# Patient Record
Sex: FEMALE | Race: WHITE | NOT HISPANIC OR LATINO | URBAN - METROPOLITAN AREA
[De-identification: names, ages, dates, MRNs, and addresses within clinical notes are randomized per-mention and may not be internally consistent; named-entity substitution may affect disease eponyms.]

---

## 2020-07-10 ENCOUNTER — INPATIENT (INPATIENT)
Facility: HOSPITAL | Age: 75
LOS: 13 days | Discharge: HOME HEALTH | DRG: 456 | End: 2020-07-24
Attending: INTERNAL MEDICINE | Admitting: INTERNAL MEDICINE
Payer: COMMERCIAL

## 2020-07-10 ENCOUNTER — RESULT REVIEW (OUTPATIENT)
Age: 75
End: 2020-07-10

## 2020-07-10 VITALS — WEIGHT: 139.99 LBS | HEIGHT: 64 IN

## 2020-07-10 DIAGNOSIS — J90 PLEURAL EFFUSION, NOT ELSEWHERE CLASSIFIED: ICD-10-CM

## 2020-07-10 LAB
ALBUMIN FLD-MCNC: 2.7 G/DL — SIGNIFICANT CHANGE UP
ALBUMIN SERPL ELPH-MCNC: 3.1 G/DL — LOW (ref 3.3–5)
ALP SERPL-CCNC: 126 U/L — HIGH (ref 40–120)
ALT FLD-CCNC: 33 U/L — SIGNIFICANT CHANGE UP (ref 12–78)
ANION GAP SERPL CALC-SCNC: 7 MMOL/L — SIGNIFICANT CHANGE UP (ref 5–17)
APPEARANCE UR: CLEAR — SIGNIFICANT CHANGE UP
APTT BLD: 29.4 SEC — SIGNIFICANT CHANGE UP (ref 27.5–35.5)
AST SERPL-CCNC: 28 U/L — SIGNIFICANT CHANGE UP (ref 15–37)
BASOPHILS # BLD AUTO: 0.07 K/UL — SIGNIFICANT CHANGE UP (ref 0–0.2)
BASOPHILS NFR BLD AUTO: 0.6 % — SIGNIFICANT CHANGE UP (ref 0–2)
BILIRUB SERPL-MCNC: 0.4 MG/DL — SIGNIFICANT CHANGE UP (ref 0.2–1.2)
BILIRUB UR-MCNC: NEGATIVE — SIGNIFICANT CHANGE UP
BUN SERPL-MCNC: 11 MG/DL — SIGNIFICANT CHANGE UP (ref 7–23)
CALCIUM SERPL-MCNC: 8.7 MG/DL — SIGNIFICANT CHANGE UP (ref 8.5–10.1)
CHLORIDE SERPL-SCNC: 104 MMOL/L — SIGNIFICANT CHANGE UP (ref 96–108)
CO2 SERPL-SCNC: 25 MMOL/L — SIGNIFICANT CHANGE UP (ref 22–31)
COLOR SPEC: YELLOW — SIGNIFICANT CHANGE UP
CREAT SERPL-MCNC: 0.73 MG/DL — SIGNIFICANT CHANGE UP (ref 0.5–1.3)
DIFF PNL FLD: NEGATIVE — SIGNIFICANT CHANGE UP
EOSINOPHIL # BLD AUTO: 0.19 K/UL — SIGNIFICANT CHANGE UP (ref 0–0.5)
EOSINOPHIL NFR BLD AUTO: 1.5 % — SIGNIFICANT CHANGE UP (ref 0–6)
GLUCOSE FLD-MCNC: 101 MG/DL — SIGNIFICANT CHANGE UP
GLUCOSE SERPL-MCNC: 114 MG/DL — HIGH (ref 70–99)
GLUCOSE UR QL: NEGATIVE MG/DL — SIGNIFICANT CHANGE UP
GRAM STN FLD: SIGNIFICANT CHANGE UP
HCT VFR BLD CALC: 36.6 % — SIGNIFICANT CHANGE UP (ref 34.5–45)
HGB BLD-MCNC: 12 G/DL — SIGNIFICANT CHANGE UP (ref 11.5–15.5)
IMM GRANULOCYTES NFR BLD AUTO: 1.3 % — SIGNIFICANT CHANGE UP (ref 0–1.5)
INR BLD: 1.04 RATIO — SIGNIFICANT CHANGE UP (ref 0.88–1.16)
KETONES UR-MCNC: ABNORMAL
LACTATE SERPL-SCNC: 1.3 MMOL/L — SIGNIFICANT CHANGE UP (ref 0.7–2)
LDH SERPL L TO P-CCNC: 410 U/L — SIGNIFICANT CHANGE UP
LEUKOCYTE ESTERASE UR-ACNC: ABNORMAL
LYMPHOCYTES # BLD AUTO: 18.6 % — SIGNIFICANT CHANGE UP (ref 13–44)
LYMPHOCYTES # BLD AUTO: 2.36 K/UL — SIGNIFICANT CHANGE UP (ref 1–3.3)
MAGNESIUM SERPL-MCNC: 2.2 MG/DL — SIGNIFICANT CHANGE UP (ref 1.6–2.6)
MCHC RBC-ENTMCNC: 30.1 PG — SIGNIFICANT CHANGE UP (ref 27–34)
MCHC RBC-ENTMCNC: 32.8 GM/DL — SIGNIFICANT CHANGE UP (ref 32–36)
MCV RBC AUTO: 91.7 FL — SIGNIFICANT CHANGE UP (ref 80–100)
MONOCYTES # BLD AUTO: 1.19 K/UL — HIGH (ref 0–0.9)
MONOCYTES NFR BLD AUTO: 9.4 % — SIGNIFICANT CHANGE UP (ref 2–14)
NEUTROPHILS # BLD AUTO: 8.71 K/UL — HIGH (ref 1.8–7.4)
NEUTROPHILS NFR BLD AUTO: 68.6 % — SIGNIFICANT CHANGE UP (ref 43–77)
NITRITE UR-MCNC: NEGATIVE — SIGNIFICANT CHANGE UP
NT-PROBNP SERPL-SCNC: 209 PG/ML — HIGH (ref 0–125)
PH FLD: 7.38 — SIGNIFICANT CHANGE UP
PH UR: 7 — SIGNIFICANT CHANGE UP (ref 5–8)
PLATELET # BLD AUTO: 470 K/UL — HIGH (ref 150–400)
POTASSIUM SERPL-MCNC: 4.2 MMOL/L — SIGNIFICANT CHANGE UP (ref 3.5–5.3)
POTASSIUM SERPL-SCNC: 4.2 MMOL/L — SIGNIFICANT CHANGE UP (ref 3.5–5.3)
PROT FLD-MCNC: 4 G/DL — SIGNIFICANT CHANGE UP
PROT SERPL-MCNC: 7 GM/DL — SIGNIFICANT CHANGE UP (ref 6–8.3)
PROT UR-MCNC: NEGATIVE MG/DL — SIGNIFICANT CHANGE UP
PROTHROM AB SERPL-ACNC: 12.2 SEC — SIGNIFICANT CHANGE UP (ref 10.6–13.6)
RBC # BLD: 3.99 M/UL — SIGNIFICANT CHANGE UP (ref 3.8–5.2)
RBC # FLD: 13.6 % — SIGNIFICANT CHANGE UP (ref 10.3–14.5)
SARS-COV-2 RNA SPEC QL NAA+PROBE: SIGNIFICANT CHANGE UP
SODIUM SERPL-SCNC: 136 MMOL/L — SIGNIFICANT CHANGE UP (ref 135–145)
SP GR SPEC: 1.01 — SIGNIFICANT CHANGE UP (ref 1.01–1.02)
SPECIMEN SOURCE FLD: SIGNIFICANT CHANGE UP
SPECIMEN SOURCE: SIGNIFICANT CHANGE UP
TROPONIN I SERPL-MCNC: <0.015 NG/ML — SIGNIFICANT CHANGE UP (ref 0.01–0.04)
TSH SERPL-MCNC: 0.66 UU/ML — SIGNIFICANT CHANGE UP (ref 0.34–4.82)
UROBILINOGEN FLD QL: NEGATIVE MG/DL — SIGNIFICANT CHANGE UP
WBC # BLD: 12.68 K/UL — HIGH (ref 3.8–10.5)
WBC # FLD AUTO: 12.68 K/UL — HIGH (ref 3.8–10.5)

## 2020-07-10 PROCEDURE — 72156 MRI NECK SPINE W/O & W/DYE: CPT

## 2020-07-10 PROCEDURE — 84157 ASSAY OF PROTEIN OTHER: CPT

## 2020-07-10 PROCEDURE — 88307 TISSUE EXAM BY PATHOLOGIST: CPT

## 2020-07-10 PROCEDURE — 86900 BLOOD TYPING SEROLOGIC ABO: CPT

## 2020-07-10 PROCEDURE — 80061 LIPID PANEL: CPT

## 2020-07-10 PROCEDURE — 82945 GLUCOSE OTHER FLUID: CPT

## 2020-07-10 PROCEDURE — 78226 HEPATOBILIARY SYSTEM IMAGING: CPT

## 2020-07-10 PROCEDURE — 88341 IMHCHEM/IMCYTCHM EA ADD ANTB: CPT

## 2020-07-10 PROCEDURE — 82306 VITAMIN D 25 HYDROXY: CPT

## 2020-07-10 PROCEDURE — 97530 THERAPEUTIC ACTIVITIES: CPT | Mod: GP

## 2020-07-10 PROCEDURE — 80053 COMPREHEN METABOLIC PANEL: CPT

## 2020-07-10 PROCEDURE — 88342 IMHCHEM/IMCYTCHM 1ST ANTB: CPT

## 2020-07-10 PROCEDURE — C1713: CPT

## 2020-07-10 PROCEDURE — 72158 MRI LUMBAR SPINE W/O & W/DYE: CPT

## 2020-07-10 PROCEDURE — 99497 ADVNCD CARE PLAN 30 MIN: CPT

## 2020-07-10 PROCEDURE — 99223 1ST HOSP IP/OBS HIGH 75: CPT

## 2020-07-10 PROCEDURE — 76000 FLUOROSCOPY <1 HR PHYS/QHP: CPT

## 2020-07-10 PROCEDURE — 83036 HEMOGLOBIN GLYCOSYLATED A1C: CPT

## 2020-07-10 PROCEDURE — 84443 ASSAY THYROID STIM HORMONE: CPT

## 2020-07-10 PROCEDURE — A9579: CPT

## 2020-07-10 PROCEDURE — 76700 US EXAM ABDOM COMPLETE: CPT

## 2020-07-10 PROCEDURE — 82248 BILIRUBIN DIRECT: CPT

## 2020-07-10 PROCEDURE — 70553 MRI BRAIN STEM W/O & W/DYE: CPT

## 2020-07-10 PROCEDURE — 88311 DECALCIFY TISSUE: CPT

## 2020-07-10 PROCEDURE — 80076 HEPATIC FUNCTION PANEL: CPT

## 2020-07-10 PROCEDURE — 32557 INSERT CATH PLEURA W/ IMAGE: CPT

## 2020-07-10 PROCEDURE — 83735 ASSAY OF MAGNESIUM: CPT

## 2020-07-10 PROCEDURE — 97116 GAIT TRAINING THERAPY: CPT | Mod: GP

## 2020-07-10 PROCEDURE — C1769: CPT

## 2020-07-10 PROCEDURE — 72157 MRI CHEST SPINE W/O & W/DYE: CPT

## 2020-07-10 PROCEDURE — 83986 ASSAY PH BODY FLUID NOS: CPT

## 2020-07-10 PROCEDURE — 88305 TISSUE EXAM BY PATHOLOGIST: CPT | Mod: 26

## 2020-07-10 PROCEDURE — 81001 URINALYSIS AUTO W/SCOPE: CPT

## 2020-07-10 PROCEDURE — 84100 ASSAY OF PHOSPHORUS: CPT

## 2020-07-10 PROCEDURE — 88108 CYTOPATH CONCENTRATE TECH: CPT | Mod: 26

## 2020-07-10 PROCEDURE — 85610 PROTHROMBIN TIME: CPT

## 2020-07-10 PROCEDURE — 47490 INCISION OF GALLBLADDER: CPT

## 2020-07-10 PROCEDURE — 87040 BLOOD CULTURE FOR BACTERIA: CPT

## 2020-07-10 PROCEDURE — 86923 COMPATIBILITY TEST ELECTRIC: CPT

## 2020-07-10 PROCEDURE — 84439 ASSAY OF FREE THYROXINE: CPT

## 2020-07-10 PROCEDURE — 71045 X-RAY EXAM CHEST 1 VIEW: CPT

## 2020-07-10 PROCEDURE — 82042 OTHER SOURCE ALBUMIN QUAN EA: CPT

## 2020-07-10 PROCEDURE — 87102 FUNGUS ISOLATION CULTURE: CPT

## 2020-07-10 PROCEDURE — 86803 HEPATITIS C AB TEST: CPT

## 2020-07-10 PROCEDURE — 99253 IP/OBS CNSLTJ NEW/EST LOW 45: CPT | Mod: 25

## 2020-07-10 PROCEDURE — C1729: CPT

## 2020-07-10 PROCEDURE — 86850 RBC ANTIBODY SCREEN: CPT

## 2020-07-10 PROCEDURE — 83615 LACTATE (LD) (LDH) ENZYME: CPT

## 2020-07-10 PROCEDURE — 88360 TUMOR IMMUNOHISTOCHEM/MANUAL: CPT

## 2020-07-10 PROCEDURE — 85025 COMPLETE CBC W/AUTO DIFF WBC: CPT

## 2020-07-10 PROCEDURE — C1889: CPT

## 2020-07-10 PROCEDURE — 87070 CULTURE OTHR SPECIMN AEROBIC: CPT

## 2020-07-10 PROCEDURE — 71045 X-RAY EXAM CHEST 1 VIEW: CPT | Mod: 26

## 2020-07-10 PROCEDURE — 87075 CULTR BACTERIA EXCEPT BLOOD: CPT

## 2020-07-10 PROCEDURE — 76705 ECHO EXAM OF ABDOMEN: CPT

## 2020-07-10 PROCEDURE — 83605 ASSAY OF LACTIC ACID: CPT

## 2020-07-10 PROCEDURE — 89051 BODY FLUID CELL COUNT: CPT

## 2020-07-10 PROCEDURE — 80048 BASIC METABOLIC PNL TOTAL CA: CPT

## 2020-07-10 PROCEDURE — 87635 SARS-COV-2 COVID-19 AMP PRB: CPT

## 2020-07-10 PROCEDURE — 85027 COMPLETE CBC AUTOMATED: CPT

## 2020-07-10 PROCEDURE — 97162 PT EVAL MOD COMPLEX 30 MIN: CPT | Mod: GP

## 2020-07-10 PROCEDURE — A9537: CPT

## 2020-07-10 PROCEDURE — 85730 THROMBOPLASTIN TIME PARTIAL: CPT

## 2020-07-10 PROCEDURE — 93306 TTE W/DOPPLER COMPLETE: CPT

## 2020-07-10 PROCEDURE — 86901 BLOOD TYPING SEROLOGIC RH(D): CPT

## 2020-07-10 PROCEDURE — 83690 ASSAY OF LIPASE: CPT

## 2020-07-10 PROCEDURE — 36415 COLL VENOUS BLD VENIPUNCTURE: CPT

## 2020-07-10 PROCEDURE — 72100 X-RAY EXAM L-S SPINE 2/3 VWS: CPT

## 2020-07-10 PROCEDURE — 88333 PATH CONSLTJ SURG CYTO XM 1: CPT

## 2020-07-10 PROCEDURE — 87086 URINE CULTURE/COLONY COUNT: CPT

## 2020-07-10 PROCEDURE — 84481 FREE ASSAY (FT-3): CPT

## 2020-07-10 PROCEDURE — 71260 CT THORAX DX C+: CPT

## 2020-07-10 RX ORDER — GABAPENTIN 400 MG/1
300 CAPSULE ORAL AT BEDTIME
Refills: 0 | Status: DISCONTINUED | OUTPATIENT
Start: 2020-07-10 | End: 2020-07-16

## 2020-07-10 RX ORDER — HYDROMORPHONE HYDROCHLORIDE 2 MG/ML
0.5 INJECTION INTRAMUSCULAR; INTRAVENOUS; SUBCUTANEOUS EVERY 4 HOURS
Refills: 0 | Status: DISCONTINUED | OUTPATIENT
Start: 2020-07-10 | End: 2020-07-16

## 2020-07-10 RX ORDER — MORPHINE SULFATE 50 MG/1
1 CAPSULE, EXTENDED RELEASE ORAL ONCE
Refills: 0 | Status: DISCONTINUED | OUTPATIENT
Start: 2020-07-10 | End: 2020-07-11

## 2020-07-10 RX ORDER — ASCORBIC ACID 60 MG
500 TABLET,CHEWABLE ORAL DAILY
Refills: 0 | Status: DISCONTINUED | OUTPATIENT
Start: 2020-07-10 | End: 2020-07-16

## 2020-07-10 RX ORDER — SENNA PLUS 8.6 MG/1
2 TABLET ORAL AT BEDTIME
Refills: 0 | Status: DISCONTINUED | OUTPATIENT
Start: 2020-07-10 | End: 2020-07-16

## 2020-07-10 RX ORDER — LIOTHYRONINE SODIUM 25 UG/1
5 TABLET ORAL DAILY
Refills: 0 | Status: DISCONTINUED | OUTPATIENT
Start: 2020-07-10 | End: 2020-07-16

## 2020-07-10 RX ORDER — AZITHROMYCIN 500 MG/1
500 TABLET, FILM COATED ORAL DAILY
Refills: 0 | Status: DISCONTINUED | OUTPATIENT
Start: 2020-07-10 | End: 2020-07-13

## 2020-07-10 RX ORDER — ONDANSETRON 8 MG/1
4 TABLET, FILM COATED ORAL EVERY 6 HOURS
Refills: 0 | Status: DISCONTINUED | OUTPATIENT
Start: 2020-07-10 | End: 2020-07-16

## 2020-07-10 RX ORDER — CEFTRIAXONE 500 MG/1
1000 INJECTION, POWDER, FOR SOLUTION INTRAMUSCULAR; INTRAVENOUS EVERY 24 HOURS
Refills: 0 | Status: DISCONTINUED | OUTPATIENT
Start: 2020-07-10 | End: 2020-07-12

## 2020-07-10 RX ORDER — LACTOBACILLUS ACIDOPHILUS 100MM CELL
1 CAPSULE ORAL
Refills: 0 | Status: DISCONTINUED | OUTPATIENT
Start: 2020-07-10 | End: 2020-07-16

## 2020-07-10 RX ORDER — OXYCODONE AND ACETAMINOPHEN 5; 325 MG/1; MG/1
1 TABLET ORAL EVERY 4 HOURS
Refills: 0 | Status: DISCONTINUED | OUTPATIENT
Start: 2020-07-10 | End: 2020-07-16

## 2020-07-10 RX ORDER — DEXAMETHASONE 0.5 MG/5ML
6 ELIXIR ORAL THREE TIMES A DAY
Refills: 0 | Status: DISCONTINUED | OUTPATIENT
Start: 2020-07-10 | End: 2020-07-12

## 2020-07-10 RX ORDER — ACETAMINOPHEN 500 MG
650 TABLET ORAL EVERY 4 HOURS
Refills: 0 | Status: DISCONTINUED | OUTPATIENT
Start: 2020-07-10 | End: 2020-07-16

## 2020-07-10 RX ORDER — LEVOTHYROXINE SODIUM 125 MCG
100 TABLET ORAL DAILY
Refills: 0 | Status: DISCONTINUED | OUTPATIENT
Start: 2020-07-10 | End: 2020-07-16

## 2020-07-10 RX ADMIN — Medication 500 MILLIGRAM(S): at 17:32

## 2020-07-10 RX ADMIN — GABAPENTIN 300 MILLIGRAM(S): 400 CAPSULE ORAL at 21:16

## 2020-07-10 RX ADMIN — AZITHROMYCIN 500 MILLIGRAM(S): 500 TABLET, FILM COATED ORAL at 17:32

## 2020-07-10 RX ADMIN — Medication 1 TABLET(S): at 21:17

## 2020-07-10 RX ADMIN — OXYCODONE AND ACETAMINOPHEN 1 TABLET(S): 5; 325 TABLET ORAL at 20:12

## 2020-07-10 RX ADMIN — Medication 1 TABLET(S): at 17:32

## 2020-07-10 RX ADMIN — CEFTRIAXONE 1000 MILLIGRAM(S): 500 INJECTION, POWDER, FOR SOLUTION INTRAMUSCULAR; INTRAVENOUS at 17:32

## 2020-07-10 NOTE — CONSULT NOTE ADULT - ASSESSMENT
75F with h/o neuropathy, hypothyroidism recently developed back pain and SOB.  Found to have pathologic fracture of L2 with large left pleural effusion.    Plan:    Admit to medicine.  Tx to 1N.  Heme/onc consult.  Pulm consult.  Left CT to water seal.  Repeat CT chest after effusion has been drained.    Will follow.    Case discussed with Dr. Stein.

## 2020-07-10 NOTE — H&P ADULT - NSHPREVIEWOFSYSTEMS_GEN_ALL_CORE
REVIEW OF SYSTEMS:    CONSTITUTIONAL: No weakness, fevers or chills  EYES/ENT: No visual changes;  No vertigo or throat pain   NECK: No pain or stiffness  RESPIRATORY:+  cough, no wheezing, hemoptysis; + shortness of breath  CARDIOVASCULAR: No chest pain or palpitations  GASTROINTESTINAL: No abdominal or epigastric pain. No nausea, vomiting, or hematemesis; No diarrhea or constipation. No melena or hematochezia.  GENITOURINARY: No dysuria, frequency or hematuria  NEUROLOGICAL: No numbness or weakness  SKIN: No itching, burning, rashes, or lesions   All other review of systems is negative unless indicated above.

## 2020-07-10 NOTE — ED ADULT NURSE NOTE - NSIMPLEMENTINTERV_GEN_ALL_ED
Implemented All Universal Safety Interventions:  [place] to call system. Call bell, personal items and telephone within reach. Instruct patient to call for assistance. Room bathroom lighting operational. Non-slip footwear when patient is off stretcher. Physically safe environment: no spills, clutter or unnecessary equipment. Stretcher in lowest position, wheels locked, appropriate side rails in place.

## 2020-07-10 NOTE — ED STATDOCS - OBJECTIVE STATEMENT
74 y/o F with presents to ED c/o SOB. Pt reports she has fluid in left lung and a possible mass underneath. Pt reports onset of pain was in March with some back pain, received cortisone shots and got an MRI of L2 and blood test x2 weeks ago. Pt went to Dr. Westfall (oncologist) and was sent for imaging with  Dr. Sanchez (pulmonologist) who discussed results of CT abdomen and chest this AM with pt and told pt to come to ED for evaluation for SOB. 76 y/o F presents to ED c/o SOB. Pt reports she has fluid in left lung and a possible mass underneath. Pt reports onset of back pain was in March, received cortisone shots and got an MRI of L2 and blood test x2 weeks ago. Pt went to Dr. Westfall (oncologist) and was sent for imaging with Dr. Sanchez (pulmonologist) who discussed results of CT abdomen and chest this AM with pt and told pt to come to ED for evaluation for SOB. On synthroid. 74 y/o F presents to ED c/o SOB. Pt reports she has fluid in left lung with concern for lung mass; Pt reports onset of back pain was in March 2020, received cortisone shots and got an MRI of L2 and blood test x2 weeks ago. Pt went to Dr. Westfall (oncologist) and was sent for imaging with Dr. Sanchez (pulmonologist) who discussed results of CT abdomen and chest this AM with pt and told pt to come to ED for evaluation for SOB. On synthroid.

## 2020-07-10 NOTE — H&P ADULT - HISTORY OF PRESENT ILLNESS
74 y/o female with PMH of Hypothyroidism and L2 pathologic compression fracture presents to  with 1 month history of dyspnea and 1 week history of the dry cough. Patient was seen by ortho spine surgeon, pulmonologist and oncologist and send for evaluation of left sided pleural effusion with concern for lung mass. Pt reports onset of back pain was in March 2020, received cortisone shots and got an MRI of L2 and blood test x2 weeks ago. Pt went to Dr. Westfall (oncologist) and was sent for imaging with Dr. Sanchez (pulmonologist) who discussed results of CT abdomen and chest this AM with pt and told pt to come to ED for evaluation for SOB.  In Ed -  T(F): 98.9Max: 98.9  HR: 80  (80 - 94) BP: 149/63 (149/63 - 150/77) RR: 24(17 - 24) SpO2: 98% (98% - 99%) EKG - sinus , low voltage QRS, troponin x 1neg, CXR - large pleural effusion, WBC 12, Cr 0.73, , covid PCR neg , s/p left chest tube placement in ED , fluid analysis pending

## 2020-07-10 NOTE — ED STATDOCS - ATTENDING CONTRIBUTION TO CARE
I, Sumi Grant DO,  performed the initial face to face bedside interview with this patient regarding history of present illness, review of symptoms and relevant past medical, social and family history.  I completed an independent physical examination.  I was the initial provider who evaluated this patient. I have signed out the follow up of any pending tests (i.e. labs, radiological studies) to the ACP.  I have communicated the patient’s plan of care and disposition with the ACP.  The history, relevant review of systems, past medical and surgical history, medical decision making, and physical examination was documented by the scribe in my presence and I attest to the accuracy of the documentation.

## 2020-07-10 NOTE — CONSULT NOTE ADULT - SUBJECTIVE AND OBJECTIVE BOX
History of Present Illness:  75y Female h/o hypothyroid s/p hemithyroidectomy, developed back pain a few months ago.  Evaluated for back pain and treated with steroid injections.  Pain returned, and when re-evaluated found to have pathological L2 fracture.  Sent to Heme/Onc for suspected cancer diagnosis. Dr. Westfall noticed absent breath sounds on the left - CT scan showed large left pleural effusion with possible underlying lung malignancy.  Referred to Dr. Sanchez who sent her to the ED for chest tube.   Patient is currently resting comfortably.  No shortness of breath at rest.  Denies fever, chills, weight loss, fatigue, or trauma.    PMH/PSH:  Hypothyroid    s/p hemithyroidectomy    Neuropathy      Relevant Family History  FAMILY HISTORY: none pertinent      SOCIAL HISTORY:  Smoker: former (quit in 1970s)  ETOH use: denies  Ilicit Drug use:  denies  Occupation:  retired   Live with:   Assist device use:  none    MEDICATIONS  (STANDING):  gabapentin 300 milliGRAM(s) Oral at bedtime  levothyroxine 100 MICROGram(s) Oral daily    MEDICATIONS  (PRN):  acetaminophen   Tablet .. 650 milliGRAM(s) Oral every 4 hours PRN Temp greater or equal to 38C (100.4F), Mild Pain (1 - 3)  aluminum hydroxide/magnesium hydroxide/simethicone Suspension 30 milliLiter(s) Oral every 4 hours PRN Dyspepsia  HYDROmorphone  Injectable 0.5 milliGRAM(s) IV Push every 4 hours PRN Severe Pain (7 - 10)  morphine  - Injectable 1 milliGRAM(s) IV Push once PRN Moderate Pain (4 - 6)  ondansetron Injectable 4 milliGRAM(s) IV Push every 6 hours PRN Nausea  oxycodone    5 mG/acetaminophen 325 mG 1 Tablet(s) Oral every 4 hours PRN Moderate Pain (4 - 6)  senna 2 Tablet(s) Oral at bedtime PRN Constipation      Allergies: No Known Allergies                                                            LABS:                        12.0   12.68 )-----------( 470      ( 10 Jul 2020 10:49 )             36.6     07-10    136  |  104  |  11  ----------------------------<  114<H>  4.2   |  25  |  0.73    Ca    8.7      10 Jul 2020 10:49  Mg     2.2     07-10    TPro  7.0  /  Alb  3.1<L>  /  TBili  0.4  /  DBili  x   /  AST  28  /  ALT  33  /  AlkPhos  126<H>  07-10    PT/INR - ( 10 Jul 2020 10:57 )   PT: 12.2 sec;   INR: 1.04 ratio         PTT - ( 10 Jul 2020 10:57 )  PTT:29.4 sec          Review of Systems       Constitutional: denies fever, chills, general malaise, weight loss, weight gain, diaphoresis   HEENT: denies dry mouth, sore throat, runny nose, photophobia, blurry vision, double vision, glasses, discharge, eye pain, difficulty hearing, vertigo, dysphagia, epistaxis, recent dental work    Respiratory: denies wheezing, hemoptysis  Cardiovascular: denies CP, palpitations, edema, diaphoresis   Gastrointestinal: denies nausea, vomiting, diarrhea, constipation, abdominal pain, melena   Genitourinary: denies dysuria, frequency, urgency, incontinence, hematuria   Skin/Breast: denies rash, hives, itching, masses, hair loss   Musculoskeletal: denies myalgias, arthritis, joint swelling, muscle weakness  Neurologic: denies syncope, LOC, headache, weakness, dizziness, parasthesias, numbness, seizures, confusion, dementia   Psychiatric: denies feeling anxious, depressed, suicidal, homicidal  Endocrine: denies increased fingerstick glucoses, cold or heat intolerance, polydipsia, polyuria, polyphagia   Hematology/Oncology: denies bruising, tender or enlarged lymph nodes   ROS negative x 10 systems except as noted above    T(C): 37.2 (07-10-20 @ 10:20), Max: 37.2 (07-10-20 @ 10:20)  HR: 80 (07-10-20 @ 13:19) (80 - 94)  BP: 149/63 (07-10-20 @ 13:19) (149/63 - 150/77)  RR: 24 (07-10-20 @ 13:19) (17 - 24)  SpO2: 98% (07-10-20 @ 13:19) on room air      Physical Exam  General: WD, WN, NAD                                                         Neuro: A+O x 3, non-focal, speech clear and intact                     Eyes: PERRL, EOMI   ENT: Normal exam of nasal/oral mucosa with absence of cyanosis.   Neck: supple, no JVD, trachea midline   Chest: absent breath sounds on the left  CV: RRR, +S1S2  GI: soft, NT, ND, +BS, no organomegaly noted  Extremities: MARTINEZ x 4, no C/C/E, distal motor/neuro/circ intact  SKIN: warm, dry, intact

## 2020-07-10 NOTE — ED STATDOCS - PROGRESS NOTE DETAILS
The patient is a 29y woman complaining of vaginal bleeding. 75 yr. old female PMH: presents to ED with difficulty breathing for 3 days. Sent to ED by Dr. Westfall for admission for mass in left lung revealed on CT Chest. Reports she developed back pain in march while in [place] . Seen by both  Dr. Harding and Khoi when she returned. Seen and examined by attending in intake. Plan: IV, Labs, admission. Will F/U with results and re evaluate. Patt NP Juanita AMOR: Endorsed to Dr. Waite for admission; thoracics at bedside.

## 2020-07-10 NOTE — PROCEDURE NOTE - NSICDXPROCEDURE_GEN_ALL_CORE_FT
PROCEDURES:  US guided chest tube insertion and thoracentesis 10-Jul-2020 13:47:38  Brittany Collins

## 2020-07-10 NOTE — ED ADULT NURSE REASSESSMENT NOTE - NS ED NURSE REASSESS COMMENT FT1
PA and hospitalist at bedside. Patient resting comfortable with no complaints. O2 sat on room air 99%

## 2020-07-10 NOTE — H&P ADULT - NSHPPHYSICALEXAM_GEN_ALL_CORE
PHYSICAL EXAM:    Constitutional: NAD, awake and alert, well-developed  HEENT: PERR, EOMI, Normal Hearing, MMM  Neck: Soft and supple, No LAD, No JVD  Respiratory: Breath sounds absent on the left side,  No wheezing, rales or rhonchi  Cardiovascular: S1 and S2, regular rate and rhythm, no Murmurs, gallops or rubs  Gastrointestinal: Bowel Sounds present, soft, nontender, nondistended, no guarding, no rebound  Extremities: No peripheral edema  Vascular: 2+ peripheral pulses  Neurological: A/O x 3, no focal deficits  Musculoskeletal: 5/5 strength b/l upper and lower extremities  Skin: No rashes  rectal : deferred, not indicated

## 2020-07-10 NOTE — H&P ADULT - ASSESSMENT
76 y/o female with PMH of Hypothyroidism and L2 pathologic compression fracture presents to  with 1 month history of dyspnea and 1 week history of the dry cough. Patient was seen by ortho spine surgeon, pulmonologist and oncologist and send for evaluation of left sided pleural effusion with concern for lung mass.   In Ed -  T(F): 98.9Max: 98.9  HR: 80  (80 - 94) BP: 149/63 (149/63 - 150/77) RR: 24(17 - 24) SpO2: 98% (98% - 99%) EKG - sinus , low voltage QRS, troponin x 1neg, CXR - large pleural effusion, WBC 12, Cr 0.73, , covid PCR neg , s/p left chest tube placement in ED , fluid analysis pending     * Dyspnea due to Large left pleural effusion r/o malignancy vs infectious  - med-surg  - CT consult- s/p left chest tube placement   - f/u pleural fluid analysis  - Ct chest /abd recently done as o/p  - oncology and pulmonology evaluation  - 2 d echo, check TSH, free T4, T3    * Hypothyroidism  - c/w LT4 and LT3, check thyroid function test   * Lower back pain due to pathologic L2 fracture  - pain management, c/w gabapentin  Advanced directives  - discussed advanced directive for 17 min  - FULL code    DVT proph -IPC  IMPROVE VTE Individual Risk Assessment  RISK                                                                Points  [  ] Previous VTE                                                  3  [  ] Thrombophilia                                               2  [  ] Lower limb paralysis                                      2        (unable to hold up >15 seconds)    [ x ] Current Cancer                                              2         (within 6 months)  [  ] Immobilization > 24 hrs                                1  [  ] ICU/CCU stay > 24 hours                              1  [ x ] Age > 60                                                      1  IMPROVE VTE Score _____3___    IMPROVE Score 0-1: Low Risk, No VTE prophylaxis required for most patients, encourage ambulation.   IMPROVE Score 2-3: At risk, pharmacologic VTE prophylaxis is indicated for most patients (in the absence of a contraindication)  IMPROVE Score > or = 4: High Risk, pharmacologic VTE prophylaxis is indicated for most patients (in the absence of a contraindication)    Time spend 72 minutes 74 y/o female with PMH of Hypothyroidism and L2 pathologic compression fracture presents to  with 1 month history of dyspnea and 1 week history of the dry cough. Patient was seen by ortho spine surgeon, pulmonologist and oncologist and send for evaluation of left sided pleural effusion with concern for lung mass.   In Ed -  T(F): 98.9Max: 98.9  HR: 80  (80 - 94) BP: 149/63 (149/63 - 150/77) RR: 24(17 - 24) SpO2: 98% (98% - 99%) EKG - sinus , low voltage QRS, troponin x 1neg, CXR - large pleural effusion, WBC 12, Cr 0.73, , covid PCR neg , s/p left chest tube placement in ED , fluid analysis pending     * Dyspnea due to Large left pleural effusion r/o malignancy vs infectious  * Suspected postobstructive PNA  - med-surg  - CT consult- s/p left chest tube placement   - f/u pleural fluid analysis  - Ct chest with contrast in am  - IV ceftriaxone, Azithromycin, cough Supressant   - oncology and pulmonology evaluation  - 2 d echo, check TSH, free T4, T3  , blood cultures, lactate, trend WBC  * Hypothyroidism  - c/w LT4 and LT3, check thyroid function test   * Lower back pain due to pathologic L2 fracture  - pain management, c/w gabapentin  - Dr. Flood consult  Advanced directives  - discussed advanced directive for 17 min  - FULL code    DVT proph -IPC  IMPROVE VTE Individual Risk Assessment  RISK                                                                Points  [  ] Previous VTE                                                  3  [  ] Thrombophilia                                               2  [  ] Lower limb paralysis                                      2        (unable to hold up >15 seconds)    [ x ] Current Cancer                                              2         (within 6 months)  [  ] Immobilization > 24 hrs                                1  [  ] ICU/CCU stay > 24 hours                              1  [ x ] Age > 60                                                      1  IMPROVE VTE Score _____3___    IMPROVE Score 0-1: Low Risk, No VTE prophylaxis required for most patients, encourage ambulation.   IMPROVE Score 2-3: At risk, pharmacologic VTE prophylaxis is indicated for most patients (in the absence of a contraindication)  IMPROVE Score > or = 4: High Risk, pharmacologic VTE prophylaxis is indicated for most patients (in the absence of a contraindication)    Time spend 72 minutes

## 2020-07-10 NOTE — CONSULT NOTE ADULT - SUBJECTIVE AND OBJECTIVE BOX
Patient is a 75y Female who presented to  ER today with Left Massive pleural effusion, sent in by Pulmonologist for eval. Patient seen by Dr. George last week for back pain, MRI demonstrated possible pathological L2 compression fx, pt only experiencing back pain at that time.  Sen to oncologist for workup for possible Lung Ca.  Pt had chest tube placed today by [facility] and >1L removed.  at bedside upon arrival.  Pt c/o LBP, difficulty walking 2/2 pain and weakness in thighs, numbness tingling along b/l anterior thighs, worse with ambulation. Denies bowel/bladder incontinence. Denies fevers/chills. No other complaints at this time.    HEALTH ISSUES - PROBLEM Dx:    Hypothyroidism       MEDICATIONS  (STANDING):  ascorbic acid 500 milliGRAM(s) Oral daily  azithromycin   Tablet 500 milliGRAM(s) Oral daily  cefTRIAXone Injectable. 1000 milliGRAM(s) IV Push every 24 hours  gabapentin 300 milliGRAM(s) Oral at bedtime  lactobacillus acidophilus 1 Tablet(s) Oral two times a day  levothyroxine 100 MICROGram(s) Oral daily  liothyronine 5 MICROGram(s) Oral daily  multivitamin 1 Tablet(s) Oral daily      Allergies    No Known Allergies    Intolerances        PAST MEDICAL & SURGICAL HISTORY:  Osteoarthritis  Fracture: L2 pathologic fracture  Hypothyroidism  No significant past surgical history                            12.0   12.68 )-----------( 470      ( 10 Jul 2020 10:49 )             36.6       10 Jul 2020 10:49    136    |  104    |  11     ----------------------------<  114    4.2     |  25     |  0.73     Ca    8.7        10 Jul 2020 10:49  Mg     2.2       10 Jul 2020 10:49    TPro  7.0    /  Alb  3.1    /  TBili  0.4    /  DBili  x      /  AST  28     /  ALT  33     /  AlkPhos  126    10 Jul 2020 10:49      PT/INR - ( 10 Jul 2020 10:57 )   PT: 12.2 sec;   INR: 1.04 ratio         PTT - ( 10 Jul 2020 10:57 )  PTT:29.4 sec    Urinalysis Basic - ( 10 Jul 2020 17:50 )    Color: Yellow / Appearance: Clear / S.010 / pH: x  Gluc: x / Ketone: Small  / Bili: Negative / Urobili: Negative mg/dL   Blood: x / Protein: Negative mg/dL / Nitrite: Negative   Leuk Esterase: Trace / RBC: 0-2 /HPF / WBC 0-2   Sq Epi: x / Non Sq Epi: Occasional / Bacteria: Few        Vital Signs Last 24 Hrs  T(C): 36.8 (07-10-20 @ 15:05), Max: 37.2 (07-10-20 @ 10:20)  T(F): 98.2 (07-10-20 @ 15:05), Max: 98.9 (07-10-20 @ 10:20)  HR: 87 (07-10-20 @ 15:05) (80 - 94)  BP: 152/74 (07-10-20 @ 15:05) (149/63 - 152/74)  BP(mean): 96 (07-10-20 @ 10:20) (96 - 96)  RR: 18 (07-10-20 @ 15:05) (17 - 24)  SpO2: 92% (07-10-20 @ 15:05) (92% - 99%)    Gen: NAD    Spine PE:  Skin intact  No gross deformity  midline TTP to upper L Spine  No bony step offs  paraspinal muscle ttp/hypertonicity along upper L Spine   Negative clonus  Negative babinski  Negative perez  No saddle anesthesia    Motor:                   C5                C6              C7               C8           T1   R            5/5                5/5            5/5             5/5          5/5  L             5/5               5/5             5/5             5/5          5/5                L2             L3             L4               L5            S1  R         4/5           4/5          4+/5             5/5           5/5  L          4/5          4/5           4+/5             5/5           5/5    Sensory:            C5         C6         C7      C8       T1        (0=absent, 1=impaired, 2=normal, NT=not testable)  R         2            2           2        2         2  L          2            2           2        2         2               L2          L3         L4      L5       S1         (0=absent, 1=impaired, 2=normal, NT=not testable)  R         1            1            2        2        2  L          1            1           2        2         2    Imaging: Out[facility] at [facility] demonstrates possible L2 pathologic compression fracture, Severe central stenosis L2-3 with moderate b/L foraminal stenosis at L2-3 and L3-4.     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  Recommend repeat CT Chest with IV contrast now that fluid is drained off  Will get repeat L spine MRI w/ Contrast patient exam worsened from office visit, concern for worsening compression  Scan MRI of C and T spine to look for contiguous mets  D/w patient and , given exam findings, patient may need surgical decompression to relieve the stenosis and LE symptoms  Answered all questions  Would prefer primary diagnosis so we can better determine outcomes, will F/U with H/O workup  Decadron 6mg Q8 for now, will repeat exam in am  Pain control  WBAT with assistive devices as needed  FU Labs/imaging  Care per primary team   SCDs  Dr. George aware and agrees with plan

## 2020-07-10 NOTE — ED ADULT NURSE NOTE - OBJECTIVE STATEMENT
pt is 76 yo female sent in by md for SOB, "fluid in lung," pt denies any cp, palpitations, fever, chills, or nvd.  no s/sx of distress noted.

## 2020-07-10 NOTE — PROCEDURE NOTE - NSPROCDETAILS_GEN_ALL_CORE
dressing applied/secured in place/ultrasound assessment of fluid (location)/Seldinger technique/ultrasound assessment of fluid (size)

## 2020-07-10 NOTE — ED ADULT NURSE NOTE - CHPI ED NUR SYMPTOMS NEG
no body aches/no cough/no chest pain/no wheezing/no edema/no headache/no diaphoresis/no chills/no hemoptysis/no fever

## 2020-07-11 LAB
A1C WITH ESTIMATED AVERAGE GLUCOSE RESULT: 5.7 % — HIGH (ref 4–5.6)
ANION GAP SERPL CALC-SCNC: 8 MMOL/L — SIGNIFICANT CHANGE UP (ref 5–17)
B PERT IGG+IGM PNL SER: ABNORMAL
BASOPHILS # BLD AUTO: 0.04 K/UL — SIGNIFICANT CHANGE UP (ref 0–0.2)
BASOPHILS NFR BLD AUTO: 0.3 % — SIGNIFICANT CHANGE UP (ref 0–2)
BUN SERPL-MCNC: 10 MG/DL — SIGNIFICANT CHANGE UP (ref 7–23)
CALCIUM SERPL-MCNC: 8.6 MG/DL — SIGNIFICANT CHANGE UP (ref 8.5–10.1)
CHLORIDE SERPL-SCNC: 103 MMOL/L — SIGNIFICANT CHANGE UP (ref 96–108)
CHOLEST SERPL-MCNC: 216 MG/DL — HIGH (ref 10–199)
CO2 SERPL-SCNC: 26 MMOL/L — SIGNIFICANT CHANGE UP (ref 22–31)
COLOR FLD: YELLOW — SIGNIFICANT CHANGE UP
CREAT SERPL-MCNC: 0.68 MG/DL — SIGNIFICANT CHANGE UP (ref 0.5–1.3)
CULTURE RESULTS: SIGNIFICANT CHANGE UP
EOSINOPHIL # BLD AUTO: 0.07 K/UL — SIGNIFICANT CHANGE UP (ref 0–0.5)
EOSINOPHIL # FLD: 0 % — SIGNIFICANT CHANGE UP
EOSINOPHIL NFR BLD AUTO: 0.5 % — SIGNIFICANT CHANGE UP (ref 0–6)
ESTIMATED AVERAGE GLUCOSE: 117 MG/DL — HIGH (ref 68–114)
FLUID INTAKE SUBSTANCE CLASS: SIGNIFICANT CHANGE UP
FLUID SEGMENTED GRANULOCYTES: 13 % — SIGNIFICANT CHANGE UP
FOLATE+VIT B12 SERBLD-IMP: 0 % — SIGNIFICANT CHANGE UP
GLUCOSE SERPL-MCNC: 196 MG/DL — HIGH (ref 70–99)
HCT VFR BLD CALC: 39.1 % — SIGNIFICANT CHANGE UP (ref 34.5–45)
HCV AB S/CO SERPL IA: 0.08 S/CO — SIGNIFICANT CHANGE UP (ref 0–0.99)
HCV AB SERPL-IMP: SIGNIFICANT CHANGE UP
HDLC SERPL-MCNC: 49 MG/DL — LOW
HGB BLD-MCNC: 13.3 G/DL — SIGNIFICANT CHANGE UP (ref 11.5–15.5)
IMM GRANULOCYTES NFR BLD AUTO: 1 % — SIGNIFICANT CHANGE UP (ref 0–1.5)
LIPID PNL WITH DIRECT LDL SERPL: 143 MG/DL — HIGH
LYMPHOCYTES # BLD AUTO: 1.04 K/UL — SIGNIFICANT CHANGE UP (ref 1–3.3)
LYMPHOCYTES # BLD AUTO: 7.7 % — LOW (ref 13–44)
LYMPHOCYTES # FLD: 40 % — SIGNIFICANT CHANGE UP
MAGNESIUM SERPL-MCNC: 2.2 MG/DL — SIGNIFICANT CHANGE UP (ref 1.6–2.6)
MCHC RBC-ENTMCNC: 30.6 PG — SIGNIFICANT CHANGE UP (ref 27–34)
MCHC RBC-ENTMCNC: 34 GM/DL — SIGNIFICANT CHANGE UP (ref 32–36)
MCV RBC AUTO: 89.9 FL — SIGNIFICANT CHANGE UP (ref 80–100)
MESOTHL CELL # FLD: 3 % — SIGNIFICANT CHANGE UP
MONOCYTES # BLD AUTO: 0.14 K/UL — SIGNIFICANT CHANGE UP (ref 0–0.9)
MONOCYTES NFR BLD AUTO: 1 % — LOW (ref 2–14)
MONOS+MACROS # FLD: 41 % — SIGNIFICANT CHANGE UP
NEUTROPHILS # BLD AUTO: 12.05 K/UL — HIGH (ref 1.8–7.4)
NEUTROPHILS NFR BLD AUTO: 89.5 % — HIGH (ref 43–77)
NRBC # FLD: 0 — SIGNIFICANT CHANGE UP
OTHER CELLS FLD MANUAL: 3 % — SIGNIFICANT CHANGE UP
PHOSPHATE SERPL-MCNC: 3.5 MG/DL — SIGNIFICANT CHANGE UP (ref 2.5–4.5)
PLATELET # BLD AUTO: 482 K/UL — HIGH (ref 150–400)
POTASSIUM SERPL-MCNC: 3.8 MMOL/L — SIGNIFICANT CHANGE UP (ref 3.5–5.3)
POTASSIUM SERPL-SCNC: 3.8 MMOL/L — SIGNIFICANT CHANGE UP (ref 3.5–5.3)
RBC # BLD: 4.35 M/UL — SIGNIFICANT CHANGE UP (ref 3.8–5.2)
RBC # FLD: 13.5 % — SIGNIFICANT CHANGE UP (ref 10.3–14.5)
RCV VOL RI: 1000 /UL — HIGH (ref 0–0)
SARS-COV-2 IGG SERPL QL IA: NEGATIVE — SIGNIFICANT CHANGE UP
SARS-COV-2 IGM SERPL IA-ACNC: <0.1 INDEX — SIGNIFICANT CHANGE UP
SODIUM SERPL-SCNC: 137 MMOL/L — SIGNIFICANT CHANGE UP (ref 135–145)
SPECIMEN SOURCE: SIGNIFICANT CHANGE UP
T3FREE SERPL-MCNC: 3.19 PG/ML — SIGNIFICANT CHANGE UP (ref 1.8–4.6)
T4 FREE SERPL-MCNC: 1.56 NG/DL — HIGH (ref 0.76–1.46)
TOTAL CHOLESTEROL/HDL RATIO MEASUREMENT: 4.4 RATIO — SIGNIFICANT CHANGE UP (ref 3.3–7.1)
TOTAL NUCLEATED CELL COUNT, BODY FLUID: 971 /UL — SIGNIFICANT CHANGE UP
TRIGL SERPL-MCNC: 120 MG/DL — SIGNIFICANT CHANGE UP (ref 10–149)
TSH SERPL-MCNC: 0.69 UU/ML — SIGNIFICANT CHANGE UP (ref 0.34–4.82)
TUBE TYPE: SIGNIFICANT CHANGE UP
WBC # BLD: 13.48 K/UL — HIGH (ref 3.8–10.5)
WBC # FLD AUTO: 13.48 K/UL — HIGH (ref 3.8–10.5)

## 2020-07-11 PROCEDURE — 72158 MRI LUMBAR SPINE W/O & W/DYE: CPT | Mod: 26

## 2020-07-11 PROCEDURE — 99232 SBSQ HOSP IP/OBS MODERATE 35: CPT

## 2020-07-11 PROCEDURE — 99233 SBSQ HOSP IP/OBS HIGH 50: CPT

## 2020-07-11 PROCEDURE — 72157 MRI CHEST SPINE W/O & W/DYE: CPT | Mod: 26

## 2020-07-11 PROCEDURE — 93306 TTE W/DOPPLER COMPLETE: CPT | Mod: 26

## 2020-07-11 PROCEDURE — 71045 X-RAY EXAM CHEST 1 VIEW: CPT | Mod: 26

## 2020-07-11 PROCEDURE — 72156 MRI NECK SPINE W/O & W/DYE: CPT | Mod: 26

## 2020-07-11 RX ORDER — DIAZEPAM 5 MG
2 TABLET ORAL ONCE
Refills: 0 | Status: DISCONTINUED | OUTPATIENT
Start: 2020-07-11 | End: 2020-07-11

## 2020-07-11 RX ORDER — DIAZEPAM 5 MG
2 TABLET ORAL
Refills: 0 | Status: DISCONTINUED | OUTPATIENT
Start: 2020-07-11 | End: 2020-07-11

## 2020-07-11 RX ORDER — LIDOCAINE 4 G/100G
1 CREAM TOPICAL DAILY
Refills: 0 | Status: DISCONTINUED | OUTPATIENT
Start: 2020-07-11 | End: 2020-07-16

## 2020-07-11 RX ADMIN — Medication 6 MILLIGRAM(S): at 06:02

## 2020-07-11 RX ADMIN — Medication 1 TABLET(S): at 10:30

## 2020-07-11 RX ADMIN — Medication 6 MILLIGRAM(S): at 14:24

## 2020-07-11 RX ADMIN — LIDOCAINE 1 PATCH: 4 CREAM TOPICAL at 19:42

## 2020-07-11 RX ADMIN — Medication 500 MILLIGRAM(S): at 10:30

## 2020-07-11 RX ADMIN — OXYCODONE AND ACETAMINOPHEN 1 TABLET(S): 5; 325 TABLET ORAL at 14:54

## 2020-07-11 RX ADMIN — Medication 100 MICROGRAM(S): at 06:02

## 2020-07-11 RX ADMIN — Medication 2 MILLIGRAM(S): at 11:16

## 2020-07-11 RX ADMIN — AZITHROMYCIN 500 MILLIGRAM(S): 500 TABLET, FILM COATED ORAL at 11:16

## 2020-07-11 RX ADMIN — MORPHINE SULFATE 1 MILLIGRAM(S): 50 CAPSULE, EXTENDED RELEASE ORAL at 10:26

## 2020-07-11 RX ADMIN — LIDOCAINE 1 PATCH: 4 CREAM TOPICAL at 15:01

## 2020-07-11 RX ADMIN — Medication 1 TABLET(S): at 21:48

## 2020-07-11 RX ADMIN — Medication 6 MILLIGRAM(S): at 21:48

## 2020-07-11 RX ADMIN — GABAPENTIN 300 MILLIGRAM(S): 400 CAPSULE ORAL at 21:48

## 2020-07-11 RX ADMIN — OXYCODONE AND ACETAMINOPHEN 1 TABLET(S): 5; 325 TABLET ORAL at 02:14

## 2020-07-11 RX ADMIN — LIOTHYRONINE SODIUM 5 MICROGRAM(S): 25 TABLET ORAL at 06:02

## 2020-07-11 RX ADMIN — CEFTRIAXONE 1000 MILLIGRAM(S): 500 INJECTION, POWDER, FOR SOLUTION INTRAMUSCULAR; INTRAVENOUS at 17:33

## 2020-07-11 NOTE — PROGRESS NOTE ADULT - SUBJECTIVE AND OBJECTIVE BOX
Pt seen & examined at bedside this AM, standing in room, discomfort from chest tube. No acute events overnight. Received steroids this AM first dose, but was ordered for last night.  Pt denies any new onset weakness/numbness/tingling or loss of bowel/bladder control.    D/w patient about MRI today.    Vital Signs Last 24 Hrs  T(C): 37.7 (11 Jul 2020 05:55), Max: 37.7 (11 Jul 2020 05:55)  T(F): 99.8 (11 Jul 2020 05:55), Max: 99.8 (11 Jul 2020 05:55)  HR: 89 (11 Jul 2020 05:55) (80 - 94)  BP: 132/70 (11 Jul 2020 05:55) (132/70 - 152/74)  BP(mean): 96 (10 Jul 2020 10:20) (96 - 96)  RR: 17 (11 Jul 2020 05:55) (17 - 24)  SpO2: 95% (11 Jul 2020 05:55) (92% - 99%)    Gen: NAD  Spine:  Skin intact, TTP along upper lumbar spine   +Sensation C5-T1 & L2-S1  Moving all extremities  Distal pulses intact  Soft compartments, - calf tenderness to palpation

## 2020-07-11 NOTE — CONSULT NOTE ADULT - ASSESSMENT
76 y/o female with PMH of Hypothyroidism and L2 pathologic compression fracture presents to  with 1 month history of dyspnea and 1 week history of the dry cough. Patient was seen by ortho spine surgeon, pulmonologist and oncologist and send for evaluation of left sided pleural effusion with concern for lung mass. Pt reports onset of back pain was in March 2020, received cortisone shots and got an MRI of L2 and blood test x2 weeks ago. Pt went to Dr. Westfall (oncologist) and was sent for imaging with Dr. Sanchez (pulmonologist) who discussed results of CT abdomen and chest this AM with pt and told pt to come to ED for evaluation for SOB.  In Ed -  T(F): 98.9Max: 98.9  HR: 80  (80 - 94) BP: 149/63 (149/63 - 150/77) RR: 24(17 - 24) SpO2: 98% (98% - 99%) EKG - sinus , low voltage QRS, troponin x 1neg, CXR - large pleural effusion, WBC 12, Cr 0.73, , covid PCR neg , s/p left chest tube placement in ED , fluid analysis pending (10 Jul 2020 13:26)    seen and evaluated today  data discussed with thoracic surgery as well as her RN at bedside  CT attached with some pain being medicated  breathing is better      Assessment / plan:  Massive left sided pleural effusion  s/p CT placement with exudative process  eval for malignancy as most likely etiology  pathologic compression fx  adequate oxygenation  hemodynamically stable    workup in progress  PF cytology  CT management as per thoracic surgery  fu PF cultures / MRI pending  data discussed with pt today

## 2020-07-11 NOTE — CONSULT NOTE ADULT - SUBJECTIVE AND OBJECTIVE BOX
Patient is a 75y old  Female who presents with a chief complaint of dyspnea , cough (2020 06:36)      HPI:  74 y/o female with PMH of Hypothyroidism and L2 pathologic compression fracture presents to  with 1 month history of dyspnea and 1 week history of the dry cough. Patient was seen by ortho spine surgeon, pulmonologist and oncologist and send for evaluation of left sided pleural effusion with concern for lung mass. Pt reports onset of back pain was in 2020, received cortisone shots and got an MRI of L2 and blood test x2 weeks ago. Pt went to Dr. Westfall (oncologist) and was sent for imaging with Dr. Sanchez (pulmonologist) who discussed results of CT abdomen and chest this AM with pt and told pt to come to ED for evaluation for SOB.  In Ed -  T(F): 98.9Max: 98.9  HR: 80  (80 - 94) BP: 149/63 (149/63 - 150/77) RR: 24(17 - 24) SpO2: 98% (98% - 99%) EKG - sinus , low voltage QRS, troponin x 1neg, CXR - large pleural effusion, WBC 12, Cr 0.73, , covid PCR neg , s/p left chest tube placement in ED , fluid analysis pending (10 Jul 2020 13:26)    seen and evaluated today  data discussed with thoracic surgery as well as her RN at bedside  CT attached with some pain being medicated  breathing is better    PAST MEDICAL & SURGICAL HISTORY:  Osteoarthritis  Fracture: L2 pathologic fracture  Hypothyroidism  No significant past surgical history      PREVIOUS DIAGNOSTIC TESTING:      MEDICATIONS  (STANDING):  ascorbic acid 500 milliGRAM(s) Oral daily  azithromycin   Tablet 500 milliGRAM(s) Oral daily  cefTRIAXone Injectable. 1000 milliGRAM(s) IV Push every 24 hours  dexAMETHasone  Injectable 6 milliGRAM(s) IV Push three times a day  diazepam    Tablet 2 milliGRAM(s) Oral once  gabapentin 300 milliGRAM(s) Oral at bedtime  lactobacillus acidophilus 1 Tablet(s) Oral two times a day  levothyroxine 100 MICROGram(s) Oral daily  liothyronine 5 MICROGram(s) Oral daily  multivitamin 1 Tablet(s) Oral daily    MEDICATIONS  (PRN):  acetaminophen   Tablet .. 650 milliGRAM(s) Oral every 4 hours PRN Temp greater or equal to 38C (100.4F), Mild Pain (1 - 3)  aluminum hydroxide/magnesium hydroxide/simethicone Suspension 30 milliLiter(s) Oral every 4 hours PRN Dyspepsia  hydrocodone/homatropine Syrup 5 milliLiter(s) Oral every 8 hours PRN Cough  HYDROmorphone  Injectable 0.5 milliGRAM(s) IV Push every 4 hours PRN Severe Pain (7 - 10)  morphine  - Injectable 1 milliGRAM(s) IV Push once PRN Moderate Pain (4 - 6)  ondansetron Injectable 4 milliGRAM(s) IV Push every 6 hours PRN Nausea  oxycodone    5 mG/acetaminophen 325 mG 1 Tablet(s) Oral every 4 hours PRN Moderate Pain (4 - 6)  senna 2 Tablet(s) Oral at bedtime PRN Constipation      FAMILY HISTORY:  FH: thyroid disease (Mother)      SOCIAL HISTORY:  ***    REVIEW OF SYSTEM:  data as above    Vital Signs Last 24 Hrs  T(C): 37 (2020 09:15), Max: 37.7 (2020 05:55)  T(F): 98.6 (2020 09:15), Max: 99.8 (2020 05:55)  HR: 95 (2020 09:15) (80 - 95)  BP: 136/71 (2020 09:15) (132/70 - 152/74)  BP(mean): 96 (10 Jul 2020 10:20) (96 - 96)  RR: 18 (2020 09:15) (17 - 24)  SpO2: 93% (2020 09:15) (92% - 99%)    I&O's Summary    10 Jul 2020 07:01  -  2020 07:00  --------------------------------------------------------  IN: 0 mL / OUT: 1035 mL / NET: -1035 mL      PHYSICAL EXAM  General Appearance: cooperative, no acute distress,   HEENT: PERRL, conjunctiva clear, EOM's intact, non injected pharynx, no exudate, TM   normal  Neck: Supple, , no adenopathy, thyroid: not enlarged, no carotid bruit or JVD  Back: Symmetric, no  tenderness,no soft tissue tenderness  Lungs: left sided CT attached, decreased BS left mid-lower lung  Heart: Regular rate and rhythm, S1, S2 normal, no murmur, rub or gallop  Abdomen: Soft, non-tender, bowel sounds active , no hepatosplenomegaly  Extremities: no cyanosis or edema, no joint swelling  Skin: Skin color, texture normal, no rashes   Neurologic: Alert and oriented X3 , cranial nerves intact, sensory and motor normal,    ECG:    LABS:                          13.3   13.48 )-----------( 482      ( 2020 09:13 )             39.1     07-10    136  |  104  |  11  ----------------------------<  114<H>  4.2   |  25  |  0.73    Ca    8.7      10 Jul 2020 10:49  Mg     2.2     07-10    TPro  7.0  /  Alb  3.1<L>  /  TBili  0.4  /  DBili  x   /  AST  28  /  ALT  33  /  AlkPhos  126<H>  07-10    CARDIAC MARKERS ( 10 Jul 2020 10:49 )  <0.015 ng/mL / x     / x     / x     / x            Pro BNP  209 07-10 @ 10:49  D Dimer  -- 07-10 @ 10:49    PT/INR - ( 10 Jul 2020 10:57 )   PT: 12.2 sec;   INR: 1.04 ratio         PTT - ( 10 Jul 2020 10:57 )  PTT:29.4 sec  Urinalysis Basic - ( 10 Jul 2020 17:50 )    Color: Yellow / Appearance: Clear / S.010 / pH: x  Gluc: x / Ketone: Small  / Bili: Negative / Urobili: Negative mg/dL   Blood: x / Protein: Negative mg/dL / Nitrite: Negative   Leuk Esterase: Trace / RBC: 0-2 /HPF / WBC 0-2   Sq Epi: x / Non Sq Epi: Occasional / Bacteria: Few            RADIOLOGY & ADDITIONAL STUDIES:    < from: Xray Chest 1 View- PORTABLE-Urgent (07.10.20 @ 14:20) >    PROCEDURE DATE:  07/10/2020          INTERPRETATION:  AP chest on July 10, 2020 at 2:12 PM. Patient had catheter left chest tube inserted for massive effusion.    Heart size cannot be assessed.    A catheter left chest tube has been inserted at the base.    Massive left effusion seen prior in the day is significantly diminished but a very large effusion remains. There is no visible pneumothorax.    Underlying pathology cannot be excluded.    IMPRESSION: Diminished left effusion after chest tube. Significant effusion remains.    < end of copied text >

## 2020-07-11 NOTE — PROGRESS NOTE ADULT - SUBJECTIVE AND OBJECTIVE BOX
Subjective:  CT unclamped.  Patient complaining of pain near CT site.    Vital Signs:  AVSS    Relevant labs, radiology and Medications reviewed                        14.6   20.12 )-----------( 563      ( 12 Jul 2020 07:18 )             43.9     07-12    137  |  103  |  14  ----------------------------<  135<H>  4.1   |  27  |  0.79    Ca    8.4<L>      12 Jul 2020 07:18  Phos  3.5     07-11  Mg     2.2     07-11    TPro  7.7  /  Alb  3.2<L>  /  TBili  0.3  /  DBili  0.1  /  AST  16  /  ALT  29  /  AlkPhos  139<H>  07-12      MEDICATIONS  (STANDING):  ascorbic acid 500 milliGRAM(s) Oral daily  azithromycin   Tablet 500 milliGRAM(s) Oral daily  dexAMETHasone  Injectable 4 milliGRAM(s) IV Push every 8 hours  gabapentin 300 milliGRAM(s) Oral at bedtime  lactobacillus acidophilus 1 Tablet(s) Oral two times a day  levothyroxine 100 MICROGram(s) Oral daily  lidocaine   Patch 1 Patch Transdermal daily  liothyronine 5 MICROGram(s) Oral daily  meropenem  IVPB 1000 milliGRAM(s) IV Intermittent every 8 hours  multivitamin 1 Tablet(s) Oral daily  pantoprazole    Tablet 40 milliGRAM(s) Oral before breakfast    MEDICATIONS  (PRN):  acetaminophen   Tablet .. 650 milliGRAM(s) Oral every 4 hours PRN Temp greater or equal to 38C (100.4F), Mild Pain (1 - 3)  aluminum hydroxide/magnesium hydroxide/simethicone Suspension 30 milliLiter(s) Oral every 4 hours PRN Dyspepsia  hydrocodone/homatropine Syrup 5 milliLiter(s) Oral every 8 hours PRN Cough  HYDROmorphone  Injectable 0.5 milliGRAM(s) IV Push every 4 hours PRN Severe Pain (7 - 10)  ondansetron Injectable 4 milliGRAM(s) IV Push every 6 hours PRN Nausea  oxycodone    5 mG/acetaminophen 325 mG 1 Tablet(s) Oral every 4 hours PRN Moderate Pain (4 - 6)  senna 2 Tablet(s) Oral at bedtime PRN Constipation      Physical exam  Gen: NAD, breathing comfortably  Neuro: AO x 3  Card: S1 S2  Pulm: decreased left basilar breath sounds  Abd: Soft NT ND  Ext: no edema    Tubes: copious amounts of serous drainage.  No AL.    I&O's Summary    12 Jul 2020 07:01  -  13 Jul 2020 07:00  --------------------------------------------------------  IN: 360 mL / OUT: 205 mL / NET: 155 mL        Assessment  75y Female  w/ PAST MEDICAL & SURGICAL HISTORY:  Osteoarthritis  Fracture: L2 pathologic fracture  Hypothyroidism  No significant past surgical history    Patient is a 75y old  Female who presents with a chief complaint of dyspnea , cough (12 Jul 2020 17:45)  .  Pathologic fx of L2.  New left pleural effusion s/p left PTC placement.    PLAN    CT to water seal.  Daily CXR.  Follow up cytology.    Discussed with Cardiothoracic Team at AM rounds.

## 2020-07-11 NOTE — PROGRESS NOTE ADULT - SUBJECTIVE AND OBJECTIVE BOX
Subjective:  Patient is a 75y old  Female who presents with a chief complaint of dyspnea , cough     HPI:  74 y/o female with PMH of Hypothyroidism and L2 pathologic compression fracture presents to   on 7/10/20 with 1 month history of dyspnea and 1 week history of the dry cough. Patient was seen by ortho spine surgeon, pulmonologist and oncologist and send for evaluation of left sided pleural effusion with concern for lung mass. Pt reports onset of back pain was in 2020, received cortisone shots and got an MRI of L2 and blood test x2 weeks ago. Pt went to Dr. Westfall (oncologist) and was sent for imaging with Dr. Sanchez (pulmonologist) who discussed results of CT abdomen and chest this AM with pt and told pt to come to ED for evaluation for SOB.  In Ed -  T(F): 98.9Max: 98.9  HR: 80  (80 - 94) BP: 149/63 (149/63 - 150/77) RR: 24(17 - 24) SpO2: 98% (98% - 99%) EKG - sinus , low voltage QRS, troponin x 1neg, CXR - large pleural effusion, WBC 12, Cr 0.73, , covid PCR neg , s/p left chest tube placement in ED , fluid analysis pending (10 Jul 2020 13:26)     -  Patient seen and examined at bedside earlier today, feels better, left sided chest wall pain around CT, + cough, dyspnea improved, POC discussed in length    Review of system- Rest of the review of system are negative except mentioned in HPI    OBJECTIVE:   T(C): 36.7 (20 @ 16:22), Max: 37.7 (20 @ 05:55)  HR: 92 (20 @ 16:22) (89 - 95)  BP: 116/70 (20 @ 16:22) (116/70 - 136/71)  RR: 18 (20 @ 16:22) (17 - 18)  SpO2: 93% (20 @ 16:22) (93% - 95%)  Wt(kg): --  Daily     Daily   CAPILLARY BLOOD GLUCOSE        PHYSICAL EXAM:  GENERAL: NAD  NERVOUS SYSTEM:  Alert & Oriented X3, non- focal exam,  Motor Strength 5/5 B/L upper and lower extremities; DTRs 2+ intact and symmetric  HEAD:  Atraumatic, Normocephalic  EYES: EOMI, PERRLA, conjunctiva and sclera clear  HEENT: Moist mucous membranes  NECK: Supple, No JVD  CHEST/LUNG: BS decreased over left base, ; No rales, no rhonchi, no wheezing  HEART: Regular rate and rhythm; No murmurs, no rubs or gallops  ABDOMEN: Soft, Nontender, Nondistended; Bowel sounds present  GENITOURINARY- Voiding, no suprapubic tenderness  EXTREMITIES:  2+ Peripheral Pulses, No clubbing, cyanosis,   edema  MUSCULOSKELETAL:- No muscle tenderness, Muscle tone normal, No joint tenderness, no Joint swelling,  Joint ROM -normal  SKIN-no rash, no lesion    LABS: all reviewed                         13.3   13.48 )-----------( 482      ( 2020 09:13 )             39.1     07-11    137  |  103  |  10  ----------------------------<  196<H>  3.8   |  26  |  0.68    Ca    8.6      2020 09:13  Phos  3.5     07-11  Mg     2.2     07-11    TPro  7.0  /  Alb  3.1<L>  /  TBili  0.4  /  DBili  x   /  AST  28  /  ALT  33  /  AlkPhos  126<H>  07-10    PT/INR - ( 10 Jul 2020 10:57 )   PT: 12.2 sec;   INR: 1.04 ratio         PTT - ( 10 Jul 2020 10:57 )  PTT:29.4 sec    CARDIAC MARKERS ( 10 Jul 2020 10:49 )  <0.015 ng/mL / x     / x     / x     / x          Urinalysis Basic - ( 10 Jul 2020 17:50 )    Color: Yellow / Appearance: Clear / S.010 / pH: x  Gluc: x / Ketone: Small  / Bili: Negative / Urobili: Negative mg/dL   Blood: x / Protein: Negative mg/dL / Nitrite: Negative   Leuk Esterase: Trace / RBC: 0-2 /HPF / WBC 0-2   Sq Epi: x / Non Sq Epi: Occasional / Bacteria: Few          RECENT CULTURES:    RADIOLOGY & ADDITIONAL TESTS: all reviewed   EKG reviewed  < from: 12 Lead ECG (07.10.20 @ 10:22) >    Ventricular Rate 87 BPM    Atrial Rate 87 BPM    P-R Interval 122 ms    QRS Duration 72 ms    Q-T Interval 348 ms    QTC Calculation(Bezet) 418 ms    P Axis 17 degrees    R Axis 1 degrees    T Axis 29 degrees    Diagnosis Line Normal sinus rhythm  Low voltage QRS  Cannot rule out Anterior infarct , age undetermined  Abnormal ECG  No previous ECGs available    < end of copied text >  < from: MR Cervical Spine w/wo IV Cont (20 @ 13:57) >  IMPRESSION: Degenerative changes as described above.    Hemangiomas as described above.      < end of copied text >  < from: MR Thoracic Spine w/wo IV Cont (20 @ 13:46) >  IMPRESSION: Abnormal lesions involving the T12 and T10 levels as described above.    Compression fracture is seen involving L2 level with abnormal T1 and T2 prolongation seen. This could be compatible with a pathologic compression fracture. Retropulsed fragment is identified as described above.    < end of copied text >    < from: Xray Chest 1 View- PORTABLE-Routine (20 @ 10:08) >  FINDINGS: Since prior evaluation the position of the indwelling left pleural space pigtail drainage catheter has changed, the pigtail now overlying the medial aspect of the left mid thorax. There is interval decrease in left pleural effusion from prior; a moderately sized left pleural effusion persists. Underlying lung parenchymal infiltrate, consolidation or atelectasis cannot be excluded. There is no evidence for left-sided pneumothorax. No acute right-sided infiltrate is identified. No evidence for right pleural effusion or pneumothorax. No mediastinal shift noted. Degenerative changes of the thoracic spine evident.    IMPRESSION: Interval decrease in left pleural effusion from prior with change in position of indwelling pigtail pleural space drainage catheter. Moderately sized left pleural effusion persists. See above discussion.          < end of copied text >    < from: TTE Echo Complete w/o Contrast w/ Doppler (20 @ 09:02) >   The mitral valve leaflets appear thin and normal.   Trace mitral regurgitation is present.   EA reversal of the mitral inflow consistent with reduced compliance of the   left ventricle.   The aortic valve is not well visualized, appears normal. Valve opening   seems to be normal.   Mild (1+) aortic regurgitation is present.   Normal appearing tricuspid valve structure and function.   Mild (1+) tricuspid valve regurgitation is present.   Pulmonic valve not well seen.   Normal appearing left atrium.   Left ventricle systolic function appears preserved based on difficult   trans thoracic views; segmental wall motion abnormalities can not be ruled   out.   Visual estimation of left ventricle ejection fraction is >50%.   The IVC appears normal.   Massive anechoic cystic structure (15 cm x 5 cm) noted within the liver   possibly enlarged gallbladder. There are calcified gallstones noted within   the structure.   Pleural effusion - massive left pleural effusion.    < end of copied text >    Current medications:  acetaminophen   Tablet .. 650 milliGRAM(s) Oral every 4 hours PRN  aluminum hydroxide/magnesium hydroxide/simethicone Suspension 30 milliLiter(s) Oral every 4 hours PRN  ascorbic acid 500 milliGRAM(s) Oral daily  azithromycin   Tablet 500 milliGRAM(s) Oral daily  cefTRIAXone Injectable. 1000 milliGRAM(s) IV Push every 24 hours  dexAMETHasone  Injectable 6 milliGRAM(s) IV Push three times a day  gabapentin 300 milliGRAM(s) Oral at bedtime  hydrocodone/homatropine Syrup 5 milliLiter(s) Oral every 8 hours PRN  HYDROmorphone  Injectable 0.5 milliGRAM(s) IV Push every 4 hours PRN  lactobacillus acidophilus 1 Tablet(s) Oral two times a day  levothyroxine 100 MICROGram(s) Oral daily  lidocaine   Patch 1 Patch Transdermal daily  liothyronine 5 MICROGram(s) Oral daily  multivitamin 1 Tablet(s) Oral daily  ondansetron Injectable 4 milliGRAM(s) IV Push every 6 hours PRN  oxycodone    5 mG/acetaminophen 325 mG 1 Tablet(s) Oral every 4 hours PRN  senna 2 Tablet(s) Oral at bedtime PRN

## 2020-07-11 NOTE — PROGRESS NOTE ADULT - ASSESSMENT
74 y/o female with PMH of Hypothyroidism and L2 pathologic compression fracture presents to  with 1 month history of dyspnea and 1 week history of the dry cough. Patient was seen by ortho spine surgeon, pulmonologist and oncologist and send for evaluation of left sided pleural effusion with concern for lung mass.   In Ed -  T(F): 98.9Max: 98.9  HR: 80  (80 - 94) BP: 149/63 (149/63 - 150/77) RR: 24(17 - 24) SpO2: 98% (98% - 99%) EKG - sinus , low voltage QRS, troponin x 1neg, CXR - large pleural effusion, WBC 12, Cr 0.73, , covid PCR neg , s/p left chest tube placement in ED , fluid analysis pending     * Dyspnea due to Large left pleural effusion s/p left chest tube placement, exudative effusion suspected malignancy vs infectious  * Suspected postobstructive PNA  - CT consult- s/p left chest tube placement   - f/u pleural fluid analysis - exudate, cx - neg, cytology - pending   - Ct chest with contrast  once lung is expanded  - IV ceftriaxone, Azithromycin, cough Supressant   - oncology and pulmonology evaluation  - 2 d echo- EF wnl, liver cystic lesion 15x5 cm  - f/u cultures  - pulmonology and oncology consult  * Liver lesion cystic on 2 d echo - will get Ct abd from oncology team  * Hypothyroidism  - c/w LT4 and LT3, check thyroid function test   * Lower back pain due to pathologic L2 fracture  - pain management, c/w gabapentin  - Dr. Flood consult     Advanced directives  - discussed advanced directive for 17 min  - FULL code    DVT proph -IPC     Dispo - CT management, pain management, oncology and pulmonology work-up

## 2020-07-11 NOTE — PROGRESS NOTE ADULT - ASSESSMENT
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  Recommend repeat CT Chest with IV contrast now that fluid is drained off  Will get repeat L spine MRI w/ Contrast to eval spine and compression, not worsening based on AM exam  Scan MRI of C and T spine to look for contiguous mets  D/w patient and , given exam findings, patient may need surgical decompression to relieve the stenosis and LE symptoms  Answered all questions  Would prefer primary diagnosis so we can better determine outcomes, will F/U with H/O workup  Decadron 6mg Q8 for now, will repeat exam in am  Pain control  WBAT with assistive devices as needed  FU Labs/imaging  Care per primary team   SCDs  Dr. George aware and agrees with plan

## 2020-07-12 DIAGNOSIS — D72.829 ELEVATED WHITE BLOOD CELL COUNT, UNSPECIFIED: ICD-10-CM

## 2020-07-12 DIAGNOSIS — J90 PLEURAL EFFUSION, NOT ELSEWHERE CLASSIFIED: ICD-10-CM

## 2020-07-12 DIAGNOSIS — T14.8XXA OTHER INJURY OF UNSPECIFIED BODY REGION, INITIAL ENCOUNTER: ICD-10-CM

## 2020-07-12 DIAGNOSIS — D47.3 ESSENTIAL (HEMORRHAGIC) THROMBOCYTHEMIA: ICD-10-CM

## 2020-07-12 LAB
ALBUMIN SERPL ELPH-MCNC: 3.2 G/DL — LOW (ref 3.3–5)
ALP SERPL-CCNC: 139 U/L — HIGH (ref 40–120)
ALT FLD-CCNC: 29 U/L — SIGNIFICANT CHANGE UP (ref 12–78)
ANION GAP SERPL CALC-SCNC: 7 MMOL/L — SIGNIFICANT CHANGE UP (ref 5–17)
AST SERPL-CCNC: 16 U/L — SIGNIFICANT CHANGE UP (ref 15–37)
BASOPHILS # BLD AUTO: 0.03 K/UL — SIGNIFICANT CHANGE UP (ref 0–0.2)
BASOPHILS NFR BLD AUTO: 0.1 % — SIGNIFICANT CHANGE UP (ref 0–2)
BILIRUB DIRECT SERPL-MCNC: 0.1 MG/DL — SIGNIFICANT CHANGE UP (ref 0–0.2)
BILIRUB INDIRECT FLD-MCNC: 0.2 MG/DL — SIGNIFICANT CHANGE UP (ref 0.2–1)
BILIRUB SERPL-MCNC: 0.3 MG/DL — SIGNIFICANT CHANGE UP (ref 0.2–1.2)
BUN SERPL-MCNC: 14 MG/DL — SIGNIFICANT CHANGE UP (ref 7–23)
CALCIUM SERPL-MCNC: 8.4 MG/DL — LOW (ref 8.5–10.1)
CHLORIDE SERPL-SCNC: 103 MMOL/L — SIGNIFICANT CHANGE UP (ref 96–108)
CO2 SERPL-SCNC: 27 MMOL/L — SIGNIFICANT CHANGE UP (ref 22–31)
CREAT SERPL-MCNC: 0.79 MG/DL — SIGNIFICANT CHANGE UP (ref 0.5–1.3)
EOSINOPHIL # BLD AUTO: 0 K/UL — SIGNIFICANT CHANGE UP (ref 0–0.5)
EOSINOPHIL NFR BLD AUTO: 0 % — SIGNIFICANT CHANGE UP (ref 0–6)
GLUCOSE SERPL-MCNC: 135 MG/DL — HIGH (ref 70–99)
HCT VFR BLD CALC: 43.9 % — SIGNIFICANT CHANGE UP (ref 34.5–45)
HGB BLD-MCNC: 14.6 G/DL — SIGNIFICANT CHANGE UP (ref 11.5–15.5)
IMM GRANULOCYTES NFR BLD AUTO: 0.8 % — SIGNIFICANT CHANGE UP (ref 0–1.5)
LIDOCAIN IGE QN: 69 U/L — LOW (ref 73–393)
LYMPHOCYTES # BLD AUTO: 1.53 K/UL — SIGNIFICANT CHANGE UP (ref 1–3.3)
LYMPHOCYTES # BLD AUTO: 7.6 % — LOW (ref 13–44)
MCHC RBC-ENTMCNC: 29.6 PG — SIGNIFICANT CHANGE UP (ref 27–34)
MCHC RBC-ENTMCNC: 33.3 GM/DL — SIGNIFICANT CHANGE UP (ref 32–36)
MCV RBC AUTO: 89 FL — SIGNIFICANT CHANGE UP (ref 80–100)
MONOCYTES # BLD AUTO: 0.77 K/UL — SIGNIFICANT CHANGE UP (ref 0–0.9)
MONOCYTES NFR BLD AUTO: 3.8 % — SIGNIFICANT CHANGE UP (ref 2–14)
NEUTROPHILS # BLD AUTO: 17.62 K/UL — HIGH (ref 1.8–7.4)
NEUTROPHILS NFR BLD AUTO: 87.7 % — HIGH (ref 43–77)
PLATELET # BLD AUTO: 563 K/UL — HIGH (ref 150–400)
POTASSIUM SERPL-MCNC: 4.1 MMOL/L — SIGNIFICANT CHANGE UP (ref 3.5–5.3)
POTASSIUM SERPL-SCNC: 4.1 MMOL/L — SIGNIFICANT CHANGE UP (ref 3.5–5.3)
PROT SERPL-MCNC: 7.7 GM/DL — SIGNIFICANT CHANGE UP (ref 6–8.3)
RBC # BLD: 4.93 M/UL — SIGNIFICANT CHANGE UP (ref 3.8–5.2)
RBC # FLD: 13.2 % — SIGNIFICANT CHANGE UP (ref 10.3–14.5)
SODIUM SERPL-SCNC: 137 MMOL/L — SIGNIFICANT CHANGE UP (ref 135–145)
WBC # BLD: 20.12 K/UL — HIGH (ref 3.8–10.5)
WBC # FLD AUTO: 20.12 K/UL — HIGH (ref 3.8–10.5)

## 2020-07-12 PROCEDURE — 99233 SBSQ HOSP IP/OBS HIGH 50: CPT

## 2020-07-12 PROCEDURE — 71045 X-RAY EXAM CHEST 1 VIEW: CPT | Mod: 26

## 2020-07-12 PROCEDURE — 76700 US EXAM ABDOM COMPLETE: CPT | Mod: 26

## 2020-07-12 RX ORDER — DIPHENHYDRAMINE HCL 50 MG
25 CAPSULE ORAL ONCE
Refills: 0 | Status: COMPLETED | OUTPATIENT
Start: 2020-07-12 | End: 2020-07-12

## 2020-07-12 RX ORDER — CEFEPIME 1 G/1
INJECTION, POWDER, FOR SOLUTION INTRAMUSCULAR; INTRAVENOUS
Refills: 0 | Status: DISCONTINUED | OUTPATIENT
Start: 2020-07-12 | End: 2020-07-12

## 2020-07-12 RX ORDER — CEFEPIME 1 G/1
1000 INJECTION, POWDER, FOR SOLUTION INTRAMUSCULAR; INTRAVENOUS ONCE
Refills: 0 | Status: COMPLETED | OUTPATIENT
Start: 2020-07-12 | End: 2020-07-12

## 2020-07-12 RX ORDER — CEFEPIME 1 G/1
1000 INJECTION, POWDER, FOR SOLUTION INTRAMUSCULAR; INTRAVENOUS EVERY 12 HOURS
Refills: 0 | Status: DISCONTINUED | OUTPATIENT
Start: 2020-07-12 | End: 2020-07-12

## 2020-07-12 RX ORDER — MEROPENEM 1 G/30ML
1000 INJECTION INTRAVENOUS EVERY 8 HOURS
Refills: 0 | Status: DISCONTINUED | OUTPATIENT
Start: 2020-07-12 | End: 2020-07-13

## 2020-07-12 RX ADMIN — MEROPENEM 100 MILLIGRAM(S): 1 INJECTION INTRAVENOUS at 16:42

## 2020-07-12 RX ADMIN — Medication 25 MILLIGRAM(S): at 12:25

## 2020-07-12 RX ADMIN — MEROPENEM 100 MILLIGRAM(S): 1 INJECTION INTRAVENOUS at 23:38

## 2020-07-12 RX ADMIN — CEFEPIME 1000 MILLIGRAM(S): 1 INJECTION, POWDER, FOR SOLUTION INTRAMUSCULAR; INTRAVENOUS at 09:58

## 2020-07-12 NOTE — PROGRESS NOTE ADULT - ASSESSMENT
74 y/o female with PMH of Hypothyroidism and L2 pathologic compression fracture presents to  with 1 month history of dyspnea and 1 week history of the dry cough. Patient was seen by ortho spine surgeon, pulmonologist and oncologist and send for evaluation of left sided pleural effusion with concern for lung mass.   In Ed -  T(F): 98.9Max: 98.9  HR: 80  (80 - 94) BP: 149/63 (149/63 - 150/77) RR: 24(17 - 24) SpO2: 98% (98% - 99%) EKG - sinus , low voltage QRS, troponin x 1neg, CXR - large pleural effusion, WBC 12, Cr 0.73, , covid PCR neg , s/p left chest tube placement in ED , fluid analysis pending     * Dyspnea due to Large left pleural effusion s/p left chest tube placement, exudative effusion suspected malignancy vs infectious  * Suspected postobstructive PNA  - CT consult- s/p left chest tube placement   - f/u pleural fluid analysis - exudate, cx - neg, cytology - pending   - Ct chest with contrast  once lung is expanded  - IV ceftriaxone--> cefepime ( fascial rash 7/12- will stop) --> meropenem and  Azithromycin  - cough Supressant   - oncology and pulmonology evaluation  - 2 d echo- EF wnl, liver cystic lesion 15x5 cm  - f/u cultures - neg   - pulmonology and oncology consult  * Liver lesion cystic on 2 d echo and Enlarged GB on CT 22 cm    - CT abd from o/p - in the chart reviewed  - US abd  - surgery consult  * Hypothyroidism  - c/w LT4 and LT3   * Lower back pain due to pathologic L2 fracture , worsening on MRI, risk for cord compression  - pain management, c/w gabapentin  - Dr. Flood consult  - will need surgery  - pt requesting second opinion Dr. Kearns consulted  - neurocSocialbombcks q4 h    Advanced directives  - discussed advanced directive for 17 min  - FULL code    DVT proph -IPC     Dispo - CT management, pain management, oncology and pulmonology work-up

## 2020-07-12 NOTE — PROGRESS NOTE ADULT - SUBJECTIVE AND OBJECTIVE BOX
Chart reviewed  Case discussed with Resident and ORTHO PA  All studies reviewed personally with respect to spine    She feels better, but on significant amount of steroids IV    Left pleural effusion, with Chest Tube  Markedly enlarged GB    MRI with what appears to be a solitary pathologic fracture. There is a severe degree of stenosis at the L2 level due to retropulsion of L2 body - signficant amount of tumor extension into the canal, most severe at level of left L2 pedicle. Also severe chronic stenosis at L3-4 > L4-5 which compounds the issue    When compared to the outpatient MRI from 2 weeks ago - there is progression of the stenosis at L2 due to the tumor.    She will require surgical decompression on an urgent basis. The situation at L2 will be progressive and unstable, no matter what the primary tissue diagnosis.    Will discuss with Patient and  the timing and prognosis.    ?? General surgery evaluation of enlarged gall bladder ?? etiology?    Appreciate Pulmonary and Oncology W/U  Spinal decompression may need to be performed sooner rather than later in order to mobilize patient and get her off steroids.    GIULIANO George MD

## 2020-07-12 NOTE — PROGRESS NOTE ADULT - ASSESSMENT
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 George aware and agrees with plan

## 2020-07-12 NOTE — CONSULT NOTE ADULT - ASSESSMENT
76 y/o female with PMH of Hypothyroidism and L2 pathologic compression fracture admitted on 7/10 for evaluation of 2 weeks of shortness of breath and cough; also noted back pain in March and found to have compression fracture. The patient had imaging and was found to have large pleural effusion and on 7/10 had left chest tube placed. Denies fever or chills, recent travel except one month ago to [place]. No pets, no recent dental work.     1. Patient admitted with large left pleural effusion, s/p placement of chest tube; fluid does not appear to be exudative given ph and cell count; ?underlying malignancy  - follow up cultures   - serial cbc and monitor temperature   - iv hydration and supportive care   - reviewed prior medical records to evaluate for resistant or atypical pathogens   - agree with cefepime as ordered to cover broadly for gram negative rods including Pseudomonas  - CTS evaluation in progress  2. other issues: per medicine 74 y/o female with PMH of Hypothyroidism and L2 pathologic compression fracture admitted on 7/10 for evaluation of 2 weeks of shortness of breath and cough; also noted back pain in March and found to have compression fracture. The patient had imaging and was found to have large pleural effusion and on 7/10 had left chest tube placed. Denies fever or chills, recent travel except one month ago to [place]. No pets, no recent dental work.     1. Patient admitted with large left pleural effusion, s/p placement of chest tube; fluid does not appear to be exudative given ph and cell count; ?underlying malignancy  - also noted with leukocytosis most likely reactive to the large pleural effusion  - follow up cultures   - serial cbc and monitor temperature   - iv hydration and supportive care   - reviewed prior medical records to evaluate for resistant or atypical pathogens   - agree with cefepime as ordered to cover broadly for gram negative rods including Pseudomonas  - CTS evaluation in progress  2. other issues: per medicine

## 2020-07-12 NOTE — PROGRESS NOTE ADULT - SUBJECTIVE AND OBJECTIVE BOX
[facility]                                                           Orthopedic Spine Progress Note        SUBJECTIVE: Patient seen and examined, awake/alert, chest tube discomfort, lower back pain well-controlled, denies worsening of numbness (b/l thighs), denies weakness in the lower extremities, C/T/L MRIs completed yesterday as was TTE    Pain (0-10):   Current Pain Management:  [ ] PCA   [x] Po Analgesics [ ] IM /IV Anagesics     Vital Signs Last 24 Hrs  T(C): 36.7 (2020 22:12), Max: 37 (2020 09:15)  T(F): 98 (2020 22:12), Max: 98.6 (2020 09:15)  HR: 100 (2020 22:12) (92 - 100)  BP: 155/69 (2020 22:12) (116/70 - 155/69)  BP(mean): --  RR: 19 (2020 22:12) (18 - 19)  SpO2: 95% (2020 22:12) (93% - 95%)  I&O's Detail    2020 07:01  -  2020 07:00  --------------------------------------------------------  IN:  Total IN: 0 mL    OUT:    Chest Tube: 1115 mL  Total OUT: 1115 mL    Total NET: -1115 mL          OBJECTIVE:       Cervical ROM: wnl  Lumbar ROM: wnl  Neurological: A/O x 3              Sensation: [x] intact to light touch  [ ] decreased:          Motor exam: [x]          [x] Upper extremity    Delt      Bicp       Tri      Wrist ext  Wrst Flex       Digit Ext Digit Flex                               R         5/5       5/5        5/5       5/5           5/5          5/5       5/5          5/5                               L          5/5       5/5        5/5       5/5           5/5          5/5       5/5          5/5         [x] Lower ext.     Hip Flx   Quad   Hamstrg     TA       EHL      GS                         R        5/5        5/5        5/5          5/5      5/5      5/5                                L         5/5        5/5        5/5          5/5      5/5      5/5                                                                [x] Vascular: intact           Tension Signs: none          Long Tract Findings: none     RADIOLOGY & ADDITIONAL STUDIES:         EXAM:  MR SPINE CERVICAL WAW IC                          PROCEDURE DATE:  2020      INTERPRETATION:  Clinical indication: L2 lesion.    MRI of the cervical spine was performed using sagittal T1-T2 and STIR sequence. Axial T1 and T2-weighted sequences were performed as well. The patient was injected with approximately 6.5 cc of gadolinium based IV with 1 cc of contrast. Sagittal and axial T1-weighted sequences with fat suppression was performed.    Loss of the normal cervical lordosis is seen. This could be due to positioning or muscle spasm. There are small areas of abnormal T1 shortening seen in the T1, T2 and T3 vertebral bodies. These findings are suspicious for hemangiomas.    The vertebral body height appears maintained.    Disc desiccation is seen throughout the cervical spine region which is secondary to degenerative change.    C2-3: Normal.    C3-4: Disc bulge and bilateral hypertrophic facet changes are seen. Moderate narrowing of both neural foramen    C4-5: Disc bulge and bilateral hypertrophic facet changes are seen. Mild narrowing of the spinal canal and right neural foramen.    C5-6: Far lateral disc herniation is seen on the left side. Disc bulge is seen. Moderate to severe narrowing of the right neural foramen and severe narrowing of the left neural foramen    C6-7: Disc bulge and bilateral hypertrophic facet changes are seen. Mild narrowing of the spinal canal. Moderate to severe narrowing of both neural foramen.    C7-T1: Normal.    The spinal cord demonstrates normal signal and caliber.    Evaluation of the paraspinal soft tissues appear normal.    Nerve root sleeve cyst is seen on the right side at the T1-2 level which measures approximate 4.5 mm.    IMPRESSION: Degenerative changes as described above.    Hemangiomas as described above.      CLAUDIA BLANDON M.D.; ATTENDING RADIOLOGIST  This document has been electronically signed. 2020  2:05PM        EXAM:  MR SPINE THORACIC WAW IC                          EXAM:  MR SPINE LUMBAR WAW IC                          PROCEDURE DATE:  2020      INTERPRETATION:  Clinical indication: Staging MRI rule out metastasis.    MRI of the thoracic and lumbar spine was performed using sagittal T1-T2 and STIR sequence. Axial T1 and T2-weighted sequences were performed as well. The patient was injected with a possible 6.5 cc gadolinium this IV with 1 cc of contrast cardia.    Thoracic spine:    Abnormal enhancing lesions are seen involving the T10 and T12 vertebral bodies. These findings demonstrate slight T1 shortening and could be compatible with hemangiomas and less likely underlying pathologic lesions such as metastasis,. These findings can also be further evaluated with CT scan of the thoracic spine region.    There are no abnormal disc herniations or significant central or neural foraminal stenosis seen.    The spinal cord demonstrates normal signal and caliber.    Evaluation of the paraspinal soft tissues appear normal.    Hazy density seen involving the left lung. Dedicated imaging with CT scan is recommended for further evaluation.    Lumbar spine:    There is normal lumbar lordosis is seen.    Compression deformity is seen involving the L2 vertebral body. Abnormal T1 and T2 prolongation are identified with associated areas of enhancement. This finding could be compatible with a pathologic lesion such as metastasis, myeloma or lymphoma. Retropulsed fragment is identified which causes mild to moderate narrowing of the spinal canal. No abnormal paraspinal extension of this process is seen.    There is grade 1 anterolisthesis involving L4 and L5.    T12-L1: Normal    L1-2: Disc bulge and bilateral hypertrophic facet changes seen. This as well as retropulsed fragment causes moderate narrowing of the spinal canal.    L2-3: Disc bulge and bilateral hypertrophic facet joint changes seen. This as well as the retropulsed fragment causes moderate to severe narrowing of the spinal canal. Severe narrowing of both neural foramina    L3-4: Disc bulge and bilateral hypertrophic facet changes are seen. Severe narrowing of the spinal canal is seen.    L4-5: Disc bulge and bilateral hypertrophic facet changes seen. Moderate narrowing spinal canal and left neural foramen.    L5-S1: Disc bulge and bilateral hypertrophic facet changes are seen. No significant compromise of the, spinal canal is seen.    There is small focus of decreased signal seen involving the right iliac bone. This could be compatible with a bone island, the possibility of underlying lesion cannot be entirely excluded.    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    Evaluation of the paraspinal soft tissues appear normal    Large fluid-filled structure is partially demonstrated involving the right abdomen. Dedicated imaging of the abdomen and pelvic region is recommended with CT scan.    IMPRESSION: Abnormal lesions involving the T12 and T10 levels as described above.    Compression fracture is seen involving L2 level with abnormal T1 and T2 prolongation seen. This could be compatible with a pathologic compression fracture. Retropulsed fragment is identified as described above.      CLAUDIA BLANDON M.D.; ATTENDING RADIOLOGIST  This document has been electronically signed. 2020  2:18PM                                                    LABS:                        14.6   20.12 )-----------( 563      ( 2020 07:18 )             43.9     07-12    137  |  103  |  14  ----------------------------<  135<H>  4.1   |  27  |  0.79    Ca    8.4<L>      2020 07:18  Phos  3.5     07-11  Mg     2.2     07-11    TPro  7.7  /  Alb  3.2<L>  /  TBili  0.3  /  DBili  0.1  /  AST  16  /  ALT  29  /  AlkPhos  139<H>  07-12    PT/INR - ( 10 Jul 2020 10:57 )   PT: 12.2 sec;   INR: 1.04 ratio         PTT - ( 10 Jul 2020 10:57 )  PTT:29.4 sec  Urinalysis Basic - ( 10 Jul 2020 17:50 )    Color: Yellow / Appearance: Clear / S.010 / pH: x  Gluc: x / Ketone: Small  / Bili: Negative / Urobili: Negative mg/dL   Blood: x / Protein: Negative mg/dL / Nitrite: Negative   Leuk Esterase: Trace / RBC: 0-2 /HPF / WBC 0-2   Sq Epi: x / Non Sq Epi: Occasional / Bacteria: Few

## 2020-07-12 NOTE — CONSULT NOTE ADULT - ASSESSMENT
Pt is a 75 year old female with pmed hypothyroidism and likely pathologic fx of L2, lung mass and large left pleural effusion s/p chest tube drainage found to have abd sono showing distended gallbladder with large stones and no signs of acute inflammation. LFTs wln. leukocytosis noted      -No additional imaging studies at this time   -Findings likely represent incidental findings and leukocytosis 2/2 to steroid use  -continue medical care per primary team  -Surgery team will continue to follow     Discussed plan with Dr. Bermeo, surgery attending

## 2020-07-12 NOTE — CONSULT NOTE ADULT - SUBJECTIVE AND OBJECTIVE BOX
Patient is a 75y old  Female who presents with a chief complaint of dyspnea , cough (2020 09:30)    HPI:  76 y/o female with PMH of Hypothyroidism and L2 pathologic compression fracture admitted on 7/10 for evaluation of 2 weeks of shortness of breath and cough; also noted back pain in March and found to have compression fracture. The patient had imaging and was found to have large pleural effusion and on 7/10 had left chest tube placed. Denies fever or chills, recent travel except one month ago to [place]. No pets, no recent dental work.       PMH: as above  PSH: as above  Meds: per reconciliation sheet, noted below  MEDICATIONS  (STANDING):  ascorbic acid 500 milliGRAM(s) Oral daily  azithromycin   Tablet 500 milliGRAM(s) Oral daily  cefepime  Injectable. 1000 milliGRAM(s) IV Push every 12 hours  cefepime  Injectable.      dexAMETHasone  Injectable 6 milliGRAM(s) IV Push three times a day  gabapentin 300 milliGRAM(s) Oral at bedtime  lactobacillus acidophilus 1 Tablet(s) Oral two times a day  levothyroxine 100 MICROGram(s) Oral daily  lidocaine   Patch 1 Patch Transdermal daily  liothyronine 5 MICROGram(s) Oral daily  multivitamin 1 Tablet(s) Oral daily    MEDICATIONS  (PRN):  acetaminophen   Tablet .. 650 milliGRAM(s) Oral every 4 hours PRN Temp greater or equal to 38C (100.4F), Mild Pain (1 - 3)  aluminum hydroxide/magnesium hydroxide/simethicone Suspension 30 milliLiter(s) Oral every 4 hours PRN Dyspepsia  hydrocodone/homatropine Syrup 5 milliLiter(s) Oral every 8 hours PRN Cough  HYDROmorphone  Injectable 0.5 milliGRAM(s) IV Push every 4 hours PRN Severe Pain (7 - 10)  ondansetron Injectable 4 milliGRAM(s) IV Push every 6 hours PRN Nausea  oxycodone    5 mG/acetaminophen 325 mG 1 Tablet(s) Oral every 4 hours PRN Moderate Pain (4 - 6)  senna 2 Tablet(s) Oral at bedtime PRN Constipation    Allergies    No Known Allergies    Intolerances      Social: no smoking, no alcohol, no illegal drugs; no recent travel, no exposure to TB  FAMILY HISTORY:  FH: thyroid disease (Mother)     no history of premature cardiovascular disease in first degree relatives  ROS: the patient denies fever, no chills, no HA, no dizziness, no sore throat, no blurry vision, no CP, no palpitations,  no abdominal pain, no diarrhea, no N/V, no dysuria, no leg pain, no claudication, no rash, no joint aches, no rectal pain or bleeding, no night sweats  All other systems reviewed and are negative    Vital Signs Last 24 Hrs  T(C): 37.4 (2020 09:15), Max: 37.4 (2020 09:15)  T(F): 99.3 (2020 09:15), Max: 99.3 (2020 09:15)  HR: 100 (2020 22:12) (92 - 100)  BP: 136/70 (2020 09:15) (116/70 - 155/69)  BP(mean): --  RR: 17 (2020 09:15) (17 - 19)  SpO2: 94% (2020 09:15) (93% - 95%)  Daily     Daily     PE:    Constitutional: frail looking  HEENT: NC/AT, EOMI, PERRLA, conjunctivae clear; ears and nose atraumatic; pharynx clear  Neck: supple; thyroid not palpable  Back: no tenderness  Respiratory: respiratory effort normal; left sided decreased breath sounds with scattered coarse breath sounds; left side chest tube  Cardiovascular: S1S2 regular, no murmurs  Abdomen: soft, not tender, not distended, positive BS; no liver or spleen organomegaly  Genitourinary: no suprapubic tenderness  Musculoskeletal: no muscle tenderness, no joint swelling or tenderness  Neurological/ Psychiatric: AxOx3, judgement and insight normal;  moving all extremities  Skin: no rashes; no palpable lesions    Labs: all available labs reviewed                        14.6   20.12 )-----------( 563      ( 2020 07:18 )             43.9     07-12    137  |  103  |  14  ----------------------------<  135<H>  4.1   |  27  |  0.79    Ca    8.4<L>      2020 07:18  Phos  3.5     07-11  Mg     2.2     07-11    TPro  7.7  /  Alb  3.2<L>  /  TBili  0.3  /  DBili  0.1  /  AST  16  /  ALT  29  /  AlkPhos  139<H>  07-12     LIVER FUNCTIONS - ( 2020 07:18 )  Alb: 3.2 g/dL / Pro: 7.7 gm/dL / ALK PHOS: 139 U/L / ALT: 29 U/L / AST: 16 U/L / GGT: x           Urinalysis Basic - ( 10 Jul 2020 17:50 )    Color: Yellow / Appearance: Clear / S.010 / pH: x  Gluc: x / Ketone: Small  / Bili: Negative / Urobili: Negative mg/dL   Blood: x / Protein: Negative mg/dL / Nitrite: Negative   Leuk Esterase: Trace / RBC: 0-2 /HPF / WBC 0-2   Sq Epi: x / Non Sq Epi: Occasional / Bacteria: Few        pH, Fluid (07.10.20 @ 12:30)    pH, Fluid: 7.38    Fluid Source: Pleural Fluid    Cell Count, Body Fluid (07.10.20 @ 12:30)    Other Body Cells: 3 %    Mesothelial Cells - Body Fluid: 3 %    Fluid Type: Pleural Fluid    Body Fluid Appearance: Slightly Cloudy    BF Lymphocytes: 40 %    Atypical Lymphocytes - Body Fluid: 0 %    Nucleated Red Blood Cells - Body Fluid: 0    Eosinophil Count - Body Fluid: 0 %    Monocyte/Macrophage Count - Body Fluid: 41 %    Fluid Segmented Granulocytes: 13 %    Tube Type: Lavendar    Color - Body Fluid: Yellow    Total Nucleated Cell Count, Body Fluid: 971: Peritoneal/Pleural/ Pericardial  Body Fluid Types            Total Nucleated cells: <500/uL            Neutrophils: <25%          Synovial Body Fluid Type           Total Nucleated cells: <150/uL           Neutrophils: <25%           Lymphocytes: <75%           Moncytes/Macrophages: </=70% /uL    Total RBC Count: 1000 /uL              < from: Xray Chest 1 View- PORTABLE-Routine (20 @ 10:08) >    EXAM:  XR CHEST PORTABLE ROUTINE 1V                            PROCEDURE DATE:  2020          INTERPRETATION:  INDICATION: Assess left pleural effusion.    PRIORS: 7/10/2020.    VIEWS: Portable AP radiography of the chest performed.    FINDINGS: Since prior evaluation the position of the indwelling left pleural space pigtail drainage catheter has changed, the pigtail now overlying the medial aspect of the left mid thorax. There is interval decrease in left pleural effusion from prior; a moderately sized left pleural effusion persists. Underlying lung parenchymal infiltrate, consolidation or atelectasis cannot be excluded. There is no evidence for left-sided pneumothorax. No acute right-sided infiltrate is identified. No evidence for right pleural effusion or pneumothorax. No mediastinal shift noted. Degenerative changes of the thoracic spine evident.    IMPRESSION: Interval decrease in left pleural effusion from prior with change in position of indwelling pigtail pleural space drainage catheter. Moderately sized left pleural effusion persists. See above discussion.    < end of copied text >      Radiology: all available radiological tests reviewed    Advanced directives addressed: full resuscitation

## 2020-07-12 NOTE — PROGRESS NOTE ADULT - ASSESSMENT
74 y/o female with PMH of Hypothyroidism and L2 pathologic compression fracture presents to  with 1 month history of dyspnea and 1 week history of the dry cough. Patient was seen by ortho spine surgeon, pulmonologist and oncologist and send for evaluation of left sided pleural effusion with concern for lung mass. Pt reports onset of back pain was in March 2020, received cortisone shots and got an MRI of L2 and blood test x2 weeks ago. Pt went to Dr. Westfall (oncologist) and was sent for imaging with Dr. Sanchez (pulmonologist) who discussed results of CT abdomen and chest this AM with pt and told pt to come to ED for evaluation for SOB.  In Ed -  T(F): 98.9Max: 98.9  HR: 80  (80 - 94) BP: 149/63 (149/63 - 150/77) RR: 24(17 - 24) SpO2: 98% (98% - 99%) EKG - sinus , low voltage QRS, troponin x 1neg, CXR - large pleural effusion, WBC 12, Cr 0.73, , covid PCR neg , s/p left chest tube placement in ED , fluid analysis pending (10 Jul 2020 13:26)    seen and evaluated today  data discussed with thoracic surgery as well as her RN at bedside  CT attached with some pain being medicated  breathing is better      Assessment / plan:  Massive left sided pleural effusion  s/p CT placement with exudative process  eval for malignancy as most likely etiology  pathologic compression fx  adequate oxygenation  hemodynamically stable    workup in progress  PF cytology  CT management as per thoracic surgery  fu PF cultures / MRI pending  data discussed with pt today  films are  personally reviewed and shown to pt with prior xrays as well  agree with ct chest for re eval today since lung expanded  case discussed with Dr Kerrie Sanchez will resume in am

## 2020-07-12 NOTE — CONSULT NOTE ADULT - SUBJECTIVE AND OBJECTIVE BOX
Surgery called to ann marie a 75 year pmed hx of hypothyroidism and likely pathologic fx of L2, lung mass and large left pleural effusion s/p chest tube drainage found to have distended gallbladder on abd sono. During workup for effusion and lung mass abd sono was found to have a distended gallbladder without wall thickening and multiple large gallstones and normal 5mm CBD. Pt currently not offering any complaints in abd no tenderness/nasuea/vommiting. Denies fever/cp/sob/n/v/d/c. VSS    Pmed: above  Psurg: Denies  Allergies: NKDA     PHYSICAL EXAM:  Constitutional: NAD, GCS: 15/15  AOX3  Eyes:  WNL  ENMT:  WNL  Neck:  WNL, non tender  Back: Non tender  Respiratory: CTABL  Cardiovascular:  S1+S2+0  Gastrointestinal: Soft, ND , NT  Chest tube secure and in place   Genitourinary:  WNL  Extremities: NV intact  Vascular:  Intact  Neurological: No focal neurological deficit,  CN, motor and sensory system grossly intact.  Skin: WNL  Musculoskeletal: WNL  Psychiatric: Grossly WNL                          14.6   20.12 )-----------( 563      ( 12 Jul 2020 07:18 )             43.9     07-12    137  |  103  |  14  ----------------------------<  135<H>  4.1   |  27  |  0.79    Ca    8.4<L>      12 Jul 2020 07:18  Phos  3.5     07-11  Mg     2.2     07-11    TPro  7.7  /  Alb  3.2<L>  /  TBili  0.3  /  DBili  0.1  /  AST  16  /  ALT  29  /  AlkPhos  139<H>  07-12 Surgery called to ann marie a 75 year pmed hx of hypothyroidism and likely pathologic fx of L2, lung mass and large left pleural effusion s/p chest tube drainage found to have distended gallbladder on abd sono. During workup for effusion and lung mass abd sono was found to have a distended gallbladder without wall thickening and multiple large gallstones and normal 5mm CBD. Pt currently not offering any complaints in abd no tenderness/nasuea/vommiting. Denies fever/cp/sob/n/v/d/c. VSS  prev colonoscopy @ 4 yrs ago    No Hx abd pain or food intolerence    Pmed: above  Psurg: Denies  Allergies: NKDA     PHYSICAL EXAM:  Constitutional: NAD, GCS: 15/15  AOX3  Eyes:  WNL  ENMT:  WNL  Neck:  WNL, non tender  Back: Non tender  Respiratory: CTABL  Cardiovascular:  S1+S2+0  Gastrointestinal: Soft, ND , NT  Chest tube secure and in place   Genitourinary:  WNL  Extremities: NV intact  Vascular:  Intact  Neurological: No focal neurological deficit,  CN, motor and sensory system grossly intact.  Skin: WNL  Musculoskeletal: WNL  Psychiatric: Grossly WNL                          14.6   20.12 )-----------( 563      ( 12 Jul 2020 07:18 )             43.9     07-12    137  |  103  |  14  ----------------------------<  135<H>  4.1   |  27  |  0.79    Ca    8.4<L>      12 Jul 2020 07:18  Phos  3.5     07-11  Mg     2.2     07-11    TPro  7.7  /  Alb  3.2<L>  /  TBili  0.3  /  DBili  0.1  /  AST  16  /  ALT  29  /  AlkPhos  139<H>  07-12

## 2020-07-12 NOTE — CONSULT NOTE ADULT - PROBLEM SELECTOR RECOMMENDATION 2
L2 fracture/ suspect pathologic; currently stable, on steroids; pain management  If metastases confirmed will need further staging HOLLIS ( CT PET out patient) and bone modifying agents

## 2020-07-12 NOTE — CONSULT NOTE ADULT - PROBLEM SELECTOR RECOMMENDATION 4
Post steroids/ and procedure. On antibiotics but no clear infection. May be from demargination/ steroids

## 2020-07-12 NOTE — CONSULT NOTE ADULT - SUBJECTIVE AND OBJECTIVE BOX
HPI:  74 y/o female presented with back pain x 2 months , with recent worsening, and pain radiating down both extremities; Imaging revealed an L2 vertebral fracture / likely pathologic; on exam had left lung dullness to percussion and imaging with CT revealed extensive   left sided pleural effusion with concern for possible lung mass. She was seen by Pulmonary medicine and advised admission for thoracentesis and further HOLLIS   In the ER a CT was placed with + drainage/ cytology etc pending       PAST MEDICAL & SURGICAL HISTORY:  Osteoarthritis  Fracture: L2 pathologic fracture  Hypothyroidism  No significant past surgical history    + ex / remote/ light  smoker    MEDICATIONS  (STANDING):  ascorbic acid 500 milliGRAM(s) Oral daily  azithromycin   Tablet 500 milliGRAM(s) Oral daily  cefepime  Injectable. 1000 milliGRAM(s) IV Push every 12 hours  cefepime  Injectable.      dexAMETHasone  Injectable 6 milliGRAM(s) IV Push three times a day  gabapentin 300 milliGRAM(s) Oral at bedtime  lactobacillus acidophilus 1 Tablet(s) Oral two times a day  levothyroxine 100 MICROGram(s) Oral daily  lidocaine   Patch 1 Patch Transdermal daily  liothyronine 5 MICROGram(s) Oral daily  multivitamin 1 Tablet(s) Oral daily    MEDICATIONS  (PRN):  acetaminophen   Tablet .. 650 milliGRAM(s) Oral every 4 hours PRN Temp greater or equal to 38C (100.4F), Mild Pain (1 - 3)  aluminum hydroxide/magnesium hydroxide/simethicone Suspension 30 milliLiter(s) Oral every 4 hours PRN Dyspepsia  hydrocodone/homatropine Syrup 5 milliLiter(s) Oral every 8 hours PRN Cough  HYDROmorphone  Injectable 0.5 milliGRAM(s) IV Push every 4 hours PRN Severe Pain (7 - 10)  ondansetron Injectable 4 milliGRAM(s) IV Push every 6 hours PRN Nausea  oxycodone    5 mG/acetaminophen 325 mG 1 Tablet(s) Oral every 4 hours PRN Moderate Pain (4 - 6)  senna 2 Tablet(s) Oral at bedtime PRN Constipation      Allergies    No Known Allergies    Intolerances        SOCIAL HISTORY:    FAMILY HISTORY:  FH: thyroid disease (Mother)      Vital Signs Last 24 Hrs  T(C): 37.4 (2020 09:15), Max: 37.4 (2020 09:15)  T(F): 99.3 (2020 09:15), Max: 99.3 (2020 09:15)  HR: 100 (2020 22:12) (92 - 100)  BP: 136/70 (2020 09:15) (116/70 - 155/69)  BP(mean): --  RR: 17 (2020 09:15) (17 - 19)  SpO2: 94% (2020 09:15) (93% - 95%)    PHYSICAL EXAM:      Constitutional: Appears comfortable, in  NAD, A/O X 3     Eyes: EOMI, PERRLA ;No icterus    ENMT:  No oral lesions, thrush; pharynx not injected    Neck:  Supple; No masses, lymph nodes, no bruits    Breasts: NA    Back: No tenderness     Respiratory:  Clear to A/P, No wheezes, rhonchi, rales. No dullness to percussion    Cardiovascular: Normal rate and rhythm; normal S1 and S2; No murmurs. No gallop    Gastrointestinal: No distension, normal bowl sounds; No tendeness , guarding, rebound;  no masses, no hepatosplenimegaly, no fluid wave    Genitourinary: No flank pains, no inguninal adenopathy    Rectal: NA    Extremities:  No phlebitis, no edema    Vascular: No acrocyanosis, no edema    Neurological: Alert and oriented. Cranial nerves II-XII WNL, Upper extremity / lower extremity  strength WNL;  Reflexes WNL, Plantar downgoing ; No cerebellar signs    Skin: No rash, petichae, purpura, echymoses    Lymph Nodes: No cervical, supraclavicular, axillary, inguinal adenopathy    Musculoskeletal: No joint swelling    Psychiatric: Alert, oriented, normal affect      LABS:                        14.6   20.12 )-----------( 563      ( 2020 07:18 )             43.9     07-12    137  |  103  |  14  ----------------------------<  135<H>  4.1   |  27  |  0.79    Ca    8.4<L>      2020 07:18  Phos  3.5     07-11  Mg     2.2     07-11    TPro  7.7  /  Alb  3.2<L>  /  TBili  0.3  /  DBili  0.1  /  AST  16  /  ALT  29  /  AlkPhos  139<H>  07-12    PT/INR - ( 10 Jul 2020 10:57 )   PT: 12.2 sec;   INR: 1.04 ratio         PTT - ( 10 Jul 2020 10:57 )  PTT:29.4 sec  Urinalysis Basic - ( 10 Jul 2020 17:50 )    Color: Yellow / Appearance: Clear / S.010 / pH: x  Gluc: x / Ketone: Small  / Bili: Negative / Urobili: Negative mg/dL   Blood: x / Protein: Negative mg/dL / Nitrite: Negative   Leuk Esterase: Trace / RBC: 0-2 /HPF / WBC 0-2   Sq Epi: x / Non Sq Epi: Occasional / Bacteria: Few        < from: MR Thoracic Spine w/wo IV Cont (20 @ 13:46) >  ROCEDURE DATE:  2020          INTERPRETATION:  Clinical indication: Staging MRI rule out metastasis.    MRI of the thoracic andlumbar spine was performed using sagittal T1-T2 and STIR sequence. Axial T1 and T2-weighted sequences were performed as well. The patient was injected with a possible 6.5 cc gadolinium this IV with 1 cc of contrast cardia.    Thoracic spine:    Abnormal enhancing lesions are seen involving the T10 and T12 vertebral bodies. These findings demonstrate slight T1 shortening and could be compatible with hemangiomas and less likely underlying pathologic lesions such as metastasis,. These findings can also be further evaluated with CT scan of the thoracic spine region.    There are no abnormal disc herniations or significant central or neural foraminal stenosis seen.    The spinal cord demonstrates normal signal and caliber.    Evaluation of the paraspinal soft tissues appear normal.    Hazy density seen involving the left lung. Dedicated imaging with CT scan is recommended for further evaluation.    Lumbar spine:    There is normal lumbar lordosis is seen.    Compression deformity is seen involving the L2 vertebral body. Abnormal T1 and T2 prolongation are identified with associated areas of enhancement. This finding could be compatible with a pathologic lesion such as metastasis, myeloma or lymphoma. Retropulsed fragment is identified which causes mild to moderate narrowing of the spinal canal. No abnormal paraspinal extension of this process is seen.    There is grade 1 anterolisthesis involving L4 and L5.    T12-L1: Normal    L1-2: Disc bulge and bilateral hypertrophic facet changes seen. This as well as retropulsed fragment causes moderate narrowing of the spinal canal.    L2-3: Disc bulge and bilateral hypertrophic facet joint changes seen. This as well as the retropulsed fragment causes moderate to severe narrowing of the spinal canal. Severe narrowing of both neural foramina    L3-4: Disc bulge and bilateral hypertrophic facet changes are seen. Severe narrowing of the spinal canal is seen.    L4-5: Disc bulge and bilateral hypertrophic facet changes seen. Moderate narrowing spinal canal and left neural foramen.    L5-S1: Disc bulge and bilateral hypertrophic facet changes are seen. No significant compromise of the, spinal canal is seen.    There is small focus of decreased signal seen involving the right iliac bone. This could be compatible with a bone island, the possibility of underlying lesion cannot be entirely excluded.    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    Evaluation of the paraspinal soft tissues appear normal    < end of copied text >      RADIOLOGY & ADDITIONAL STUDIES:  < from: Xray Chest 1 View- PORTABLE-Routine (20 @ 10:08) >  EXAM:  XR CHEST PORTABLE ROUTINE 1V                            PROCEDURE DATE:  2020          INTERPRETATION:  INDICATION: Assess left pleural effusion.    PRIORS: 7/10/2020.    VIEWS: Portable AP radiography of the chest performed.    FINDINGS: Since prior evaluation the position of the indwelling left pleural space pigtail drainage catheter has changed, the pigtail now overlying the medial aspect of the left mid thorax. There is interval decrease in left pleural effusion from prior; a moderately sized left pleural effusion persists. Underlying lung parenchymal infiltrate, consolidation or atelectasis cannot be excluded. There is no evidence for left-sided pneumothorax. No acute right-sided infiltrate is identified. No evidence for right pleural effusion or pneumothorax. No mediastinal shift noted. Degenerative changes of the thoracic spine evident.    IMPRESSION: Interval decrease in left pleural effusion from prior with change in position of indwelling pigtail pleural space drainage catheter. Moderately sized left pleural effusion persists. See above discussion.    < end of copied text > HPI:  76 y/o female presented with back pain x 2 months , with recent worsening, and pain radiating down both extremities; Imaging revealed an L2 vertebral fracture / likely pathologic; on exam had left lung dullness to percussion and imaging with CT revealed extensive   left sided pleural effusion with concern for possible lung mass. She was seen by Pulmonary medicine and advised admission for thoracentesis and further HOLLIS; kept on dexamethasone for lower back fracture; WBC has increased/ placed on an antibiotic ( no overt infection)  In the ER a CT was placed with + drainage/  RBC 1000; Chemistry and Cytology pending  Post procedure can : decreased fluid ( but still substantial )     She has less dyspnea  + chest discomfort from chest tube  _ low back pain       PAST MEDICAL & SURGICAL HISTORY:  Osteoarthritis  Fracture: L2 pathologic fracture  Hypothyroidism  No significant past surgical history    + ex / remote/ light  smoker    MEDICATIONS  (STANDING):  ascorbic acid 500 milliGRAM(s) Oral daily  azithromycin   Tablet 500 milliGRAM(s) Oral daily  cefepime  Injectable. 1000 milliGRAM(s) IV Push every 12 hours  cefepime  Injectable.      dexAMETHasone  Injectable 6 milliGRAM(s) IV Push three times a day  gabapentin 300 milliGRAM(s) Oral at bedtime  lactobacillus acidophilus 1 Tablet(s) Oral two times a day  levothyroxine 100 MICROGram(s) Oral daily  lidocaine   Patch 1 Patch Transdermal daily  liothyronine 5 MICROGram(s) Oral daily  multivitamin 1 Tablet(s) Oral daily    MEDICATIONS  (PRN):  acetaminophen   Tablet .. 650 milliGRAM(s) Oral every 4 hours PRN Temp greater or equal to 38C (100.4F), Mild Pain (1 - 3)  aluminum hydroxide/magnesium hydroxide/simethicone Suspension 30 milliLiter(s) Oral every 4 hours PRN Dyspepsia  hydrocodone/homatropine Syrup 5 milliLiter(s) Oral every 8 hours PRN Cough  HYDROmorphone  Injectable 0.5 milliGRAM(s) IV Push every 4 hours PRN Severe Pain (7 - 10)  ondansetron Injectable 4 milliGRAM(s) IV Push every 6 hours PRN Nausea  oxycodone    5 mG/acetaminophen 325 mG 1 Tablet(s) Oral every 4 hours PRN Moderate Pain (4 - 6)  senna 2 Tablet(s) Oral at bedtime PRN Constipation      Allergies    No Known Allergies    Intolerances    SOCIAL HISTORY: Remote and light smoker     FAMILY HISTORY:  FH: thyroid disease (Mother)      Vital Signs Last 24 Hrs  T(C): 37.4 (2020 09:15), Max: 37.4 (2020 09:15)  T(F): 99.3 (2020 09:15), Max: 99.3 (2020 09:15)  HR: 100 (2020 22:12) (92 - 100)  BP: 136/70 (2020 09:15) (116/70 - 155/69)  BP(mean): --  RR: 17 (2020 09:15) (17 - 19)  SpO2: 94% (2020 09:15) (93% - 95%)    PHYSICAL EXAM:      Constitutional: Appears comfortable, in  NAD, A/O X 3     Eyes: EOMI, PERRLA ;No icterus    ENMT:  No oral lesions, thrush; pharynx not injected    Neck:  Supple; No masses, lymph nodes, no bruits    Breasts: NA    Back: No tenderness     Respiratory:  Clear to A/P, No wheezes, rhonchi, rales.   L chest tube in place    Cardiovascular: Normal rate and rhythm; normal S1 and S2; No murmurs. No gallop    Gastrointestinal: No distension, normal bowl sounds; No tendeness , guarding, rebound;  no masses, no hepatosplenomegaly no fluid wave    Genitourinary: No flank pains, no inguinal adenopathy    Rectal: NA    Extremities:  No phlebitis, no edema    Vascular: No acrocyanosis, no edema    Neurological: Alert and oriented. Cranial nerves II-XII WNL, Upper extremity / lower extremity  strength WNL;  Reflexes WNL, Plantar downgoing ; No cerebellar signs    Skin: No rash, petechiae purpura, ecchymoses    Lymph Nodes: No cervical, supraclavicular, axillary, inguinal adenopathy    Musculoskeletal: No joint swelling    Psychiatric: Alert, oriented, normal affect      LABS:                        14.6   20.12 )-----------( 563      ( 2020 07:18 )             43.9     07-12    137  |  103  |  14  ----------------------------<  135<H>  4.1   |  27  |  0.79    Ca    8.4<L>      2020 07:18  Phos  3.5     07-11  Mg     2.2     07-11    TPro  7.7  /  Alb  3.2<L>  /  TBili  0.3  /  DBili  0.1  /  AST  16  /  ALT  29  /  AlkPhos  139<H>  07-12    PT/INR - ( 10 Jul 2020 10:57 )   PT: 12.2 sec;   INR: 1.04 ratio         PTT - ( 10 Jul 2020 10:57 )  PTT:29.4 sec  Urinalysis Basic - ( 10 Jul 2020 17:50 )    Color: Yellow / Appearance: Clear / S.010 / pH: x  Gluc: x / Ketone: Small  / Bili: Negative / Urobili: Negative mg/dL   Blood: x / Protein: Negative mg/dL / Nitrite: Negative   Leuk Esterase: Trace / RBC: 0-2 /HPF / WBC 0-2   Sq Epi: x / Non Sq Epi: Occasional / Bacteria: Few        < from: MR Thoracic Spine w/wo IV Cont (20 @ 13:46) >  ROCEDURE DATE:  2020          INTERPRETATION:  Clinical indication: Staging MRI rule out metastasis.    MRI of the thoracic and lumbar spine was performed using sagittal T1-T2 and STIR sequence. Axial T1 and T2-weighted sequences were performed as well. The patient was injected with a possible 6.5 cc gadolinium this IV with 1 cc of contrast cardia.    Thoracic spine:    Abnormal enhancing lesions are seen involving the T10 and T12 vertebral bodies. These findings demonstrate slight T1 shortening and could be compatible with hemangiomas and less likely underlying pathologic lesions such as metastasis,. These findings can also be further evaluated with CT scan of the thoracic spine region.    There are no abnormal disc herniations or significant central or neural foraminal stenosis seen.    The spinal cord demonstrates normal signal and caliber.    Evaluation of the paraspinal soft tissues appear normal.    Hazy density seen involving the left lung. Dedicated imaging with CT scan is recommended for further evaluation.    Lumbar spine:    There is normal lumbar lordosis is seen.    Compression deformity is seen involving the L2 vertebral body. Abnormal T1 and T2 prolongation are identified with associated areas of enhancement. This finding could be compatible with a pathologic lesion such as metastasis, myeloma or lymphoma. Retropulsed fragment is identified which causes mild to moderate narrowing of the spinal canal. No abnormal paraspinal extension of this process is seen.    There is grade 1 anterolisthesis involving L4 and L5.    T12-L1: Normal    L1-2: Disc bulge and bilateral hypertrophic facet changes seen. This as well as retropulsed fragment causes moderate narrowing of the spinal canal.    L2-3: Disc bulge and bilateral hypertrophic facet joint changes seen. This as well as the retropulsed fragment causes moderate to severe narrowing of the spinal canal. Severe narrowing of both neural foramina    L3-4: Disc bulge and bilateral hypertrophic facet changes are seen. Severe narrowing of the spinal canal is seen.    L4-5: Disc bulge and bilateral hypertrophic facet changes seen. Moderate narrowing spinal canal and left neural foramen.    L5-S1: Disc bulge and bilateral hypertrophic facet changes are seen. No significant compromise of the, spinal canal is seen.    There is small focus of decreased signal seen involving the right iliac bone. This could be compatible with a bone island, the possibility of underlying lesion cannot be entirely excluded.    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    Evaluation of the paraspinal soft tissues appear normal    < end of copied text >    < from: Xray Chest 1 View- PORTABLE-Routine (20 @ 10:08) >    EXAM:  XR CHEST PORTABLE ROUTINE 1V                            PROCEDURE DATE:  2020          INTERPRETATION:  INDICATION: Assess left pleural effusion.    PRIORS: 7/10/2020.    VIEWS: Portable AP radiography of the chest performed.    FINDINGS: Since prior evaluation the position of the indwelling left pleural space pigtail drainage catheter has changed, the pigtail now overlying the medial aspect of the left mid thorax. There is interval decrease in left pleural effusion from prior; a moderately sized left pleural effusion persists. Underlying lung parenchymal infiltrate, consolidation or atelectasis cannot be excluded. There is no evidence for left-sided pneumothorax. No acute right-sided infiltrate is identified. No evidence for right pleural effusion or pneumothorax. No mediastinal shift noted. Degenerative changes of the thoracic spine evident.    IMPRESSION: Interval decrease in left pleural effusion from prior with change in position of indwelling pigtail pleural space drainage catheter. Moderately sized left pleural effusion persists. See above discussion.    < end of copied text >            INTERPRETATION:  INDICATION: Assess left pleural effusion.    PRIORS: 7/10/2020.    VIEWS: Portable AP radiography of the chest performed.    FINDINGS: Since prior evaluation the position of the indwelling left pleural space pigtail drainage catheter has changed, the pigtail now overlying the medial aspect of the left mid thorax. There is interval decrease in left pleural effusion from prior; a moderately sized left pleural effusion persists. Underlying lung parenchymal infiltrate, consolidation or atelectasis cannot be excluded. There is no evidence for left-sided pneumothorax. No acute right-sided infiltrate is identified. No evidence for right pleural effusion or pneumothorax. No mediastinal shift noted. Degenerative changes of the thoracic spine evident.    IMPRESSION: Interval decrease in left pleural effusion from prior with change in position of indwelling pigtail pleural space drainage catheter. Moderately sized left pleural effusion persists. See above discussion.    < end of copied text >

## 2020-07-12 NOTE — PROGRESS NOTE ADULT - SUBJECTIVE AND OBJECTIVE BOX
Subjective:  Patient is a 75y old  Female who presents with a chief complaint of dyspnea , cough     HPI:  74 y/o female with PMH of Hypothyroidism and L2 pathologic compression fracture presents to   on 7/10/20 with 1 month history of dyspnea and 1 week history of the dry cough. Patient was seen by ortho spine surgeon, pulmonologist and oncologist and send for evaluation of left sided pleural effusion with concern for lung mass. Pt reports onset of back pain was in 2020, received cortisone shots and got an MRI of L2 and blood test x2 weeks ago. Pt went to Dr. Westfall (oncologist) and was sent for imaging with Dr. Sanchez (pulmonologist) who discussed results of CT abdomen and chest this AM with pt and told pt to come to ED for evaluation for SOB.  In Ed -  T(F): 98.9Max: 98.9  HR: 80  (80 - 94) BP: 149/63 (149/63 - 150/77) RR: 24(17 - 24) SpO2: 98% (98% - 99%) EKG - sinus , low voltage QRS, troponin x 1neg, CXR - large pleural effusion, WBC 12, Cr 0.73, , covid PCR neg , s/p left chest tube placement in ED , fluid analysis pending (10 Jul 2020 13:26)     -  Patient seen and examined at bedside earlier today, feels better, left sided chest wall pain around CT, + cough, dyspnea improved, POC discussed in length   - pt seen and examined, + cough, chest wall pain, low grade fever overnight, denies abdominal pain, POC discussed     Review of system- Rest of the review of system are negative except mentioned in HPI    OBJECTIVE:   T(C): 36.8 (20 @ 16:25), Max: 37.4 (20 @ 09:15)  T(F): 98.2 (20 @ 16:25), Max: 99.3 (20 @ 09:15)  HR: 93 (20 @ 16:25) (93 - 100)  BP: 136/65 (20 @ 16:25) (136/65 - 155/69)  RR: 18 (20 @ 16:25) (17 - 19)  SpO2: 95% (20 @ 16:25) (94% - 95%)  Wt(kg): --  CAPILLARY BLOOD GLUCOSE        PHYSICAL EXAM:  GENERAL: NAD  NERVOUS SYSTEM:  Alert & Oriented X3, non- focal exam,  Motor Strength 5/5 B/L upper and lower extremities; DTRs 2+ intact and symmetric  HEAD:  Atraumatic, Normocephalic  EYES: EOMI, PERRLA, conjunctiva and sclera clear  HEENT: Moist mucous membranes  NECK: Supple, No JVD  CHEST/LUNG: BS decreased over left base, ; No rales, no rhonchi, no wheezing, Left CT +   HEART: Regular rate and rhythm; No murmurs, no rubs or gallops  ABDOMEN: Soft, Nontender, Nondistended; Bowel sounds present  GENITOURINARY- Voiding, no suprapubic tenderness  EXTREMITIES:  2+ Peripheral Pulses, No clubbing, cyanosis,   edema  MUSCULOSKELETAL:- No muscle tenderness, Muscle tone normal, No joint tenderness, no Joint swelling,  Joint ROM -normal  SKIN-no rash, no lesion    LABS: all reviewed                         14.6   20.12 )-----------( 563      ( 2020 07:18 )             43.9                         13.3   13.48 )-----------( 482      ( 2020 09:13 )             39.1       07-12    137  |  103  |  14  ----------------------------<  135<H>  4.1   |  27  |  0.79    Ca    8.4<L>      2020 07:18  Phos  3.5     07-11  Mg     2.2     07-11    TPro  7.7  /  Alb  3.2<L>  /  TBili  0.3  /  DBili  0.1  /  AST  16  /  ALT  29  /  AlkPhos  139<H>  07-12            CARDIAC MARKERS ( 10 Jul 2020 10:49 )  <0.015 ng/mL / x     / x     / x     / x          Urinalysis Basic - ( 10 Jul 2020 17:50 )    Color: Yellow / Appearance: Clear / S.010 / pH: x  Gluc: x / Ketone: Small  / Bili: Negative / Urobili: Negative mg/dL   Blood: x / Protein: Negative mg/dL / Nitrite: Negative   Leuk Esterase: Trace / RBC: 0-2 /HPF / WBC 0-2   Sq Epi: x / Non Sq Epi: Occasional / Bacteria: Few          RECENT CULTURES:  Culture - Urine (07.10.20 @ 17:50)    Specimen Source: .Urine Clean Catch (Midstream)    Culture Results:   <10,000 CFU/mL Normal Urogenital Shyann  Culture - Blood in AM (07.10.20 @ 14:38)    Specimen Source: .Blood None    Culture Results:   No growth to date.    Culture - Blood in AM (07.10.20 @ 14:38)    Specimen Source: .Blood None    Culture Results:   No growth to date.      RADIOLOGY & ADDITIONAL TESTS: all reviewed   EKG reviewed  < from: 12 Lead ECG (07.10.20 @ 10:22) >    Ventricular Rate 87 BPM    Atrial Rate 87 BPM    P-R Interval 122 ms    QRS Duration 72 ms    Q-T Interval 348 ms    QTC Calculation(Bezet) 418 ms    P Axis 17 degrees    R Axis 1 degrees    T Axis 29 degrees    Diagnosis Line Normal sinus rhythm  Low voltage QRS  Cannot rule out Anterior infarct , age undetermined  Abnormal ECG  No previous ECGs available    < end of copied text >  < from: MR Cervical Spine w/wo IV Cont (20 @ 13:57) >  IMPRESSION: Degenerative changes as described above.    Hemangiomas as described above.      < end of copied text >  < from: MR Thoracic Spine w/wo IV Cont (20 @ 13:46) >  IMPRESSION: Abnormal lesions involving the T12 and T10 levels as described above.    Compression fracture is seen involving L2 level with abnormal T1 and T2 prolongation seen. This could be compatible with a pathologic compression fracture. Retropulsed fragment is identified as described above.    < end of copied text >    < from: Xray Chest 1 View- PORTABLE-Routine (20 @ 10:08) >  FINDINGS: Since prior evaluation the position of the indwelling left pleural space pigtail drainage catheter has changed, the pigtail now overlying the medial aspect of the left mid thorax. There is interval decrease in left pleural effusion from prior; a moderately sized left pleural effusion persists. Underlying lung parenchymal infiltrate, consolidation or atelectasis cannot be excluded. There is no evidence for left-sided pneumothorax. No acute right-sided infiltrate is identified. No evidence for right pleural effusion or pneumothorax. No mediastinal shift noted. Degenerative changes of the thoracic spine evident.    IMPRESSION: Interval decrease in left pleural effusion from prior with change in position of indwelling pigtail pleural space drainage catheter. Moderately sized left pleural effusion persists. See above discussion.          < end of copied text >    < from: TTE Echo Complete w/o Contrast w/ Doppler (20 @ 09:02) >   The mitral valve leaflets appear thin and normal.   Trace mitral regurgitation is present.   EA reversal of the mitral inflow consistent with reduced compliance of the   left ventricle.   The aortic valve is not well visualized, appears normal. Valve opening   seems to be normal.   Mild (1+) aortic regurgitation is present.   Normal appearing tricuspid valve structure and function.   Mild (1+) tricuspid valve regurgitation is present.   Pulmonic valve not well seen.   Normal appearing left atrium.   Left ventricle systolic function appears preserved based on difficult   trans thoracic views; segmental wall motion abnormalities can not be ruled   out.   Visual estimation of left ventricle ejection fraction is >50%.   The IVC appears normal.   Massive anechoic cystic structure (15 cm x 5 cm) noted within the liver   possibly enlarged gallbladder. There are calcified gallstones noted within   the structure.   Pleural effusion - massive left pleural effusion.    < end of copied text >    Current medications:  acetaminophen   Tablet .. 650 milliGRAM(s) Oral every 4 hours PRN  aluminum hydroxide/magnesium hydroxide/simethicone Suspension 30 milliLiter(s) Oral every 4 hours PRN  ascorbic acid 500 milliGRAM(s) Oral daily  azithromycin   Tablet 500 milliGRAM(s) Oral daily  cefTRIAXone Injectable. 1000 milliGRAM(s) IV Push every 24 hours  dexAMETHasone  Injectable 6 milliGRAM(s) IV Push three times a day  gabapentin 300 milliGRAM(s) Oral at bedtime  hydrocodone/homatropine Syrup 5 milliLiter(s) Oral every 8 hours PRN  HYDROmorphone  Injectable 0.5 milliGRAM(s) IV Push every 4 hours PRN  lactobacillus acidophilus 1 Tablet(s) Oral two times a day  levothyroxine 100 MICROGram(s) Oral daily  lidocaine   Patch 1 Patch Transdermal daily  liothyronine 5 MICROGram(s) Oral daily  multivitamin 1 Tablet(s) Oral daily  ondansetron Injectable 4 milliGRAM(s) IV Push every 6 hours PRN  oxycodone    5 mG/acetaminophen 325 mG 1 Tablet(s) Oral every 4 hours PRN  senna 2 Tablet(s) Oral at bedtime PRN

## 2020-07-12 NOTE — PROGRESS NOTE ADULT - SUBJECTIVE AND OBJECTIVE BOX
Patient is a 75y old  Female who presents with a chief complaint of dyspnea , cough (2020 06:36)      HPI:  76 y/o female with PMH of Hypothyroidism and L2 pathologic compression fracture presents to  with 1 month history of dyspnea and 1 week history of the dry cough. Patient was seen by ortho spine surgeon, pulmonologist and oncologist and send for evaluation of left sided pleural effusion with concern for lung mass. Pt reports onset of back pain was in 2020, received cortisone shots and got an MRI of L2 and blood test x2 weeks ago. Pt went to Dr. Westfall (oncologist) and was sent for imaging with Dr. Sanchez (pulmonologist) who discussed results of CT abdomen and chest this AM with pt and told pt to come to ED for evaluation for SOB.  In Ed -  T(F): 98.9Max: 98.9  HR: 80  (80 - 94) BP: 149/63 (149/63 - 150/77) RR: 24(17 - 24) SpO2: 98% (98% - 99%) EKG - sinus , low voltage QRS, troponin x 1neg, CXR - large pleural effusion, WBC 12, Cr 0.73, , covid PCR neg , s/p left chest tube placement in ED , fluid analysis pending (10 Jul 2020 13:26)    seen and evaluated today  data discussed with thoracic surgery as well as her RN at bedside  CT attached with some pain being medicated  breathing is better        she is ambulating with CT attached  she is feeling much better today  some discomfort from CT  PAST MEDICAL & SURGICAL HISTORY:  Osteoarthritis  Fracture: L2 pathologic fracture  Hypothyroidism  No significant past surgical history      PREVIOUS DIAGNOSTIC TESTING:      MEDICATIONS  (STANDING):  ascorbic acid 500 milliGRAM(s) Oral daily  azithromycin   Tablet 500 milliGRAM(s) Oral daily  cefTRIAXone Injectable. 1000 milliGRAM(s) IV Push every 24 hours  dexAMETHasone  Injectable 6 milliGRAM(s) IV Push three times a day  diazepam    Tablet 2 milliGRAM(s) Oral once  gabapentin 300 milliGRAM(s) Oral at bedtime  lactobacillus acidophilus 1 Tablet(s) Oral two times a day  levothyroxine 100 MICROGram(s) Oral daily  liothyronine 5 MICROGram(s) Oral daily  multivitamin 1 Tablet(s) Oral daily    MEDICATIONS  (PRN):  acetaminophen   Tablet .. 650 milliGRAM(s) Oral every 4 hours PRN Temp greater or equal to 38C (100.4F), Mild Pain (1 - 3)  aluminum hydroxide/magnesium hydroxide/simethicone Suspension 30 milliLiter(s) Oral every 4 hours PRN Dyspepsia  hydrocodone/homatropine Syrup 5 milliLiter(s) Oral every 8 hours PRN Cough  HYDROmorphone  Injectable 0.5 milliGRAM(s) IV Push every 4 hours PRN Severe Pain (7 - 10)  morphine  - Injectable 1 milliGRAM(s) IV Push once PRN Moderate Pain (4 - 6)  ondansetron Injectable 4 milliGRAM(s) IV Push every 6 hours PRN Nausea  oxycodone    5 mG/acetaminophen 325 mG 1 Tablet(s) Oral every 4 hours PRN Moderate Pain (4 - 6)  senna 2 Tablet(s) Oral at bedtime PRN Constipation      FAMILY HISTORY:  FH: thyroid disease (Mother)      SOCIAL HISTORY:  ***    REVIEW OF SYSTEM:  data as above    Vital Signs Last 24 Hrs  T(C): 37.4 (2020 09:15), Max: 37.4 (2020 09:15)  T(F): 99.3 (2020 09:15), Max: 99.3 (2020 09:15)  HR: 100 (2020 22:12) (92 - 100)  BP: 136/70 (2020 09:15) (116/70 - 155/69)  BP(mean): --  RR: 17 (2020 09:15) (17 - 19)  SpO2: 94% (2020 09:15) (93% - 95%)    I&O's Detail    2020 07:01  -  2020 07:00  --------------------------------------------------------  IN:  Total IN: 0 mL    OUT:    Chest Tube: 1115 mL  Total OUT: 1115 mL    Total NET: -1115 mL          PHYSICAL EXAM  General Appearance: cooperative, no acute distress,   HEENT: PERRL, conjunctiva clear, EOM's intact, non injected pharynx, no exudate, TM   normal  Neck: Supple, , no adenopathy, thyroid: not enlarged, no carotid bruit or JVD  Back: Symmetric, no  tenderness,no soft tissue tenderness  Lungs: left sided CT attached, decreased BS left mid-lower lung, IMPROVED  Heart: Regular rate and rhythm, S1, S2 normal, no murmur, rub or gallop  Abdomen: Soft, non-tender, bowel sounds active , no hepatosplenomegaly  Extremities: no cyanosis or edema, no joint swelling  Skin: Skin color, texture normal, no rashes   Neurologic: Alert and oriented X3 , cranial nerves intact, sensory and motor normal,    ECG:    LABS:                          13.3   13.48 )-----------( 482      ( 2020 09:13 )             39.1     07-10    136  |  104  |  11  ----------------------------<  114<H>  4.2   |  25  |  0.73    Ca    8.7      10 Jul 2020 10:49  Mg     2.2     07-10    TPro  7.0  /  Alb  3.1<L>  /  TBili  0.4  /  DBili  x   /  AST  28  /  ALT  33  /  AlkPhos  126<H>  07-10    CARDIAC MARKERS ( 10 Jul 2020 10:49 )  <0.015 ng/mL / x     / x     / x     / x            Pro BNP  209 07-10 @ 10:49  D Dimer  -- 07-10 @ 10:49    PT/INR - ( 10 Jul 2020 10:57 )   PT: 12.2 sec;   INR: 1.04 ratio         PTT - ( 10 Jul 2020 10:57 )  PTT:29.4 sec  Urinalysis Basic - ( 10 Jul 2020 17:50 )    Color: Yellow / Appearance: Clear / S.010 / pH: x  Gluc: x / Ketone: Small  / Bili: Negative / Urobili: Negative mg/dL   Blood: x / Protein: Negative mg/dL / Nitrite: Negative   Leuk Esterase: Trace / RBC: 0-2 /HPF / WBC 0-2   Sq Epi: x / Non Sq Epi: Occasional / Bacteria: Few            RADIOLOGY & ADDITIONAL STUDIES:    < from: Xray Chest 1 View- PORTABLE-Urgent (07.10.20 @ 14:20) >    PROCEDURE DATE:  07/10/2020          INTERPRETATION:  AP chest on July 10, 2020 at 2:12 PM. Patient had catheter left chest tube inserted for massive effusion.    Heart size cannot be assessed.    A catheter left chest tube has been inserted at the base.    Massive left effusion seen prior in the day is significantly diminished but a very large effusion remains. There is no visible pneumothorax.    Underlying pathology cannot be excluded.    IMPRESSION: Diminished left effusion after chest tube. Significant effusion remains.    < end of copied text >  < from: Xray Chest 1 View- PORTABLE-Routine (20 @ 10:08) >      PROCEDURE DATE:  2020  personally reviewed and shown to pt with prior xrays as well        INTERPRETATION:  INDICATION: Assess left pleural effusion.    PRIORS: 7/10/2020.    VIEWS: Portable AP radiography of the chest performed.    FINDINGS: Since prior evaluation the position of the indwelling left pleural space pigtail drainage catheter has changed, the pigtail now overlying the medial aspect of the left mid thorax. There is interval decrease in left pleural effusion from prior; a moderately sized left pleural effusion persists. Underlying lung parenchymal infiltrate, consolidation or atelectasis cannot be excluded. There is no evidence for left-sided pneumothorax. No acute right-sided infiltrate is identified. No evidence for right pleural effusion or pneumothorax. No mediastinal shift noted. Degenerative changes of the thoracic spine evident.    IMPRESSION: Interval decrease in left pleural effusion from prior with change in position of indwelling pigtail pleural space drainage catheter. Moderately sized left pleural effusion persists. See above discussion.    < end of copied text >

## 2020-07-12 NOTE — CONSULT NOTE ADULT - PROBLEM SELECTOR RECOMMENDATION 9
S/P CT / drainage; awaiting chemistry / cytology to determine if this is an exudate and related to malignancy; Post CT CXR no overt mass; Will allow further drainage/ FU CT chest in AM / She will be seen by Pulmonary in FU tomorrow to coordinate

## 2020-07-12 NOTE — PROGRESS NOTE ADULT - ASSESSMENT
A/P pathologic fracture L2 - stable  d/w Dr. Mcmahon and Dr. George  1. Pathologic fracture L2 stable for now, neuro and motor exams are stable as well. No emergent surgical intervention at this time. Continue medical and oncology workup - will continue to follow closely.

## 2020-07-12 NOTE — PROGRESS NOTE ADULT - SUBJECTIVE AND OBJECTIVE BOX
Pt seen & examined at bedside this AM, standing in room, discomfort from chest tube. No acute events overnight. Pt denies any new onset weakness/numbness/tingling or loss of bowel/bladder control.      Vital Signs Last 24 Hrs  T(C): 36.7 (11 Jul 2020 22:12), Max: 37 (11 Jul 2020 09:15)  T(F): 98 (11 Jul 2020 22:12), Max: 98.6 (11 Jul 2020 09:15)  HR: 100 (11 Jul 2020 22:12) (92 - 100)  BP: 155/69 (11 Jul 2020 22:12) (116/70 - 155/69)  BP(mean): --  RR: 19 (11 Jul 2020 22:12) (18 - 19)  SpO2: 95% (11 Jul 2020 22:12) (93% - 95%)    Gen: NAD  Spine:  Skin intact, TTP along upper lumbar spine   +Sensation C5-T1 & L2-S1  Moving all extremities  Distal pulses intact  Soft compartments, - calf tenderness to palpation

## 2020-07-13 LAB
ALBUMIN SERPL ELPH-MCNC: 2.5 G/DL — LOW (ref 3.3–5)
ALP SERPL-CCNC: 123 U/L — HIGH (ref 40–120)
ALT FLD-CCNC: 29 U/L — SIGNIFICANT CHANGE UP (ref 12–78)
ANION GAP SERPL CALC-SCNC: 8 MMOL/L — SIGNIFICANT CHANGE UP (ref 5–17)
AST SERPL-CCNC: 38 U/L — HIGH (ref 15–37)
BASOPHILS # BLD AUTO: 0.03 K/UL — SIGNIFICANT CHANGE UP (ref 0–0.2)
BASOPHILS NFR BLD AUTO: 0.1 % — SIGNIFICANT CHANGE UP (ref 0–2)
BILIRUB SERPL-MCNC: 0.4 MG/DL — SIGNIFICANT CHANGE UP (ref 0.2–1.2)
BUN SERPL-MCNC: 20 MG/DL — SIGNIFICANT CHANGE UP (ref 7–23)
CALCIUM SERPL-MCNC: 8.1 MG/DL — LOW (ref 8.5–10.1)
CHLORIDE SERPL-SCNC: 108 MMOL/L — SIGNIFICANT CHANGE UP (ref 96–108)
CO2 SERPL-SCNC: 20 MMOL/L — LOW (ref 22–31)
CREAT SERPL-MCNC: 0.63 MG/DL — SIGNIFICANT CHANGE UP (ref 0.5–1.3)
EOSINOPHIL # BLD AUTO: 0.01 K/UL — SIGNIFICANT CHANGE UP (ref 0–0.5)
EOSINOPHIL NFR BLD AUTO: 0 % — SIGNIFICANT CHANGE UP (ref 0–6)
GLUCOSE SERPL-MCNC: 105 MG/DL — HIGH (ref 70–99)
HCT VFR BLD CALC: 40.2 % — SIGNIFICANT CHANGE UP (ref 34.5–45)
HGB BLD-MCNC: 13.4 G/DL — SIGNIFICANT CHANGE UP (ref 11.5–15.5)
IMM GRANULOCYTES NFR BLD AUTO: 0.8 % — SIGNIFICANT CHANGE UP (ref 0–1.5)
LYMPHOCYTES # BLD AUTO: 1.28 K/UL — SIGNIFICANT CHANGE UP (ref 1–3.3)
LYMPHOCYTES # BLD AUTO: 5.9 % — LOW (ref 13–44)
MCHC RBC-ENTMCNC: 30 PG — SIGNIFICANT CHANGE UP (ref 27–34)
MCHC RBC-ENTMCNC: 33.3 GM/DL — SIGNIFICANT CHANGE UP (ref 32–36)
MCV RBC AUTO: 90.1 FL — SIGNIFICANT CHANGE UP (ref 80–100)
MONOCYTES # BLD AUTO: 0.79 K/UL — SIGNIFICANT CHANGE UP (ref 0–0.9)
MONOCYTES NFR BLD AUTO: 3.7 % — SIGNIFICANT CHANGE UP (ref 2–14)
NEUTROPHILS # BLD AUTO: 19.35 K/UL — HIGH (ref 1.8–7.4)
NEUTROPHILS NFR BLD AUTO: 89.5 % — HIGH (ref 43–77)
PLATELET # BLD AUTO: 404 K/UL — HIGH (ref 150–400)
POTASSIUM SERPL-MCNC: 4.8 MMOL/L — SIGNIFICANT CHANGE UP (ref 3.5–5.3)
POTASSIUM SERPL-SCNC: 4.8 MMOL/L — SIGNIFICANT CHANGE UP (ref 3.5–5.3)
PROT SERPL-MCNC: 6.8 GM/DL — SIGNIFICANT CHANGE UP (ref 6–8.3)
RBC # BLD: 4.46 M/UL — SIGNIFICANT CHANGE UP (ref 3.8–5.2)
RBC # FLD: 13.4 % — SIGNIFICANT CHANGE UP (ref 10.3–14.5)
SODIUM SERPL-SCNC: 136 MMOL/L — SIGNIFICANT CHANGE UP (ref 135–145)
WBC # BLD: 21.64 K/UL — HIGH (ref 3.8–10.5)
WBC # FLD AUTO: 21.64 K/UL — HIGH (ref 3.8–10.5)

## 2020-07-13 PROCEDURE — 71260 CT THORAX DX C+: CPT | Mod: 26

## 2020-07-13 PROCEDURE — 99233 SBSQ HOSP IP/OBS HIGH 50: CPT

## 2020-07-13 PROCEDURE — 99232 SBSQ HOSP IP/OBS MODERATE 35: CPT

## 2020-07-13 PROCEDURE — 71045 X-RAY EXAM CHEST 1 VIEW: CPT | Mod: 26

## 2020-07-13 RX ORDER — ALPRAZOLAM 0.25 MG
0.25 TABLET ORAL EVERY 8 HOURS
Refills: 0 | Status: DISCONTINUED | OUTPATIENT
Start: 2020-07-13 | End: 2020-07-14

## 2020-07-13 RX ADMIN — Medication 0.25 MILLIGRAM(S): at 21:20

## 2020-07-13 RX ADMIN — MEROPENEM 100 MILLIGRAM(S): 1 INJECTION INTRAVENOUS at 06:40

## 2020-07-13 RX ADMIN — Medication 0.25 MILLIGRAM(S): at 12:20

## 2020-07-13 NOTE — PROGRESS NOTE ADULT - SUBJECTIVE AND OBJECTIVE BOX
Patient is a 75y old  Female who presents with a chief complaint of dyspnea , cough (2020 06:36)      HPI:  74 y/o female with PMH of Hypothyroidism and L2 pathologic compression fracture presents to  with 1 month history of dyspnea and 1 week history of the dry cough. Patient was seen by ortho spine surgeon, pulmonologist and oncologist and send for evaluation of left sided pleural effusion with concern for lung mass. Pt reports onset of back pain was in 2020, received cortisone shots and got an MRI of L2 and blood test x2 weeks ago. Pt went to Dr. Westfall (oncologist) and was sent for imaging with Dr. Sanchez (pulmonologist) who discussed results of CT abdomen and chest this AM with pt and told pt to come to ED for evaluation for SOB.  In Ed -  T(F): 98.9Max: 98.9  HR: 80  (80 - 94) BP: 149/63 (149/63 - 150/77) RR: 24(17 - 24) SpO2: 98% (98% - 99%) EKG - sinus , low voltage QRS, troponin x 1neg, CXR - large pleural effusion, WBC 12, Cr 0.73, , covid PCR neg , s/p left chest tube placement in ED , fluid analysis pending (10 Jul 2020 13:26)    seen and evaluated today  data discussed with thoracic surgery as well as her RN at bedside  CT attached with some pain being medicated  breathing is better        she is ambulating with CT attached  she is feeling much better today  some discomfort from CT      No acute pulmonary events occurred overnight  Discussed MRI findings with the patient; awaiting pleural fluid cytology  Discussed plan with  as well    PREVIOUS DIAGNOSTIC TESTING:      MEDICATIONS  (STANDING):  ascorbic acid 500 milliGRAM(s) Oral daily  azithromycin   Tablet 500 milliGRAM(s) Oral daily  cefTRIAXone Injectable. 1000 milliGRAM(s) IV Push every 24 hours  dexAMETHasone  Injectable 6 milliGRAM(s) IV Push three times a day  diazepam    Tablet 2 milliGRAM(s) Oral once  gabapentin 300 milliGRAM(s) Oral at bedtime  lactobacillus acidophilus 1 Tablet(s) Oral two times a day  levothyroxine 100 MICROGram(s) Oral daily  liothyronine 5 MICROGram(s) Oral daily  multivitamin 1 Tablet(s) Oral daily    MEDICATIONS  (PRN):  acetaminophen   Tablet .. 650 milliGRAM(s) Oral every 4 hours PRN Temp greater or equal to 38C (100.4F), Mild Pain (1 - 3)  aluminum hydroxide/magnesium hydroxide/simethicone Suspension 30 milliLiter(s) Oral every 4 hours PRN Dyspepsia  hydrocodone/homatropine Syrup 5 milliLiter(s) Oral every 8 hours PRN Cough  HYDROmorphone  Injectable 0.5 milliGRAM(s) IV Push every 4 hours PRN Severe Pain (7 - 10)  morphine  - Injectable 1 milliGRAM(s) IV Push once PRN Moderate Pain (4 - 6)  ondansetron Injectable 4 milliGRAM(s) IV Push every 6 hours PRN Nausea  oxycodone    5 mG/acetaminophen 325 mG 1 Tablet(s) Oral every 4 hours PRN Moderate Pain (4 - 6)  senna 2 Tablet(s) Oral at bedtime PRN Constipation      FAMILY HISTORY:  FH: thyroid disease (Mother)      SOCIAL HISTORY:  ***    REVIEW OF SYSTEM:  data as above    Vital Signs Last 24 Hrs  T(C): 37.4 (2020 09:15), Max: 37.4 (2020 09:15)  T(F): 99.3 (2020 09:15), Max: 99.3 (2020 09:15)  HR: 100 (2020 22:12) (92 - 100)  BP: 136/70 (2020 09:15) (116/70 - 155/69)  BP(mean): --  RR: 17 (2020 09:15) (17 - 19)  SpO2: 94% (2020 09:15) (93% - 95%)    I&O's Detail    2020 07:01  -  2020 07:00  --------------------------------------------------------  IN:  Total IN: 0 mL    OUT:    Chest Tube: 1115 mL  Total OUT: 1115 mL    Total NET: -1115 mL          PHYSICAL EXAM  General Appearance: cooperative, no acute distress,   HEENT: PERRL, conjunctiva clear, EOM's intact, non injected pharynx, no exudate, TM   normal  Neck: Supple, , no adenopathy, thyroid: not enlarged, no carotid bruit or JVD  Back: Symmetric, no  tenderness,no soft tissue tenderness  Lungs: left sided CT attached, decreased BS left mid-lower lung, IMPROVED  Heart: Regular rate and rhythm, S1, S2 normal, no murmur, rub or gallop  Abdomen: Soft, non-tender, bowel sounds active , no hepatosplenomegaly  Extremities: no cyanosis or edema, no joint swelling  Skin: Skin color, texture normal, no rashes   Neurologic: Alert and oriented X3 , cranial nerves intact, sensory and motor normal,    ECG:    LABS:                          13.3   13.48 )-----------( 482      ( 2020 09:13 )             39.1     07-10    136  |  104  |  11  ----------------------------<  114<H>  4.2   |  25  |  0.73    Ca    8.7      10 Jul 2020 10:49  Mg     2.2     07-10    TPro  7.0  /  Alb  3.1<L>  /  TBili  0.4  /  DBili  x   /  AST  28  /  ALT  33  /  AlkPhos  126<H>  07-10    CARDIAC MARKERS ( 10 Jul 2020 10:49 )  <0.015 ng/mL / x     / x     / x     / x            Pro BNP  209 07-10 @ 10:49  D Dimer  -- 07-10 @ 10:49    PT/INR - ( 10 Jul 2020 10:57 )   PT: 12.2 sec;   INR: 1.04 ratio         PTT - ( 10 Jul 2020 10:57 )  PTT:29.4 sec  Urinalysis Basic - ( 10 Jul 2020 17:50 )    Color: Yellow / Appearance: Clear / S.010 / pH: x  Gluc: x / Ketone: Small  / Bili: Negative / Urobili: Negative mg/dL   Blood: x / Protein: Negative mg/dL / Nitrite: Negative   Leuk Esterase: Trace / RBC: 0-2 /HPF / WBC 0-2   Sq Epi: x / Non Sq Epi: Occasional / Bacteria: Few            RADIOLOGY & ADDITIONAL STUDIES:    < from: Xray Chest 1 View- PORTABLE-Urgent (07.10.20 @ 14:20) >    PROCEDURE DATE:  07/10/2020          INTERPRETATION:  AP chest on July 10, 2020 at 2:12 PM. Patient had catheter left chest tube inserted for massive effusion.    Heart size cannot be assessed.    A catheter left chest tube has been inserted at the base.    Massive left effusion seen prior in the day is significantly diminished but a very large effusion remains. There is no visible pneumothorax.    Underlying pathology cannot be excluded.    IMPRESSION: Diminished left effusion after chest tube. Significant effusion remains.    < end of copied text >  < from: Xray Chest 1 View- PORTABLE-Routine (20 @ 10:08) >      PROCEDURE DATE:  2020  personally reviewed and shown to pt with prior xrays as well        INTERPRETATION:  INDICATION: Assess left pleural effusion.    PRIORS: 7/10/2020.    VIEWS: Portable AP radiography of the chest performed.    FINDINGS: Since prior evaluation the position of the indwelling left pleural space pigtail drainage catheter has changed, the pigtail now overlying the medial aspect of the left mid thorax. There is interval decrease in left pleural effusion from prior; a moderately sized left pleural effusion persists. Underlying lung parenchymal infiltrate, consolidation or atelectasis cannot be excluded. There is no evidence for left-sided pneumothorax. No acute right-sided infiltrate is identified. No evidence for right pleural effusion or pneumothorax. No mediastinal shift noted. Degenerative changes of the thoracic spine evident.    IMPRESSION: Interval decrease in left pleural effusion from prior with change in position of indwelling pigtail pleural space drainage catheter. Moderately sized left pleural effusion persists. See above discussion.    < end of copied text >

## 2020-07-13 NOTE — PROGRESS NOTE ADULT - SUBJECTIVE AND OBJECTIVE BOX
Pt seen & examined at bedside this morning.  No acute events overnight. Pt denies any new onset weakness/numbness/tingling or loss of bowel/bladder control.      Vital Signs Last 24 Hrs  T(C): 36.7 (13 Jul 2020 08:04), Max: 36.8 (12 Jul 2020 16:25)  T(F): 98.1 (13 Jul 2020 08:04), Max: 98.2 (12 Jul 2020 16:25)  HR: 93 (13 Jul 2020 08:04) (88 - 93)  BP: 131/57 (13 Jul 2020 08:04) (131/57 - 136/65)  BP(mean): --  RR: 16 (13 Jul 2020 08:04) (16 - 18)  SpO2: 95% (13 Jul 2020 08:04) (95% - 96%)    Gen: NAD  Spine:  Skin intact, TTP along upper lumbar spine   +Sensation C5-T1 & L2-S1  Moving all extremities  Distal pulses intact  Soft compartments, - calf tenderness to palpation

## 2020-07-13 NOTE — PROGRESS NOTE ADULT - SUBJECTIVE AND OBJECTIVE BOX
Date of service: 07-13-20 @ 10:54      Patient sitting in bed; tearful, noted she fears of cancer      ROS: no fever or chills; denies dizziness, no HA, no SOB or cough, no abdominal pain, no diarrhea or constipation; no dysuria, no urinary frequency, no legs pain, no rashes    MEDICATIONS  (STANDING):  ascorbic acid 500 milliGRAM(s) Oral daily  azithromycin   Tablet 500 milliGRAM(s) Oral daily  dexAMETHasone  Injectable 4 milliGRAM(s) IV Push every 8 hours  gabapentin 300 milliGRAM(s) Oral at bedtime  lactobacillus acidophilus 1 Tablet(s) Oral two times a day  levothyroxine 100 MICROGram(s) Oral daily  lidocaine   Patch 1 Patch Transdermal daily  liothyronine 5 MICROGram(s) Oral daily  meropenem  IVPB 1000 milliGRAM(s) IV Intermittent every 8 hours  multivitamin 1 Tablet(s) Oral daily  pantoprazole    Tablet 40 milliGRAM(s) Oral before breakfast    MEDICATIONS  (PRN):  acetaminophen   Tablet .. 650 milliGRAM(s) Oral every 4 hours PRN Temp greater or equal to 38C (100.4F), Mild Pain (1 - 3)  aluminum hydroxide/magnesium hydroxide/simethicone Suspension 30 milliLiter(s) Oral every 4 hours PRN Dyspepsia  hydrocodone/homatropine Syrup 5 milliLiter(s) Oral every 8 hours PRN Cough  HYDROmorphone  Injectable 0.5 milliGRAM(s) IV Push every 4 hours PRN Severe Pain (7 - 10)  ondansetron Injectable 4 milliGRAM(s) IV Push every 6 hours PRN Nausea  oxycodone    5 mG/acetaminophen 325 mG 1 Tablet(s) Oral every 4 hours PRN Moderate Pain (4 - 6)  senna 2 Tablet(s) Oral at bedtime PRN Constipation      Vital Signs Last 24 Hrs  T(C): 36.7 (13 Jul 2020 08:04), Max: 36.8 (12 Jul 2020 16:25)  T(F): 98.1 (13 Jul 2020 08:04), Max: 98.2 (12 Jul 2020 16:25)  HR: 93 (13 Jul 2020 08:04) (88 - 93)  BP: 131/57 (13 Jul 2020 08:04) (131/57 - 136/65)  BP(mean): --  RR: 16 (13 Jul 2020 08:04) (16 - 18)  SpO2: 95% (13 Jul 2020 08:04) (95% - 96%)        Physical Exam:    Constitutional: frail looking  HEENT: NC/AT, EOMI, PERRLA, conjunctivae clear; ears and nose atraumatic; pharynx clear  Neck: supple; thyroid not palpable  Back: no tenderness  Respiratory: respiratory effort normal; left sided decreased breath sounds with scattered coarse breath sounds; left side chest tube  Cardiovascular: S1S2 regular, no murmurs  Abdomen: soft, not tender, not distended, positive BS; no liver or spleen organomegaly  Genitourinary: no suprapubic tenderness  Musculoskeletal: no muscle tenderness, no joint swelling or tenderness  Neurological/ Psychiatric: AxOx3, judgement and insight normal;  moving all extremities  Skin: no rashes; no palpable lesions    Labs: all available labs reviewed                     Labs:                        13.4   21.64 )-----------( 404      ( 13 Jul 2020 07:44 )             40.2     07-13    136  |  108  |  20  ----------------------------<  105<H>  4.8   |  20<L>  |  0.63    Ca    8.1<L>      13 Jul 2020 07:44    TPro  6.8  /  Alb  2.5<L>  /  TBili  0.4  /  DBili  x   /  AST  38<H>  /  ALT  29  /  AlkPhos  123<H>  07-13           Cultures:       Culture - Urine (collected 07-10-20 @ 17:50)  Source: .Urine Clean Catch (Midstream)  Final Report (07-11-20 @ 18:13):    <10,000 CFU/mL Normal Urogenital Shyann    Culture - Blood (collected 07-10-20 @ 14:38)  Source: .Blood None  Preliminary Report (07-11-20 @ 23:00):    No growth to date.    Culture - Blood (collected 07-10-20 @ 14:38)  Source: .Blood None  Preliminary Report (07-11-20 @ 23:00):    No growth to date.    Culture - Fungal, Body Fluid (collected 07-10-20 @ 12:30)  Source: Pleural Fl Pleural Fluid  Preliminary Report (07-13-20 @ 08:55):    Testing in progress    Culture - Body Fluid with Gram Stain (collected 07-10-20 @ 12:30)  Source: Pleural Fl Pleural Fluid  Gram Stain (07-10-20 @ 21:49):    polymorphonuclear leukocytes seen    No organisms seen    by cytocentrifuge  Preliminary Report (07-11-20 @ 17:25):    No growth                pH, Fluid (07.10.20 @ 12:30)    pH, Fluid: 7.38    Fluid Source: Pleural Fluid    Cell Count, Body Fluid (07.10.20 @ 12:30)    Other Body Cells: 3 %    Mesothelial Cells - Body Fluid: 3 %    Fluid Type: Pleural Fluid    Body Fluid Appearance: Slightly Cloudy    BF Lymphocytes: 40 %    Atypical Lymphocytes - Body Fluid: 0 %    Nucleated Red Blood Cells - Body Fluid: 0    Eosinophil Count - Body Fluid: 0 %    Monocyte/Macrophage Count - Body Fluid: 41 %    Fluid Segmented Granulocytes: 13 %    Tube Type: Lavendar    Color - Body Fluid: Yellow    Total Nucleated Cell Count, Body Fluid: 971: Peritoneal/Pleural/ Pericardial  Body Fluid Types            Total Nucleated cells: <500/uL            Neutrophils: <25%          Synovial Body Fluid Type           Total Nucleated cells: <150/uL           Neutrophils: <25%           Lymphocytes: <75%           Moncytes/Macrophages: </=70% /uL    Total RBC Count: 1000 /uL              < from: Xray Chest 1 View- PORTABLE-Routine (07.11.20 @ 10:08) >    EXAM:  XR CHEST PORTABLE ROUTINE 1V                            PROCEDURE DATE:  07/11/2020          INTERPRETATION:  INDICATION: Assess left pleural effusion.    PRIORS: 7/10/2020.    VIEWS: Portable AP radiography of the chest performed.    FINDINGS: Since prior evaluation the position of the indwelling left pleural space pigtail drainage catheter has changed, the pigtail now overlying the medial aspect of the left mid thorax. There is interval decrease in left pleural effusion from prior; a moderately sized left pleural effusion persists. Underlying lung parenchymal infiltrate, consolidation or atelectasis cannot be excluded. There is no evidence for left-sided pneumothorax. No acute right-sided infiltrate is identified. No evidence for right pleural effusion or pneumothorax. No mediastinal shift noted. Degenerative changes of the thoracic spine evident.    IMPRESSION: Interval decrease in left pleural effusion from prior with change in position of indwelling pigtail pleural space drainage catheter. Moderately sized left pleural effusion persists. See above discussion.    < end of copied text >      Radiology: all available radiological tests reviewed    Advanced directives addressed: full resuscitation

## 2020-07-13 NOTE — PROGRESS NOTE ADULT - SUBJECTIVE AND OBJECTIVE BOX
JOHAN WU  [id number]  Female       Daily   Denies abd complaints    Physical Exam:    Pt is AAOx3  General: Well developed, in no acute distress.   Chest: Lungs clear, no rales, no rhonchi, no wheezes.   Heart: RR, no murmurs, no rubs, no gallops.   Abdomen: Soft, no tenderness, no masses, BS normal.    Back: Normal curvature, no tenderness.   Neuro: Physiological, no localizing findings.   Skin: Normal, no rashes, no lesions noted.   Extremities: Warm, well perfused, no edema, Pulses intact    I&O's Summary    12 Jul 2020 07:01  -  13 Jul 2020 07:00  --------------------------------------------------------  IN: 360 mL / OUT: 205 mL / NET: 155 mL                              13.4   21.64 )-----------( 404      ( 13 Jul 2020 07:44 )             40.2       07-13    136  |  108  |  20  ----------------------------<  105<H>  4.8   |  20<L>  |  0.63    Ca    8.1<L>      13 Jul 2020 07:44    TPro  6.8  /  Alb  2.5<L>  /  TBili  0.4  /  DBili  x   /  AST  38<H>  /  ALT  29  /  AlkPhos  123<H>  07-13        HEALTH ISSUES - PROBLEM Dx:  Leukocytosis: Leukocytosis  Thrombocytosis: Thrombocytosis  Fracture: Fracture  Pleural effusion: Pleural effusion

## 2020-07-13 NOTE — PROGRESS NOTE ADULT - ASSESSMENT
74 y/o female with PMH of Hypothyroidism and L2 pathologic compression fracture presents to  with 1 month history of dyspnea and 1 week history of the dry cough. Patient was seen by ortho spine surgeon, pulmonologist and oncologist and send for evaluation of left sided pleural effusion with concern for lung mass.   In Ed -  T(F): 98.9Max: 98.9  HR: 80  (80 - 94) BP: 149/63 (149/63 - 150/77) RR: 24(17 - 24) SpO2: 98% (98% - 99%) EKG - sinus , low voltage QRS, troponin x 1neg, CXR - large pleural effusion, WBC 12, Cr 0.73, , covid PCR neg , s/p left chest tube placement in ED , fluid analysis pending     * Dyspnea due to Large left pleural effusion s/p left chest tube placement, exudative effusion suspected malignancy   * INDU 2.6 cm mass, 1.5 cm liver lesion   * Suspected postobstructive PNA s/p IV abx  * Leukocytosis steroids induced  - CT consult- s/p left chest tube placement   - f/u pleural fluid analysis - exudate, cx - neg, cytology - pending   - Ct chest with contrast 7/13 - INDU mass, 1.5 cm liver mass  - IV ceftriaxone--> cefepime ( fascial rash 7/12- will stop) --> meropenem and  Azithromycin, will observe off abx   - oncology and pulmonology evaluation  - 2 d echo- EF wnl, liver cystic lesion 15x5 cm  - bone scan, MRI of the brain w contrast oskar  *  Liver lesion cystic on 2 d echo and Enlarged GB on CT 22 cm    - CT abd from o/p - in the chart reviewed  - US abd - noted , HIDA scan pending  - surgery consult  * Hypothyroidism  - c/w LT4 and LT3   * Lower back pain due to pathologic L2 fracture , worsening on MRI, risk for cord compression  - pain management, c/w gabapentin, IV steroids 4 q8h   - Dr. Flood consult  - will need surgery  - pt requesting second opinion Dr. Kearns consulted  - neurochecks q4 h    Advanced directives  - discussed advanced directive for 17 min  - FULL code    DVT proph -IPC     Dispo - CT management, pain management, oncology and pulmonology work-up 74 y/o female with PMH of Hypothyroidism and L2 pathologic compression fracture presents to  with 1 month history of dyspnea and 1 week history of the dry cough. Patient was seen by ortho spine surgeon, pulmonologist and oncologist and send for evaluation of left sided pleural effusion with concern for lung mass.   In Ed -  T(F): 98.9Max: 98.9  HR: 80  (80 - 94) BP: 149/63 (149/63 - 150/77) RR: 24(17 - 24) SpO2: 98% (98% - 99%) EKG - sinus , low voltage QRS, troponin x 1neg, CXR - large pleural effusion, WBC 12, Cr 0.73, , covid PCR neg , s/p left chest tube placement in ED , fluid analysis pending     * Dyspnea due to Large left pleural effusion s/p left chest tube placement, exudative effusion suspected malignancy   * INDU 2.6 cm mass, 1.5 cm liver lesion   * Suspected postobstructive PNA s/p IV abx  * Leukocytosis steroids induced  - CT consult- s/p left chest tube placement   - f/u pleural fluid analysis - exudate, cx - neg, cytology - pending   - Ct chest with contrast 7/13 - INDU mass, 1.5 cm liver mass  - IV ceftriaxone--> cefepime ( fascial rash 7/12- will stop) --> meropenem and  Azithromycin, will observe off abx   - oncology and pulmonology evaluation  - 2 d echo- EF wnl, liver cystic lesion 15x5 cm  - bone scan, MRI of the brain w contrast oskar  *  Liver lesion cystic on 2 d echo and Enlarged GB on CT 22 cm    - CT abd from o/p - in the chart reviewed  - US abd - noted , HIDA scan pending  - surgery consult  * Hypothyroidism  - c/w LT4 and LT3   * Lower back pain due to pathologic L2 fracture , worsening on MRI, risk for cord compression  - pain management, c/w gabapentin, IV steroids 4 q8h with PPI  - Dr. Flood consult  - will need surgery  - pt requesting second opinion Dr. Kearns consulted  - neurochecks q4 h    Advanced directives  - discussed advanced directive for 17 min  - FULL code    DVT proph -IPC     Dispo - CT management, oncology  work-up , plan for spine surgery

## 2020-07-13 NOTE — CONSULT NOTE ADULT - SUBJECTIVE AND OBJECTIVE BOX
Mrs. Hyman is admitted with back pain, leg numbness, dyspnea, cough.  She has had back pain dating back to March of this year.  Xray at that time was reportedly okay.  About two weeks ago she was seen by Dr. George with back pain and imaging demonstrated lesion at L2.  She is now admitted at  with a chest catheter to drain pleural effusion.  Imaging shows signficant L2 lesion with epidural and circumferential involvement.  Pedicular involvement noted on imaging.  High grade spinal stenosis seen at level of lesion due to epidural disease and also below due to degenerative changes.  Spine survey did not demonstrate other lesions, although likely hemangiomas noted.      Neurological exam demonstrates bilateral IP weakness to at 4-/5.  Quadriceps weakness is noted at 4+/5.  Sensation is decreased, left greater than right in an L2/3 type distribution.

## 2020-07-13 NOTE — PROGRESS NOTE ADULT - SUBJECTIVE AND OBJECTIVE BOX
120  Patient seen and evaluated   Known pathological fracture with severe stenosis L2 / retropulsion of vertebral body.   Currently awaiting second option regarding surgical intervention.   Case discussed with Dr George,  who has been in touch with family.   Neurological she remains unchanged this morning .   Will continue to recommend close observation.   Surgical planning based on patient / family decision.

## 2020-07-13 NOTE — PROGRESS NOTE ADULT - ASSESSMENT
Pt is a 75 year old female with pmed hypothyroidism and likely pathologic fx of L2, lung mass and large left pleural effusion s/p chest tube drainage found to have abd sono showing distended gallbladder with large stones and no signs of acute inflammation. LFTs wln. leukocytosis noted      -No additional imaging studies at this time   -Findings likely represent incidental findings and leukocytosis 2/2 to steroid use  -continue medical care per primary team  -Surgery team will continue to follow

## 2020-07-13 NOTE — PROGRESS NOTE ADULT - SUBJECTIVE AND OBJECTIVE BOX
Subjective:  evaluated by neurosurgery for L2 decompression.  Patient is very upset about her possible diagnosis of lung cancer.    Vital Signs:  Vital Signs Last 24 Hrs  T(C): 36.7 (07-13-20 @ 08:04), Max: 36.8 (07-12-20 @ 16:25)  T(F): 98.1 (07-13-20 @ 08:04), Max: 98.2 (07-12-20 @ 16:25)  HR: 93 (07-13-20 @ 08:04) (88 - 93)  BP: 131/57 (07-13-20 @ 08:04) (131/57 - 136/65)  RR: 16 (07-13-20 @ 08:04) (16 - 18)  SpO2: 95% (07-13-20 @ 08:04) (95% - 96%) on room air      Relevant labs, radiology and Medications reviewed                        13.4   21.64 )-----------( 404      ( 13 Jul 2020 07:44 )             40.2     07-13    136  |  108  |  20  ----------------------------<  105<H>  4.8   |  20<L>  |  0.63    Ca    8.1<L>      13 Jul 2020 07:44    TPro  6.8  /  Alb  2.5<L>  /  TBili  0.4  /  DBili  x   /  AST  38<H>  /  ALT  29  /  AlkPhos  123<H>  07-13      MEDICATIONS  (STANDING):  ascorbic acid 500 milliGRAM(s) Oral daily  dexAMETHasone  Injectable 4 milliGRAM(s) IV Push every 8 hours  gabapentin 300 milliGRAM(s) Oral at bedtime  lactobacillus acidophilus 1 Tablet(s) Oral two times a day  levothyroxine 100 MICROGram(s) Oral daily  lidocaine   Patch 1 Patch Transdermal daily  liothyronine 5 MICROGram(s) Oral daily  multivitamin 1 Tablet(s) Oral daily  pantoprazole    Tablet 40 milliGRAM(s) Oral before breakfast    MEDICATIONS  (PRN):  acetaminophen   Tablet .. 650 milliGRAM(s) Oral every 4 hours PRN Temp greater or equal to 38C (100.4F), Mild Pain (1 - 3)  aluminum hydroxide/magnesium hydroxide/simethicone Suspension 30 milliLiter(s) Oral every 4 hours PRN Dyspepsia  hydrocodone/homatropine Syrup 5 milliLiter(s) Oral every 8 hours PRN Cough  HYDROmorphone  Injectable 0.5 milliGRAM(s) IV Push every 4 hours PRN Severe Pain (7 - 10)  ondansetron Injectable 4 milliGRAM(s) IV Push every 6 hours PRN Nausea  oxycodone    5 mG/acetaminophen 325 mG 1 Tablet(s) Oral every 4 hours PRN Moderate Pain (4 - 6)  senna 2 Tablet(s) Oral at bedtime PRN Constipation      Physical exam  Gen: NAD breathing comfortably  Neuro: AO x 3   Card: S1 S2  Pulm: CTA  Abd: Soft NT ND  Ext: no edema    Tubes: left PTC to water seal, no air leak.    I&O's Summary    12 Jul 2020 07:01  -  13 Jul 2020 07:00  --------------------------------------------------------  IN: 360 mL / OUT: 205 mL / NET: 155 mL        Assessment  75y Female  w/ PAST MEDICAL & SURGICAL HISTORY:  Osteoarthritis  Fracture: L2 pathologic fracture  Hypothyroidism  No significant past surgical history    Patient is a 75y old  Female who presents with a chief complaint of dyspnea , cough (13 Jul 2020 10:53)      Pathologic fx of L2 - Neurosx intervention suggested.  New left pleural effusion s/p left PTC placement.    PLAN    Follow up cytology.  CT to water seal.  Possible CT removal either today or tomorrow.  Daily CXR.    Discussed with Cardiothoracic Team at AM rounds.

## 2020-07-13 NOTE — PROGRESS NOTE ADULT - SUBJECTIVE AND OBJECTIVE BOX
Subjective:  Patient is a 75y old  Female who presents with a chief complaint of dyspnea , cough     HPI:  74 y/o female with PMH of Hypothyroidism and L2 pathologic compression fracture presents to   on 7/10/20 with 1 month history of dyspnea and 1 week history of the dry cough. Patient was seen by ortho spine surgeon, pulmonologist and oncologist and send for evaluation of left sided pleural effusion with concern for lung mass. Pt reports onset of back pain was in 2020, received cortisone shots and got an MRI of L2 and blood test x2 weeks ago. Pt went to Dr. Westfall (oncologist) and was sent for imaging with Dr. Sanchez (pulmonologist) who discussed results of CT abdomen and chest this AM with pt and told pt to come to ED for evaluation for SOB.  In Ed -  T(F): 98.9Max: 98.9  HR: 80  (80 - 94) BP: 149/63 (149/63 - 150/77) RR: 24(17 - 24) SpO2: 98% (98% - 99%) EKG - sinus , low voltage QRS, troponin x 1neg, CXR - large pleural effusion, WBC 12, Cr 0.73, , covid PCR neg , s/p left chest tube placement in ED , fluid analysis pending (10 Jul 2020 13:26)     -  Patient seen and examined at bedside earlier today, feels better, left sided chest wall pain around CT, + cough, dyspnea improved, POC discussed in length   - pt seen and examined, + cough, chest wall pain, low grade fever overnight, denies abdominal pain, POC discussed    - pt seen and examined, cough improved, + anxious about results of the tests, denies cp, dyspnea, abdominal pain, POC discussed     Review of system- Rest of the review of system are negative except mentioned in HPI    T(C): 36.6 (20 @ 15:57), Max: 36.8 (20 @ 21:20)  T(F): 97.8 (20 @ 15:57), Max: 98.2 (20 @ 21:20)  HR: 82 (20 @ 15:57) (82 - 93)  BP: 138/69 (20 @ 15:57) (131/57 - 138/69)  RR: 18 (20 @ 15:57) (16 - 18)  SpO2: 95% (20 @ 15:57) (95% - 96%)  Wt(kg): --    PHYSICAL EXAM:  GENERAL: NAD  NERVOUS SYSTEM:  Alert & Oriented X3, non- focal exam,  Motor Strength 5/5 B/L upper and lower extremities; DTRs 2+ intact and symmetric  HEAD:  Atraumatic, Normocephalic  EYES: EOMI, PERRLA, conjunctiva and sclera clear  HEENT: Moist mucous membranes  NECK: Supple, No JVD  CHEST/LUNG: BS decreased over left base, ; No rales, no rhonchi, no wheezing, Left CT +   HEART: Regular rate and rhythm; No murmurs, no rubs or gallops  ABDOMEN: Soft, Nontender, Nondistended; Bowel sounds present  GENITOURINARY- Voiding, no suprapubic tenderness  EXTREMITIES:  2+ Peripheral Pulses, No clubbing, cyanosis,   edema  MUSCULOSKELETAL:- No muscle tenderness, Muscle tone normal, No joint tenderness, no Joint swelling,  Joint ROM -normal  SKIN-no rash, no lesion    LABS: all reviewed                         13.4   21.64 )-----------( 404      ( 2020 07:44 )             40.2                         14.6   20.12 )-----------( 563      ( 2020 07:18 )             43.9       0713    136  |  108  |  20  ----------------------------<  105<H>  4.8   |  20<L>  |  0.63    Ca    8.1<L>      2020 07:44    TPro  6.8  /  Alb  2.5<L>  /  TBili  0.4  /  DBili  x   /  AST  38<H>  /  ALT  29  /  AlkPhos  123<H>        07-12    137  |  103  |  14  ----------------------------<  135<H>  4.1   |  27  |  0.79    Ca    8.4<L>      2020 07:18  Phos  3.5     07-11  Mg     2.2     07-11    TPro  7.7  /  Alb  3.2<L>  /  TBili  0.3  /  DBili  0.1  /  AST  16  /  ALT  29  /  AlkPhos  139<H>              CARDIAC MARKERS ( 10 Jul 2020 10:49 )  <0.015 ng/mL / x     / x     / x     / x          Urinalysis Basic - ( 10 Jul 2020 17:50 )    Color: Yellow / Appearance: Clear / S.010 / pH: x  Gluc: x / Ketone: Small  / Bili: Negative / Urobili: Negative mg/dL   Blood: x / Protein: Negative mg/dL / Nitrite: Negative   Leuk Esterase: Trace / RBC: 0-2 /HPF / WBC 0-2   Sq Epi: x / Non Sq Epi: Occasional / Bacteria: Few          RECENT CULTURES:  Culture - Urine (07.10.20 @ 17:50)    Specimen Source: .Urine Clean Catch (Midstream)    Culture Results:   <10,000 CFU/mL Normal Urogenital Shyann  Culture - Blood in AM (07.10.20 @ 14:38)    Specimen Source: .Blood None    Culture Results:   No growth to date.    Culture - Blood in AM (07.10.20 @ 14:38)    Specimen Source: .Blood None    Culture Results:   No growth to date.      RADIOLOGY & ADDITIONAL TESTS: all reviewed   EKG reviewed  < from: 12 Lead ECG (07.10.20 @ 10:22) >    Ventricular Rate 87 BPM    Atrial Rate 87 BPM    P-R Interval 122 ms    QRS Duration 72 ms    Q-T Interval 348 ms    QTC Calculation(Bezet) 418 ms    P Axis 17 degrees    R Axis 1 degrees    T Axis 29 degrees    Diagnosis Line Normal sinus rhythm  Low voltage QRS  Cannot rule out Anterior infarct , age undetermined  Abnormal ECG  No previous ECGs available    < end of copied text >  < from: MR Cervical Spine w/wo IV Cont (20 @ 13:57) >  IMPRESSION: Degenerative changes as described above.    Hemangiomas as described above.      < end of copied text >  < from: MR Thoracic Spine w/wo IV Cont (20 @ 13:46) >  IMPRESSION: Abnormal lesions involving the T12 and T10 levels as described above.    Compression fracture is seen involving L2 level with abnormal T1 and T2 prolongation seen. This could be compatible with a pathologic compression fracture. Retropulsed fragment is identified as described above.    < end of copied text >    < from: Xray Chest 1 View- PORTABLE-Routine (20 @ 10:08) >  FINDINGS: Since prior evaluation the position of the indwelling left pleural space pigtail drainage catheter has changed, the pigtail now overlying the medial aspect of the left mid thorax. There is interval decrease in left pleural effusion from prior; a moderately sized left pleural effusion persists. Underlying lung parenchymal infiltrate, consolidation or atelectasis cannot be excluded. There is no evidence for left-sided pneumothorax. No acute right-sided infiltrate is identified. No evidence for right pleural effusion or pneumothorax. No mediastinal shift noted. Degenerative changes of the thoracic spine evident.    IMPRESSION: Interval decrease in left pleural effusion from prior with change in position of indwelling pigtail pleural space drainage catheter. Moderately sized left pleural effusion persists. See above discussion.          < end of copied text >    < from: TTE Echo Complete w/o Contrast w/ Doppler (20 @ 09:02) >   The mitral valve leaflets appear thin and normal.   Trace mitral regurgitation is present.   EA reversal of the mitral inflow consistent with reduced compliance of the   left ventricle.   The aortic valve is not well visualized, appears normal. Valve opening   seems to be normal.   Mild (1+) aortic regurgitation is present.   Normal appearing tricuspid valve structure and function.   Mild (1+) tricuspid valve regurgitation is present.   Pulmonic valve not well seen.   Normal appearing left atrium.   Left ventricle systolic function appears preserved based on difficult   trans thoracic views; segmental wall motion abnormalities can not be ruled   out.   Visual estimation of left ventricle ejection fraction is >50%.   The IVC appears normal.   Massive anechoic cystic structure (15 cm x 5 cm) noted within the liver   possibly enlarged gallbladder. There are calcified gallstones noted within   the structure.   Pleural effusion - massive left pleural effusion.    < end of copied text >  < from: CT Chest w/ IV Cont (20 @ 12:51) >  LUNGS, AIRWAYS: The central airways are patent. 2.6 x 2.2 cm spiculated nodule in the left upper lobe with surrounding linear fibrotic reaction. Patchy left lower lobe airspace disease, likely infectious. Nodularity along the left major fissure.    PLEURA: Left pleural pigtail drainage catheter terminates in the left posterior hemithorax. No significant pleural effusion. Small left-sided pneumothorax.    VESSELS: Normal caliber aorta. Main pulmonary arteries are patent.    HEART: Normal heart size. No pericardial effusion.    MEDIASTINUM AND MARJAN: No adenopathy.    UPPER ABDOMEN: Distended gallbladder. 1.5 cm indeterminate lesion in the caudate lobe.    BONES AND SOFT TISSUES: Destructive lesion of L2 vertebral body again seen.    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    Nodularity along the left major fissure. Cannot differentiate intrapulmonary lymph nodes versus pleural metastatic disease.    Left pleural drainage catheter in place without significant pleural effusion.    Small left-sided pneumothorax, which may be postprocedural.    1.5 cm indeterminate liver lesion.    Metastatic bone disease again noted to the spine.      < end of copied text >    Current medications:  acetaminophen   Tablet .. 650 milliGRAM(s) Oral every 4 hours PRN  aluminum hydroxide/magnesium hydroxide/simethicone Suspension 30 milliLiter(s) Oral every 4 hours PRN  ascorbic acid 500 milliGRAM(s) Oral daily  azithromycin   Tablet 500 milliGRAM(s) Oral daily  cefTRIAXone Injectable. 1000 milliGRAM(s) IV Push every 24 hours  dexAMETHasone  Injectable 6 milliGRAM(s) IV Push three times a day  gabapentin 300 milliGRAM(s) Oral at bedtime  hydrocodone/homatropine Syrup 5 milliLiter(s) Oral every 8 hours PRN  HYDROmorphone  Injectable 0.5 milliGRAM(s) IV Push every 4 hours PRN  lactobacillus acidophilus 1 Tablet(s) Oral two times a day  levothyroxine 100 MICROGram(s) Oral daily  lidocaine   Patch 1 Patch Transdermal daily  liothyronine 5 MICROGram(s) Oral daily  multivitamin 1 Tablet(s) Oral daily  ondansetron Injectable 4 milliGRAM(s) IV Push every 6 hours PRN  oxycodone    5 mG/acetaminophen 325 mG 1 Tablet(s) Oral every 4 hours PRN  senna 2 Tablet(s) Oral at bedtime PRN

## 2020-07-13 NOTE — PROGRESS NOTE ADULT - ASSESSMENT
74 y/o female with PMH of Hypothyroidism and L2 pathologic compression fracture presents to  with 1 month history of dyspnea and 1 week history of the dry cough. Patient was seen by ortho spine surgeon, pulmonologist and oncologist and send for evaluation of left sided pleural effusion with concern for lung mass. Pt reports onset of back pain was in March 2020, received cortisone shots and got an MRI of L2 and blood test x2 weeks ago. Pt went to Dr. Westfall (oncologist) and was sent for imaging with Dr. Sanchez (pulmonologist) who discussed results of CT abdomen and chest this AM with pt and told pt to come to ED for evaluation for SOB.  In Ed -  T(F): 98.9Max: 98.9  HR: 80  (80 - 94) BP: 149/63 (149/63 - 150/77) RR: 24(17 - 24) SpO2: 98% (98% - 99%) EKG - sinus , low voltage QRS, troponin x 1neg, CXR - large pleural effusion, WBC 12, Cr 0.73, , covid PCR neg , s/p left chest tube placement in ED , fluid analysis pending (10 Jul 2020 13:26)    seen and evaluated today  data discussed with thoracic surgery as well as her RN at bedside  CT attached with some pain being medicated  breathing is better      Assessment / plan:  Massive left sided pleural effusion  s/p CT placement with exudative process  eval for malignancy as most likely etiology  pathologic compression fx  adequate oxygenation  hemodynamically stable    workup in progress  PF cytology  CT management as per thoracic surgery  fu PF cultures / MRI pending  data discussed with pt today  No acute pulmonary events occurred overnight  Discussed MRI findings with the patient; awaiting pleural fluid cytology  Discussed plan with  as well  CT Chest shows 2.6 cm spiculated left upper lobe mass, likely primary lung neoplasm. Nodularity along the left major fissure. Cannot differentiate intrapulmonary lymph nodes versus pleural metastatic disease.  Left pleural drainage catheter in place without significant pleural effusion. Small left-sided pneumothorax, which may be postprocedural. 1.5 cm indeterminate liver lesion.  Metastatic bone disease again noted to the spine.  Will discuss new CT Chest findings with patient and .

## 2020-07-13 NOTE — CONSULT NOTE ADULT - ASSESSMENT
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  No pathology yet.  She has had significant and what sounds like accelerated process in lumbar spine at L2 in particular.  If left alone she will likely develop significantly worse neurological findings, including severe leg weakness, and bowel/bladder problems/incontinence and possible loss of sexual function.  Therefore I think surgery is recommended.  This should involve lumbar decompression at L2 in particular and also to L5, with removal or separation of epidural disease.  It should also include stabilization of the spine, atleast two levels above L2 and two to three levels below down to L5.  I spoke to them about their options and spoke to her  and answered all of their questions.   They want to think about their options including the possibility of transfer.  I spoke to them about the risks, benefits and alternatives of each option as well as of waiting.  The patient verbalizes understanding.  I am available further as needed.

## 2020-07-13 NOTE — PROGRESS NOTE ADULT - ASSESSMENT
76 y/o female with PMH of Hypothyroidism and L2 pathologic compression fracture admitted on 7/10 for evaluation of 2 weeks of shortness of breath and cough; also noted back pain in March and found to have compression fracture. The patient had imaging and was found to have large pleural effusion and on 7/10 had left chest tube placed. Denies fever or chills, recent travel except one month ago to [place]. No pets, no recent dental work.     1. Patient admitted with large left pleural effusion, s/p placement of chest tube; fluid does not appear to be exudative given ph and cell count; ?underlying malignancy  - also noted with leukocytosis most likely reactive to the large pleural effusion  - follow up cultures   - serial cbc and monitor temperature   - iv hydration and supportive care   - reviewed prior medical records to evaluate for resistant or atypical pathogens   - had rash with cefepime, then meropenem was started; most likely this patient does not have empyema, will stop antibiotics at this time and observe off antibiotics  - CTS evaluation in progress  2. other issues: per medicine

## 2020-07-14 LAB
24R-OH-CALCIDIOL SERPL-MCNC: 44.9 NG/ML — SIGNIFICANT CHANGE UP (ref 30–80)
ALBUMIN SERPL ELPH-MCNC: 3.1 G/DL — LOW (ref 3.3–5)
ALP SERPL-CCNC: 136 U/L — HIGH (ref 40–120)
ALT FLD-CCNC: 37 U/L — SIGNIFICANT CHANGE UP (ref 12–78)
ANION GAP SERPL CALC-SCNC: 6 MMOL/L — SIGNIFICANT CHANGE UP (ref 5–17)
AST SERPL-CCNC: 21 U/L — SIGNIFICANT CHANGE UP (ref 15–37)
BILIRUB SERPL-MCNC: 0.3 MG/DL — SIGNIFICANT CHANGE UP (ref 0.2–1.2)
BUN SERPL-MCNC: 21 MG/DL — SIGNIFICANT CHANGE UP (ref 7–23)
CALCIUM SERPL-MCNC: 8.4 MG/DL — LOW (ref 8.5–10.1)
CHLORIDE SERPL-SCNC: 108 MMOL/L — SIGNIFICANT CHANGE UP (ref 96–108)
CO2 SERPL-SCNC: 26 MMOL/L — SIGNIFICANT CHANGE UP (ref 22–31)
CREAT SERPL-MCNC: 0.65 MG/DL — SIGNIFICANT CHANGE UP (ref 0.5–1.3)
GLUCOSE SERPL-MCNC: 111 MG/DL — HIGH (ref 70–99)
HCT VFR BLD CALC: 42.3 % — SIGNIFICANT CHANGE UP (ref 34.5–45)
HGB BLD-MCNC: 14.2 G/DL — SIGNIFICANT CHANGE UP (ref 11.5–15.5)
MCHC RBC-ENTMCNC: 30.3 PG — SIGNIFICANT CHANGE UP (ref 27–34)
MCHC RBC-ENTMCNC: 33.6 GM/DL — SIGNIFICANT CHANGE UP (ref 32–36)
MCV RBC AUTO: 90.2 FL — SIGNIFICANT CHANGE UP (ref 80–100)
PLATELET # BLD AUTO: 545 K/UL — HIGH (ref 150–400)
POTASSIUM SERPL-MCNC: 4.3 MMOL/L — SIGNIFICANT CHANGE UP (ref 3.5–5.3)
POTASSIUM SERPL-SCNC: 4.3 MMOL/L — SIGNIFICANT CHANGE UP (ref 3.5–5.3)
PROT SERPL-MCNC: 7 GM/DL — SIGNIFICANT CHANGE UP (ref 6–8.3)
RBC # BLD: 4.69 M/UL — SIGNIFICANT CHANGE UP (ref 3.8–5.2)
RBC # FLD: 13.6 % — SIGNIFICANT CHANGE UP (ref 10.3–14.5)
SODIUM SERPL-SCNC: 140 MMOL/L — SIGNIFICANT CHANGE UP (ref 135–145)
WBC # BLD: 21.24 K/UL — HIGH (ref 3.8–10.5)
WBC # FLD AUTO: 21.24 K/UL — HIGH (ref 3.8–10.5)

## 2020-07-14 PROCEDURE — 99233 SBSQ HOSP IP/OBS HIGH 50: CPT

## 2020-07-14 PROCEDURE — 78226 HEPATOBILIARY SYSTEM IMAGING: CPT | Mod: 26

## 2020-07-14 PROCEDURE — 99232 SBSQ HOSP IP/OBS MODERATE 35: CPT

## 2020-07-14 PROCEDURE — 70553 MRI BRAIN STEM W/O & W/DYE: CPT | Mod: 26

## 2020-07-14 PROCEDURE — 99222 1ST HOSP IP/OBS MODERATE 55: CPT

## 2020-07-14 RX ORDER — MORPHINE SULFATE 50 MG/1
2 CAPSULE, EXTENDED RELEASE ORAL ONCE
Refills: 0 | Status: DISCONTINUED | OUTPATIENT
Start: 2020-07-14 | End: 2020-07-14

## 2020-07-14 RX ADMIN — Medication 0.25 MILLIGRAM(S): at 08:29

## 2020-07-14 RX ADMIN — MORPHINE SULFATE 2 MILLIGRAM(S): 50 CAPSULE, EXTENDED RELEASE ORAL at 10:50

## 2020-07-14 NOTE — PROGRESS NOTE ADULT - ASSESSMENT
76 y/o female with PMH of Hypothyroidism and L2 pathologic compression fracture presents to  with 1 month history of dyspnea and 1 week history of the dry cough. Patient was seen by ortho spine surgeon, pulmonologist and oncologist and send for evaluation of left sided pleural effusion with concern for lung mass.   In Ed -  T(F): 98.9Max: 98.9  HR: 80  (80 - 94) BP: 149/63 (149/63 - 150/77) RR: 24(17 - 24) SpO2: 98% (98% - 99%) EKG - sinus , low voltage QRS, troponin x 1neg, CXR - large pleural effusion, WBC 12, Cr 0.73, , covid PCR neg , s/p left chest tube placement in ED , fluid analysis pending   * Lower back pain due to pathologic L2 fracture , worsening on MRI, risk for cord compression  - pain management, c/w gabapentin, IV steroids 4 q8h with PPI  - Dr. Flood consult  - will need surgery   - pt requesting second opinion Dr. Kearns consulted  - neurochecks q4 h  - 7/14 - family want to per [facility] surgery on spine here, getting [place] outpatient evaluation, Dr. Flood notified  * Brain lesion in right frontal lobe with multiple subcentimeter lesions   - neurosurgery consult Dr. Handy - ? resectable brain lesion   - no edema  * Dyspnea due to Large left pleural effusion s/p left chest tube placement, exudative effusion suspected malignancy   * INDU 2.6 cm mass, 1.5 cm liver lesion   * Suspected postobstructive PNA s/p IV abx  * Leukocytosis steroids induced  - CT consult- s/p left chest tube placement   - f/u pleural fluid analysis - exudate, cx - neg, cytology - pending   - Ct chest with contrast 7/13 - INDU mass, 1.5 cm liver mass  - IV ceftriaxone--> cefepime ( fascial rash 7/12- will stop) --> meropenem and  Azithromycin, will observe off abx   - oncology and pulmonology evaluation  - 2 d echo- EF wnl, liver cystic lesion 15x5 cm  - bone scan - pending , MRI of the brain w contrast  - noted  *  Liver lesion cystic on 2 d echo and Enlarged GB on CT 22 cm  doubt cholecystitis    - CT abd from o/p - in the chart reviewed  - US abd - noted , HIDA scan - positive  - surgery consult - no need for surgery   * Hypothyroidism  - c/w LT4 and LT3   * Anxiety - xanax prn q6h  Advanced directives  - discussed advanced directive for 17 min  - FULL code    DVT proph -IPC     Dispo - CT management, oncology  work-up , plan for spine surgery , neurosurgery evaluation  Medically optimized for needed surgery

## 2020-07-14 NOTE — CONSULT NOTE ADULT - ASSESSMENT
76 y/o female with PMH of Hypothyroidism was admitted with  L2 pathologic compression fracture and a large pleural effusion,  s/p left chest tube placement in ED , fluid analysis pending    Brain MRI showing multiple small enhancing lesions without brain compression, mass effect or midline shift.   Pt is neurologically intact.   All CT scans and MRIs were reviewed by Dr Handy  Pt awaiting spinal fusion with DR George   Recommend heme/oncology follow up and work up for tissue diagnosis   recommend Rad/onc consult for possible brain RT   No acute neurosurgical intervention at this time  all above d/w DR Handy who agrees with the plan

## 2020-07-14 NOTE — PROGRESS NOTE ADULT - SUBJECTIVE AND OBJECTIVE BOX
Subjective:  Patient seen and examined.  States she is unsure how she will proceed.  Awaiting decision about spine surgery for stabilization of her L2 pathological fx.  CT scan shows INDU mass.    Vital Signs:  Vital Signs Last 24 Hrs  T(C): 37.2 (07-13-20 @ 21:36), Max: 37.2 (07-13-20 @ 21:36)  T(F): 98.9 (07-13-20 @ 21:36), Max: 98.9 (07-13-20 @ 21:36)  HR: 78 (07-13-20 @ 21:36) (78 - 82)  BP: 135/69 (07-13-20 @ 21:36) (135/69 - 138/69)  RR: 18 (07-13-20 @ 21:36) (18 - 18)  SpO2: 95% (07-13-20 @ 21:36) (95% - 95%) on room air    Relevant labs, radiology and Medications reviewed                        14.2   21.24 )-----------( 545      ( 14 Jul 2020 08:09 )             42.3     07-14    140  |  108  |  21  ----------------------------<  111<H>  4.3   |  26  |  0.65    Ca    8.4<L>      14 Jul 2020 08:09    TPro  7.0  /  Alb  3.1<L>  /  TBili  0.3  /  DBili  x   /  AST  21  /  ALT  37  /  AlkPhos  136<H>  07-14      MEDICATIONS  (STANDING):  ascorbic acid 500 milliGRAM(s) Oral daily  dexAMETHasone  Injectable 4 milliGRAM(s) IV Push every 8 hours  gabapentin 300 milliGRAM(s) Oral at bedtime  lactobacillus acidophilus 1 Tablet(s) Oral two times a day  levothyroxine 100 MICROGram(s) Oral daily  lidocaine   Patch 1 Patch Transdermal daily  liothyronine 5 MICROGram(s) Oral daily  multivitamin 1 Tablet(s) Oral daily  pantoprazole    Tablet 40 milliGRAM(s) Oral before breakfast    MEDICATIONS  (PRN):  acetaminophen   Tablet .. 650 milliGRAM(s) Oral every 4 hours PRN Temp greater or equal to 38C (100.4F), Mild Pain (1 - 3)  ALPRAZolam 0.25 milliGRAM(s) Oral every 8 hours PRN anxiety  aluminum hydroxide/magnesium hydroxide/simethicone Suspension 30 milliLiter(s) Oral every 4 hours PRN Dyspepsia  hydrocodone/homatropine Syrup 5 milliLiter(s) Oral every 8 hours PRN Cough  HYDROmorphone  Injectable 0.5 milliGRAM(s) IV Push every 4 hours PRN Severe Pain (7 - 10)  ondansetron Injectable 4 milliGRAM(s) IV Push every 6 hours PRN Nausea  oxycodone    5 mG/acetaminophen 325 mG 1 Tablet(s) Oral every 4 hours PRN Moderate Pain (4 - 6)  senna 2 Tablet(s) Oral at bedtime PRN Constipation      Physical exam  Gen: NAD breathing comfortably  Neuro: AO x 3  Card: S1 S2   Pulm: CTA bilaterally  Abd: Soft NT ND  Ext: no edema    Tubes:  Left CT to water seal, no air leak, moderate serous output.    I&O's Summary    13 Jul 2020 07:01  -  14 Jul 2020 07:00  --------------------------------------------------------  IN: 0 mL / OUT: 70 mL / NET: -70 mL        Assessment  75y Female  w/ PAST MEDICAL & SURGICAL HISTORY:  Osteoarthritis  Fracture: L2 pathologic fracture  Hypothyroidism      Patient is a 75y old  Female who presents with a chief complaint of dyspnea, cough (14 Jul 2020 09:00)    Pathologic fx of L2 - Neurosx intervention suggested.  New left pleural effusion s/p left PTC placement.    PLAN    Follow up cytology.  CT to water seal.  Daily CXR.  Will keep CT for time being while admitted in hospital until final diagnosis.      Discussed with Cardiothoracic Team at AM rounds.

## 2020-07-14 NOTE — PROGRESS NOTE ADULT - ASSESSMENT
Pt is a 75 year old female with pmed hypothyroidism and likely pathologic fx of L2, lung mass and large left pleural effusion s/p chest tube drainage found to have abd sono showing distended gallbladder with large stones and no signs of acute inflammation. LFTs wln. leukocytosis noted      -Results from HIDA this AM appreciated  -Physical exam remains benign and patient exhibits no pain level  -No surgical intervention warranted at this time  -Lab findings likely represent incidental findings and leukocytosis 2/2 to steroid use  -continue medical care per primary team  -Please reconsult if patient's condition worsens

## 2020-07-14 NOTE — CONSULT NOTE ADULT - SUBJECTIVE AND OBJECTIVE BOX
Pt seen and examined Pt seen and examined         HPI:  74 y/o female with PMH of Hypothyroidism and L2 pathologic compression fracture presents to  with 1 month history of dyspnea and 1 week history of the dry cough. Patient was seen by ortho spine surgeon, pulmonologist and oncologist and send for evaluation of left sided pleural effusion with concern for lung mass. Pt reports onset of back pain was in March 2020, received cortisone shots and got an MRI of L2 and blood test x2 weeks ago. Pt went to Dr. Westfall (oncologist) and was sent for imaging with Dr. Sanchez (pulmonologist) who discussed results of CT abdomen and chest this AM with pt and told pt to come to ED for evaluation for SOB.   CXR - large pleural effusion,  s/p left chest tube placement in ED , fluid analysis pending     Neurosurgery consult was called for findings of brain MRI showing multiple small enhancing lesions. Pt was seen and examined and d/w Dr Handy, all films were reviewed. Pt denies any headache, dizziness, numbness, tingling or weakness. Pt c/o back pain and pain to bilateral anterior thigh. Pt is pending biopsy and further tissue diagnosis. Pt denies any weight loss, fevers, night sweats. Pt states she used to smoke but quit over 20 years ago.   At this time would recommend further lung and or bone biopsy with tissue diagnosis and possible brain RT.         PAST MEDICAL & SURGICAL HISTORY:  Osteoarthritis  Fracture: L2 pathologic fracture  Hypothyroidism  No significant past surgical history      FAMILY HISTORY:  FH: thyroid disease (Mother)      Social Hx:  Nonsmoker, no drug or alcohol use    MEDICATIONS  (STANDING):  ascorbic acid 500 milliGRAM(s) Oral daily  dexAMETHasone  Injectable 4 milliGRAM(s) IV Push every 8 hours  gabapentin 300 milliGRAM(s) Oral at bedtime  lactobacillus acidophilus 1 Tablet(s) Oral two times a day  levothyroxine 100 MICROGram(s) Oral daily  lidocaine   Patch 1 Patch Transdermal daily  liothyronine 5 MICROGram(s) Oral daily  multivitamin 1 Tablet(s) Oral daily  pantoprazole    Tablet 40 milliGRAM(s) Oral before breakfast       Allergies    No Known Allergies    Intolerances        ROS: Pertinent positives in HPI, all other ROS were reviewed and are negative.      Vital Signs Last 24 Hrs  T(C): 36.8 (14 Jul 2020 21:00), Max: 36.8 (14 Jul 2020 21:00)  T(F): 98.3 (14 Jul 2020 21:00), Max: 98.3 (14 Jul 2020 21:00)  HR: 82 (14 Jul 2020 21:00) (82 - 82)  BP: 139/74 (14 Jul 2020 21:00) (137/65 - 139/74)  BP(mean): --  RR: 18 (14 Jul 2020 21:00) (16 - 18)  SpO2: 95% (14 Jul 2020 21:00) (95% - 99%)          Neurological exam:  HF: A x O x 3. Appropriately interactive, normal affect. Speech fluent, No Aphasia or paraphasic errors. Naming /repetition intact   CN: NADJA, EOMI, VFF, facial sensation normal, no NLFD, tongue midline, Palate moves equally, SCM equal bilaterally  Motor: No pronator drift, Strength 5/5 in all 4 ext, normal bulk and tone, no tremor, rigidity or bradykinesia.    Sens: Intact to light touch   Reflexes: Symmetric and normal . no clonus, no perez   Coord:  No FNFA, dysmetria, PAVITHRA intact   Gait/Balance: Cannot test              Labs:   07-15    138  |  104  |  21  ----------------------------<  116<H>  4.0   |  27  |  0.56    Ca    7.5<L>      15 Jul 2020 06:33    TPro  6.2  /  Alb  2.6<L>  /  TBili  0.4  /  DBili  x   /  AST  29  /  ALT  73  /  AlkPhos  135<H>  07-15    07-11 Chol 216<H> <H> HDL 49<L> Trig 120                          12.6   15.70 )-----------( 464      ( 15 Jul 2020 06:33 )             37.7       Radiology:        EXAM:  MR BRAIN [facility]                            PROCEDURE DATE:  07/14/2020          INTERPRETATION:  CLINICAL INDICATION: Evaluate for intracranial metastatic disease.    TECHNIQUE: Multi-planar multi-sequential MR imaging of the brain was performed before and after the intravenous administration of 6 ml of Gadavist.  Please note, 1.5 mL Gadavist was discarded.    COMPARISON: None available.    FINDINGS:    There is an enhancing lesion in the right lateral frontal lobe measuring 1.2 x 1.2 x 0.7 cm in AP by transverse by CC dimensions (series 12, image 119 and series 14, image 70), with associated T2/FLAIR hyperintense vasogenic edema, findings consistent with metastatic disease in the setting of known malignancy. Please note there is some intrinsic T1-hyperintensity associated with the lesion (series 9, image 21), likely representing associated blood products. There is associated mild localized mass effect on the surrounding brain parenchyma.    There are additional subcentimeter enhancing lesions mostly located at the gray-white matter junction, including 7 mm enhancing lesion in the left posterior parietal lobe (series 12, image 123), 3.5 mm enhancing lesion in the right posterior frontal subinsular white matter (series 12, image 97), 2.8 mm lesion in the right frontal subcortical white matter (series 12, image 129) and 2.7 mm lesion in the left paramedian frontal subcortical white matter (series 12, image 120), findings favored to represent additional foci of intracranial metastatic disease. These lesions are annotated on series 15 for easier review.    There is no definite evidence of abnormal enhancement in the calvarium to suggest osseous metastatic disease.    There is no evidence of abnormal leptomeningeal or pachymeningeal enhancement.    There are scattered T2/FLAIR hyperintense foci in the periventricular and subcortical white matter, nonspecific finding, but most likely representing sequelae of mild chronic microvascular ischemic disease. There is mild diffuse cortical sulcal prominence related to underlying brain parenchymal volume loss.    Incidentally noted is a developmental venous anomaly in the left frontal periventricular region (series 15 images 16-18). There is no evidence of hydrocephalus.    No acute infarction or intracranial hemorrhage. There are no extra-axial fluid collections. The visualized skull base flow voids are patent.    The visualized intraorbital contents are normal. There are mild mucosal inflammatory changes of the ethmoid air cells. The mastoid air cells are grossly clear. The visualized soft tissues and osseous structures appear unremarkable.    IMPRESSION:     Right lateral frontal lobe enhancing lesion measuring 1.2 x 1.2 x 0.7 cm, findings consistent with metastatic disease in the setting of known malignancy. Multiple additional subcentimeter enhancing lesions located at the gray-white matter junction are favored to represent additional foci of intracranial metastatic disease.    No evidence of osseous metastatic disease.    No evidence of abnormal leptomeningeal or pachymeningeal enhancement.    NINA ERNANDEZ M.D., ATTENDING RADIOLOGIST  This document has been electronically signed. Jul 14 2020  2:00PM

## 2020-07-14 NOTE — PROGRESS NOTE ADULT - SUBJECTIVE AND OBJECTIVE BOX
Patient is a 75y old  Female who presents with a chief complaint of dyspnea , cough (2020 06:36)      HPI:  74 y/o female with PMH of Hypothyroidism and L2 pathologic compression fracture presents to  with 1 month history of dyspnea and 1 week history of the dry cough. Patient was seen by ortho spine surgeon, pulmonologist and oncologist and send for evaluation of left sided pleural effusion with concern for lung mass. Pt reports onset of back pain was in 2020, received cortisone shots and got an MRI of L2 and blood test x2 weeks ago. Pt went to Dr. Westfall (oncologist) and was sent for imaging with Dr. Sanchez (pulmonologist) who discussed results of CT abdomen and chest this AM with pt and told pt to come to ED for evaluation for SOB.  In Ed -  T(F): 98.9Max: 98.9  HR: 80  (80 - 94) BP: 149/63 (149/63 - 150/77) RR: 24(17 - 24) SpO2: 98% (98% - 99%) EKG - sinus , low voltage QRS, troponin x 1neg, CXR - large pleural effusion, WBC 12, Cr 0.73, , covid PCR neg , s/p left chest tube placement in ED , fluid analysis pending (10 Jul 2020 13:26)    seen and evaluated today  data discussed with thoracic surgery as well as her RN at bedside  CT attached with some pain being medicated  breathing is better        she is ambulating with CT attached  she is feeling much better today  some discomfort from CT      No acute pulmonary events occurred overnight  Discussed MRI findings with the patient; awaiting pleural fluid cytology  Discussed plan with  as well      No acute pulmonary events occurred overnight     MEDICATIONS  (STANDING):  ascorbic acid 500 milliGRAM(s) Oral daily  dexAMETHasone  Injectable 4 milliGRAM(s) IV Push every 8 hours  gabapentin 300 milliGRAM(s) Oral at bedtime  lactobacillus acidophilus 1 Tablet(s) Oral two times a day  levothyroxine 100 MICROGram(s) Oral daily  lidocaine   Patch 1 Patch Transdermal daily  liothyronine 5 MICROGram(s) Oral daily  multivitamin 1 Tablet(s) Oral daily  pantoprazole    Tablet 40 milliGRAM(s) Oral before breakfast    MEDICATIONS  (PRN):  acetaminophen   Tablet .. 650 milliGRAM(s) Oral every 4 hours PRN Temp greater or equal to 38C (100.4F), Mild Pain (1 - 3)  ALPRAZolam 0.25 milliGRAM(s) Oral every 8 hours PRN anxiety  aluminum hydroxide/magnesium hydroxide/simethicone Suspension 30 milliLiter(s) Oral every 4 hours PRN Dyspepsia  hydrocodone/homatropine Syrup 5 milliLiter(s) Oral every 8 hours PRN Cough  HYDROmorphone  Injectable 0.5 milliGRAM(s) IV Push every 4 hours PRN Severe Pain (7 - 10)  ondansetron Injectable 4 milliGRAM(s) IV Push every 6 hours PRN Nausea  oxycodone    5 mG/acetaminophen 325 mG 1 Tablet(s) Oral every 4 hours PRN Moderate Pain (4 - 6)  senna 2 Tablet(s) Oral at bedtime PRN Constipation      Vital Signs Last 24 Hrs  T(C): 37.2 (2020 21:36), Max: 37.2 (2020 21:36)  T(F): 98.9 (2020 21:36), Max: 98.9 (2020 21:36)  HR: 78 (2020 21:36) (78 - 82)  BP: 135/69 (2020 21:36) (135/69 - 138/69)  BP(mean): --  RR: 18 (2020 21:36) (18 - 18)  SpO2: 95% (2020 21:36) (95% - 95%)    I&O's Detail    2020 07:01  -  2020 07:00  --------------------------------------------------------  IN:  Total IN: 0 mL    OUT:    Chest Tube: 1115 mL  Total OUT: 1115 mL    Total NET: -1115 mL          PHYSICAL EXAM  General Appearance: cooperative, no acute distress,   HEENT: PERRL, conjunctiva clear, EOM's intact, non injected pharynx, no exudate, TM   normal  Neck: Supple, , no adenopathy, thyroid: not enlarged, no carotid bruit or JVD  Back: Symmetric, no  tenderness,no soft tissue tenderness  Lungs: left sided CT attached, decreased BS left mid-lower lung, IMPROVED  Heart: Regular rate and rhythm, S1, S2 normal, no murmur, rub or gallop  Abdomen: Soft, non-tender, bowel sounds active , no hepatosplenomegaly  Extremities: no cyanosis or edema, no joint swelling  Skin: Skin color, texture normal, no rashes   Neurologic: Alert and oriented X3 , cranial nerves intact, sensory and motor normal,    ECG:    LABS:                          13.3   13.48 )-----------( 482      ( 2020 09:13 )             39.1     07-10    136  |  104  |  11  ----------------------------<  114<H>  4.2   |  25  |  0.73    Ca    8.7      10 Jul 2020 10:49  Mg     2.2     07-10    TPro  7.0  /  Alb  3.1<L>  /  TBili  0.4  /  DBili  x   /  AST  28  /  ALT  33  /  AlkPhos  126<H>  07-10    CARDIAC MARKERS ( 10 Jul 2020 10:49 )  <0.015 ng/mL / x     / x     / x     / x            Pro BNP  209 07-10 @ 10:49  D Dimer  -- 07-10 @ 10:49    PT/INR - ( 10 Jul 2020 10:57 )   PT: 12.2 sec;   INR: 1.04 ratio         PTT - ( 10 Jul 2020 10:57 )  PTT:29.4 sec  Urinalysis Basic - ( 10 Jul 2020 17:50 )    Color: Yellow / Appearance: Clear / S.010 / pH: x  Gluc: x / Ketone: Small  / Bili: Negative / Urobili: Negative mg/dL   Blood: x / Protein: Negative mg/dL / Nitrite: Negative   Leuk Esterase: Trace / RBC: 0-2 /HPF / WBC 0-2   Sq Epi: x / Non Sq Epi: Occasional / Bacteria: Few            RADIOLOGY & ADDITIONAL STUDIES:    < from: Xray Chest 1 View- PORTABLE-Urgent (07.10.20 @ 14:20) >    PROCEDURE DATE:  07/10/2020          INTERPRETATION:  AP chest on July 10, 2020 at 2:12 PM. Patient had catheter left chest tube inserted for massive effusion.    Heart size cannot be assessed.    A catheter left chest tube has been inserted at the base.    Massive left effusion seen prior in the day is significantly diminished but a very large effusion remains. There is no visible pneumothorax.    Underlying pathology cannot be excluded.    IMPRESSION: Diminished left effusion after chest tube. Significant effusion remains.    < end of copied text >  < from: Xray Chest 1 View- PORTABLE-Routine (20 @ 10:08) >      PROCEDURE DATE:  2020  personally reviewed and shown to pt with prior xrays as well        INTERPRETATION:  INDICATION: Assess left pleural effusion.    PRIORS: 7/10/2020.    VIEWS: Portable AP radiography of the chest performed.    FINDINGS: Since prior evaluation the position of the indwelling left pleural space pigtail drainage catheter has changed, the pigtail now overlying the medial aspect of the left mid thorax. There is interval decrease in left pleural effusion from prior; a moderately sized left pleural effusion persists. Underlying lung parenchymal infiltrate, consolidation or atelectasis cannot be excluded. There is no evidence for left-sided pneumothorax. No acute right-sided infiltrate is identified. No evidence for right pleural effusion or pneumothorax. No mediastinal shift noted. Degenerative changes of the thoracic spine evident.    IMPRESSION: Interval decrease in left pleural effusion from prior with change in position of indwelling pigtail pleural space drainage catheter. Moderately sized left pleural effusion persists. See above discussion.      CT CHEST     LUNGS, AIRWAYS: The central airways are patent. 2.6 x 2.2 cm spiculated nodule in the left upper lobe with surrounding linear fibrotic reaction. Patchy left lower lobe airspace disease, likely infectious. Nodularity along the left major fissure.    PLEURA: Left pleural pigtail drainage catheter terminates in the left posterior hemithorax. No significant pleural effusion. Small left-sided pneumothorax.    VESSELS: Normal caliber aorta. Main pulmonary arteries are patent.    HEART: Normal heart size. No pericardial effusion.    MEDIASTINUM AND MARJAN: No adenopathy.    UPPER ABDOMEN: Distended gallbladder. 1.5 cm indeterminate lesion in the caudate lobe.    BONES AND SOFT TISSUES: Destructive lesion of L2 vertebral body again seen.    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    Nodularity along the left major fissure. Cannot differentiate intrapulmonary lymph nodes versus pleural metastatic disease.    Left pleural drainage catheter in place without significant pleural effusion.    Small left-sided pneumothorax, which may be postprocedural.    1.5 cm indeterminate liver lesion.    Metastatic bone disease again noted to the spine.

## 2020-07-14 NOTE — PROGRESS NOTE ADULT - ASSESSMENT
76 y/o female with PMH of Hypothyroidism and L2 pathologic compression fracture presents to  with 1 month history of dyspnea and 1 week history of the dry cough. Patient was seen by ortho spine surgeon, pulmonologist and oncologist and send for evaluation of left sided pleural effusion with concern for lung mass. Pt reports onset of back pain was in March 2020, received cortisone shots and got an MRI of L2 and blood test x2 weeks ago. Pt went to Dr. Westfall (oncologist) and was sent for imaging with Dr. Sanchez (pulmonologist) who discussed results of CT abdomen and chest this AM with pt and told pt to come to ED for evaluation for SOB.  In Ed -  T(F): 98.9Max: 98.9  HR: 80  (80 - 94) BP: 149/63 (149/63 - 150/77) RR: 24(17 - 24) SpO2: 98% (98% - 99%) EKG - sinus , low voltage QRS, troponin x 1neg, CXR - large pleural effusion, WBC 12, Cr 0.73, , COVID PCR neg , s/p left chest tube placement in ED , fluid analysis pending (10 Jul 2020 13:26)  Massive left sided pleural effusion  CT management as per thoracic surgery  Discussed MRI findings with the patient; awaiting pleural fluid cytology  CT Chest shows 2.6 cm spiculated left upper lobe mass, likely primary lung neoplasm. Nodularity along the left major fissure. Cannot differentiate intrapulmonary lymph nodes versus pleural metastatic disease. Left pleural drainage catheter in place without significant pleural effusion. Small left-sided pneumothorax, which may be postprocedural. 1.5 cm indeterminate liver lesion. Metastatic bone disease again noted to the spine.   Left pleural pigtail drainage catheter terminates in the left posterior hemithorax. No significant pleural effusion. Small left-sided pneumothorax.  Discussed new CT Chest findings with patient and will call  to update him.

## 2020-07-14 NOTE — PROGRESS NOTE ADULT - SUBJECTIVE AND OBJECTIVE BOX
WUJOHAN  MRN-[id number]  Female    Patient seen and examined at bedside. Patient has no complaints and reports pain is under control. Nurse denies any acute events. Vitals reviewed and WNL.    Vitals:  T(C): 37.2 (07-13 @ 21:36), Max: 37.2 (07-13 @ 21:36)  HR: 78 (07-13 @ 21:36) (78 - 82)  BP: 135/69 (07-13 @ 21:36) (135/69 - 138/69)  RR: 18 (07-13 @ 21:36) (18 - 18)  SpO2: 95% (07-13 @ 21:36) (95% - 95%)    07-13 @ 07:01  -  07-14 @ 07:00  --------------------------------------------------------  IN:  Total IN: 0 mL    OUT:    Chest Tube: 70 mL  Total OUT: 70 mL    Total NET: -70 mL    Physical Exam:  Pt is AAOx3  General: Well developed, in no acute distress.   Chest: Lungs clear, no rales, no rhonchi, no wheezes.   Heart: RR, no murmurs, no rubs, no gallops.   Abdomen: Soft, no tenderness, no masses, BS normal.    Back: Normal curvature, no tenderness.   Neuro: Physiological, no localizing findings.   Skin: Normal, no rashes, no lesions noted.   Extremities: Warm, well perfused, no edema, Pulses intact    07-14 @ 08:09                    14.2  CBC: 21.24>)-------(<545                     42.3                 140 | 108 | 21    CMP:  ----------------------< 111               4.3 | 26 | 0.65                      Ca:8.4  Phos:-  Mg:-               0.3|      |21        LFTs:  ------|136|-----             -|      |-  07-13 @ 07:44                    13.4  CBC: 21.64>)-------(<404                     40.2                 136 | 108 | 20    CMP:  ----------------------< 105               4.8 | 20 | 0.63                      Ca:8.1  Phos:-  Mg:-               0.4|      |38        LFTs:  ------|123|-----             -|      |-      Culture - Urine (collected 07-10-20 @ 17:50)  Source: .Urine Clean Catch (Midstream)  Final Report (07-11-20 @ 18:13):    <10,000 CFU/mL Normal Urogenital Shyann    Culture - Blood (collected 07-10-20 @ 14:38)  Source: .Blood None  Preliminary Report (07-11-20 @ 23:00):    No growth to date.    Culture - Blood (collected 07-10-20 @ 14:38)  Source: .Blood None  Preliminary Report (07-11-20 @ 23:00):    No growth to date.    Culture - Fungal, Body Fluid (collected 07-10-20 @ 12:30)  Source: Pleural Fl Pleural Fluid  Preliminary Report (07-13-20 @ 08:55):    Testing in progress    Culture - Body Fluid with Gram Stain (collected 07-10-20 @ 12:30)  Source: Pleural Fl Pleural Fluid  Gram Stain (07-10-20 @ 21:49):    polymorphonuclear leukocytes seen    No organisms seen    by cytocentrifuge  Preliminary Report (07-11-20 @ 17:25):    No growth      Current Inpatient Medications:  acetaminophen   Tablet .. 650 milliGRAM(s) Oral every 4 hours PRN  ALPRAZolam 0.25 milliGRAM(s) Oral every 8 hours PRN  aluminum hydroxide/magnesium hydroxide/simethicone Suspension 30 milliLiter(s) Oral every 4 hours PRN  ascorbic acid 500 milliGRAM(s) Oral daily  dexAMETHasone  Injectable 4 milliGRAM(s) IV Push every 8 hours  gabapentin 300 milliGRAM(s) Oral at bedtime  hydrocodone/homatropine Syrup 5 milliLiter(s) Oral every 8 hours PRN  HYDROmorphone  Injectable 0.5 milliGRAM(s) IV Push every 4 hours PRN  lactobacillus acidophilus 1 Tablet(s) Oral two times a day  levothyroxine 100 MICROGram(s) Oral daily  lidocaine   Patch 1 Patch Transdermal daily  liothyronine 5 MICROGram(s) Oral daily  multivitamin 1 Tablet(s) Oral daily  ondansetron Injectable 4 milliGRAM(s) IV Push every 6 hours PRN  oxycodone    5 mG/acetaminophen 325 mG 1 Tablet(s) Oral every 4 hours PRN  pantoprazole    Tablet 40 milliGRAM(s) Oral before breakfast  senna 2 Tablet(s) Oral at bedtime PRN

## 2020-07-14 NOTE — PROGRESS NOTE ADULT - SUBJECTIVE AND OBJECTIVE BOX
Subjective:  Patient is a 75y old  Female who presents with a chief complaint of dyspnea , cough     HPI:  76 y/o female with PMH of Hypothyroidism and L2 pathologic compression fracture presents to   on 7/10/20 with 1 month history of dyspnea and 1 week history of the dry cough. Patient was seen by ortho spine surgeon, pulmonologist and oncologist and send for evaluation of left sided pleural effusion with concern for lung mass. Pt reports onset of back pain was in 2020, received cortisone shots and got an MRI of L2 and blood test x2 weeks ago. Pt went to Dr. Westfall (oncologist) and was sent for imaging with Dr. Sanchez (pulmonologist) who discussed results of CT abdomen and chest this AM with pt and told pt to come to ED for evaluation for SOB.  In Ed -  T(F): 98.9Max: 98.9  HR: 80  (80 - 94) BP: 149/63 (149/63 - 150/77) RR: 24(17 - 24) SpO2: 98% (98% - 99%) EKG - sinus , low voltage QRS, troponin x 1neg, CXR - large pleural effusion, WBC 12, Cr 0.73, , covid PCR neg , s/p left chest tube placement in ED , fluid analysis pending (10 Jul 2020 13:26)     -  Patient seen and examined at bedside earlier today, feels better, left sided chest wall pain around CT, + cough, dyspnea improved, POC discussed in length   - pt seen and examined, + cough, chest wall pain, low grade fever overnight, denies abdominal pain, POC discussed    - pt seen and examined, cough improved, + anxious about results of the tests, denies cp, dyspnea, abdominal pain, POC discussed    - pt seen and examined , denies cp, dyspnea and cough better, + very anxious, results of the tests discussed , POC discussed     Review of system- Rest of the review of system are negative except mentioned in HPI    T(C): 37.2 (20 @ 21:36), Max: 37.2 (20 @ 21:36)  T(F): 98.9 (20 @ 21:36), Max: 98.9 (20 @ 21:36)  HR: 78 (20 @ 21:36) (78 - 82)  BP: 135/69 (20 @ 21:36) (135/69 - 138/69)  RR: 18 (20 @ 21:36) (18 - 18)  SpO2: 95% (20 @ 21:36) (95% - 95%)  Wt(kg): --    PHYSICAL EXAM:  GENERAL: NAD  NERVOUS SYSTEM:  Alert & Oriented X3, non- focal exam,  Motor Strength 5/5 B/L upper and lower extremities; DTRs 2+ intact and symmetric  HEAD:  Atraumatic, Normocephalic  EYES: EOMI, PERRLA, conjunctiva and sclera clear  HEENT: Moist mucous membranes  NECK: Supple, No JVD  CHEST/LUNG: BS decreased over left base, ; No rales, no rhonchi, no wheezing, Left CT +   HEART: Regular rate and rhythm; No murmurs, no rubs or gallops  ABDOMEN: Soft, Nontender, Nondistended; Bowel sounds present  GENITOURINARY- Voiding, no suprapubic tenderness  EXTREMITIES:  2+ Peripheral Pulses, No clubbing, cyanosis,   edema  MUSCULOSKELETAL:- No muscle tenderness, Muscle tone normal, No joint tenderness, no Joint swelling,  Joint ROM -normal  SKIN-no rash, no lesion    LABS: all reviewed                         14.2   21.24 )-----------( 545      ( 2020 08:09 )             42.3                         13.4   21.64 )-----------( 404      ( 2020 07:44 )             40.2       07-14    140  |  108  |  21  ----------------------------<  111<H>  4.3   |  26  |  0.65    Ca    8.4<L>      2020 08:09    TPro  7.0  /  Alb  3.1<L>  /  TBili  0.3  /  DBili  x   /  AST  21  /  ALT  37  /  AlkPhos  136<H>  07-14                        13.4   21.64 )-----------( 404      ( 2020 07:44 )             40.2                         14.6   20.12 )-----------( 563      ( 2020 07:18 )             43.9       07-13    136  |  108  |  20  ----------------------------<  105<H>  4.8   |  20<L>  |  0.63    Ca    8.1<L>      2020 07:44    TPro  6.8  /  Alb  2.5<L>  /  TBili  0.4  /  DBili  x   /  AST  38<H>  /  ALT  29  /  AlkPhos  123<H>  07-13      07-12    137  |  103  |  14  ----------------------------<  135<H>  4.1   |  27  |  0.79    Ca    8.4<L>      2020 07:18  Phos  3.5     07-11  Mg     2.2     0711    TPro  7.7  /  Alb  3.2<L>  /  TBili  0.3  /  DBili  0.1  /  AST  16  /  ALT  29  /  AlkPhos  139<H>  0712            CARDIAC MARKERS ( 10 Jul 2020 10:49 )  <0.015 ng/mL / x     / x     / x     / x          Urinalysis Basic - ( 10 Jul 2020 17:50 )    Color: Yellow / Appearance: Clear / S.010 / pH: x  Gluc: x / Ketone: Small  / Bili: Negative / Urobili: Negative mg/dL   Blood: x / Protein: Negative mg/dL / Nitrite: Negative   Leuk Esterase: Trace / RBC: 0-2 /HPF / WBC 0-2   Sq Epi: x / Non Sq Epi: Occasional / Bacteria: Few          RECENT CULTURES:  Culture - Urine (07.10.20 @ 17:50)    Specimen Source: .Urine Clean Catch (Midstream)    Culture Results:   <10,000 CFU/mL Normal Urogenital Shyann  Culture - Blood in AM (07.10.20 @ 14:38)    Specimen Source: .Blood None    Culture Results:   No growth to date.    Culture - Blood in AM (07.10.20 @ 14:38)    Specimen Source: .Blood None    Culture Results:   No growth to date.      RADIOLOGY & ADDITIONAL TESTS: all reviewed   EKG reviewed  < from: 12 Lead ECG (07.10.20 @ 10:22) >    Ventricular Rate 87 BPM    Atrial Rate 87 BPM    P-R Interval 122 ms    QRS Duration 72 ms    Q-T Interval 348 ms    QTC Calculation(Bezet) 418 ms    P Axis 17 degrees    R Axis 1 degrees    T Axis 29 degrees    Diagnosis Line Normal sinus rhythm  Low voltage QRS  Cannot rule out Anterior infarct , age undetermined  Abnormal ECG  No previous ECGs available    < end of copied text >  < from: MR Cervical Spine w/wo IV Cont (20 @ 13:57) >  IMPRESSION: Degenerative changes as described above.    Hemangiomas as described above.      < end of copied text >  < from: MR Thoracic Spine w/wo IV Cont (20 @ 13:46) >  IMPRESSION: Abnormal lesions involving the T12 and T10 levels as described above.    Compression fracture is seen involving L2 level with abnormal T1 and T2 prolongation seen. This could be compatible with a pathologic compression fracture. Retropulsed fragment is identified as described above.    < end of copied text >    < from: Xray Chest 1 View- PORTABLE-Routine (20 @ 10:08) >  FINDINGS: Since prior evaluation the position of the indwelling left pleural space pigtail drainage catheter has changed, the pigtail now overlying the medial aspect of the left mid thorax. There is interval decrease in left pleural effusion from prior; a moderately sized left pleural effusion persists. Underlying lung parenchymal infiltrate, consolidation or atelectasis cannot be excluded. There is no evidence for left-sided pneumothorax. No acute right-sided infiltrate is identified. No evidence for right pleural effusion or pneumothorax. No mediastinal shift noted. Degenerative changes of the thoracic spine evident.    IMPRESSION: Interval decrease in left pleural effusion from prior with change in position of indwelling pigtail pleural space drainage catheter. Moderately sized left pleural effusion persists. See above discussion.          < end of copied text >    < from: TTE Echo Complete w/o Contrast w/ Doppler (20 @ 09:02) >   The mitral valve leaflets appear thin and normal.   Trace mitral regurgitation is present.   EA reversal of the mitral inflow consistent with reduced compliance of the   left ventricle.   The aortic valve is not well visualized, appears normal. Valve opening   seems to be normal.   Mild (1+) aortic regurgitation is present.   Normal appearing tricuspid valve structure and function.   Mild (1+) tricuspid valve regurgitation is present.   Pulmonic valve not well seen.   Normal appearing left atrium.   Left ventricle systolic function appears preserved based on difficult   trans thoracic views; segmental wall motion abnormalities can not be ruled   out.   Visual estimation of left ventricle ejection fraction is >50%.   The IVC appears normal.   Massive anechoic cystic structure (15 cm x 5 cm) noted within the liver   possibly enlarged gallbladder. There are calcified gallstones noted within   the structure.   Pleural effusion - massive left pleural effusion.    < end of copied text >  < from: CT Chest w/ IV Cont (20 @ 12:51) >  LUNGS, AIRWAYS: The central airways are patent. 2.6 x 2.2 cm spiculated nodule in the left upper lobe with surrounding linear fibrotic reaction. Patchy left lower lobe airspace disease, likely infectious. Nodularity along the left major fissure.    PLEURA: Left pleural pigtail drainage catheter terminates in the left posterior hemithorax. No significant pleural effusion. Small left-sided pneumothorax.    VESSELS: Normal caliber aorta. Main pulmonary arteries are patent.    HEART: Normal heart size. No pericardial effusion.    MEDIASTINUM AND MARJAN: No adenopathy.    UPPER ABDOMEN: Distended gallbladder. 1.5 cm indeterminate lesion in the caudate lobe.    BONES AND SOFT TISSUES: Destructive lesion of L2 vertebral body again seen.    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    Nodularity along the left major fissure. Cannot differentiate intrapulmonary lymph nodes versus pleural metastatic disease.    Left pleural drainage catheter in place without significant pleural effusion.    Small left-sided pneumothorax, which may be postprocedural.    1.5 cm indeterminate liver lesion.    Metastatic bone disease again noted to the spine.      < end of copied text >  < from: MR Head w/wo IV Cont (20 @ 12:58) >  IMPRESSION:     Right lateral frontal lobe enhancing lesion measuring 1.2 x 1.2 x 0.7 cm, findings consistent with metastatic disease in the setting of known malignancy. Multiple additional subcentimeter enhancing lesions located at the gray-white matter junction are favored to represent additional foci of intracranial metastatic disease.    No evidence of osseous metastatic disease.    No evidence of abnormal leptomeningeal or pachymeningeal enhancement.      < end of copied text >  < from: NM Hepatobiliary Imaging (20 @ 12:03) >  IMPRESSION:  Abnormal morphine-augmented hepatobiliary scan compatible with acute cholecystitis.    < end of copied text >    Current medications:  acetaminophen   Tablet .. 650 milliGRAM(s) Oral every 4 hours PRN  aluminum hydroxide/magnesium hydroxide/simethicone Suspension 30 milliLiter(s) Oral every 4 hours PRN  ascorbic acid 500 milliGRAM(s) Oral daily  azithromycin   Tablet 500 milliGRAM(s) Oral daily  cefTRIAXone Injectable. 1000 milliGRAM(s) IV Push every 24 hours  dexAMETHasone  Injectable 6 milliGRAM(s) IV Push three times a day  gabapentin 300 milliGRAM(s) Oral at bedtime  hydrocodone/homatropine Syrup 5 milliLiter(s) Oral every 8 hours PRN  HYDROmorphone  Injectable 0.5 milliGRAM(s) IV Push every 4 hours PRN  lactobacillus acidophilus 1 Tablet(s) Oral two times a day  levothyroxine 100 MICROGram(s) Oral daily  lidocaine   Patch 1 Patch Transdermal daily  liothyronine 5 MICROGram(s) Oral daily  multivitamin 1 Tablet(s) Oral daily  ondansetron Injectable 4 milliGRAM(s) IV Push every 6 hours PRN  oxycodone    5 mG/acetaminophen 325 mG 1 Tablet(s) Oral every 4 hours PRN  senna 2 Tablet(s) Oral at bedtime PRN

## 2020-07-15 DIAGNOSIS — C79.31 SECONDARY MALIGNANT NEOPLASM OF BRAIN: ICD-10-CM

## 2020-07-15 DIAGNOSIS — C79.51 SECONDARY MALIGNANT NEOPLASM OF BONE: ICD-10-CM

## 2020-07-15 DIAGNOSIS — K81.9 CHOLECYSTITIS, UNSPECIFIED: ICD-10-CM

## 2020-07-15 DIAGNOSIS — K76.9 LIVER DISEASE, UNSPECIFIED: ICD-10-CM

## 2020-07-15 LAB
ALBUMIN SERPL ELPH-MCNC: 2.6 G/DL — LOW (ref 3.3–5)
ALP SERPL-CCNC: 135 U/L — HIGH (ref 40–120)
ALT FLD-CCNC: 73 U/L — SIGNIFICANT CHANGE UP (ref 12–78)
ANION GAP SERPL CALC-SCNC: 7 MMOL/L — SIGNIFICANT CHANGE UP (ref 5–17)
AST SERPL-CCNC: 29 U/L — SIGNIFICANT CHANGE UP (ref 15–37)
BASOPHILS # BLD AUTO: 0.02 K/UL — SIGNIFICANT CHANGE UP (ref 0–0.2)
BASOPHILS NFR BLD AUTO: 0.1 % — SIGNIFICANT CHANGE UP (ref 0–2)
BILIRUB SERPL-MCNC: 0.4 MG/DL — SIGNIFICANT CHANGE UP (ref 0.2–1.2)
BUN SERPL-MCNC: 21 MG/DL — SIGNIFICANT CHANGE UP (ref 7–23)
CALCIUM SERPL-MCNC: 7.5 MG/DL — LOW (ref 8.5–10.1)
CHLORIDE SERPL-SCNC: 104 MMOL/L — SIGNIFICANT CHANGE UP (ref 96–108)
CO2 SERPL-SCNC: 27 MMOL/L — SIGNIFICANT CHANGE UP (ref 22–31)
CREAT SERPL-MCNC: 0.56 MG/DL — SIGNIFICANT CHANGE UP (ref 0.5–1.3)
CULTURE RESULTS: SIGNIFICANT CHANGE UP
EOSINOPHIL # BLD AUTO: 0 K/UL — SIGNIFICANT CHANGE UP (ref 0–0.5)
EOSINOPHIL NFR BLD AUTO: 0 % — SIGNIFICANT CHANGE UP (ref 0–6)
GLUCOSE SERPL-MCNC: 116 MG/DL — HIGH (ref 70–99)
HCT VFR BLD CALC: 37.7 % — SIGNIFICANT CHANGE UP (ref 34.5–45)
HGB BLD-MCNC: 12.6 G/DL — SIGNIFICANT CHANGE UP (ref 11.5–15.5)
IMM GRANULOCYTES NFR BLD AUTO: 1.2 % — SIGNIFICANT CHANGE UP (ref 0–1.5)
INR BLD: 1.09 RATIO — SIGNIFICANT CHANGE UP (ref 0.88–1.16)
LYMPHOCYTES # BLD AUTO: 1.23 K/UL — SIGNIFICANT CHANGE UP (ref 1–3.3)
LYMPHOCYTES # BLD AUTO: 7.8 % — LOW (ref 13–44)
MCHC RBC-ENTMCNC: 30 PG — SIGNIFICANT CHANGE UP (ref 27–34)
MCHC RBC-ENTMCNC: 33.4 GM/DL — SIGNIFICANT CHANGE UP (ref 32–36)
MCV RBC AUTO: 89.8 FL — SIGNIFICANT CHANGE UP (ref 80–100)
MONOCYTES # BLD AUTO: 0.79 K/UL — SIGNIFICANT CHANGE UP (ref 0–0.9)
MONOCYTES NFR BLD AUTO: 5 % — SIGNIFICANT CHANGE UP (ref 2–14)
NEUTROPHILS # BLD AUTO: 13.47 K/UL — HIGH (ref 1.8–7.4)
NEUTROPHILS NFR BLD AUTO: 85.9 % — HIGH (ref 43–77)
PLATELET # BLD AUTO: 464 K/UL — HIGH (ref 150–400)
POTASSIUM SERPL-MCNC: 4 MMOL/L — SIGNIFICANT CHANGE UP (ref 3.5–5.3)
POTASSIUM SERPL-SCNC: 4 MMOL/L — SIGNIFICANT CHANGE UP (ref 3.5–5.3)
PROT SERPL-MCNC: 6.2 GM/DL — SIGNIFICANT CHANGE UP (ref 6–8.3)
PROTHROM AB SERPL-ACNC: 12.7 SEC — SIGNIFICANT CHANGE UP (ref 10.6–13.6)
RBC # BLD: 4.2 M/UL — SIGNIFICANT CHANGE UP (ref 3.8–5.2)
RBC # FLD: 13.2 % — SIGNIFICANT CHANGE UP (ref 10.3–14.5)
SODIUM SERPL-SCNC: 138 MMOL/L — SIGNIFICANT CHANGE UP (ref 135–145)
SPECIMEN SOURCE: SIGNIFICANT CHANGE UP
WBC # BLD: 15.7 K/UL — HIGH (ref 3.8–10.5)
WBC # FLD AUTO: 15.7 K/UL — HIGH (ref 3.8–10.5)

## 2020-07-15 PROCEDURE — 99233 SBSQ HOSP IP/OBS HIGH 50: CPT

## 2020-07-15 PROCEDURE — 99232 SBSQ HOSP IP/OBS MODERATE 35: CPT

## 2020-07-15 PROCEDURE — 71045 X-RAY EXAM CHEST 1 VIEW: CPT | Mod: 26

## 2020-07-15 RX ORDER — PREGABALIN 225 MG/1
1000 CAPSULE ORAL ONCE
Refills: 0 | Status: COMPLETED | OUTPATIENT
Start: 2020-07-15 | End: 2020-07-16

## 2020-07-15 RX ORDER — METRONIDAZOLE 500 MG
500 TABLET ORAL EVERY 8 HOURS
Refills: 0 | Status: DISCONTINUED | OUTPATIENT
Start: 2020-07-15 | End: 2020-07-16

## 2020-07-15 RX ORDER — FOLIC ACID 0.8 MG
1 TABLET ORAL DAILY
Refills: 0 | Status: DISCONTINUED | OUTPATIENT
Start: 2020-07-15 | End: 2020-07-16

## 2020-07-15 RX ORDER — CIPROFLOXACIN LACTATE 400MG/40ML
500 VIAL (ML) INTRAVENOUS EVERY 12 HOURS
Refills: 0 | Status: DISCONTINUED | OUTPATIENT
Start: 2020-07-15 | End: 2020-07-16

## 2020-07-15 RX ADMIN — Medication 500 MILLIGRAM(S): at 15:08

## 2020-07-15 RX ADMIN — Medication 500 MILLIGRAM(S): at 21:41

## 2020-07-15 RX ADMIN — Medication 500 MILLIGRAM(S): at 21:42

## 2020-07-15 NOTE — PROGRESS NOTE ADULT - SUBJECTIVE AND OBJECTIVE BOX
Melissa was awake and alert and capable of standing as I measured and fit with an [employer] LSO. She was instructed on donning and adjustment of the brace, and the use of undersleeves provided. Brace was labeled,and placed on window sill along with sleeves and printed directions.    [facility]

## 2020-07-15 NOTE — PROGRESS NOTE ADULT - ATTENDING COMMENTS
Going for HIDA scan this morning. I think there is a chance that we can get diagnosis from the spine surgery. If nondiagnostic, I think a CT guided biopsy of the lung can be performed.  I do not see any obvious pleural disease.     I plan on leaving the chest tube in the chest for now, in case she does need a IR guided biopsy. Cytology from pleural fluid is still pending.
Patient seen and examined. Will keep chest tube in for now. Metastatic lung cancer.  Stable for ortho procedure, stable for general anesthesia.
Patient seen and examined. Will order CT chest with IV contrast to look for malignancy.   Patient upset about diagnosis.   Unsure how she wants to proceed.
Patient was seen and examined, modifications and corrections made  I agree with findings and plan      HIDA today
Patient was seen and examined, modifications and corrections made  I agree with findings and plan

## 2020-07-15 NOTE — PROGRESS NOTE ADULT - SUBJECTIVE AND OBJECTIVE BOX
JOHAN WU  MRN-[id number]  Female    Patient seen and examined at bedside. Patient has no complaints and reports pain is under control. Nurse denies any acute events. Vitals reviewed and WNL.      Vitals:  T(C): 36.8 (07-14 @ 21:00), Max: 36.8 (07-14 @ 21:00)  HR: 82 (07-14 @ 21:00) (82 - 82)  BP: 139/74 (07-14 @ 21:00) (137/65 - 139/74)  RR: 18 (07-14 @ 21:00) (16 - 18)  SpO2: 95% (07-14 @ 21:00) (95% - 99%)    07-14 @ 07:01  -  07-15 @ 07:00  --------------------------------------------------------  IN:  Total IN: 0 mL    OUT:  Total OUT: 0 mL    Total NET: 0 mL    Physical Exam:  Pt is AAOx3  General: Well developed, in no acute distress.   Chest: Lungs clear, no rales, no rhonchi, no wheezes.   Heart: RR, no murmurs, no rubs, no gallops.   Abdomen: Soft, no tenderness, no masses, BS normal.    Back: Normal curvature, no tenderness.   Neuro: Physiological, no localizing findings.   Skin: Normal, no rashes, no lesions noted.   Extremities: Warm, well perfused, no edema, Pulses intact    07-15 @ 06:33                    12.6  CBC: 15.70>)-------(<464                     37.7                 138 | 104 | 21    CMP:  ----------------------< 116               4.0 | 27 | 0.56                      Ca:7.5  Phos:-  Mg:-               0.4|      |29        LFTs:  ------|135|-----             -|      |-  07-14 @ 08:09                    14.2  CBC: 21.24>)-------(<545                     42.3                 140 | 108 | 21    CMP:  ----------------------< 111               4.3 | 26 | 0.65                      Ca:8.4  Phos:-  Mg:-               0.3|      |21        LFTs:  ------|136|-----             -|      |-      Culture - Urine (collected 07-10-20 @ 17:50)  Source: .Urine Clean Catch (Midstream)  Final Report (07-11-20 @ 18:13):    <10,000 CFU/mL Normal Urogenital Shyann    Culture - Blood (collected 07-10-20 @ 14:38)  Source: .Blood None  Preliminary Report (07-11-20 @ 23:00):    No growth to date.    Culture - Blood (collected 07-10-20 @ 14:38)  Source: .Blood None  Preliminary Report (07-11-20 @ 23:00):    No growth to date.    Culture - Fungal, Body Fluid (collected 07-10-20 @ 12:30)  Source: Pleural Fl Pleural Fluid  Preliminary Report (07-13-20 @ 08:55):    Testing in progress    Culture - Body Fluid with Gram Stain (collected 07-10-20 @ 12:30)  Source: Pleural Fl Pleural Fluid  Gram Stain (07-10-20 @ 21:49):    polymorphonuclear leukocytes seen    No organisms seen    by cytocentrifuge  Preliminary Report (07-11-20 @ 17:25):    No growth      Current Inpatient Medications:  acetaminophen   Tablet .. 650 milliGRAM(s) Oral every 4 hours PRN  ALPRAZolam 0.25 milliGRAM(s) Oral every 6 hours PRN  aluminum hydroxide/magnesium hydroxide/simethicone Suspension 30 milliLiter(s) Oral every 4 hours PRN  ascorbic acid 500 milliGRAM(s) Oral daily  dexAMETHasone  Injectable 4 milliGRAM(s) IV Push every 8 hours  gabapentin 300 milliGRAM(s) Oral at bedtime  hydrocodone/homatropine Syrup 5 milliLiter(s) Oral every 8 hours PRN  HYDROmorphone  Injectable 0.5 milliGRAM(s) IV Push every 4 hours PRN  lactobacillus acidophilus 1 Tablet(s) Oral two times a day  levothyroxine 100 MICROGram(s) Oral daily  lidocaine   Patch 1 Patch Transdermal daily  liothyronine 5 MICROGram(s) Oral daily  multivitamin 1 Tablet(s) Oral daily  ondansetron Injectable 4 milliGRAM(s) IV Push every 6 hours PRN  oxycodone    5 mG/acetaminophen 325 mG 1 Tablet(s) Oral every 4 hours PRN  pantoprazole    Tablet 40 milliGRAM(s) Oral before breakfast  senna 2 Tablet(s) Oral at bedtime PRN

## 2020-07-15 NOTE — PROGRESS NOTE ADULT - ASSESSMENT
74 y/o female with PMH of Hypothyroidism and L2 pathologic compression fracture presents to  with 1 month history of dyspnea and 1 week history of the dry cough. Patient was seen by ortho spine surgeon, pulmonologist and oncologist and send for evaluation of left sided pleural effusion with concern for lung mass. Pt reports onset of back pain was in March 2020, received cortisone shots and got an MRI of L2 and blood test x2 weeks ago. Pt went to Dr. Westfall (oncologist) and was sent for imaging with Dr. Sanchez (pulmonologist) who discussed results of CT abdomen and chest this AM with pt and told pt to come to ED for evaluation for SOB.  In Ed -  T(F): 98.9Max: 98.9  HR: 80  (80 - 94) BP: 149/63 (149/63 - 150/77) RR: 24(17 - 24) SpO2: 98% (98% - 99%) EKG - sinus , low voltage QRS, troponin x 1neg, CXR - large pleural effusion, WBC 12, Cr 0.73, , COVID PCR neg , s/p left chest tube placement in ED , fluid analysis pending (10 Jul 2020 13:26)  Massive left sided pleural effusion  CT management as per thoracic surgery  Discussed MRI findings with the patient; awaiting pleural fluid cytology  CT Chest shows 2.6 cm spiculated left upper lobe mass, likely primary lung neoplasm. Nodularity along the left major fissure. Cannot differentiate intrapulmonary lymph nodes versus pleural metastatic disease. Left pleural drainage catheter in place without significant pleural effusion. Small left-sided pneumothorax, which may be postprocedural. 1.5 cm indeterminate liver lesion. Metastatic bone disease again noted to the spine.   Left pleural pigtail drainage catheter terminates in the left posterior hemithorax. No significant pleural effusion. Small left-sided pneumothorax.  Discussed new CT Chest findings with patient and will call  to update him.   Patient evaluated by Orthopedics, underwent MRI brain, evaluated by Surgery  No surgical intervention for the gallstones, receiving Cipro and Flagyl  Plan for L2-L5 laminectomy, partial L2 corpectomy, T11-L5 posterior fusion and stabilization, pedicle screw instrumentation and possible vertebroplasty  From a pulmonary perspective, patient is a low pulmonary risk for the planned procedure  Will follow up post operatively ; at this point, high likelihood that she has metastatic lung cancer with metastasis to extrapulmonary areas including the vertebra and brain (per recent MRI results)  Thank you for the consult; will continue to follow

## 2020-07-15 NOTE — PROGRESS NOTE ADULT - SUBJECTIVE AND OBJECTIVE BOX
Patient seen and examined  Chart reviewed    Patient tearful  Pain controlled  Has more LE pain, tingling, numbness in thighs than axial LBP    Neuro exam is stable    IMP:   severe stenosis L2 metastatic lesion, pathologic fracture    She is indicated for a decompression and stabilization  Will do this as soon as time available in OR (tomorrow or Friday):    L2-L5 laminectomy, partial L2 corpectomy, T11-L5 posterior fusion and stabilization, pedicle screw instrumentation and possible vertebraplasty.    Brief discussed the goals of surgery (tissue diagnosis, neurologic stabilization, fracture stabilization)  She understands the surgery is not curative for the cancer.  Discussed that a secondary (anterior/lateral) approach maybe necessary based on stability of repair and tissue diagnosis.    Discussed case with primary team.     GIULIANO George

## 2020-07-15 NOTE — CONSULT NOTE ADULT - ASSESSMENT
76 y/o female with no significant smoking history (quit in 1976) who has a 2.6 cm INDU spiculated nodule/nodularity along left major fissure/large left pleural effusion, a 1.5 cm liver lesion, pathologic compression fracture of L2 and suspicious lesions in T10 and T12, and a 1.2 cm right frontal lobe mass with four additional subcenter brain parenchymal lesions.  The overall clinical picture is consistent with that of a stage IV primary lung cancer.  Patient will undergo surgical decompression and stabilization of L2 tomorrow.  This should yield a histopathologic diagnosis.  This will guide the choice of systemic therapy.  She will need postoperative radiotherapy to the lumbar region as well.  Although asymptomatic at present, she will need radiotherapy for the brain metastases.  She appears to be a good candidate for Gamma knife stereotactic radiosurgery, for which Dr. Handy and I will collaborate.  Patient will need outpatient followup with me in my [place] office to discuss and plan the recommended radiation treatments.   She was given my business card.

## 2020-07-15 NOTE — PROGRESS NOTE ADULT - SUBJECTIVE AND OBJECTIVE BOX
74 y/o female presented with back pain x 2 months , with recent worsening, and pain radiating down both extremities; Imaging revealed an L2 vertebral fracture / likely pathologic; on exam had left lung dullness to percussion and imaging with CT revealed extensive   left sided pleural effusion with concern for possible lung mass. She was seen by Pulmonary medicine and advised admission for thoracentesis and further HOLLIS; kept on dexamethasone for lower back fracture; WBC has increased/ placed on an antibiotic ( no overt infection)  In the ER a CT was placed with + drainage/    cytology: atypical cells suspicious for malignancy  to have spinal stabilization  found to have 1.2 cm right frontal brain met as well as some smaller additional lesions, asymptomatic, no edema  distended gallbladder seen on scan, HIDA scan shows obstructed GB , GB has stones, asymptomatic    feels anxious          PAST MEDICAL & SURGICAL HISTORY:  Osteoarthritis  Fracture: L2 pathologic fracture  Hypothyroidism  No significant past surgical history    + ex / remote/ light  smoker 10 pack yr    MEDICATIONS  (STANDING):  ascorbic acid 500 milliGRAM(s) Oral daily  ciprofloxacin     Tablet 500 milliGRAM(s) Oral every 12 hours  dexAMETHasone  Injectable 4 milliGRAM(s) IV Push every 8 hours  gabapentin 300 milliGRAM(s) Oral at bedtime  lactobacillus acidophilus 1 Tablet(s) Oral two times a day  levothyroxine 100 MICROGram(s) Oral daily  lidocaine   Patch 1 Patch Transdermal daily  liothyronine 5 MICROGram(s) Oral daily  metroNIDAZOLE    Tablet 500 milliGRAM(s) Oral every 8 hours  multivitamin 1 Tablet(s) Oral daily  pantoprazole    Tablet 40 milliGRAM(s) Oral before breakfast    MEDICATIONS  (PRN):  acetaminophen   Tablet .. 650 milliGRAM(s) Oral every 4 hours PRN Temp greater or equal to 38C (100.4F), Mild Pain (1 - 3)  ALPRAZolam 0.25 milliGRAM(s) Oral every 6 hours PRN anxiety  aluminum hydroxide/magnesium hydroxide/simethicone Suspension 30 milliLiter(s) Oral every 4 hours PRN Dyspepsia  hydrocodone/homatropine Syrup 5 milliLiter(s) Oral every 8 hours PRN Cough  HYDROmorphone  Injectable 0.5 milliGRAM(s) IV Push every 4 hours PRN Severe Pain (7 - 10)  ondansetron Injectable 4 milliGRAM(s) IV Push every 6 hours PRN Nausea  oxycodone    5 mG/acetaminophen 325 mG 1 Tablet(s) Oral every 4 hours PRN Moderate Pain (4 - 6)  senna 2 Tablet(s) Oral at bedtime PRN Constipation    ROS  back pain  anxiety  numbess of the front of the thighs  no cough  shortness of breath has resolved        Vital Signs Last 24 Hrs  T(C): 36.7 (15 Jul 2020 15:00), Max: 36.8 (14 Jul 2020 21:00)  T(F): 98.1 (15 Jul 2020 15:00), Max: 98.3 (14 Jul 2020 21:00)  HR: 76 (15 Jul 2020 15:00) (76 - 82)  BP: 135/70 (15 Jul 2020 15:00) (135/70 - 144/68)  BP(mean): --  RR: 16 (15 Jul 2020 15:00) (16 - 18)  SpO2: 98% (15 Jul 2020 15:00) (95% - 98%)      PHYSICAL EXAM:      Constitutional: Appears comfortable, in  NAD, A/O X 3     Eyes: EOMI, PERRLA ;No icterus    ENMT:  No oral lesions, thrush; pharynx not injected    Neck:  Supple; No masses, lymph nodes, no bruits    Breasts: NA    Back: No tenderness     Respiratory:  Clear to A/P, No wheezes, rhonchi, rales.   L chest tube in place    Cardiovascular: Normal rate and rhythm; normal S1 and S2; No murmurs. No gallop    Gastrointestinal: No distension, normal bowl sounds;     no masses, no hepatosplenomegaly no fluid wave    Genitourinary: No flank pains, no inguinal adenopathy    Rectal: NA    Extremities:  No phlebitis, no edema    Vascular: No acrocyanosis, no edema    Neurological: Alert and oriented. Cranial nerves II-XII WNL, Upper extremity / lower extremity  strength WNL;  Reflexes WNL, Plantar downgoing ; No cerebellar signs    Skin: No rash, petechiae purpura, ecchymoses    Lymph Nodes: No cervical, supraclavicular, axillary, inguinal adenopathy    Musculoskeletal: No joint swelling    Psychiatric: Alert, oriented, normal affect                          12.6   15.70 )-----------( 464      ( 15 Jul 2020 06:33 )             37.7     07-15    138  |  104  |  21  ----------------------------<  116<H>  4.0   |  27  |  0.56    Ca    7.5<L>      15 Jul 2020 06:33    TPro  6.2  /  Alb  2.6<L>  /  TBili  0.4  /  DBili  x   /  AST  29  /  ALT  73  /  AlkPhos  135<H>  07-15    LIVER FUNCTIONS - ( 15 Jul 2020 06:33 )  Alb: 2.6 g/dL / Pro: 6.2 gm/dL / ALK PHOS: 135 U/L / ALT: 73 U/L / AST: 29 U/L / GGT: x           PT/INR - ( 15 Jul 2020 06:33 )   PT: 12.7 sec;   INR: 1.09 ratio         Pleural fluid  atypical cells seen . not diagnostic        INTERPRETATION:  Clinical indication: Staging MRI rule out metastasis.    MRI of the thoracic and lumbar spine was performed using sagittal T1-T2 and STIR sequence. Axial T1 and T2-weighted sequences were performed as well. The patient was injected with a possible 6.5 cc gadolinium this IV with 1 cc of contrast cardia.    Thoracic spine:    Abnormal enhancing lesions are seen involving the T10 and T12 vertebral bodies. These findings demonstrate slight T1 shortening and could be compatible with hemangiomas and less likely underlying pathologic lesions such as metastasis,. These findings can also be further evaluated with CT scan of the thoracic spine region.    There are no abnormal disc herniations or significant central or neural foraminal stenosis seen.    The spinal cord demonstrates normal signal and caliber.    Evaluation of the paraspinal soft tissues appear normal.    Hazy density seen involving the left lung. Dedicated imaging with CT scan is recommended for further evaluation.    Lumbar spine:    There is normal lumbar lordosis is seen.    Compression deformity is seen involving the L2 vertebral body. Abnormal T1 and T2 prolongation are identified with associated areas of enhancement. This finding could be compatible with a pathologic lesion such as metastasis, myeloma or lymphoma. Retropulsed fragment is identified which causes mild to moderate narrowing of the spinal canal. No abnormal paraspinal extension of this process is seen.    There is grade 1 anterolisthesis involving L4 and L5.    T12-L1: Normal    L1-2: Disc bulge and bilateral hypertrophic facet changes seen. This as well as retropulsed fragment causes moderate narrowing of the spinal canal.    L2-3: Disc bulge and bilateral hypertrophic facet joint changes seen. This as well as the retropulsed fragment causes moderate to severe narrowing of the spinal canal. Severe narrowing of both neural foramina    L3-4: Disc bulge and bilateral hypertrophic facet changes are seen. Severe narrowing of the spinal canal is seen.    L4-5: Disc bulge and bilateral hypertrophic facet changes seen. Moderate narrowing spinal canal and left neural foramen.    L5-S1: Disc bulge and bilateral hypertrophic facet changes are seen. No significant compromise of the, spinal canal is seen.    There is small focus of decreased signal seen involving the right iliac bone. This could be compatible with a bone island, the possibility of underlying lesion cannot be entirely excluded.    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    Evaluation of the paraspinal soft tissues appear normal    < end of copied text >    < from: Xray Chest 1 View- PORTABLE-Routine (07.11.20 @ 10:08) >    EXAM:  XR CHEST PORTABLE ROUTINE 1V                            PROCEDURE DATE:  07/11/2020          INTERPRETATION:  INDICATION: Assess left pleural effusion.    PRIORS: 7/10/2020.    VIEWS: Portable AP radiography of the chest performed.    FINDINGS: Since prior evaluation the position of the indwelling left pleural space pigtail drainage catheter has changed, the pigtail now overlying the medial aspect of the left mid thorax. There is interval decrease in left pleural effusion from prior; a moderately sized left pleural effusion persists. Underlying lung parenchymal infiltrate, consolidation or atelectasis cannot be excluded. There is no evidence for left-sided pneumothorax. No acute right-sided infiltrate is identified. No evidence for right pleural effusion or pneumothorax. No mediastinal shift noted. Degenerative changes of the thoracic spine evident.    IMPRESSION: Interval decrease in left pleural effusion from prior with change in position of indwelling pigtail pleural space drainage catheter. Moderately sized left pleural effusion persists. See above discussion.    < end of copied text >            INTERPRETATION:  INDICATION: Assess left pleural effusion.    PRIORS: 7/10/2020.    VIEWS: Portable AP radiography of the chest performed.    FINDINGS: Since prior evaluation the position of the indwelling left pleural space pigtail drainage catheter has changed, the pigtail now overlying the medial aspect of the left mid thorax. There is interval decrease in left pleural effusion from prior; a moderately sized left pleural effusion persists. Underlying lung parenchymal infiltrate, consolidation or atelectasis cannot be excluded. There is no evidence for left-sided pneumothorax. No acute right-sided infiltrate is identified. No evidence for right pleural effusion or pneumothorax. No mediastinal shift noted. Degenerative changes of the thoracic spine evident.    IMPRESSION: Interval decrease in left pleural effusion from prior with change in position of indwelling pigtail pleural space drainage catheter. Moderately sized left pleural effusion persists. See above discussion.    < end of copied text >      MRI Brain  1.2 cm right frontal lesion and a few smaller lesions

## 2020-07-15 NOTE — PROGRESS NOTE ADULT - PROBLEM SELECTOR PLAN 1
likely malignant from left upper lobe lung ca  cytology inconclusive  further tissue to be obtained at spine surgery  shall likely need systemic chemo once recovered from surgery and diagnosis confirmed, may be an adenca as CEA was 40 as outpatient.   molecular studies will need to be done as well to rule out EGFR/ALK mutations  start B12 folate in preparation.

## 2020-07-15 NOTE — PROGRESS NOTE ADULT - ASSESSMENT
Pt is a 75 year old female with pmed hypothyroidism and likely pathologic fx of L2, lung mass and large left pleural effusion s/p chest tube drainage found to have abd sono showing distended gallbladder with large stones and no signs of acute inflammation. LFTs wln. leukocytosis noted      -Results from HIDA this AM appreciated  -Physical exam remains benign and patient exhibits no pain level  -WBC downtrending; recommend Cipro/Flagyl 7days  -No surgical intervention warranted at this time  -Lab findings likely represent incidental findings and leukocytosis 2/2 to steroid use  -continue medical care per primary team  -Please reconsult if patient's condition worsens

## 2020-07-15 NOTE — PROGRESS NOTE ADULT - ASSESSMENT
76 y/o female with PMH of Hypothyroidism and L2 pathologic compression fracture presents to  with 1 month history of dyspnea and 1 week history of the dry cough. Patient was seen by ortho spine surgeon, pulmonologist and oncologist and send for evaluation of left sided pleural effusion with concern for lung mass.   In Ed -  T(F): 98.9Max: 98.9  HR: 80  (80 - 94) BP: 149/63 (149/63 - 150/77) RR: 24(17 - 24) SpO2: 98% (98% - 99%) EKG - sinus , low voltage QRS, troponin x 1neg, CXR - large pleural effusion, WBC 12, Cr 0.73, , covid PCR neg , s/p left chest tube placement in ED , fluid analysis pending   * Lower back pain due to pathologic L2 fracture , worsening on MRI, risk for cord compression  - pain management, c/w gabapentin, IV steroids 4 q8h with PPI  - Dr. Flood consult  - will need surgery   - pt requesting second opinion Dr. Kearns consulted  - neurochecks q4 h  - 7/14 - family want to per [facility] surgery on spine here, getting [facility] outpatient evaluation, Dr. Flood notified  - 7/15 -plan to OR [facility] for stabilization and decompression of L2 fracture,   low cardiac risk for low risk surgery, medically optimized  for surgery     * Brain lesion in right frontal lobe with multiple subcentimeter lesions   - neurosurgery consult Dr. Handy -  radiology - oncology consult for brain RT  - no edema  * Dyspnea due to Large left pleural effusion s/p left chest tube placement, exudative effusion suspected malignancy   * INDU 2.6 cm mass, 1.5 cm liver lesion   * Suspected postobstructive PNA s/p IV abx  * Leukocytosis steroids induced, improving   - CT consult- s/p left chest tube placement   - f/u pleural fluid analysis - exudate, cx - neg, cytology - pending   - Ct chest with contrast 7/13 - INDU mass, 1.5 cm liver mass  - IV ceftriaxone--> cefepime ( fascial rash 7/12- will stop) --> meropenem and  Azithromycin, will observe off abx   - oncology and pulmonology evaluation  - 2 d echo- EF wnl, liver cystic lesion 15x5 cm  - bone scan - pending , MRI of the brain w contrast  - noted  *  Liver lesion cystic on 2 d echo and Enlarged GB on CT 22 cm  doubt cholecystitis    - CT abd from o/p - in the chart reviewed  - US abd - noted , HIDA scan - positive  - surgery consult - no need for surgery , incidental finding, false positive HIDA  * Hypothyroidism  - c/w LT4 and LT3   * Anxiety - xanax prn q6h  Advanced directives  - discussed advanced directive for 17 min  - FULL code    DVT proph -IPC     Dispo - CT management, oncology  work-up , plan for spine surgery , neurosurgery evaluation  Medically optimized for needed surgery   NPO from midnight, for OR [facility]

## 2020-07-15 NOTE — PROGRESS NOTE ADULT - SUBJECTIVE AND OBJECTIVE BOX
Patient is a 75y old  Female who presents with a chief complaint of dyspnea , cough (2020 06:36)      HPI:  74 y/o female with PMH of Hypothyroidism and L2 pathologic compression fracture presents to  with 1 month history of dyspnea and 1 week history of the dry cough. Patient was seen by ortho spine surgeon, pulmonologist and oncologist and send for evaluation of left sided pleural effusion with concern for lung mass. Pt reports onset of back pain was in 2020, received cortisone shots and got an MRI of L2 and blood test x2 weeks ago. Pt went to Dr. Westfall (oncologist) and was sent for imaging with Dr. Sanchez (pulmonologist) who discussed results of CT abdomen and chest this AM with pt and told pt to come to ED for evaluation for SOB.  In Ed -  T(F): 98.9Max: 98.9  HR: 80  (80 - 94) BP: 149/63 (149/63 - 150/77) RR: 24(17 - 24) SpO2: 98% (98% - 99%) EKG - sinus , low voltage QRS, troponin x 1neg, CXR - large pleural effusion, WBC 12, Cr 0.73, , covid PCR neg , s/p left chest tube placement in ED , fluid analysis pending (10 Jul 2020 13:26)    seen and evaluated today  data discussed with thoracic surgery as well as her RN at bedside  CT attached with some pain being medicated  breathing is better        she is ambulating with CT attached  she is feeling much better today  some discomfort from CT      No acute pulmonary events occurred overnight  Discussed MRI findings with the patient; awaiting pleural fluid cytology  Discussed plan with  as well      No acute pulmonary events occurred overnight     7/15  Patient evaluated by Orthopedics, underwent MRI brain, evaluated by Surgery  No surgical intervention for the gallstones, receiving Cipro and Flagyl    MEDICATIONS  (STANDING):  ascorbic acid 500 milliGRAM(s) Oral daily  ciprofloxacin     Tablet 500 milliGRAM(s) Oral every 12 hours  dexAMETHasone  Injectable 4 milliGRAM(s) IV Push every 8 hours  gabapentin 300 milliGRAM(s) Oral at bedtime  lactobacillus acidophilus 1 Tablet(s) Oral two times a day  levothyroxine 100 MICROGram(s) Oral daily  lidocaine   Patch 1 Patch Transdermal daily  liothyronine 5 MICROGram(s) Oral daily  metroNIDAZOLE    Tablet 500 milliGRAM(s) Oral every 8 hours  multivitamin 1 Tablet(s) Oral daily  pantoprazole    Tablet 40 milliGRAM(s) Oral before breakfast    MEDICATIONS  (PRN):  acetaminophen   Tablet .. 650 milliGRAM(s) Oral every 4 hours PRN Temp greater or equal to 38C (100.4F), Mild Pain (1 - 3)  ALPRAZolam 0.25 milliGRAM(s) Oral every 6 hours PRN anxiety  aluminum hydroxide/magnesium hydroxide/simethicone Suspension 30 milliLiter(s) Oral every 4 hours PRN Dyspepsia  hydrocodone/homatropine Syrup 5 milliLiter(s) Oral every 8 hours PRN Cough  HYDROmorphone  Injectable 0.5 milliGRAM(s) IV Push every 4 hours PRN Severe Pain (7 - 10)  ondansetron Injectable 4 milliGRAM(s) IV Push every 6 hours PRN Nausea  oxycodone    5 mG/acetaminophen 325 mG 1 Tablet(s) Oral every 4 hours PRN Moderate Pain (4 - 6)  senna 2 Tablet(s) Oral at bedtime PRN Constipation        Vital Signs Last 24 Hrs  T(C): 37.2 (2020 21:36), Max: 37.2 (2020 21:36)  T(F): 98.9 (2020 21:36), Max: 98.9 (2020 21:36)  HR: 78 (2020 21:36) (78 - 82)  BP: 135/69 (2020 21:36) (135/69 - 138/69)  BP(mean): --  RR: 18 (2020 21:36) (18 - 18)  SpO2: 95% (2020 21:36) (95% - 95%)    I&O's Detail    2020 07:01  -  2020 07:00  --------------------------------------------------------  IN:  Total IN: 0 mL    OUT:    Chest Tube: 1115 mL  Total OUT: 1115 mL    Total NET: -1115 mL          PHYSICAL EXAM  General Appearance: cooperative, no acute distress,   HEENT: PERRL, conjunctiva clear, EOM's intact, non injected pharynx, no exudate, TM   normal  Neck: Supple, , no adenopathy, thyroid: not enlarged, no carotid bruit or JVD  Back: Symmetric, no  tenderness,no soft tissue tenderness  Lungs: left sided CT attached, decreased BS left mid-lower lung, IMPROVED  Heart: Regular rate and rhythm, S1, S2 normal, no murmur, rub or gallop  Abdomen: Soft, non-tender, bowel sounds active , no hepatosplenomegaly  Extremities: no cyanosis or edema, no joint swelling  Skin: Skin color, texture normal, no rashes   Neurologic: Alert and oriented X3 , cranial nerves intact, sensory and motor normal,    ECG:    LABS:                          13.3   13.48 )-----------( 482      ( 2020 09:13 )             39.1     07-10    136  |  104  |  11  ----------------------------<  114<H>  4.2   |  25  |  0.73    Ca    8.7      10 Jul 2020 10:49  Mg     2.2     07-10    TPro  7.0  /  Alb  3.1<L>  /  TBili  0.4  /  DBili  x   /  AST  28  /  ALT  33  /  AlkPhos  126<H>  07-10    CARDIAC MARKERS ( 10 Jul 2020 10:49 )  <0.015 ng/mL / x     / x     / x     / x            Pro BNP  209 07-10 @ 10:49  D Dimer  -- 07-10 @ 10:49    PT/INR - ( 10 Jul 2020 10:57 )   PT: 12.2 sec;   INR: 1.04 ratio         PTT - ( 10 Jul 2020 10:57 )  PTT:29.4 sec  Urinalysis Basic - ( 10 Jul 2020 17:50 )    Color: Yellow / Appearance: Clear / S.010 / pH: x  Gluc: x / Ketone: Small  / Bili: Negative / Urobili: Negative mg/dL   Blood: x / Protein: Negative mg/dL / Nitrite: Negative   Leuk Esterase: Trace / RBC: 0-2 /HPF / WBC 0-2   Sq Epi: x / Non Sq Epi: Occasional / Bacteria: Few            RADIOLOGY & ADDITIONAL STUDIES:    < from: Xray Chest 1 View- PORTABLE-Urgent (07.10.20 @ 14:20) >    PROCEDURE DATE:  07/10/2020          INTERPRETATION:  AP chest on July 10, 2020 at 2:12 PM. Patient had catheter left chest tube inserted for massive effusion.    Heart size cannot be assessed.    A catheter left chest tube has been inserted at the base.    Massive left effusion seen prior in the day is significantly diminished but a very large effusion remains. There is no visible pneumothorax.    Underlying pathology cannot be excluded.    IMPRESSION: Diminished left effusion after chest tube. Significant effusion remains.    < end of copied text >  < from: Xray Chest 1 View- PORTABLE-Routine (20 @ 10:08) >      PROCEDURE DATE:  2020  personally reviewed and shown to pt with prior xrays as well        INTERPRETATION:  INDICATION: Assess left pleural effusion.    PRIORS: 7/10/2020.    VIEWS: Portable AP radiography of the chest performed.    FINDINGS: Since prior evaluation the position of the indwelling left pleural space pigtail drainage catheter has changed, the pigtail now overlying the medial aspect of the left mid thorax. There is interval decrease in left pleural effusion from prior; a moderately sized left pleural effusion persists. Underlying lung parenchymal infiltrate, consolidation or atelectasis cannot be excluded. There is no evidence for left-sided pneumothorax. No acute right-sided infiltrate is identified. No evidence for right pleural effusion or pneumothorax. No mediastinal shift noted. Degenerative changes of the thoracic spine evident.    IMPRESSION: Interval decrease in left pleural effusion from prior with change in position of indwelling pigtail pleural space drainage catheter. Moderately sized left pleural effusion persists. See above discussion.      CT CHEST     LUNGS, AIRWAYS: The central airways are patent. 2.6 x 2.2 cm spiculated nodule in the left upper lobe with surrounding linear fibrotic reaction. Patchy left lower lobe airspace disease, likely infectious. Nodularity along the left major fissure.    PLEURA: Left pleural pigtail drainage catheter terminates in the left posterior hemithorax. No significant pleural effusion. Small left-sided pneumothorax.    VESSELS: Normal caliber aorta. Main pulmonary arteries are patent.    HEART: Normal heart size. No pericardial effusion.    MEDIASTINUM AND MARJAN: No adenopathy.    UPPER ABDOMEN: Distended gallbladder. 1.5 cm indeterminate lesion in the caudate lobe.    BONES AND SOFT TISSUES: Destructive lesion of L2 vertebral body again seen.    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    Nodularity along the left major fissure. Cannot differentiate intrapulmonary lymph nodes versus pleural metastatic disease.    Left pleural drainage catheter in place without significant pleural effusion.    Small left-sided pneumothorax, which may be postprocedural.    1.5 cm indeterminate liver lesion.    Metastatic bone disease again noted to the spine.

## 2020-07-15 NOTE — CONSULT NOTE ADULT - SUBJECTIVE AND OBJECTIVE BOX
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 George.  Plastic surgery assistance was requested for the reconstructive closure.

## 2020-07-15 NOTE — CONSULT NOTE ADULT - ASSESSMENT
I discussed the paraspinous muscle flaps with the patient, the use of post-op drains and management. All questions were answered to her satisfaction, informed consent was obtained. Thank you for allowing me to participate in this patient's care.

## 2020-07-15 NOTE — PROGRESS NOTE ADULT - SUBJECTIVE AND OBJECTIVE BOX
Subjective:  Patient agreeable to spine surgery.    Vital Signs:  Vital Signs Last 24 Hrs  T(C): 36.8 (07-15-20 @ 11:00), Max: 36.8 (07-14-20 @ 21:00)  T(F): 98.3 (07-15-20 @ 11:00), Max: 98.3 (07-14-20 @ 21:00)  HR: 80 (07-15-20 @ 11:00) (80 - 82)  BP: 144/68 (07-15-20 @ 11:00) (137/65 - 144/68)  RR: 17 (07-15-20 @ 11:00) (16 - 18)  SpO2: 96% (07-15-20 @ 11:00) (95% - 99%) on room air      Relevant labs, radiology and Medications reviewed                        12.6   15.70 )-----------( 464      ( 15 Jul 2020 06:33 )             37.7     07-15    138  |  104  |  21  ----------------------------<  116<H>  4.0   |  27  |  0.56    Ca    7.5<L>      15 Jul 2020 06:33    TPro  6.2  /  Alb  2.6<L>  /  TBili  0.4  /  DBili  x   /  AST  29  /  ALT  73  /  AlkPhos  135<H>  07-15    PT/INR - ( 15 Jul 2020 06:33 )   PT: 12.7 sec;   INR: 1.09 ratio           MEDICATIONS  (STANDING):  ascorbic acid 500 milliGRAM(s) Oral daily  ciprofloxacin     Tablet 500 milliGRAM(s) Oral every 12 hours  dexAMETHasone  Injectable 4 milliGRAM(s) IV Push every 8 hours  gabapentin 300 milliGRAM(s) Oral at bedtime  lactobacillus acidophilus 1 Tablet(s) Oral two times a day  levothyroxine 100 MICROGram(s) Oral daily  lidocaine   Patch 1 Patch Transdermal daily  liothyronine 5 MICROGram(s) Oral daily  metroNIDAZOLE    Tablet 500 milliGRAM(s) Oral every 8 hours  multivitamin 1 Tablet(s) Oral daily  pantoprazole    Tablet 40 milliGRAM(s) Oral before breakfast    MEDICATIONS  (PRN):  acetaminophen   Tablet .. 650 milliGRAM(s) Oral every 4 hours PRN Temp greater or equal to 38C (100.4F), Mild Pain (1 - 3)  ALPRAZolam 0.25 milliGRAM(s) Oral every 6 hours PRN anxiety  aluminum hydroxide/magnesium hydroxide/simethicone Suspension 30 milliLiter(s) Oral every 4 hours PRN Dyspepsia  hydrocodone/homatropine Syrup 5 milliLiter(s) Oral every 8 hours PRN Cough  HYDROmorphone  Injectable 0.5 milliGRAM(s) IV Push every 4 hours PRN Severe Pain (7 - 10)  ondansetron Injectable 4 milliGRAM(s) IV Push every 6 hours PRN Nausea  oxycodone    5 mG/acetaminophen 325 mG 1 Tablet(s) Oral every 4 hours PRN Moderate Pain (4 - 6)  senna 2 Tablet(s) Oral at bedtime PRN Constipation      Physical exam  Gen: NAD breathing comfortably  Neuro: AO x 3   Card: S1 S2  Pulm: CTA  Abd: Soft NT ND  Ext: no edema    Tubes:  left ct was kinked, released  - drained about 300mL of serous fluid    I&O's Summary    14 Jul 2020 07:01  -  15 Jul 2020 07:00  --------------------------------------------------------  IN: 0 mL / OUT: 0 mL / NET: 0 mL        Assessment  75y Female  w/ PAST MEDICAL & SURGICAL HISTORY:  Osteoarthritis  Fracture: L2 pathologic fracture  Hypothyroidism  No significant past surgical history    Patient is a 75y old  Female who presents with a chief complaint of dyspnea , cough (15 Jul 2020 10:18)    Pathologic fx of L2 - Neurosx intervention suggested.  New left pleural effusion s/p left PTC placement.    PLAN    Follow up cytology.  CT to water seal.  Daily CXR.  Optimized from our standpoint for surgery planned tomorrow.  Please keep chest tube to waterseal.  Once final pathology is made, will offer patient pleurx closer to discharge.      Discussed with Cardiothoracic Team at AM rounds.

## 2020-07-15 NOTE — PROGRESS NOTE ADULT - SUBJECTIVE AND OBJECTIVE BOX
Subjective:  Patient is a 75y old  Female who presents with a chief complaint of dyspnea , cough     HPI:  74 y/o female with PMH of Hypothyroidism and L2 pathologic compression fracture presents to   on 7/10/20 with 1 month history of dyspnea and 1 week history of the dry cough. Patient was seen by ortho spine surgeon, pulmonologist and oncologist and send for evaluation of left sided pleural effusion with concern for lung mass. Pt reports onset of back pain was in 2020, received cortisone shots and got an MRI of L2 and blood test x2 weeks ago. Pt went to Dr. Westfall (oncologist) and was sent for imaging with Dr. Sanchez (pulmonologist) who discussed results of CT abdomen and chest this AM with pt and told pt to come to ED for evaluation for SOB.  In Ed -  T(F): 98.9Max: 98.9  HR: 80  (80 - 94) BP: 149/63 (149/63 - 150/77) RR: 24(17 - 24) SpO2: 98% (98% - 99%) EKG - sinus , low voltage QRS, troponin x 1neg, CXR - large pleural effusion, WBC 12, Cr 0.73, , covid PCR neg , s/p left chest tube placement in ED , fluid analysis pending (10 Jul 2020 13:26)     -  Patient seen and examined at bedside earlier today, feels better, left sided chest wall pain around CT, + cough, dyspnea improved, POC discussed in length   - pt seen and examined, + cough, chest wall pain, low grade fever overnight, denies abdominal pain, POC discussed    - pt seen and examined, cough improved, + anxious about results of the tests, denies cp, dyspnea, abdominal pain, POC discussed    - pt seen and examined , denies cp, dyspnea and cough better, + very anxious, results of the tests discussed , POC discussed   7/15 - pt seen and examined, anxious, worries about surgery, + crying during interview, denies cp, cough minimal, afebrile, POC discussed     Review of system- Rest of the review of system are negative except mentioned in HPI    T(C): 36.7 (07-15-20 @ 15:00), Max: 36.8 (20 @ 21:00)  T(F): 98.1 (07-15-20 @ 15:00), Max: 98.3 (20 @ 21:00)  HR: 76 (07-15-20 @ 15:00) (76 - 82)  BP: 135/70 (07-15-20 @ 15:00) (135/70 - 144/68)  RR: 16 (07-15-20 @ 15:00) (16 - 18)  SpO2: 98% (07-15-20 @ 15:00) (95% - 98%)  Wt(kg): --    PHYSICAL EXAM:  GENERAL: NAD  NERVOUS SYSTEM:  Alert & Oriented X3, non- focal exam,  Motor Strength 5/5 B/L upper and lower extremities; DTRs 2+ intact and symmetric  HEAD:  Atraumatic, Normocephalic  EYES: EOMI, PERRLA, conjunctiva and sclera clear  HEENT: Moist mucous membranes  NECK: Supple, No JVD  CHEST/LUNG: BS decreased over left base, ; No rales, no rhonchi, no wheezing, Left CT +   HEART: Regular rate and rhythm; No murmurs, no rubs or gallops  ABDOMEN: Soft, Nontender, Nondistended; Bowel sounds present  GENITOURINARY- Voiding, no suprapubic tenderness  EXTREMITIES:  2+ Peripheral Pulses, No clubbing, cyanosis,   edema  MUSCULOSKELETAL:- No muscle tenderness, Muscle tone normal, No joint tenderness, no Joint swelling,  Joint ROM -normal  SKIN-no rash, no lesion    LABS: all reviewed   07-15    138  |  104  |  21  ----------------------------<  116<H>  4.0   |  27  |  0.56    Ca    7.5<L>      15 Jul 2020 06:33    TPro  6.2  /  Alb  2.6<L>  /  TBili  0.4  /  DBili  x   /  AST  29  /  ALT  73  /  AlkPhos  135<H>  07-15                            12.6   15.70 )-----------( 464      ( 15 Jul 2020 06:33 )             37.7             LIVER FUNCTIONS - ( 15 Jul 2020 06:33 )  Alb: 2.6 g/dL / Pro: 6.2 gm/dL / ALK PHOS: 135 U/L / ALT: 73 U/L / AST: 29 U/L / GGT: x             PT/INR - ( 15 Jul 2020 06:33 )   PT: 12.7 sec;   INR: 1.09 ratio               140  |  108  |  21  ----------------------------<  111<H>  4.3   |  26  |  0.65    Ca    8.4<L>      2020 08:09    TPro  7.0  /  Alb  3.1<L>  /  TBili  0.3  /  DBili  x   /  AST  21  /  ALT  37  /  AlkPhos  136<H>        CARDIAC MARKERS ( 10 Jul 2020 10:49 )  <0.015 ng/mL / x     / x     / x     / x          Urinalysis Basic - ( 10 Jul 2020 17:50 )    Color: Yellow / Appearance: Clear / S.010 / pH: x  Gluc: x / Ketone: Small  / Bili: Negative / Urobili: Negative mg/dL   Blood: x / Protein: Negative mg/dL / Nitrite: Negative   Leuk Esterase: Trace / RBC: 0-2 /HPF / WBC 0-2   Sq Epi: x / Non Sq Epi: Occasional / Bacteria: Few          RECENT CULTURES:  Culture - Urine (07.10.20 @ 17:50)    Specimen Source: .Urine Clean Catch (Midstream)    Culture Results:   <10,000 CFU/mL Normal Urogenital Shyann  Culture - Blood in AM (07.10.20 @ 14:38)    Specimen Source: .Blood None    Culture Results:   No growth to date.    Culture - Blood in AM (07.10.20 @ 14:38)    Specimen Source: .Blood None    Culture Results:   No growth to date.      RADIOLOGY & ADDITIONAL TESTS: all reviewed   EKG reviewed  < from: 12 Lead ECG (07.10.20 @ 10:22) >    Ventricular Rate 87 BPM    Atrial Rate 87 BPM    P-R Interval 122 ms    QRS Duration 72 ms    Q-T Interval 348 ms    QTC Calculation(Bezet) 418 ms    P Axis 17 degrees    R Axis 1 degrees    T Axis 29 degrees    Diagnosis Line Normal sinus rhythm  Low voltage QRS  Cannot rule out Anterior infarct , age undetermined  Abnormal ECG  No previous ECGs available    < end of copied text >  < from: MR Cervical Spine w/wo IV Cont (20 @ 13:57) >  IMPRESSION: Degenerative changes as described above.    Hemangiomas as described above.      < end of copied text >  < from: MR Thoracic Spine w/wo IV Cont (20 @ 13:46) >  IMPRESSION: Abnormal lesions involving the T12 and T10 levels as described above.    Compression fracture is seen involving L2 level with abnormal T1 and T2 prolongation seen. This could be compatible with a pathologic compression fracture. Retropulsed fragment is identified as described above.    < end of copied text >    < from: Xray Chest 1 View- PORTABLE-Routine (20 @ 10:08) >  FINDINGS: Since prior evaluation the position of the indwelling left pleural space pigtail drainage catheter has changed, the pigtail now overlying the medial aspect of the left mid thorax. There is interval decrease in left pleural effusion from prior; a moderately sized left pleural effusion persists. Underlying lung parenchymal infiltrate, consolidation or atelectasis cannot be excluded. There is no evidence for left-sided pneumothorax. No acute right-sided infiltrate is identified. No evidence for right pleural effusion or pneumothorax. No mediastinal shift noted. Degenerative changes of the thoracic spine evident.    IMPRESSION: Interval decrease in left pleural effusion from prior with change in position of indwelling pigtail pleural space drainage catheter. Moderately sized left pleural effusion persists. See above discussion.          < end of copied text >    < from: TTE Echo Complete w/o Contrast w/ Doppler (20 @ 09:02) >   The mitral valve leaflets appear thin and normal.   Trace mitral regurgitation is present.   EA reversal of the mitral inflow consistent with reduced compliance of the   left ventricle.   The aortic valve is not well visualized, appears normal. Valve opening   seems to be normal.   Mild (1+) aortic regurgitation is present.   Normal appearing tricuspid valve structure and function.   Mild (1+) tricuspid valve regurgitation is present.   Pulmonic valve not well seen.   Normal appearing left atrium.   Left ventricle systolic function appears preserved based on difficult   trans thoracic views; segmental wall motion abnormalities can not be ruled   out.   Visual estimation of left ventricle ejection fraction is >50%.   The IVC appears normal.   Massive anechoic cystic structure (15 cm x 5 cm) noted within the liver   possibly enlarged gallbladder. There are calcified gallstones noted within   the structure.   Pleural effusion - massive left pleural effusion.    < end of copied text >  < from: CT Chest w/ IV Cont (20 @ 12:51) >  LUNGS, AIRWAYS: The central airways are patent. 2.6 x 2.2 cm spiculated nodule in the left upper lobe with surrounding linear fibrotic reaction. Patchy left lower lobe airspace disease, likely infectious. Nodularity along the left major fissure.    PLEURA: Left pleural pigtail drainage catheter terminates in the left posterior hemithorax. No significant pleural effusion. Small left-sided pneumothorax.    VESSELS: Normal caliber aorta. Main pulmonary arteries are patent.    HEART: Normal heart size. No pericardial effusion.    MEDIASTINUM AND MARJAN: No adenopathy.    UPPER ABDOMEN: Distended gallbladder. 1.5 cm indeterminate lesion in the caudate lobe.    BONES AND SOFT TISSUES: Destructive lesion of L2 vertebral body again seen.    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    Nodularity along the left major fissure. Cannot differentiate intrapulmonary lymph nodes versus pleural metastatic disease.    Left pleural drainage catheter in place without significant pleural effusion.    Small left-sided pneumothorax, which may be postprocedural.    1.5 cm indeterminate liver lesion.    Metastatic bone disease again noted to the spine.      < end of copied text >  < from: MR Head w/wo IV Cont (20 @ 12:58) >  IMPRESSION:     Right lateral frontal lobe enhancing lesion measuring 1.2 x 1.2 x 0.7 cm, findings consistent with metastatic disease in the setting of known malignancy. Multiple additional subcentimeter enhancing lesions located at the gray-white matter junction are favored to represent additional foci of intracranial metastatic disease.    No evidence of osseous metastatic disease.    No evidence of abnormal leptomeningeal or pachymeningeal enhancement.      < end of copied text >  < from: NM Hepatobiliary Imaging (20 @ 12:03) >  IMPRESSION:  Abnormal morphine-augmented hepatobiliary scan compatible with acute cholecystitis.    < end of copied text >    Current medications:  acetaminophen   Tablet .. 650 milliGRAM(s) Oral every 4 hours PRN  aluminum hydroxide/magnesium hydroxide/simethicone Suspension 30 milliLiter(s) Oral every 4 hours PRN  ascorbic acid 500 milliGRAM(s) Oral daily  azithromycin   Tablet 500 milliGRAM(s) Oral daily  cefTRIAXone Injectable. 1000 milliGRAM(s) IV Push every 24 hours  dexAMETHasone  Injectable 6 milliGRAM(s) IV Push three times a day  gabapentin 300 milliGRAM(s) Oral at bedtime  hydrocodone/homatropine Syrup 5 milliLiter(s) Oral every 8 hours PRN  HYDROmorphone  Injectable 0.5 milliGRAM(s) IV Push every 4 hours PRN  lactobacillus acidophilus 1 Tablet(s) Oral two times a day  levothyroxine 100 MICROGram(s) Oral daily  lidocaine   Patch 1 Patch Transdermal daily  liothyronine 5 MICROGram(s) Oral daily  multivitamin 1 Tablet(s) Oral daily  ondansetron Injectable 4 milliGRAM(s) IV Push every 6 hours PRN  oxycodone    5 mG/acetaminophen 325 mG 1 Tablet(s) Oral every 4 hours PRN  senna 2 Tablet(s) Oral at bedtime PRN

## 2020-07-15 NOTE — CONSULT NOTE ADULT - SUBJECTIVE AND OBJECTIVE BOX
76 y/o female with PMH of Hypothyroidism and L2 pathologic compression fracture presented to   ED on 7/10/20 with 1 month history of increasing dyspnea and also 3 month history of low back pain.  CXR obtained in ED  - large pleural effusion, WBC 12, covid PCR neg , s/p left chest tube placement in ED , pleural fluid analysis pending.  MRI of t and l spine on 7-11-20 shows lesions at T10 and T12 which appear suspicious for metastases and pathologic compression fracture of L2 vertebral body with abnormal enhancement and retropulsion of bony fragments into the spinal canal.  Patient had previous imaging of lumbar spine 2 weeks prior as an outpatient and current imaging is felt to represent further progression of disease.  She has been seen by Dr. George of Orthopedic surgery and by Dr. Kearns of neurosurgery, both of whom recommend surgical decompression and stabilization of the lumbar spine.  Patient is scheduled to undergo the surgical procedure tomorrow.  CT chest reveals a 2.6 cm INDU spiculated nodule suspicious for primary tumor and nodularity along left major fissure suspicious for pleural disease.   MRI brain on 7-14-20 revealed  a 1.2 cm enhancing mass in right lateral frontal lobe and 4 subcentimeter lesions elsewhere in the supratentorial brain consistent with brain metastases; no abnormal leptomeningeal enhancement.  Patient has been evaluated by Dr. Handy of neurosurgery; no neurosurgical intervention is planned at this time.  Patient is also being followed by Dr. Sy of medical oncology.    She is referred now for radiation oncology consultation.  Currently, she reports feeling overwhelmed by her situation but is otherwise well.  She denies any headaches, nausea, weakness in lower extremities or other neurologic symptoms.  She is on decadron 6mg TID.  She continues to have low back pain but reports it is improved on the narcotics, gabapentin, and decadron.      Review of systems-  negative except as mentioned in HPI    T(C): 37.2 (07-13-20 @ 21:36), Max: 37.2 (07-13-20 @ 21:36)  T(F): 98.9 (07-13-20 @ 21:36), Max: 98.9 (07-13-20 @ 21:36)  HR: 78 (07-13-20 @ 21:36) (78 - 82)  BP: 135/69 (07-13-20 @ 21:36) (135/69 - 138/69)  RR: 18 (07-13-20 @ 21:36) (18 - 18)  SpO2: 95% (07-13-20 @ 21:36) (95% - 95%)  Wt(kg): --    PHYSICAL EXAM:  GENERAL: NAD  KS is 90.  HEAD:  Atraumatic, Normocephalic  EYES: EOMI, PERRLA, conjunctiva and sclera clear  HEENT: Moist mucous membranes  NECK: Supple, No JVD  CHEST/LUNG: BS decreased over left base, ; No rales, no rhonchi, no wheezing, Left Chest tube in place  HEART: Regular rate and rhythm; No murmurs, no rubs or gallops  ABDOMEN: Soft, Nontender, Nondistended; Bowel sounds present  GENITOURINARY- Voiding, no suprapubic tenderness  EXTREMITIES:  2+ Peripheral Pulses, No clubbing, cyanosis,   edema  MUSCULOSKELETAL:- No muscle tenderness, Muscle tone normal, No joint tenderness, no Joint swelling,  Joint ROM -normal  SKIN-no rash, no lesion  NERVOUS SYSTEM:  Alert & Oriented X3, grossly non- focal exam  Motor Strength 5/5 B/L upper and lower extremities; DTRs 2+ intact and symmetric    LABS: all reviewed                         14.2   21.24 )-----------( 545      ( 14 Jul 2020 08:09 )             42.3                         13.4   21.64 )-----------( 404      ( 13 Jul 2020 07:44 )             40.2       07-14    140  |  108  |  21  ----------------------------<  111<H>  4.3   |  26  |  0.65    Ca    8.4<L>      14 Jul 2020 08:09    TPro  7.0  /  Alb  3.1<L>  /  TBili  0.3  /  DBili  x   /  AST  21  /  ALT  37  /  AlkPhos  136<H>  07-14                   RADIOLOGY & ADDITIONAL TESTS: all reviewed     < from: MR Cervical Spine w/wo IV Cont (07.11.20 @ 13:57) >  IMPRESSION: Degenerative changes as described above.    Hemangiomas as described above.      < end of copied text >  < from: MR Thoracic Spine w/wo IV Cont (07.11.20 @ 13:46) >  IMPRESSION: Abnormal lesions involving the T12 and T10 levels as described above.    Compression fracture is seen involving L2 level with abnormal T1 and T2 prolongation seen. This could be compatible with a pathologic compression fracture. Retropulsed fragment is identified as described above.    < end of copied text >    < from: Xray Chest 1 View- PORTABLE-Routine (07.11.20 @ 10:08) >  FINDINGS: Since prior evaluation the position of the indwelling left pleural space pigtail drainage catheter has changed, the pigtail now overlying the medial aspect of the left mid thorax. There is interval decrease in left pleural effusion from prior; a moderately sized left pleural effusion persists. Underlying lung parenchymal infiltrate, consolidation or atelectasis cannot be excluded. There is no evidence for left-sided pneumothorax. No acute right-sided infiltrate is identified. No evidence for right pleural effusion or pneumothorax. No mediastinal shift noted. Degenerative changes of the thoracic spine evident.    IMPRESSION: Interval decrease in left pleural effusion from prior with change in position of indwelling pigtail pleural space drainage catheter. Moderately sized left pleural effusion persists. See above discussion.      < end of copied text >    < from: CT Chest w/ IV Cont (07.13.20 @ 12:51) >  LUNGS, AIRWAYS: The central airways are patent. 2.6 x 2.2 cm spiculated nodule in the left upper lobe with surrounding linear fibrotic reaction. Patchy left lower lobe airspace disease, likely infectious. Nodularity along the left major fissure.    PLEURA: Left pleural pigtail drainage catheter terminates in the left posterior hemithorax. No significant pleural effusion. Small left-sided pneumothorax.    VESSELS: Normal caliber aorta. Main pulmonary arteries are patent.    HEART: Normal heart size. No pericardial effusion.    MEDIASTINUM AND MARJAN: No adenopathy.    UPPER ABDOMEN: Distended gallbladder. 1.5 cm indeterminate lesion in the caudate lobe.    BONES AND SOFT TISSUES: Destructive lesion of L2 vertebral body again seen.    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    Nodularity along the left major fissure. Cannot differentiate intrapulmonary lymph nodes versus pleural metastatic disease.    Left pleural drainage catheter in place without significant pleural effusion.    Small left-sided pneumothorax, which may be postprocedural.    1.5 cm indeterminate liver lesion.    Metastatic bone disease again noted to the spine.      < end of copied text >  < from: MR Head w/wo IV Cont (07.14.20 @ 12:58) >  IMPRESSION:     Right lateral frontal lobe enhancing lesion measuring 1.2 x 1.2 x 0.7 cm, findings consistent with metastatic disease in the setting of known malignancy. Multiple additional subcentimeter enhancing lesions located at the gray-white matter junction are favored to represent additional foci of intracranial metastatic disease.    No evidence of osseous metastatic disease.    No evidence of abnormal leptomeningeal or pachymeningeal enhancement.      < end of copied text >  < from: NM Hepatobiliary Imaging (07.14.20 @ 12:03) >  IMPRESSION:  Abnormal morphine-augmented hepatobiliary scan compatible with acute cholecystitis.    < end of copied text >    Current medications:  acetaminophen   Tablet .. 650 milliGRAM(s) Oral every 4 hours PRN  aluminum hydroxide/magnesium hydroxide/simethicone Suspension 30 milliLiter(s) Oral every 4 hours PRN  ascorbic acid 500 milliGRAM(s) Oral daily  azithromycin   Tablet 500 milliGRAM(s) Oral daily  cefTRIAXone Injectable. 1000 milliGRAM(s) IV Push every 24 hours  dexAMETHasone  Injectable 6 milliGRAM(s) IV Push three times a day  gabapentin 300 milliGRAM(s) Oral at bedtime  hydrocodone/homatropine Syrup 5 milliLiter(s) Oral every 8 hours PRN  HYDROmorphone  Injectable 0.5 milliGRAM(s) IV Push every 4 hours PRN  lactobacillus acidophilus 1 Tablet(s) Oral two times a day  levothyroxine 100 MICROGram(s) Oral daily  lidocaine   Patch 1 Patch Transdermal daily  liothyronine 5 MICROGram(s) Oral daily  multivitamin 1 Tablet(s) Oral daily  ondansetron Injectable 4 milliGRAM(s) IV Push every 6 hours PRN  oxycodone    5 mG/acetaminophen 325 mG 1 Tablet(s) Oral every 4 hours PRN  senna 2 Tablet(s) Oral at bedtime PRN

## 2020-07-16 ENCOUNTER — RESULT REVIEW (OUTPATIENT)
Age: 75
End: 2020-07-16

## 2020-07-16 DIAGNOSIS — M48.50XA COLLAPSED VERTEBRA, NOT ELSEWHERE CLASSIFIED, SITE UNSPECIFIED, INITIAL ENCOUNTER FOR FRACTURE: ICD-10-CM

## 2020-07-16 LAB
ANION GAP SERPL CALC-SCNC: 8 MMOL/L — SIGNIFICANT CHANGE UP (ref 5–17)
APTT BLD: 19.5 SEC — LOW (ref 27.5–35.5)
BASOPHILS # BLD AUTO: 0 K/UL — SIGNIFICANT CHANGE UP (ref 0–0.2)
BASOPHILS NFR BLD AUTO: 0 % — SIGNIFICANT CHANGE UP (ref 0–2)
BUN SERPL-MCNC: 18 MG/DL — SIGNIFICANT CHANGE UP (ref 7–23)
CALCIUM SERPL-MCNC: 6.6 MG/DL — LOW (ref 8.5–10.1)
CHLORIDE SERPL-SCNC: 109 MMOL/L — HIGH (ref 96–108)
CO2 SERPL-SCNC: 19 MMOL/L — LOW (ref 22–31)
CREAT SERPL-MCNC: 0.58 MG/DL — SIGNIFICANT CHANGE UP (ref 0.5–1.3)
EOSINOPHIL # BLD AUTO: 0 K/UL — SIGNIFICANT CHANGE UP (ref 0–0.5)
EOSINOPHIL NFR BLD AUTO: 0 % — SIGNIFICANT CHANGE UP (ref 0–6)
GLUCOSE SERPL-MCNC: 179 MG/DL — HIGH (ref 70–99)
HCT VFR BLD CALC: 41.3 % — SIGNIFICANT CHANGE UP (ref 34.5–45)
HGB BLD-MCNC: 12.8 G/DL — SIGNIFICANT CHANGE UP (ref 11.5–15.5)
INR BLD: 1.08 RATIO — SIGNIFICANT CHANGE UP (ref 0.88–1.16)
LYMPHOCYTES # BLD AUTO: 10 % — LOW (ref 13–44)
LYMPHOCYTES # BLD AUTO: 3.77 K/UL — HIGH (ref 1–3.3)
MCHC RBC-ENTMCNC: 30.2 PG — SIGNIFICANT CHANGE UP (ref 27–34)
MCHC RBC-ENTMCNC: 31 GM/DL — LOW (ref 32–36)
MCV RBC AUTO: 97.4 FL — SIGNIFICANT CHANGE UP (ref 80–100)
MONOCYTES # BLD AUTO: 0.75 K/UL — SIGNIFICANT CHANGE UP (ref 0–0.9)
MONOCYTES NFR BLD AUTO: 2 % — SIGNIFICANT CHANGE UP (ref 2–14)
NEUTROPHILS # BLD AUTO: 32.83 K/UL — HIGH (ref 1.8–7.4)
NEUTROPHILS NFR BLD AUTO: 87 % — HIGH (ref 43–77)
NRBC # BLD: SIGNIFICANT CHANGE UP /100 WBCS (ref 0–0)
PLATELET # BLD AUTO: 463 K/UL — HIGH (ref 150–400)
POTASSIUM SERPL-MCNC: 4 MMOL/L — SIGNIFICANT CHANGE UP (ref 3.5–5.3)
POTASSIUM SERPL-SCNC: 4 MMOL/L — SIGNIFICANT CHANGE UP (ref 3.5–5.3)
PROTHROM AB SERPL-ACNC: 12.5 SEC — SIGNIFICANT CHANGE UP (ref 10.6–13.6)
RBC # BLD: 4.24 M/UL — SIGNIFICANT CHANGE UP (ref 3.8–5.2)
RBC # FLD: 13.2 % — SIGNIFICANT CHANGE UP (ref 10.3–14.5)
SARS-COV-2 RNA SPEC QL NAA+PROBE: SIGNIFICANT CHANGE UP
SODIUM SERPL-SCNC: 136 MMOL/L — SIGNIFICANT CHANGE UP (ref 135–145)
WBC # BLD: 37.73 K/UL — HIGH (ref 3.8–10.5)
WBC # FLD AUTO: 37.73 K/UL — HIGH (ref 3.8–10.5)

## 2020-07-16 PROCEDURE — 88342 IMHCHEM/IMCYTCHM 1ST ANTB: CPT | Mod: 26,59

## 2020-07-16 PROCEDURE — 88360 TUMOR IMMUNOHISTOCHEM/MANUAL: CPT | Mod: 26,59

## 2020-07-16 PROCEDURE — 99232 SBSQ HOSP IP/OBS MODERATE 35: CPT

## 2020-07-16 PROCEDURE — 88307 TISSUE EXAM BY PATHOLOGIST: CPT | Mod: 26

## 2020-07-16 PROCEDURE — 88333 PATH CONSLTJ SURG CYTO XM 1: CPT | Mod: 26

## 2020-07-16 PROCEDURE — 99233 SBSQ HOSP IP/OBS HIGH 50: CPT

## 2020-07-16 PROCEDURE — 88341 IMHCHEM/IMCYTCHM EA ADD ANTB: CPT | Mod: 26,59

## 2020-07-16 PROCEDURE — 88311 DECALCIFY TISSUE: CPT | Mod: 26

## 2020-07-16 PROCEDURE — 71045 X-RAY EXAM CHEST 1 VIEW: CPT | Mod: 26

## 2020-07-16 RX ORDER — DEXAMETHASONE 0.5 MG/5ML
1 ELIXIR ORAL EVERY 8 HOURS
Refills: 0 | Status: COMPLETED | OUTPATIENT
Start: 2020-07-18 | End: 2020-07-18

## 2020-07-16 RX ORDER — NALOXONE HYDROCHLORIDE 4 MG/.1ML
0.1 SPRAY NASAL
Refills: 0 | Status: DISCONTINUED | OUTPATIENT
Start: 2020-07-16 | End: 2020-07-23

## 2020-07-16 RX ORDER — ONDANSETRON 8 MG/1
4 TABLET, FILM COATED ORAL ONCE
Refills: 0 | Status: DISCONTINUED | OUTPATIENT
Start: 2020-07-16 | End: 2020-07-16

## 2020-07-16 RX ORDER — HYDROMORPHONE HYDROCHLORIDE 2 MG/ML
30 INJECTION INTRAMUSCULAR; INTRAVENOUS; SUBCUTANEOUS
Refills: 0 | Status: DISCONTINUED | OUTPATIENT
Start: 2020-07-16 | End: 2020-07-18

## 2020-07-16 RX ORDER — ONDANSETRON 8 MG/1
4 TABLET, FILM COATED ORAL EVERY 6 HOURS
Refills: 0 | Status: DISCONTINUED | OUTPATIENT
Start: 2020-07-16 | End: 2020-07-23

## 2020-07-16 RX ORDER — SODIUM CHLORIDE 9 MG/ML
1000 INJECTION, SOLUTION INTRAVENOUS
Refills: 0 | Status: DISCONTINUED | OUTPATIENT
Start: 2020-07-16 | End: 2020-07-18

## 2020-07-16 RX ORDER — MAGNESIUM HYDROXIDE 400 MG/1
30 TABLET, CHEWABLE ORAL EVERY 12 HOURS
Refills: 0 | Status: DISCONTINUED | OUTPATIENT
Start: 2020-07-16 | End: 2020-07-23

## 2020-07-16 RX ORDER — ACETAMINOPHEN 500 MG
975 TABLET ORAL EVERY 8 HOURS
Refills: 0 | Status: DISCONTINUED | OUTPATIENT
Start: 2020-07-16 | End: 2020-07-18

## 2020-07-16 RX ORDER — MEPERIDINE HYDROCHLORIDE 50 MG/ML
12.5 INJECTION INTRAMUSCULAR; INTRAVENOUS; SUBCUTANEOUS
Refills: 0 | Status: DISCONTINUED | OUTPATIENT
Start: 2020-07-16 | End: 2020-07-16

## 2020-07-16 RX ORDER — SENNA PLUS 8.6 MG/1
2 TABLET ORAL AT BEDTIME
Refills: 0 | Status: DISCONTINUED | OUTPATIENT
Start: 2020-07-16 | End: 2020-07-23

## 2020-07-16 RX ORDER — HYDROMORPHONE HYDROCHLORIDE 2 MG/ML
0.5 INJECTION INTRAMUSCULAR; INTRAVENOUS; SUBCUTANEOUS
Refills: 0 | Status: DISCONTINUED | OUTPATIENT
Start: 2020-07-16 | End: 2020-07-16

## 2020-07-16 RX ORDER — CYCLOBENZAPRINE HYDROCHLORIDE 10 MG/1
10 TABLET, FILM COATED ORAL EVERY 8 HOURS
Refills: 0 | Status: DISCONTINUED | OUTPATIENT
Start: 2020-07-16 | End: 2020-07-23

## 2020-07-16 RX ORDER — LEVOTHYROXINE SODIUM 125 MCG
100 TABLET ORAL DAILY
Refills: 0 | Status: DISCONTINUED | OUTPATIENT
Start: 2020-07-16 | End: 2020-07-23

## 2020-07-16 RX ORDER — FAMOTIDINE 10 MG/ML
20 INJECTION INTRAVENOUS EVERY 12 HOURS
Refills: 0 | Status: DISCONTINUED | OUTPATIENT
Start: 2020-07-16 | End: 2020-07-23

## 2020-07-16 RX ORDER — METRONIDAZOLE 500 MG
500 TABLET ORAL EVERY 8 HOURS
Refills: 0 | Status: DISCONTINUED | OUTPATIENT
Start: 2020-07-16 | End: 2020-07-16

## 2020-07-16 RX ORDER — DEXAMETHASONE 0.5 MG/5ML
4 ELIXIR ORAL EVERY 8 HOURS
Refills: 0 | Status: COMPLETED | OUTPATIENT
Start: 2020-07-16 | End: 2020-07-17

## 2020-07-16 RX ORDER — PROCHLORPERAZINE MALEATE 5 MG
10 TABLET ORAL EVERY 8 HOURS
Refills: 0 | Status: DISCONTINUED | OUTPATIENT
Start: 2020-07-16 | End: 2020-07-23

## 2020-07-16 RX ORDER — ONDANSETRON 8 MG/1
4 TABLET, FILM COATED ORAL EVERY 6 HOURS
Refills: 0 | Status: DISCONTINUED | OUTPATIENT
Start: 2020-07-16 | End: 2020-07-21

## 2020-07-16 RX ORDER — ACETAMINOPHEN 500 MG
1000 TABLET ORAL ONCE
Refills: 0 | Status: COMPLETED | OUTPATIENT
Start: 2020-07-16 | End: 2020-07-16

## 2020-07-16 RX ORDER — DEXAMETHASONE 0.5 MG/5ML
2 ELIXIR ORAL EVERY 8 HOURS
Refills: 0 | Status: COMPLETED | OUTPATIENT
Start: 2020-07-17 | End: 2020-07-17

## 2020-07-16 RX ORDER — ASCORBIC ACID 60 MG
500 TABLET,CHEWABLE ORAL DAILY
Refills: 0 | Status: DISCONTINUED | OUTPATIENT
Start: 2020-07-16 | End: 2020-07-23

## 2020-07-16 RX ORDER — BENZOCAINE AND MENTHOL 5; 1 G/100ML; G/100ML
1 LIQUID ORAL
Refills: 0 | Status: DISCONTINUED | OUTPATIENT
Start: 2020-07-16 | End: 2020-07-23

## 2020-07-16 RX ORDER — SODIUM CHLORIDE 9 MG/ML
3 INJECTION INTRAMUSCULAR; INTRAVENOUS; SUBCUTANEOUS EVERY 8 HOURS
Refills: 0 | Status: DISCONTINUED | OUTPATIENT
Start: 2020-07-16 | End: 2020-07-23

## 2020-07-16 RX ORDER — HYDROMORPHONE HYDROCHLORIDE 2 MG/ML
0.5 INJECTION INTRAMUSCULAR; INTRAVENOUS; SUBCUTANEOUS
Refills: 0 | Status: DISCONTINUED | OUTPATIENT
Start: 2020-07-16 | End: 2020-07-18

## 2020-07-16 RX ORDER — SODIUM CHLORIDE 9 MG/ML
1000 INJECTION INTRAMUSCULAR; INTRAVENOUS; SUBCUTANEOUS
Refills: 0 | Status: DISCONTINUED | OUTPATIENT
Start: 2020-07-16 | End: 2020-07-16

## 2020-07-16 RX ORDER — METRONIDAZOLE 500 MG
500 TABLET ORAL EVERY 8 HOURS
Refills: 0 | Status: DISCONTINUED | OUTPATIENT
Start: 2020-07-16 | End: 2020-07-18

## 2020-07-16 RX ORDER — OXYCODONE HYDROCHLORIDE 5 MG/1
5 TABLET ORAL ONCE
Refills: 0 | Status: DISCONTINUED | OUTPATIENT
Start: 2020-07-16 | End: 2020-07-16

## 2020-07-16 RX ORDER — CEFAZOLIN SODIUM 1 G
2000 VIAL (EA) INJECTION EVERY 8 HOURS
Refills: 0 | Status: COMPLETED | OUTPATIENT
Start: 2020-07-16 | End: 2020-07-17

## 2020-07-16 RX ORDER — CIPROFLOXACIN LACTATE 400MG/40ML
500 VIAL (ML) INTRAVENOUS EVERY 12 HOURS
Refills: 0 | Status: DISCONTINUED | OUTPATIENT
Start: 2020-07-16 | End: 2020-07-16

## 2020-07-16 RX ORDER — LANOLIN ALCOHOL/MO/W.PET/CERES
3 CREAM (GRAM) TOPICAL AT BEDTIME
Refills: 0 | Status: DISCONTINUED | OUTPATIENT
Start: 2020-07-16 | End: 2020-07-23

## 2020-07-16 RX ORDER — LIOTHYRONINE SODIUM 25 UG/1
5 TABLET ORAL DAILY
Refills: 0 | Status: DISCONTINUED | OUTPATIENT
Start: 2020-07-16 | End: 2020-07-23

## 2020-07-16 RX ORDER — CIPROFLOXACIN LACTATE 400MG/40ML
400 VIAL (ML) INTRAVENOUS EVERY 12 HOURS
Refills: 0 | Status: DISCONTINUED | OUTPATIENT
Start: 2020-07-16 | End: 2020-07-18

## 2020-07-16 RX ADMIN — Medication 100 MILLIGRAM(S): at 19:39

## 2020-07-16 RX ADMIN — CYCLOBENZAPRINE HYDROCHLORIDE 10 MILLIGRAM(S): 10 TABLET, FILM COATED ORAL at 23:37

## 2020-07-16 RX ADMIN — Medication 400 MILLIGRAM(S): at 15:47

## 2020-07-16 RX ADMIN — Medication 975 MILLIGRAM(S): at 23:37

## 2020-07-16 RX ADMIN — Medication 500 MILLIGRAM(S): at 06:14

## 2020-07-16 RX ADMIN — HYDROMORPHONE HYDROCHLORIDE 0.5 MILLIGRAM(S): 2 INJECTION INTRAMUSCULAR; INTRAVENOUS; SUBCUTANEOUS at 15:09

## 2020-07-16 RX ADMIN — PREGABALIN 1000 MICROGRAM(S): 225 CAPSULE ORAL at 06:15

## 2020-07-16 RX ADMIN — Medication 1000 MILLIGRAM(S): at 15:47

## 2020-07-16 RX ADMIN — Medication 200 MILLIGRAM(S): at 23:19

## 2020-07-16 RX ADMIN — Medication 4 MILLIGRAM(S): at 18:43

## 2020-07-16 RX ADMIN — SODIUM CHLORIDE 3 MILLILITER(S): 9 INJECTION INTRAMUSCULAR; INTRAVENOUS; SUBCUTANEOUS at 23:17

## 2020-07-16 RX ADMIN — Medication 100 MILLIGRAM(S): at 23:18

## 2020-07-16 RX ADMIN — SODIUM CHLORIDE 125 MILLILITER(S): 9 INJECTION, SOLUTION INTRAVENOUS at 14:29

## 2020-07-16 RX ADMIN — HYDROMORPHONE HYDROCHLORIDE 30 MILLILITER(S): 2 INJECTION INTRAMUSCULAR; INTRAVENOUS; SUBCUTANEOUS at 14:43

## 2020-07-16 RX ADMIN — HYDROMORPHONE HYDROCHLORIDE 0.5 MILLIGRAM(S): 2 INJECTION INTRAMUSCULAR; INTRAVENOUS; SUBCUTANEOUS at 14:50

## 2020-07-16 NOTE — PROGRESS NOTE ADULT - ASSESSMENT
74 y/o female with PMH of Hypothyroidism and L2 pathologic compression fracture presents to  with 1 month history of dyspnea and 1 week history of the dry cough. Patient was seen by ortho spine surgeon, pulmonologist and oncologist and send for evaluation of left sided pleural effusion with concern for lung mass.   In Ed -  T(F): 98.9Max: 98.9  HR: 80  (80 - 94) BP: 149/63 (149/63 - 150/77) RR: 24(17 - 24) SpO2: 98% (98% - 99%) EKG - sinus , low voltage QRS, troponin x 1neg, CXR - large pleural effusion, WBC 12, Cr 0.73, , covid PCR neg , s/p left chest tube placement in ED , fluid analysis pending     * Lower back pain due to pathologic L2 fracture , worsening on MRI, risk for cord compression  7/16/20 - s/p L2-L4 laminectomy, T11-L5 PSF with bone graft, L2 Biopsy/partial tumor excision/Dr George   - pain management, dilaudid PCA;  IV steroids  - pt requesting second opinion Dr. Kearns consulted  - neurochecks q4 h    * Brain lesion in right frontal lobe with multiple subcentimeter lesions   - neurosurgery consult Dr. 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   * Suspected postobstructive PNA s/p IV abx  * Leukocytosis steroids induced, improving   - CT consult- s/p left chest tube placement   - f/u pleural fluid analysis - exudate, cx - neg, cytology - pending   - IV ceftriaxone--> cefepime ( facial rash 7/12- will stop)   - oncology and pulmonology evaluation  - 2 d echo- EF wnl, liver cystic lesion 15x5 cm  - bone scan - pending , MRI of the brain w contrast  - noted    *  Liver lesion cystic on 2 d echo and Enlarged GB on CT 22 cm  doubt cholecystitis    - CT abd from o/p - in the chart reviewed  - US abd - noted , HIDA scan - positive  - surgery consult - no need for surgery at this time; recommend CIPRO-FLAGYL for 7 days   Reconsult Dr Bermeo if patient worsens    * Hypothyroidism  - c/w LT4 and LT3     * Anxiety - xanax prn q6h    DVT proph -when cleared by Ortho spine      Dispo - Pt in PACU s/p surgery; Follow-up tissue from spine surgery, plan for chemo after recovery from surgery, cont cipro-flagyl for 7d with close eye on labs and abdo examn.   discussed with RN

## 2020-07-16 NOTE — PROGRESS NOTE ADULT - SUBJECTIVE AND OBJECTIVE BOX
Subjective:  Pt went to OR for   L2-L4 laminectomy, T11-L5 PSF with bone graft, L2 Biopsy/partial tumor excision.	  Chest tube w minimal output last 24 hours.    Vital Signs:  Vital Signs Last 24 Hrs  T(C): 36.1 (07-16-20 @ 14:20), Max: 36.8 (07-15-20 @ 21:00)  T(F): 97 (07-16-20 @ 14:20), Max: 98.2 (07-15-20 @ 21:00)  HR: 61 (07-16-20 @ 14:45) (61 - 79)  BP: 104/61 (07-16-20 @ 14:45) (104/61 - 140/61)  RR: 13 (07-16-20 @ 14:45) (12 - 17)  SpO2: 99% (07-16-20 @ 14:45) (94% - 99%) on (O2)    Telemetry/Alarms:    Relevant labs, radiology and Medications reviewed                        12.6   15.70 )-----------( 464      ( 15 Jul 2020 06:33 )             37.7     07-15    138  |  104  |  21  ----------------------------<  116<H>  4.0   |  27  |  0.56    Ca    7.5<L>      15 Jul 2020 06:33    TPro  6.2  /  Alb  2.6<L>  /  TBili  0.4  /  DBili  x   /  AST  29  /  ALT  73  /  AlkPhos  135<H>  07-15    PT/INR - ( 15 Jul 2020 06:33 )   PT: 12.7 sec;   INR: 1.09 ratio           MEDICATIONS  (STANDING):  HYDROmorphone PCA (1 mG/mL) 30 milliLiter(s) PCA Continuous PCA Continuous  lactated ringers. 1000 milliLiter(s) (125 mL/Hr) IV Continuous <Continuous>  sodium chloride 0.9%. 1000 milliLiter(s) (100 mL/Hr) IV Continuous <Continuous>    MEDICATIONS  (PRN):  HYDROmorphone  Injectable 0.5 milliGRAM(s) IV Push every 10 minutes PRN Moderate Pain (4 - 6)  HYDROmorphone PCA (1 mG/mL) Rescue Clinician Bolus 0.5 milliGRAM(s) IV Push every 15 minutes PRN for Pain Scale GREATER THAN 6  meperidine     Injectable 12.5 milliGRAM(s) IV Push every 10 minutes PRN Shivering  naloxone Injectable 0.1 milliGRAM(s) IV Push every 3 minutes PRN For ANY of the following changes in patient status:  A. RR LESS THAN 10 breaths per minute, B. Oxygen saturation LESS THAN 90%, C. Sedation score of 6  ondansetron Injectable 4 milliGRAM(s) IV Push every 6 hours PRN Nausea  ondansetron Injectable 4 milliGRAM(s) IV Push once PRN Nausea and/or Vomiting  oxyCODONE    IR 5 milliGRAM(s) Oral once PRN Moderate Pain (4 - 6)      Physical exam  Gen NAD  Neuro alert  Card rrr  Pulm decreased bases  Abd soft  Ext warm    Tubes: left pigtail waterseal, 35cc output last 24 hours    I&O's Summary    15 Jul 2020 07:01  -  16 Jul 2020 07:00  --------------------------------------------------------  IN: 0 mL / OUT: 35 mL / NET: -35 mL    16 Jul 2020 07:01  -  16 Jul 2020 14:57  --------------------------------------------------------  IN: 4000 mL / OUT: 675 mL / NET: 3325 mL        Assessment  75y Female  w/ PAST MEDICAL & SURGICAL HISTORY:  Osteoarthritis  Fracture: L2 pathologic fracture  Hypothyroidism  No significant past surgical history  admitted with complaints of Patient is a 75y old  Female who presents with a chief complaint of dyspnea , cough (15 Jul 2020 20:45)  .  76 yo female w pathologic L2 fracture, INDU mass and large effusions admitted 7/10 w SOB. Had CT placed 7/10. Now s/p Decompression, spine, lumbar, posterior approach, with fusion of posterior spinal column.        PLAN  cont chest tube for now  CXR today  moniter output  care as per ortho/med    Discussed with Cardiothoracic Team at AM rounds.

## 2020-07-16 NOTE — PROGRESS NOTE ADULT - SUBJECTIVE AND OBJECTIVE BOX
76 y/o female with PMH of Hypothyroidism and L2 pathologic compression fracture presents to   on 7/10/20 with 1 month history of dyspnea and 1 week history of the dry cough. Patient was seen by ortho spine surgeon, pulmonologist and oncologist and send for evaluation of left sided pleural effusion with concern for lung mass. Pt reports onset of back pain was in March 2020, received cortisone shots and got an MRI of L2 and blood test x2 weeks ago. Pt went to Dr. Westfall (oncologist) and was sent for imaging with Dr. Sanchez (pulmonologist) who discussed results of CT abdomen and chest this AM with pt and told pt to come to ED for evaluation for SOB.  In Ed -  T(F): 98.9Max: 98.9  HR: 80  (80 - 94) BP: 149/63 (149/63 - 150/77) RR: 24(17 - 24) SpO2: 98% (98% - 99%) EKG - sinus , low voltage QRS, troponin x 1neg, CXR - large pleural effusion, WBC 12, Cr 0.73, , covid PCR neg , s/p left chest tube placement in ED , fluid analysis pending (10 Jul 2020 13:26)      7/16 - seen in PACU s/p laminectomy, per RN at bedside had been moaning in pain,  got dilaudid now sleepy    PHYSICAL EXAM:  GENERAL: NAD, s/p spine surgery, seen in PACU,   NERVOUS SYSTEM: sleepy, arousable, moves all four extremities,   Motor Strength 5/5 B/L upper and lower extremities; DTRs 2+ intact and symmetric  HEAD:  Atraumatic, Normocephalic  EYES: EOMI, PERRLA, conjunctiva and sclera clear  NECK: Supple, No JVD  CHEST/LUNG: BS decreased over left base, ; No rales, no rhonchi, no wheezing, Left CT +   HEART: Regular rate and rhythm; No murmurs, no rubs or gallops  ABDOMEN: Soft, Nontender, Nondistended; Bowel sounds present  GENITOURINARY- Voiding, no suprapubic tenderness  EXTREMITIES:  2+ Peripheral Pulses, No clubbing, cyanosis,   edema  MUSCULOSKELETAL:- No muscle tenderness, Muscle tone normal, No joint tenderness, no Joint swelling,  Joint ROM -normal    < from: MR Thoracic Spine w/wo IV Cont (07.11.20 @ 13:46) >  IMPRESSION: Abnormal lesions involving the T12 and T10 levels as described above.  Compression fracture is seen involving L2 level with abnormal T1 and T2 prolongation seen. This could be compatible with a pathologic compression fracture. Retropulsed fragment is identified as described above.    < from: TTE Echo Complete w/o Contrast w/ Doppler (07.11.20 @ 09:02) >   The mitral valve leaflets appear thin and normal.   Trace mitral regurgitation is present.   EA reversal of the mitral inflow consistent with reduced compliance of the   left ventricle.   The aortic valve is not well visualized, appears normal. Valve opening   seems to be normal.   Mild (1+) aortic regurgitation is present.   Normal appearing tricuspid valve structure and function.   Mild (1+) tricuspid valve regurgitation is present.   Pulmonic valve not well seen.   Normal appearing left atrium.   Left ventricle systolic function appears preserved based on difficult   trans thoracic views; segmental wall motion abnormalities can not be ruled   out.   Visual estimation of left ventricle ejection fraction is >50%.   The IVC appears normal.   Massive anechoic cystic structure (15 cm x 5 cm) noted within the liver   possibly enlarged gallbladder. There are calcified gallstones noted within   the structure.   Pleural effusion - massive left pleural effusion.    < end of copied text >  < from: CT Chest w/ IV Cont (07.13.20 @ 12:51) >  LUNGS, AIRWAYS: The central airways are patent. 2.6 x 2.2 cm spiculated nodule in the left upper lobe with surrounding linear fibrotic reaction. Patchy left lower lobe airspace disease, likely infectious. Nodularity along the left major fissure.  PLEURA: Left pleural pigtail drainage catheter terminates in the left posterior hemithorax. No significant pleural effusion. Small left-sided pneumothorax.  VESSELS: Normal caliber aorta. Main pulmonary arteries are patent.  HEART: Normal heart size. No pericardial effusion.  MEDIASTINUM AND MARJAN: No adenopathy.  UPPER ABDOMEN: Distended gallbladder. 1.5 cm indeterminate lesion in the caudate lobe.  BONES AND SOFT TISSUES: Destructive lesion of L2 vertebral body again seen.  IMPRESSION:   2.6 cm spiculated left upper lobe mass, likely primary lung neoplasm.  Nodularity along the left major fissure. Cannot differentiate intrapulmonary lymph nodes versus pleural metastatic disease.  Left pleural drainage catheter in place without significant pleural effusion.  Small left-sided pneumothorax, which may be postprocedural.  1.5 cm indeterminate liver lesion.  Metastatic bone disease again noted to the spine.      < from: MR Head w/wo IV Cont (07.14.20 @ 12:58) >  IMPRESSION:   Right lateral frontal lobe enhancing lesion measuring 1.2 x 1.2 x 0.7 cm, findings consistent with metastatic disease in the setting of known malignancy. Multiple additional subcentimeter enhancing lesions located at the gray-white matter junction are favored to represent additional foci of intracranial metastatic disease.  No evidence of osseous metastatic disease.  No evidence of abnormal leptomeningeal or pachymeningeal enhancement.    < from: NM Hepatobiliary Imaging (07.14.20 @ 12:03) >  IMPRESSION:  Abnormal morphine-augmented hepatobiliary scan compatible with acute cholecystitis.    LABS: All Labs Reviewed:                      12.8   37.73 )-----------( 463      ( 16 Jul 2020 15:38 )             41.3   136  |  109<H>  |  18  ----------------------------<  179<H>  4.0   |  19<L>  |  0.58    dexAMETHasone  Injectable 4 milliGRAM(s) IV Push every 8 hours  HYDROmorphone  Injectable 0.5 milliGRAM(s) IV Push every 10 minutes PRN  HYDROmorphone PCA (1 mG/mL) 30 milliLiter(s) PCA Continuous PCA Continuous  HYDROmorphone PCA (1 mG/mL) Rescue Clinician Bolus 0.5 milliGRAM(s) IV Push every 15 minutes PRN  lactated ringers. 1000 milliLiter(s) IV Continuous <Continuous>  meperidine     Injectable 12.5 milliGRAM(s) IV Push every 10 minutes PRN  naloxone Injectable 0.1 milliGRAM(s) IV Push every 3 minutes PRN  ondansetron Injectable 4 milliGRAM(s) IV Push every 6 hours PRN  ondansetron Injectable 4 milliGRAM(s) IV Push once PRN  oxyCODONE    IR 5 milliGRAM(s) Oral once PRN  sodium chloride 0.9%. 1000 milliLiter(s) IV Continuous <Continuous>

## 2020-07-16 NOTE — PROGRESS NOTE ADULT - ASSESSMENT
A/P:  76 yo F s/p L2-4 Lami/decompression, T11-L5 PSF:  -FU drain outputs, HMV x2, KAYLAN drain  -pain control. PCA  -Decadron taper  -medical management per primary team  -Fields  -LSO at bedside  -Q4 Hr neuro checks  -dispo planning

## 2020-07-16 NOTE — PROGRESS NOTE ADULT - SUBJECTIVE AND OBJECTIVE BOX
Pt seen and examined. Moving all extremities. Pt tired from surgery. No acute events overnight. Pain controlled.    Vital Signs Last 24 Hrs  T(C): 36.3 (17 Jul 2020 06:32), Max: 36.8 (16 Jul 2020 19:30)  T(F): 97.3 (17 Jul 2020 06:32), Max: 98.3 (16 Jul 2020 19:30)  HR: 83 (17 Jul 2020 06:00) (60 - 88)  BP: 121/51 (17 Jul 2020 06:00) (98/68 - 142/63)  BP(mean): 67 (17 Jul 2020 06:00) (63 - 73)  RR: 10 (17 Jul 2020 06:00) (8 - 16)  SpO2: 100% (17 Jul 2020 06:00) (93% - 100%)    Gen: NAD  Spine:  Pt moving all extremities  Difficult to obtain full exam  Sensation intact throughout  Drains in place  SCDs in place

## 2020-07-17 LAB
ANION GAP SERPL CALC-SCNC: 5 MMOL/L — SIGNIFICANT CHANGE UP (ref 5–17)
BASOPHILS # BLD AUTO: 0.04 K/UL — SIGNIFICANT CHANGE UP (ref 0–0.2)
BASOPHILS NFR BLD AUTO: 0.2 % — SIGNIFICANT CHANGE UP (ref 0–2)
BUN SERPL-MCNC: 20 MG/DL — SIGNIFICANT CHANGE UP (ref 7–23)
CALCIUM SERPL-MCNC: 6.6 MG/DL — LOW (ref 8.5–10.1)
CHLORIDE SERPL-SCNC: 104 MMOL/L — SIGNIFICANT CHANGE UP (ref 96–108)
CO2 SERPL-SCNC: 28 MMOL/L — SIGNIFICANT CHANGE UP (ref 22–31)
COMMENT - FLUIDS: SIGNIFICANT CHANGE UP
CREAT SERPL-MCNC: 0.7 MG/DL — SIGNIFICANT CHANGE UP (ref 0.5–1.3)
EOSINOPHIL # BLD AUTO: 0 K/UL — SIGNIFICANT CHANGE UP (ref 0–0.5)
EOSINOPHIL NFR BLD AUTO: 0 % — SIGNIFICANT CHANGE UP (ref 0–6)
GLUCOSE SERPL-MCNC: 146 MG/DL — HIGH (ref 70–99)
HCT VFR BLD CALC: 34.1 % — LOW (ref 34.5–45)
HGB BLD-MCNC: 11 G/DL — LOW (ref 11.5–15.5)
IMM GRANULOCYTES NFR BLD AUTO: 2.2 % — HIGH (ref 0–1.5)
LYMPHOCYTES # BLD AUTO: 1.07 K/UL — SIGNIFICANT CHANGE UP (ref 1–3.3)
LYMPHOCYTES # BLD AUTO: 4.4 % — LOW (ref 13–44)
MAGNESIUM SERPL-MCNC: 2.2 MG/DL — SIGNIFICANT CHANGE UP (ref 1.6–2.6)
MCHC RBC-ENTMCNC: 29.6 PG — SIGNIFICANT CHANGE UP (ref 27–34)
MCHC RBC-ENTMCNC: 32.3 GM/DL — SIGNIFICANT CHANGE UP (ref 32–36)
MCV RBC AUTO: 91.9 FL — SIGNIFICANT CHANGE UP (ref 80–100)
MONOCYTES # BLD AUTO: 1.56 K/UL — HIGH (ref 0–0.9)
MONOCYTES NFR BLD AUTO: 6.5 % — SIGNIFICANT CHANGE UP (ref 2–14)
NEUTROPHILS # BLD AUTO: 20.96 K/UL — HIGH (ref 1.8–7.4)
NEUTROPHILS NFR BLD AUTO: 86.7 % — HIGH (ref 43–77)
PLATELET # BLD AUTO: 377 K/UL — SIGNIFICANT CHANGE UP (ref 150–400)
POTASSIUM SERPL-MCNC: 4.1 MMOL/L — SIGNIFICANT CHANGE UP (ref 3.5–5.3)
POTASSIUM SERPL-SCNC: 4.1 MMOL/L — SIGNIFICANT CHANGE UP (ref 3.5–5.3)
RBC # BLD: 3.71 M/UL — LOW (ref 3.8–5.2)
RBC # FLD: 13.3 % — SIGNIFICANT CHANGE UP (ref 10.3–14.5)
SODIUM SERPL-SCNC: 137 MMOL/L — SIGNIFICANT CHANGE UP (ref 135–145)
WBC # BLD: 24.17 K/UL — HIGH (ref 3.8–10.5)
WBC # FLD AUTO: 24.17 K/UL — HIGH (ref 3.8–10.5)

## 2020-07-17 PROCEDURE — 99233 SBSQ HOSP IP/OBS HIGH 50: CPT

## 2020-07-17 PROCEDURE — 71045 X-RAY EXAM CHEST 1 VIEW: CPT | Mod: 26

## 2020-07-17 PROCEDURE — 99232 SBSQ HOSP IP/OBS MODERATE 35: CPT

## 2020-07-17 RX ORDER — CALCIUM CARBONATE 500(1250)
1 TABLET ORAL THREE TIMES A DAY
Refills: 0 | Status: DISCONTINUED | OUTPATIENT
Start: 2020-07-17 | End: 2020-07-23

## 2020-07-17 RX ADMIN — Medication 4 MILLIGRAM(S): at 09:45

## 2020-07-17 RX ADMIN — Medication 2 MILLIGRAM(S): at 23:00

## 2020-07-17 RX ADMIN — Medication 975 MILLIGRAM(S): at 06:00

## 2020-07-17 RX ADMIN — Medication 975 MILLIGRAM(S): at 22:57

## 2020-07-17 RX ADMIN — CYCLOBENZAPRINE HYDROCHLORIDE 10 MILLIGRAM(S): 10 TABLET, FILM COATED ORAL at 05:16

## 2020-07-17 RX ADMIN — Medication 975 MILLIGRAM(S): at 23:30

## 2020-07-17 RX ADMIN — CYCLOBENZAPRINE HYDROCHLORIDE 10 MILLIGRAM(S): 10 TABLET, FILM COATED ORAL at 23:00

## 2020-07-17 RX ADMIN — Medication 975 MILLIGRAM(S): at 13:07

## 2020-07-17 RX ADMIN — Medication 1 TABLET(S): at 22:58

## 2020-07-17 RX ADMIN — Medication 2 MILLIGRAM(S): at 17:02

## 2020-07-17 RX ADMIN — Medication 200 MILLIGRAM(S): at 22:59

## 2020-07-17 RX ADMIN — Medication 975 MILLIGRAM(S): at 05:15

## 2020-07-17 RX ADMIN — Medication 100 MILLIGRAM(S): at 05:00

## 2020-07-17 RX ADMIN — SODIUM CHLORIDE 3 MILLILITER(S): 9 INJECTION INTRAMUSCULAR; INTRAVENOUS; SUBCUTANEOUS at 14:09

## 2020-07-17 RX ADMIN — Medication 100 MILLIGRAM(S): at 13:08

## 2020-07-17 RX ADMIN — Medication 100 MICROGRAM(S): at 05:16

## 2020-07-17 RX ADMIN — SODIUM CHLORIDE 3 MILLILITER(S): 9 INJECTION INTRAMUSCULAR; INTRAVENOUS; SUBCUTANEOUS at 23:01

## 2020-07-17 RX ADMIN — LIOTHYRONINE SODIUM 5 MICROGRAM(S): 25 TABLET ORAL at 09:45

## 2020-07-17 RX ADMIN — SENNA PLUS 2 TABLET(S): 8.6 TABLET ORAL at 23:00

## 2020-07-17 RX ADMIN — Medication 975 MILLIGRAM(S): at 14:00

## 2020-07-17 RX ADMIN — CYCLOBENZAPRINE HYDROCHLORIDE 10 MILLIGRAM(S): 10 TABLET, FILM COATED ORAL at 13:08

## 2020-07-17 RX ADMIN — Medication 500 MILLIGRAM(S): at 09:45

## 2020-07-17 RX ADMIN — Medication 200 MILLIGRAM(S): at 09:47

## 2020-07-17 RX ADMIN — Medication 4 MILLIGRAM(S): at 02:13

## 2020-07-17 RX ADMIN — Medication 1 TABLET(S): at 09:45

## 2020-07-17 RX ADMIN — Medication 1 TABLET(S): at 14:17

## 2020-07-17 RX ADMIN — SODIUM CHLORIDE 3 MILLILITER(S): 9 INJECTION INTRAMUSCULAR; INTRAVENOUS; SUBCUTANEOUS at 05:07

## 2020-07-17 RX ADMIN — Medication 100 MILLIGRAM(S): at 05:17

## 2020-07-17 RX ADMIN — Medication 100 MILLIGRAM(S): at 22:59

## 2020-07-17 NOTE — PHYSICAL THERAPY INITIAL EVALUATION ADULT - PERTINENT HX OF CURRENT PROBLEM, REHAB EVAL
76 yo F admitted c/o Dyspnea and cough x 1 week; LBP since March.  Patient was advised to come to ED post CT Abdomen and pelvis resulted (INDU 2.6 cm mass, 1.5 cm liver lesion  - suspect Primary Lung with Brain and Spine mets).  MRI:  Abnormal lesions involving the T12 and T10. Compression fracture is seen involving L2 level with abnormal T1 and T2.  CT:  left upper lobe mass, likely primary lung neoplasm.  MRI Head: Right lateral frontal lobe enhancing lesion consistent with metastatic disease.

## 2020-07-17 NOTE — PROGRESS NOTE ADULT - SUBJECTIVE AND OBJECTIVE BOX
Pt seen and examined in SDU, no issues overnight per nursing and per patient. She states she is just tired and having back pain. Moving all extremities. Pain moderately controlled, No headaches, new onset numbness/tingling.    Vital Signs Last 24 Hrs  T(C): 36.3 (17 Jul 2020 06:32), Max: 36.8 (16 Jul 2020 19:30)  T(F): 97.3 (17 Jul 2020 06:32), Max: 98.3 (16 Jul 2020 19:30)  HR: 83 (17 Jul 2020 06:00) (60 - 88)  BP: 121/51 (17 Jul 2020 06:00) (98/68 - 142/63)  BP(mean): 67 (17 Jul 2020 06:00) (63 - 73)  RR: 10 (17 Jul 2020 06:00) (8 - 16)  SpO2: 100% (17 Jul 2020 06:00) (93% - 100%)    Gen: NAD  Spine:  Dressing C/D/I  Pt moving all extremities HF/LE/DF/PF  Sensation intact throughout, Similar discrepancy along B/L anterior thighs from preop.  KAYLAN and HMVx2 in place with SS output  SCDs in place, no calf TTP   +2 DP pulses B/L

## 2020-07-17 NOTE — PROGRESS NOTE ADULT - ASSESSMENT
A/P:  74 yo F s/p L2-4 Lami/decompression, T11-L5 PSF POD 1    - Continue post op abx  - Continue Decadron Taper  - FU OR Pathology   - FU drain outputs, HMV x2, KAYLAN drain  - pain control. PCA  -medical management per primary team  -Fields, can be removed when ambulating   -LSO at bedside, wear while in chair and ambulating  -Q4 Hr neuro checks  - PT/OT  - Will d/w Dr. George and advise if plan changes

## 2020-07-17 NOTE — PROGRESS NOTE ADULT - SUBJECTIVE AND OBJECTIVE BOX
Patient is a 75y old  Female who presents with a chief complaint of dyspnea , cough (2020 06:36)      HPI:  76 y/o female with PMH of Hypothyroidism and L2 pathologic compression fracture presents to  with 1 month history of dyspnea and 1 week history of the dry cough. Patient was seen by ortho spine surgeon, pulmonologist and oncologist and send for evaluation of left sided pleural effusion with concern for lung mass. Pt reports onset of back pain was in 2020, received cortisone shots and got an MRI of L2 and blood test x2 weeks ago. Pt went to Dr. Westfall (oncologist) and was sent for imaging with Dr. Sanchez (pulmonologist) who discussed results of CT abdomen and chest this AM with pt and told pt to come to ED for evaluation for SOB.  In Ed -  T(F): 98.9Max: 98.9  HR: 80  (80 - 94) BP: 149/63 (149/63 - 150/77) RR: 24(17 - 24) SpO2: 98% (98% - 99%) EKG - sinus , low voltage QRS, troponin x 1neg, CXR - large pleural effusion, WBC 12, Cr 0.73, , covid PCR neg , s/p left chest tube placement in ED , fluid analysis pending (10 Jul 2020 13:26)    seen and evaluated today  data discussed with thoracic surgery as well as her RN at bedside  CT attached with some pain being medicated  breathing is better        she is ambulating with CT attached  she is feeling much better today  some discomfort from CT      No acute pulmonary events occurred overnight  Discussed MRI findings with the patient; awaiting pleural fluid cytology  Discussed plan with  as well      No acute pulmonary events occurred overnight     7/15  Patient evaluated by Orthopedics, underwent MRI brain, evaluated by Surgery  No surgical intervention for the gallstones, receiving Cipro and Flagyl      Patient was in the OR , seen ; No acute pulmonary events occurred overnight  Slightly drowsy today secondary to Dilaudid  She has been using the Incentive spirometer    MEDICATIONS  (STANDING):  ascorbic acid 500 milliGRAM(s) Oral daily  ciprofloxacin     Tablet 500 milliGRAM(s) Oral every 12 hours  dexAMETHasone  Injectable 4 milliGRAM(s) IV Push every 8 hours  gabapentin 300 milliGRAM(s) Oral at bedtime  lactobacillus acidophilus 1 Tablet(s) Oral two times a day  levothyroxine 100 MICROGram(s) Oral daily  lidocaine   Patch 1 Patch Transdermal daily  liothyronine 5 MICROGram(s) Oral daily  metroNIDAZOLE    Tablet 500 milliGRAM(s) Oral every 8 hours  multivitamin 1 Tablet(s) Oral daily  pantoprazole    Tablet 40 milliGRAM(s) Oral before breakfast    MEDICATIONS  (PRN):  acetaminophen   Tablet .. 650 milliGRAM(s) Oral every 4 hours PRN Temp greater or equal to 38C (100.4F), Mild Pain (1 - 3)  ALPRAZolam 0.25 milliGRAM(s) Oral every 6 hours PRN anxiety  aluminum hydroxide/magnesium hydroxide/simethicone Suspension 30 milliLiter(s) Oral every 4 hours PRN Dyspepsia  hydrocodone/homatropine Syrup 5 milliLiter(s) Oral every 8 hours PRN Cough  HYDROmorphone  Injectable 0.5 milliGRAM(s) IV Push every 4 hours PRN Severe Pain (7 - 10)  ondansetron Injectable 4 milliGRAM(s) IV Push every 6 hours PRN Nausea  oxycodone    5 mG/acetaminophen 325 mG 1 Tablet(s) Oral every 4 hours PRN Moderate Pain (4 - 6)  senna 2 Tablet(s) Oral at bedtime PRN Constipation        Vital Signs Last 24 Hrs  T(C): 37.2 (2020 21:36), Max: 37.2 (2020 21:36)  T(F): 98.9 (2020 21:36), Max: 98.9 (2020 21:36)  HR: 78 (2020 21:36) (78 - 82)  BP: 135/69 (2020 21:36) (135/69 - 138/69)  BP(mean): --  RR: 18 (2020 21:36) (18 - 18)  SpO2: 95% (2020 21:36) (95% - 95%)    I&O's Detail    2020 07:01  -  2020 07:00  --------------------------------------------------------  IN:  Total IN: 0 mL    OUT:    Chest Tube: 1115 mL  Total OUT: 1115 mL    Total NET: -1115 mL          PHYSICAL EXAM  General Appearance: cooperative, no acute distress,   HEENT: PERRL, conjunctiva clear, EOM's intact, non injected pharynx, no exudate, TM   normal  Neck: Supple, , no adenopathy, thyroid: not enlarged, no carotid bruit or JVD  Back: Symmetric, no  tenderness,no soft tissue tenderness  Lungs: left sided CT attached, decreased BS left mid-lower lung, IMPROVED  Heart: Regular rate and rhythm, S1, S2 normal, no murmur, rub or gallop  Abdomen: Soft, non-tender, bowel sounds active , no hepatosplenomegaly  Extremities: no cyanosis or edema, no joint swelling  Skin: Skin color, texture normal, no rashes   Neurologic: Alert and oriented X3 , cranial nerves intact, sensory and motor normal,    ECG:    LABS:                          13.3   13.48 )-----------( 482      ( 2020 09:13 )             39.1     07-10    136  |  104  |  11  ----------------------------<  114<H>  4.2   |  25  |  0.73    Ca    8.7      10 Jul 2020 10:49  Mg     2.2     10    TPro  7.0  /  Alb  3.1<L>  /  TBili  0.4  /  DBili  x   /  AST  28  /  ALT  33  /  AlkPhos  126<H>  07-10    CARDIAC MARKERS ( 10 Jul 2020 10:49 )  <0.015 ng/mL / x     / x     / x     / x            Pro BNP  209 10 @ 10:49  D Dimer  -- 07-10 @ 10:49    PT/INR - ( 10 Jul 2020 10:57 )   PT: 12.2 sec;   INR: 1.04 ratio         PTT - ( 10 Jul 2020 10:57 )  PTT:29.4 sec  Urinalysis Basic - ( 10 Jul 2020 17:50 )    Color: Yellow / Appearance: Clear / S.010 / pH: x  Gluc: x / Ketone: Small  / Bili: Negative / Urobili: Negative mg/dL   Blood: x / Protein: Negative mg/dL / Nitrite: Negative   Leuk Esterase: Trace / RBC: 0-2 /HPF / WBC 0-2   Sq Epi: x / Non Sq Epi: Occasional / Bacteria: Few            RADIOLOGY & ADDITIONAL STUDIES:    < from: Xray Chest 1 View- PORTABLE-Urgent (07.10.20 @ 14:20) >    PROCEDURE DATE:  07/10/2020          INTERPRETATION:  AP chest on July 10, 2020 at 2:12 PM. Patient had catheter left chest tube inserted for massive effusion.    Heart size cannot be assessed.    A catheter left chest tube has been inserted at the base.    Massive left effusion seen prior in the day is significantly diminished but a very large effusion remains. There is no visible pneumothorax.    Underlying pathology cannot be excluded.    IMPRESSION: Diminished left effusion after chest tube. Significant effusion remains.    < end of copied text >  < from: Xray Chest 1 View- PORTABLE-Routine (20 @ 10:08) >      PROCEDURE DATE:  2020  personally reviewed and shown to pt with prior xrays as well        INTERPRETATION:  INDICATION: Assess left pleural effusion.    PRIORS: 7/10/2020.    VIEWS: Portable AP radiography of the chest performed.    FINDINGS: Since prior evaluation the position of the indwelling left pleural space pigtail drainage catheter has changed, the pigtail now overlying the medial aspect of the left mid thorax. There is interval decrease in left pleural effusion from prior; a moderately sized left pleural effusion persists. Underlying lung parenchymal infiltrate, consolidation or atelectasis cannot be excluded. There is no evidence for left-sided pneumothorax. No acute right-sided infiltrate is identified. No evidence for right pleural effusion or pneumothorax. No mediastinal shift noted. Degenerative changes of the thoracic spine evident.    IMPRESSION: Interval decrease in left pleural effusion from prior with change in position of indwelling pigtail pleural space drainage catheter. Moderately sized left pleural effusion persists. See above discussion.      CT CHEST     LUNGS, AIRWAYS: The central airways are patent. 2.6 x 2.2 cm spiculated nodule in the left upper lobe with surrounding linear fibrotic reaction. Patchy left lower lobe airspace disease, likely infectious. Nodularity along the left major fissure.    PLEURA: Left pleural pigtail drainage catheter terminates in the left posterior hemithorax. No significant pleural effusion. Small left-sided pneumothorax.    VESSELS: Normal caliber aorta. Main pulmonary arteries are patent.    HEART: Normal heart size. No pericardial effusion.    MEDIASTINUM AND MARJAN: No adenopathy.    UPPER ABDOMEN: Distended gallbladder. 1.5 cm indeterminate lesion in the caudate lobe.    BONES AND SOFT TISSUES: Destructive lesion of L2 vertebral body again seen.    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    Nodularity along the left major fissure. Cannot differentiate intrapulmonary lymph nodes versus pleural metastatic disease.    Left pleural drainage catheter in place without significant pleural effusion.    Small left-sided pneumothorax, which may be postprocedural.    1.5 cm indeterminate liver lesion.    Metastatic bone disease again noted to the spine.

## 2020-07-17 NOTE — PROGRESS NOTE ADULT - SUBJECTIVE AND OBJECTIVE BOX
[facility]                                                           Orthopedic Spine Progress Note      POST OPERATIVE DAY #: 1  STATUS POST: s/p L1-5 laminectomy, T11-L5 fusion, vertebroplasty, partial L2 corpectomy                Pre-Op Dx: Pathologic compression fracture of spine    Post-Op Dx:  Pathologic compression fracture of spine     SUBJECTIVE: Patient seen and examined.     Pain (0-10):   Current Pain Management:  [ ] PCA   [ ] Po Analgesics [ ] IM /IV Anagesics     Vital Signs Last 24 Hrs  T(C): 36.4 (17 Jul 2020 09:13), Max: 36.8 (16 Jul 2020 19:30)  T(F): 97.5 (17 Jul 2020 09:13), Max: 98.3 (16 Jul 2020 19:30)  HR: 83 (17 Jul 2020 09:00) (60 - 88)  BP: 122/49 (17 Jul 2020 09:00) (98/68 - 142/63)  BP(mean): 64 (17 Jul 2020 09:00) (63 - 73)  RR: 6 (17 Jul 2020 09:00) (6 - 16)  SpO2: 100% (17 Jul 2020 07:00) (93% - 100%)  I&O's Detail    16 Jul 2020 07:01  -  17 Jul 2020 07:00  --------------------------------------------------------  IN:    IV PiggyBack: 350 mL    lactated ringers.: 1500 mL    Other: 4000 mL  Total IN: 5850 mL    OUT:    Bulb: 10 mL    Chest Tube: 200 mL    Drain: 215 mL    Drain: 85 mL    Indwelling Catheter - Urethral: 850 mL    Other: 675 mL  Total OUT: 2035 mL    Total NET: 3815 mL          OBJECTIVE:       Wound /Dressing: dressing intact  Drains: KAYLAN 10cc, RHV 165cc, LHV 45cc - all drains kept  Cervical ROM: wnl  Lumbar ROM: not tested  Neurological: A/O x 3              Sensation: [x] intact to light touch  [ ] decreased:          Motor exam: [x]                [x] Lower ext.     Hip Flx    Quad   Hamstrg     TA       EHL      GS                                R        5/5        5/5        5/5         5/5      5/5       5/5                                      L         5/5        5/5        5/5         5/5      5/5       5/5                                                             [x] Vascular: intact           Tension Signs: none          Long Tract Findings: none                                                   LABS:                        11.0   24.17 )-----------( 377      ( 17 Jul 2020 06:55 )             34.1     07-17    137  |  104  |  20  ----------------------------<  146<H>  4.1   |  28  |  0.70    Ca    6.6<L>      17 Jul 2020 06:55  Mg     2.2     07-17      PT/INR - ( 16 Jul 2020 17:07 )   PT: 12.5 sec;   INR: 1.08 ratio         PTT - ( 16 Jul 2020 17:07 )  PTT:19.5 sec

## 2020-07-17 NOTE — PROGRESS NOTE ADULT - SUBJECTIVE AND OBJECTIVE BOX
POD #1  Patient seen and examined    C/O dizziness  Using PCA- no nausea  No Headaches    Moderate hv, louie DRAINAGE - SEROSANG    Dsg dry  Abd soft  LE well perfused  Motor power intact all groups in LE  Sens to light touch intact    HCt 34     - OK POD #1   - Dizziness - ?PCA   - D/C PCA and driscoll tomorrow   - Start PO pain meds (oxycodone 10mg q4 PRN)   - Mobilize win brace with PT over the weekend   - Await final path (fresh frozen - adenocarcinoma)    GIULIANO George MD

## 2020-07-17 NOTE — PHYSICAL THERAPY INITIAL EVALUATION ADULT - PLANNED THERAPY INTERVENTIONS, PT EVAL
bed mobility training/transfer training/gait training/today: therapeutic exercises x 12 min, bed mobility x 12 min

## 2020-07-17 NOTE — PHYSICAL THERAPY INITIAL EVALUATION ADULT - GENERAL OBSERVATIONS, REHAB EVAL
Patient received in bed in SDU, +ICU monitors, +O2@4L via nc, + L CT to water seal, +B HV and one KAYLAN drain, +IV/PCA, +Fields, +B SCDs. Patient c/o LBP at 5/10,

## 2020-07-17 NOTE — PROGRESS NOTE ADULT - SUBJECTIVE AND OBJECTIVE BOX
POD #1 s/p decompression and bx and spine stabilization with frozen path c/w adenocarcinoma   Patient awake and alert but feels hoarse stating at times feels confused.  slight numbness in thighs and tightness in back

## 2020-07-17 NOTE — PHYSICAL THERAPY INITIAL EVALUATION ADULT - MODALITIES TREATMENT COMMENTS
Patient returned to bed by request, call bell in reach and bed alarm active. All lines intact. Made comfortable. RN informed of session/status.

## 2020-07-17 NOTE — PROGRESS NOTE ADULT - SUBJECTIVE AND OBJECTIVE BOX
Subjective:  Pt seen, upset about cancer dx. Admits to back pain with movement. Denies SOB    Vital Signs:  Vital Signs Last 24 Hrs  T(C): 36.4 (07-17-20 @ 09:13), Max: 36.8 (07-16-20 @ 19:30)  T(F): 97.5 (07-17-20 @ 09:13), Max: 98.3 (07-16-20 @ 19:30)  HR: 94 (07-17-20 @ 10:00) (60 - 94)  BP: 126/53 (07-17-20 @ 10:00) (98/68 - 142/63)  RR: 14 (07-17-20 @ 10:00) (6 - 16)  SpO2: 100% (07-17-20 @ 07:00) (93% - 100%) on (O2)    Telemetry/Alarms:    Relevant labs, radiology and Medications reviewed                        11.0   24.17 )-----------( 377      ( 17 Jul 2020 06:55 )             34.1     07-17    137  |  104  |  20  ----------------------------<  146<H>  4.1   |  28  |  0.70    Ca    6.6<L>      17 Jul 2020 06:55  Mg     2.2     07-17      PT/INR - ( 16 Jul 2020 17:07 )   PT: 12.5 sec;   INR: 1.08 ratio         PTT - ( 16 Jul 2020 17:07 )  PTT:19.5 sec  MEDICATIONS  (STANDING):  acetaminophen   Tablet .. 975 milliGRAM(s) Oral every 8 hours  ascorbic acid 500 milliGRAM(s) Oral daily  calcium carbonate   1250 mG (OsCal) 1 Tablet(s) Oral three times a day  ciprofloxacin   IVPB 400 milliGRAM(s) IV Intermittent every 12 hours  cyclobenzaprine 10 milliGRAM(s) Oral every 8 hours  dexAMETHasone  Injectable 2 milliGRAM(s) IV Push every 8 hours  HYDROmorphone PCA (1 mG/mL) 30 milliLiter(s) PCA Continuous PCA Continuous  lactated ringers. 1000 milliLiter(s) (125 mL/Hr) IV Continuous <Continuous>  levothyroxine 100 MICROGram(s) Oral daily  liothyronine 5 MICROGram(s) Oral daily  metroNIDAZOLE  IVPB 500 milliGRAM(s) IV Intermittent every 8 hours  multivitamin 1 Tablet(s) Oral daily  senna 2 Tablet(s) Oral at bedtime  sodium chloride 0.9% lock flush 3 milliLiter(s) IV Push every 8 hours    MEDICATIONS  (PRN):  benzocaine 15 mG/menthol 3.6 mG (Sugar-Free) Lozenge 1 Lozenge Oral every 3 hours PRN Sore Throat  famotidine    Tablet 20 milliGRAM(s) Oral every 12 hours PRN Dyspepsia  HYDROmorphone PCA (1 mG/mL) Rescue Clinician Bolus 0.5 milliGRAM(s) IV Push every 15 minutes PRN for Pain Scale GREATER THAN 6  magnesium hydroxide Suspension 30 milliLiter(s) Oral every 12 hours PRN Constipation  melatonin 3 milliGRAM(s) Oral at bedtime PRN Sleep  naloxone Injectable 0.1 milliGRAM(s) IV Push every 3 minutes PRN For ANY of the following changes in patient status:  A. RR LESS THAN 10 breaths per minute, B. Oxygen saturation LESS THAN 90%, C. Sedation score of 6  ondansetron Injectable 4 milliGRAM(s) IV Push every 6 hours PRN Nausea  ondansetron Injectable 4 milliGRAM(s) IV Push every 6 hours PRN Nausea  prochlorperazine   Injectable 10 milliGRAM(s) IV Push every 8 hours PRN Nausea/vomiting      Physical exam  Gen NAD  Neuro AAOx3  Card RRR  Pulm clear  Abd soft  Ext warm    Tubes: Left pigtail to waterseal, 200cc, no AL    I&O's Summary    16 Jul 2020 07:01  -  17 Jul 2020 07:00  --------------------------------------------------------  IN: 5850 mL / OUT: 2035 mL / NET: 3815 mL        Assessment  75y Female  w/ PAST MEDICAL & SURGICAL HISTORY:  Osteoarthritis  Fracture: L2 pathologic fracture  Hypothyroidism  No significant past surgical history  admitted with complaints of Patient is a 75y old  Female who presents with a chief complaint of dyspnea , cough (17 Jul 2020 10:18)  .  74 yo female w pathologic L2 fracture, INDU mass and large effusions admitted 7/10 w SOB. Had CT placed 7/10. Now POD 1 Decompression, spine, lumbar, posterior approach, with fusion of posterior spinal column.        PLAN  cont chest tube for now  CXR today  moniter output  care as per ortho/med    Discussed with Cardiothoracic Team at AM rounds.

## 2020-07-17 NOTE — PROGRESS NOTE ADULT - ASSESSMENT
A/P: s/p L1-5 laminectomy, T11-L5 fusion, vertebroplasty, partial L2 corpectomy - stable  1. Continue PCA/driscoll  2. PT/mobilization w/brace as tolerated

## 2020-07-17 NOTE — PROVIDER CONTACT NOTE (OTHER) - SITUATION
Spoke with office to inform dr that patient is in [facility].  Please fax discharge papers to [phone]

## 2020-07-17 NOTE — PHYSICAL THERAPY INITIAL EVALUATION ADULT - DIAGNOSIS, PT EVAL
pathologic L2 fracture- s/p L2-L4 laminectomy, T11-L5 PSF with bone graft, L2 Biopsy/partial tumor excision.   Brain lesion in right frontal lobe with multiple subcentimeter lesions.  Dyspnea due to Large left pleural effusion s/p left chest tube placement, exudative effusion suspected malignancy

## 2020-07-17 NOTE — PROGRESS NOTE ADULT - SUBJECTIVE AND OBJECTIVE BOX
76 y/o female with PMH of Hypothyroidism and L2 pathologic compression fracture presents to   on 7/10/20 with 1 month history of dyspnea and 1 week history of the dry cough. Patient was seen by ortho spine surgeon, pulmonologist and oncologist and send for evaluation of left sided pleural effusion with concern for lung mass. Pt reports onset of back pain was in March 2020, received cortisone shots and got an MRI of L2 and blood test x2 weeks ago. Pt went to Dr. Westfall (oncologist) and was sent for imaging with Dr. Sanchez (pulmonologist) who discussed results of CT abdomen and chest this AM with pt and told pt to come to ED for evaluation for SOB.  In Ed -  T(F): 98.9Max: 98.9  HR: 80  (80 - 94) BP: 149/63 (149/63 - 150/77) RR: 24(17 - 24) SpO2: 98% (98% - 99%) EKG - sinus , low voltage QRS, troponin x 1neg, CXR - large pleural effusion, WBC 12, Cr 0.73, , covid PCR neg , s/p left chest tube placement in ED , fluid analysis pending (10 Jul 2020 13:26)    INTERVAL HPI:  7/16 - seen in PACU s/p laminectomy, per RN at bedside had been moaning in pain,  got dilaudid now sleepy  7/17 - pain is controlled, c/o dizziness, "no hope for me", left sided CT in place with 50 ml drainage, FQ in place    Vital Signs Last 24 Hrs  T(C): 36.4 (17 Jul 2020 09:13), Max: 36.8 (16 Jul 2020 19:30)  T(F): 97.5 (17 Jul 2020 09:13), Max: 98.3 (16 Jul 2020 19:30)  HR: 94 (17 Jul 2020 10:00) (60 - 94)  BP: 126/53 (17 Jul 2020 10:00) (98/68 - 142/63)  BP(mean): 70 (17 Jul 2020 10:00) (63 - 73)  RR: 14 (17 Jul 2020 10:00) (6 - 16)  SpO2: 100% (17 Jul 2020 07:00) (93% - 100%)    I&O's Detail    16 Jul 2020 07:01  -  17 Jul 2020 07:00  --------------------------------------------------------  IN:    IV PiggyBack: 350 mL    lactated ringers.: 1500 mL    Other: 4000 mL  Total IN: 5850 mL    OUT:    Bulb: 10 mL    Chest Tube: 200 mL    Drain: 215 mL    Drain: 85 mL    Indwelling Catheter - Urethral: 850 mL    Other: 675 mL  Total OUT: 2035 mL    Total NET: 3815 mL                        11.0   24.17 )-----------( 377      ( 17 Jul 2020 06:55 )             34.1     17 Jul 2020 06:55    137    |  104    |  20     ----------------------------<  146    4.1     |  28     |  0.70     Ca    6.6        17 Jul 2020 06:55  Mg     2.2       17 Jul 2020 06:55      PT/INR - ( 16 Jul 2020 17:07 )   PT: 12.5 sec;   INR: 1.08 ratio       PTT - ( 16 Jul 2020 17:07 )  PTT:19.5 sec    MEDICATIONS  (STANDING):  acetaminophen   Tablet .. 975 milliGRAM(s) Oral every 8 hours  ascorbic acid 500 milliGRAM(s) Oral daily  calcium carbonate   1250 mG (OsCal) 1 Tablet(s) Oral three times a day  ciprofloxacin   IVPB 400 milliGRAM(s) IV Intermittent every 12 hours  cyclobenzaprine 10 milliGRAM(s) Oral every 8 hours  dexAMETHasone  Injectable 2 milliGRAM(s) IV Push every 8 hours  HYDROmorphone PCA (1 mG/mL) 30 milliLiter(s) PCA Continuous PCA Continuous  lactated ringers. 1000 milliLiter(s) (125 mL/Hr) IV Continuous <Continuous>  levothyroxine 100 MICROGram(s) Oral daily  liothyronine 5 MICROGram(s) Oral daily  metroNIDAZOLE  IVPB 500 milliGRAM(s) IV Intermittent every 8 hours  multivitamin 1 Tablet(s) Oral daily  senna 2 Tablet(s) Oral at bedtime  sodium chloride 0.9% lock flush 3 milliLiter(s) IV Push every 8 hours    MEDICATIONS  (PRN):  benzocaine 15 mG/menthol 3.6 mG (Sugar-Free) Lozenge 1 Lozenge Oral every 3 hours PRN Sore Throat  famotidine    Tablet 20 milliGRAM(s) Oral every 12 hours PRN Dyspepsia  HYDROmorphone PCA (1 mG/mL) Rescue Clinician Bolus 0.5 milliGRAM(s) IV Push every 15 minutes PRN for Pain Scale GREATER THAN 6  magnesium hydroxide Suspension 30 milliLiter(s) Oral every 12 hours PRN Constipation  melatonin 3 milliGRAM(s) Oral at bedtime PRN Sleep  naloxone Injectable 0.1 milliGRAM(s) IV Push every 3 minutes PRN For ANY of the following changes in patient status:  A. RR LESS THAN 10 breaths per minute, B. Oxygen saturation LESS THAN 90%, C. Sedation score of 6  ondansetron Injectable 4 milliGRAM(s) IV Push every 6 hours PRN Nausea  ondansetron Injectable 4 milliGRAM(s) IV Push every 6 hours PRN Nausea  prochlorperazine   Injectable 10 milliGRAM(s) IV Push every 8 hours PRN Nausea/vomiting    PHYSICAL EXAM:  GENERAL: elderly female in mild distress due to pain in the back   NERVOUS SYSTEM: CNII-XII grossly intact, no paresthesias   Motor Strength 5/5 B/L upper and lower extremities; DTRs 2+ intact and symmetric  HEAD:  Atraumatic, Normocephalic  EYES: EOMI, PERRLA, conjunctiva and sclera clear  NECK: Supple, No JVD  CHEST/LUNG: BS decreased over left base, ; No rales, no rhonchi, no wheezing, Left CT +   HEART: Regular rate and rhythm; No murmurs, no rubs or gallops  ABDOMEN: Soft, Nontender, Nondistended; Bowel sounds present  GENITOURINARY: FQ in place  EXTREMITIES:  2+ Peripheral Pulses, No clubbing, cyanosis, no  edema  MUSCULOSKELETAL: No muscle tenderness, Muscle tone normal, No joint tenderness, no Joint swelling,  Joint ROM -normal    < from: MR Thoracic Spine w/wo IV Cont (07.11.20 @ 13:46) >  IMPRESSION: Abnormal lesions involving the T12 and T10 levels as described above.  Compression fracture is seen involving L2 level with abnormal T1 and T2 prolongation seen. This could be compatible with a pathologic compression fracture. Retropulsed fragment is identified as described above.    < from: TTE Echo Complete w/o Contrast w/ Doppler (07.11.20 @ 09:02) >   The mitral valve leaflets appear thin and normal.   Trace mitral regurgitation is present.   EA reversal of the mitral inflow consistent with reduced compliance of the   left ventricle.   The aortic valve is not well visualized, appears normal. Valve opening   seems to be normal.   Mild (1+) aortic regurgitation is present.   Normal appearing tricuspid valve structure and function.   Mild (1+) tricuspid valve regurgitation is present.   Pulmonic valve not well seen.   Normal appearing left atrium.   Left ventricle systolic function appears preserved based on difficult   trans thoracic views; segmental wall motion abnormalities can not be ruled   out.   Visual estimation of left ventricle ejection fraction is >50%.   The IVC appears normal.   Massive anechoic cystic structure (15 cm x 5 cm) noted within the liver   possibly enlarged gallbladder. There are calcified gallstones noted within   the structure.   Pleural effusion - massive left pleural effusion.    < end of copied text >  < from: CT Chest w/ IV Cont (07.13.20 @ 12:51) >  LUNGS, AIRWAYS: The central airways are patent. 2.6 x 2.2 cm spiculated nodule in the left upper lobe with surrounding linear fibrotic reaction. Patchy left lower lobe airspace disease, likely infectious. Nodularity along the left major fissure.  PLEURA: Left pleural pigtail drainage catheter terminates in the left posterior hemithorax. No significant pleural effusion. Small left-sided pneumothorax.  VESSELS: Normal caliber aorta. Main pulmonary arteries are patent.  HEART: Normal heart size. No pericardial effusion.  MEDIASTINUM AND MARJAN: No adenopathy.  UPPER ABDOMEN: Distended gallbladder. 1.5 cm indeterminate lesion in the caudate lobe.  BONES AND SOFT TISSUES: Destructive lesion of L2 vertebral body again seen.  IMPRESSION:   2.6 cm spiculated left upper lobe mass, likely primary lung neoplasm.  Nodularity along the left major fissure. Cannot differentiate intrapulmonary lymph nodes versus pleural metastatic disease.  Left pleural drainage catheter in place without significant pleural effusion.  Small left-sided pneumothorax, which may be postprocedural.  1.5 cm indeterminate liver lesion.  Metastatic bone disease again noted to the spine.      < from: MR Head w/wo IV Cont (07.14.20 @ 12:58) >  IMPRESSION:   Right lateral frontal lobe enhancing lesion measuring 1.2 x 1.2 x 0.7 cm, findings consistent with metastatic disease in the setting of known malignancy. Multiple additional subcentimeter enhancing lesions located at the gray-white matter junction are favored to represent additional foci of intracranial metastatic disease.  No evidence of osseous metastatic disease.  No evidence of abnormal leptomeningeal or pachymeningeal enhancement.    < from: NM Hepatobiliary Imaging (07.14.20 @ 12:03) >  IMPRESSION:  Abnormal morphine-augmented hepatobiliary scan compatible with acute cholecystitis.

## 2020-07-17 NOTE — PROGRESS NOTE ADULT - ASSESSMENT
74 y/o female with PMH of Hypothyroidism and L2 pathologic compression fracture presents to  with 1 month history of dyspnea and 1 week history of the dry cough. Patient was seen by ortho spine surgeon, pulmonologist and oncologist and send for evaluation of left sided pleural effusion with concern for lung mass.   In Ed -  T(F): 98.9Max: 98.9  HR: 80  (80 - 94) BP: 149/63 (149/63 - 150/77) RR: 24(17 - 24) SpO2: 98% (98% - 99%) EKG - sinus , low voltage QRS, troponin x 1neg, CXR - large pleural effusion, WBC 12, Cr 0.73, , covid PCR neg , s/p left chest tube placement in ED , fluid analysis pending     * Lower back pain due to pathologic L2 fracture, worsening on MRI, risk for cord compression  - s/p L2-L4 laminectomy, T11-L5 PSF with bone graft, L2 Biopsy/partial tumor excision/Dr George   - pain management, dilaudid PCAm - would change to IVP due to dizziness; continue IV steroids  - neurochecks q4 h    * Brain lesion in right frontal lobe with multiple subcentimeter lesions   - neurosurgery consult Dr. 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   * Suspected postobstructive PNA s/p IV abx  * Leukocytosis steroids induced, improving    - cytology - pending    - TTE EF wnl, liver cystic lesion 15x5 cm   - bone scan - ? need at this point, MRI of the brain w contrast  reviewed    *  Liver lesion cystic on 2 d echo and Enlarged GB on CT 22 cm  doubt cholecystitis   - HIDA scan - positive, CIPRO-FLAGYL for 7 days     * Hypothyroidism  - c/w LT4 and LT3     * Anxiety - xanax prn q6h    DVT proph - to be started when cleared by Ortho/spine      Pt can be downgraded to medical floor

## 2020-07-17 NOTE — PROGRESS NOTE ADULT - ASSESSMENT
s/p decompression bx and stabilization spine with met carcinoma awaiting final pathology drains and wound care as per plastics with ongoing medical management and pulmonary support

## 2020-07-17 NOTE — PROGRESS NOTE ADULT - ASSESSMENT
76 y/o female with PMH of Hypothyroidism and L2 pathologic compression fracture presents to  with 1 month history of dyspnea and 1 week history of the dry cough. Patient was seen by ortho spine surgeon, pulmonologist and oncologist and send for evaluation of left sided pleural effusion with concern for lung mass. Pt reports onset of back pain was in March 2020, received cortisone shots and got an MRI of L2 and blood test x2 weeks ago. Pt went to Dr. Westfall (oncologist) and was sent for imaging with Dr. Sanchez (pulmonologist) who discussed results of CT abdomen and chest this AM with pt and told pt to come to ED for evaluation for SOB.  In Ed -  T(F): 98.9Max: 98.9  HR: 80  (80 - 94) BP: 149/63 (149/63 - 150/77) RR: 24(17 - 24) SpO2: 98% (98% - 99%) EKG - sinus , low voltage QRS, troponin x 1neg, CXR - large pleural effusion, WBC 12, Cr 0.73, , COVID PCR neg , s/p left chest tube placement in ED , fluid analysis pending (10 Jul 2020 13:26)  Massive left sided pleural effusion  CT management as per thoracic surgery  Discussed MRI findings with the patient; awaiting pleural fluid cytology  CT Chest shows 2.6 cm spiculated left upper lobe mass, likely primary lung neoplasm. Nodularity along the left major fissure. Cannot differentiate intrapulmonary lymph nodes versus pleural metastatic disease. Left pleural drainage catheter in place without significant pleural effusion. Small left-sided pneumothorax, which may be postprocedural. 1.5 cm indeterminate liver lesion. Metastatic bone disease again noted to the spine.   Left pleural pigtail drainage catheter terminates in the left posterior hemithorax. No significant pleural effusion. Small left-sided pneumothorax.  Discussed new CT Chest findings with patient and will call  to update him.   Patient evaluated by Orthopedics, underwent MRI brain, evaluated by Surgery  No surgical intervention for the gallstones, receiving Cipro and Flagyl  Underwent L2-L5 laminectomy, partial L2 corpectomy, T11-L5 posterior fusion and stabilization, pedicle screw instrumentation and possible vertebroplasty on 7/16  Likely metastatic lung cancer with metastasis to extrapulmonary areas including the vertebra and brain (per recent MRI results)  Follow up OR Pathology   Thank you for the consult; will continue to follow

## 2020-07-18 LAB
ANION GAP SERPL CALC-SCNC: 5 MMOL/L — SIGNIFICANT CHANGE UP (ref 5–17)
BASOPHILS # BLD AUTO: 0.03 K/UL — SIGNIFICANT CHANGE UP (ref 0–0.2)
BASOPHILS NFR BLD AUTO: 0.1 % — SIGNIFICANT CHANGE UP (ref 0–2)
BUN SERPL-MCNC: 14 MG/DL — SIGNIFICANT CHANGE UP (ref 7–23)
CALCIUM SERPL-MCNC: 7.4 MG/DL — LOW (ref 8.5–10.1)
CHLORIDE SERPL-SCNC: 101 MMOL/L — SIGNIFICANT CHANGE UP (ref 96–108)
CO2 SERPL-SCNC: 30 MMOL/L — SIGNIFICANT CHANGE UP (ref 22–31)
CREAT SERPL-MCNC: 0.46 MG/DL — LOW (ref 0.5–1.3)
EOSINOPHIL # BLD AUTO: 0 K/UL — SIGNIFICANT CHANGE UP (ref 0–0.5)
EOSINOPHIL NFR BLD AUTO: 0 % — SIGNIFICANT CHANGE UP (ref 0–6)
GLUCOSE SERPL-MCNC: 124 MG/DL — HIGH (ref 70–99)
HCT VFR BLD CALC: 31.8 % — LOW (ref 34.5–45)
HGB BLD-MCNC: 10.5 G/DL — LOW (ref 11.5–15.5)
IMM GRANULOCYTES NFR BLD AUTO: 1.7 % — HIGH (ref 0–1.5)
LYMPHOCYTES # BLD AUTO: 1.55 K/UL — SIGNIFICANT CHANGE UP (ref 1–3.3)
LYMPHOCYTES # BLD AUTO: 7.2 % — LOW (ref 13–44)
MCHC RBC-ENTMCNC: 30.1 PG — SIGNIFICANT CHANGE UP (ref 27–34)
MCHC RBC-ENTMCNC: 33 GM/DL — SIGNIFICANT CHANGE UP (ref 32–36)
MCV RBC AUTO: 91.1 FL — SIGNIFICANT CHANGE UP (ref 80–100)
MONOCYTES # BLD AUTO: 1.82 K/UL — HIGH (ref 0–0.9)
MONOCYTES NFR BLD AUTO: 8.5 % — SIGNIFICANT CHANGE UP (ref 2–14)
NEUTROPHILS # BLD AUTO: 17.7 K/UL — HIGH (ref 1.8–7.4)
NEUTROPHILS NFR BLD AUTO: 82.5 % — HIGH (ref 43–77)
PLATELET # BLD AUTO: 310 K/UL — SIGNIFICANT CHANGE UP (ref 150–400)
POTASSIUM SERPL-MCNC: 4.2 MMOL/L — SIGNIFICANT CHANGE UP (ref 3.5–5.3)
POTASSIUM SERPL-SCNC: 4.2 MMOL/L — SIGNIFICANT CHANGE UP (ref 3.5–5.3)
RBC # BLD: 3.49 M/UL — LOW (ref 3.8–5.2)
RBC # FLD: 13.3 % — SIGNIFICANT CHANGE UP (ref 10.3–14.5)
SODIUM SERPL-SCNC: 136 MMOL/L — SIGNIFICANT CHANGE UP (ref 135–145)
WBC # BLD: 21.47 K/UL — HIGH (ref 3.8–10.5)
WBC # FLD AUTO: 21.47 K/UL — HIGH (ref 3.8–10.5)

## 2020-07-18 PROCEDURE — 71045 X-RAY EXAM CHEST 1 VIEW: CPT | Mod: 26

## 2020-07-18 PROCEDURE — 99233 SBSQ HOSP IP/OBS HIGH 50: CPT

## 2020-07-18 RX ORDER — ACETAMINOPHEN 500 MG
975 TABLET ORAL EVERY 8 HOURS
Refills: 0 | Status: DISCONTINUED | OUTPATIENT
Start: 2020-07-18 | End: 2020-07-23

## 2020-07-18 RX ORDER — HYDROMORPHONE HYDROCHLORIDE 2 MG/ML
1 INJECTION INTRAMUSCULAR; INTRAVENOUS; SUBCUTANEOUS
Refills: 0 | Status: DISCONTINUED | OUTPATIENT
Start: 2020-07-18 | End: 2020-07-23

## 2020-07-18 RX ORDER — OXYCODONE HYDROCHLORIDE 5 MG/1
10 TABLET ORAL EVERY 4 HOURS
Refills: 0 | Status: DISCONTINUED | OUTPATIENT
Start: 2020-07-18 | End: 2020-07-23

## 2020-07-18 RX ORDER — OXYCODONE HYDROCHLORIDE 5 MG/1
5 TABLET ORAL EVERY 4 HOURS
Refills: 0 | Status: DISCONTINUED | OUTPATIENT
Start: 2020-07-18 | End: 2020-07-23

## 2020-07-18 RX ORDER — GABAPENTIN 400 MG/1
300 CAPSULE ORAL
Refills: 0 | Status: DISCONTINUED | OUTPATIENT
Start: 2020-07-18 | End: 2020-07-23

## 2020-07-18 RX ORDER — PIPERACILLIN AND TAZOBACTAM 4; .5 G/20ML; G/20ML
3.38 INJECTION, POWDER, LYOPHILIZED, FOR SOLUTION INTRAVENOUS ONCE
Refills: 0 | Status: DISCONTINUED | OUTPATIENT
Start: 2020-07-18 | End: 2020-07-18

## 2020-07-18 RX ORDER — PIPERACILLIN AND TAZOBACTAM 4; .5 G/20ML; G/20ML
3.38 INJECTION, POWDER, LYOPHILIZED, FOR SOLUTION INTRAVENOUS EVERY 8 HOURS
Refills: 0 | Status: DISCONTINUED | OUTPATIENT
Start: 2020-07-18 | End: 2020-07-23

## 2020-07-18 RX ORDER — DEXAMETHASONE 0.5 MG/5ML
2 ELIXIR ORAL ONCE
Refills: 0 | Status: COMPLETED | OUTPATIENT
Start: 2020-07-18 | End: 2020-07-18

## 2020-07-18 RX ADMIN — Medication 975 MILLIGRAM(S): at 06:29

## 2020-07-18 RX ADMIN — Medication 100 MICROGRAM(S): at 06:29

## 2020-07-18 RX ADMIN — SODIUM CHLORIDE 3 MILLILITER(S): 9 INJECTION INTRAMUSCULAR; INTRAVENOUS; SUBCUTANEOUS at 15:39

## 2020-07-18 RX ADMIN — Medication 500 MILLIGRAM(S): at 10:17

## 2020-07-18 RX ADMIN — Medication 975 MILLIGRAM(S): at 07:00

## 2020-07-18 RX ADMIN — OXYCODONE HYDROCHLORIDE 5 MILLIGRAM(S): 5 TABLET ORAL at 15:37

## 2020-07-18 RX ADMIN — Medication 1 TABLET(S): at 15:36

## 2020-07-18 RX ADMIN — Medication 1 TABLET(S): at 22:02

## 2020-07-18 RX ADMIN — Medication 1 MILLIGRAM(S): at 22:02

## 2020-07-18 RX ADMIN — GABAPENTIN 300 MILLIGRAM(S): 400 CAPSULE ORAL at 22:02

## 2020-07-18 RX ADMIN — LIOTHYRONINE SODIUM 5 MICROGRAM(S): 25 TABLET ORAL at 10:16

## 2020-07-18 RX ADMIN — Medication 975 MILLIGRAM(S): at 22:02

## 2020-07-18 RX ADMIN — GABAPENTIN 300 MILLIGRAM(S): 400 CAPSULE ORAL at 10:16

## 2020-07-18 RX ADMIN — Medication 1 TABLET(S): at 10:16

## 2020-07-18 RX ADMIN — SODIUM CHLORIDE 3 MILLILITER(S): 9 INJECTION INTRAMUSCULAR; INTRAVENOUS; SUBCUTANEOUS at 06:20

## 2020-07-18 RX ADMIN — PIPERACILLIN AND TAZOBACTAM 25 GRAM(S): 4; .5 INJECTION, POWDER, LYOPHILIZED, FOR SOLUTION INTRAVENOUS at 15:37

## 2020-07-18 RX ADMIN — Medication 1 MILLIGRAM(S): at 15:37

## 2020-07-18 RX ADMIN — CYCLOBENZAPRINE HYDROCHLORIDE 10 MILLIGRAM(S): 10 TABLET, FILM COATED ORAL at 22:02

## 2020-07-18 RX ADMIN — Medication 975 MILLIGRAM(S): at 15:38

## 2020-07-18 RX ADMIN — Medication 975 MILLIGRAM(S): at 17:13

## 2020-07-18 RX ADMIN — OXYCODONE HYDROCHLORIDE 10 MILLIGRAM(S): 5 TABLET ORAL at 20:33

## 2020-07-18 RX ADMIN — Medication 100 MILLIGRAM(S): at 06:31

## 2020-07-18 RX ADMIN — CYCLOBENZAPRINE HYDROCHLORIDE 10 MILLIGRAM(S): 10 TABLET, FILM COATED ORAL at 15:36

## 2020-07-18 RX ADMIN — SODIUM CHLORIDE 3 MILLILITER(S): 9 INJECTION INTRAMUSCULAR; INTRAVENOUS; SUBCUTANEOUS at 22:20

## 2020-07-18 RX ADMIN — Medication 2 MILLIGRAM(S): at 06:29

## 2020-07-18 RX ADMIN — Medication 200 MILLIGRAM(S): at 10:17

## 2020-07-18 RX ADMIN — OXYCODONE HYDROCHLORIDE 10 MILLIGRAM(S): 5 TABLET ORAL at 19:55

## 2020-07-18 RX ADMIN — SENNA PLUS 2 TABLET(S): 8.6 TABLET ORAL at 22:02

## 2020-07-18 RX ADMIN — CYCLOBENZAPRINE HYDROCHLORIDE 10 MILLIGRAM(S): 10 TABLET, FILM COATED ORAL at 06:29

## 2020-07-18 RX ADMIN — PIPERACILLIN AND TAZOBACTAM 25 GRAM(S): 4; .5 INJECTION, POWDER, LYOPHILIZED, FOR SOLUTION INTRAVENOUS at 22:20

## 2020-07-18 RX ADMIN — OXYCODONE HYDROCHLORIDE 5 MILLIGRAM(S): 5 TABLET ORAL at 17:14

## 2020-07-18 RX ADMIN — Medication 975 MILLIGRAM(S): at 23:45

## 2020-07-18 NOTE — PROGRESS NOTE ADULT - SUBJECTIVE AND OBJECTIVE BOX
Patient is a 75y old  Female who presents with a chief complaint of dyspnea , cough (2020 06:36)      HPI:  74 y/o female with PMH of Hypothyroidism and L2 pathologic compression fracture presents to  with 1 month history of dyspnea and 1 week history of the dry cough. Patient was seen by ortho spine surgeon, pulmonologist and oncologist and send for evaluation of left sided pleural effusion with concern for lung mass. Pt reports onset of back pain was in 2020, received cortisone shots and got an MRI of L2 and blood test x2 weeks ago. Pt went to Dr. Westflal (oncologist) and was sent for imaging with Dr. Sanchez (pulmonologist) who discussed results of CT abdomen and chest this AM with pt and told pt to come to ED for evaluation for SOB.  In Ed -  T(F): 98.9Max: 98.9  HR: 80  (80 - 94) BP: 149/63 (149/63 - 150/77) RR: 24(17 - 24) SpO2: 98% (98% - 99%) EKG - sinus , low voltage QRS, troponin x 1neg, CXR - large pleural effusion, WBC 12, Cr 0.73, , covid PCR neg , s/p left chest tube placement in ED , fluid analysis pending (10 Jul 2020 13:26)    seen and evaluated today  data discussed with thoracic surgery as well as her RN at bedside  CT attached with some pain being medicated  breathing is better        she is ambulating with CT attached  she is feeling much better today  some discomfort from CT      No acute pulmonary events occurred overnight  Discussed MRI findings with the patient; awaiting pleural fluid cytology  Discussed plan with  as well      No acute pulmonary events occurred overnight     7/15  Patient evaluated by Orthopedics, underwent MRI brain, evaluated by Surgery  No surgical intervention for the gallstones, receiving Cipro and Flagyl      Patient was in the OR , seen ; No acute pulmonary events occurred overnight  Slightly drowsy today secondary to Dilaudid  She has been using the Incentive spirometer      Less drowsy today  Endorses diffuse pain  Plan for more PT/OT    MEDICATIONS  (STANDING):  acetaminophen   Tablet .. 975 milliGRAM(s) Oral every 8 hours  ascorbic acid 500 milliGRAM(s) Oral daily  calcium carbonate   1250 mG (OsCal) 1 Tablet(s) Oral three times a day  ciprofloxacin   IVPB 400 milliGRAM(s) IV Intermittent every 12 hours  cyclobenzaprine 10 milliGRAM(s) Oral every 8 hours  dexAMETHasone  Injectable 1 milliGRAM(s) IV Push every 8 hours  gabapentin 300 milliGRAM(s) Oral two times a day  levothyroxine 100 MICROGram(s) Oral daily  liothyronine 5 MICROGram(s) Oral daily  metroNIDAZOLE  IVPB 500 milliGRAM(s) IV Intermittent every 8 hours  multivitamin 1 Tablet(s) Oral daily  senna 2 Tablet(s) Oral at bedtime  sodium chloride 0.9% lock flush 3 milliLiter(s) IV Push every 8 hours    MEDICATIONS  (PRN):  benzocaine 15 mG/menthol 3.6 mG (Sugar-Free) Lozenge 1 Lozenge Oral every 3 hours PRN Sore Throat  famotidine    Tablet 20 milliGRAM(s) Oral every 12 hours PRN Dyspepsia  HYDROmorphone  Injectable 1 milliGRAM(s) IV Push every 3 hours PRN breakthrough pain  magnesium hydroxide Suspension 30 milliLiter(s) Oral every 12 hours PRN Constipation  melatonin 3 milliGRAM(s) Oral at bedtime PRN Sleep  naloxone Injectable 0.1 milliGRAM(s) IV Push every 3 minutes PRN For ANY of the following changes in patient status:  A. RR LESS THAN 10 breaths per minute, B. Oxygen saturation LESS THAN 90%, C. Sedation score of 6  ondansetron Injectable 4 milliGRAM(s) IV Push every 6 hours PRN Nausea  ondansetron Injectable 4 milliGRAM(s) IV Push every 6 hours PRN Nausea  oxyCODONE    IR 10 milliGRAM(s) Oral every 4 hours PRN Moderate Pain (4 - 6)  oxyCODONE    IR 5 milliGRAM(s) Oral every 4 hours PRN Mild Pain (1 - 3)  prochlorperazine   Injectable 10 milliGRAM(s) IV Push every 8 hours PRN Nausea/vomiting        Vital Signs Last 24 Hrs  T(C): 36.2 (2020 06:43), Max: 36.8 (2020 14:52)  T(F): 97.2 (2020 06:43), Max: 98.3 (2020 14:52)  HR: 99 (2020 09:00) (79 - 99)  BP: 127/54 (2020 09:00) (107/50 - 135/53)  BP(mean): 71 (2020 09:00) (55 - 80)  RR: 14 (2020 09:00) (7 - 19)  SpO2: 100% (2020 08:00) (95% - 100%)    I&O's Detail    2020 07:01  -  2020 07:00  --------------------------------------------------------  IN:  Total IN: 0 mL    OUT:    Chest Tube: 1115 mL  Total OUT: 1115 mL    Total NET: -1115 mL          PHYSICAL EXAM  General Appearance: cooperative, no acute distress,   HEENT: PERRL, conjunctiva clear, EOM's intact, non injected pharynx, no exudate, TM   normal  Neck: Supple, , no adenopathy, thyroid: not enlarged, no carotid bruit or JVD  Back: Symmetric, no  tenderness,no soft tissue tenderness  Lungs: left sided CT attached, decreased BS left mid-lower lung, IMPROVED  Heart: Regular rate and rhythm, S1, S2 normal, no murmur, rub or gallop  Abdomen: Soft, non-tender, bowel sounds active , no hepatosplenomegaly  Extremities: no cyanosis or edema, no joint swelling  Skin: Skin color, texture normal, no rashes   Neurologic: Alert and oriented X3 , cranial nerves intact, sensory and motor normal,    ECG:    LABS:                          13.3   13.48 )-----------( 482      ( 2020 09:13 )             39.1     07-10    136  |  104  |  11  ----------------------------<  114<H>  4.2   |  25  |  0.73    Ca    8.7      10 Jul 2020 10:49  Mg     2.2     07-10    TPro  7.0  /  Alb  3.1<L>  /  TBili  0.4  /  DBili  x   /  AST  28  /  ALT  33  /  AlkPhos  126<H>  07-10    CARDIAC MARKERS ( 10 Jul 2020 10:49 )  <0.015 ng/mL / x     / x     / x     / x            Pro BNP  209 07-10 @ 10:49  D Dimer  -- 07-10 @ 10:49    PT/INR - ( 10 Jul 2020 10:57 )   PT: 12.2 sec;   INR: 1.04 ratio         PTT - ( 10 Jul 2020 10:57 )  PTT:29.4 sec  Urinalysis Basic - ( 10 Jul 2020 17:50 )    Color: Yellow / Appearance: Clear / S.010 / pH: x  Gluc: x / Ketone: Small  / Bili: Negative / Urobili: Negative mg/dL   Blood: x / Protein: Negative mg/dL / Nitrite: Negative   Leuk Esterase: Trace / RBC: 0-2 /HPF / WBC 0-2   Sq Epi: x / Non Sq Epi: Occasional / Bacteria: Few            RADIOLOGY & ADDITIONAL STUDIES:    < from: Xray Chest 1 View- PORTABLE-Urgent (07.10.20 @ 14:20) >    PROCEDURE DATE:  07/10/2020          INTERPRETATION:  AP chest on July 10, 2020 at 2:12 PM. Patient had catheter left chest tube inserted for massive effusion.    Heart size cannot be assessed.    A catheter left chest tube has been inserted at the base.    Massive left effusion seen prior in the day is significantly diminished but a very large effusion remains. There is no visible pneumothorax.    Underlying pathology cannot be excluded.    IMPRESSION: Diminished left effusion after chest tube. Significant effusion remains.    < end of copied text >  < from: Xray Chest 1 View- PORTABLE-Routine (20 @ 10:08) >      PROCEDURE DATE:  2020  personally reviewed and shown to pt with prior xrays as well        INTERPRETATION:  INDICATION: Assess left pleural effusion.    PRIORS: 7/10/2020.    VIEWS: Portable AP radiography of the chest performed.    FINDINGS: Since prior evaluation the position of the indwelling left pleural space pigtail drainage catheter has changed, the pigtail now overlying the medial aspect of the left mid thorax. There is interval decrease in left pleural effusion from prior; a moderately sized left pleural effusion persists. Underlying lung parenchymal infiltrate, consolidation or atelectasis cannot be excluded. There is no evidence for left-sided pneumothorax. No acute right-sided infiltrate is identified. No evidence for right pleural effusion or pneumothorax. No mediastinal shift noted. Degenerative changes of the thoracic spine evident.    IMPRESSION: Interval decrease in left pleural effusion from prior with change in position of indwelling pigtail pleural space drainage catheter. Moderately sized left pleural effusion persists. See above discussion.      CT CHEST     LUNGS, AIRWAYS: The central airways are patent. 2.6 x 2.2 cm spiculated nodule in the left upper lobe with surrounding linear fibrotic reaction. Patchy left lower lobe airspace disease, likely infectious. Nodularity along the left major fissure.    PLEURA: Left pleural pigtail drainage catheter terminates in the left posterior hemithorax. No significant pleural effusion. Small left-sided pneumothorax.    VESSELS: Normal caliber aorta. Main pulmonary arteries are patent.    HEART: Normal heart size. No pericardial effusion.    MEDIASTINUM AND MARJAN: No adenopathy.    UPPER ABDOMEN: Distended gallbladder. 1.5 cm indeterminate lesion in the caudate lobe.    BONES AND SOFT TISSUES: Destructive lesion of L2 vertebral body again seen.    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    Nodularity along the left major fissure. Cannot differentiate intrapulmonary lymph nodes versus pleural metastatic disease.    Left pleural drainage catheter in place without significant pleural effusion.    Small left-sided pneumothorax, which may be postprocedural.    1.5 cm indeterminate liver lesion.    Metastatic bone disease again noted to the spine.

## 2020-07-18 NOTE — PROGRESS NOTE ADULT - ASSESSMENT
76 y/o female with PMH of Hypothyroidism and L2 pathologic compression fracture presents to  with 1 month history of dyspnea and 1 week history of the dry cough. Patient was seen by ortho spine surgeon, pulmonologist and oncologist and send for evaluation of left sided pleural effusion with concern for lung mass.   In Ed -  T(F): 98.9Max: 98.9  HR: 80  (80 - 94) BP: 149/63 (149/63 - 150/77) RR: 24(17 - 24) SpO2: 98% (98% - 99%) EKG - sinus , low voltage QRS, troponin x 1neg, CXR - large pleural effusion, WBC 12, Cr 0.73, , covid PCR neg , s/p left chest tube placement in ED , fluid analysis pending     * Lower back pain due to pathologic L2 fracture, worsening on MRI,   - s/p L2-L4 laminectomy, T11-L5 PSF with bone graft, L2 Biopsy/partial tumor excision/Dr George   - pain management, dilaudid PCA - would change to IVP due to dizziness; continue IV steroids      * Brain lesion in right frontal lobe with multiple subcentimeter lesions   - neurosurgery consult Dr. Handy -  radiology - oncology consult for brain RT  - no edema    * Dyspnea due to Large left pleural effusion s/p left chest tube placement, exudative effusion suspected malignancy   * ON CT CHEST: INDU 2.6 cm mass, 1.5 cm liver lesion  - suspect Primary Lung with Brain and Spine mets   * Suspected postobstructive PNA s/p IV abx    * Leukocytosis steroids induced, improving    - cytology - pending    - TTE EF wnl, liver cystic lesion 15x5 cm       *  Liver lesion cystic on 2 d echo and Enlarged GB on CT 22 cm  - HIDA scan - positive,   - change to Zosyn ( Cipro with toxic profile) and ask Dr. Linda  to review the film; just abx are not enough for ac cresencio; either percut drain to temporize the process and surgery later or surgery        DVT proph - to be started when cleared by Ortho/spine

## 2020-07-18 NOTE — PROGRESS NOTE ADULT - ASSESSMENT
74 y/o female with PMH of Hypothyroidism and L2 pathologic compression fracture presents to  with 1 month history of dyspnea and 1 week history of the dry cough. Patient was seen by ortho spine surgeon, pulmonologist and oncologist and send for evaluation of left sided pleural effusion with concern for lung mass. Pt reports onset of back pain was in March 2020, received cortisone shots and got an MRI of L2 and blood test x2 weeks ago. Pt went to Dr. Westfall (oncologist) and was sent for imaging with Dr. Sanchez (pulmonologist) who discussed results of CT abdomen and chest this AM with pt and told pt to come to ED for evaluation for SOB.  In Ed -  T(F): 98.9Max: 98.9  HR: 80  (80 - 94) BP: 149/63 (149/63 - 150/77) RR: 24(17 - 24) SpO2: 98% (98% - 99%) EKG - sinus , low voltage QRS, troponin x 1neg, CXR - large pleural effusion, WBC 12, Cr 0.73, , COVID PCR neg , s/p left chest tube placement in ED , fluid analysis pending (10 Jul 2020 13:26)  Massive left sided pleural effusion  CT management as per thoracic surgery  Discussed MRI findings with the patient; awaiting pleural fluid cytology  CT Chest shows 2.6 cm spiculated left upper lobe mass, likely primary lung neoplasm. Nodularity along the left major fissure. Cannot differentiate intrapulmonary lymph nodes versus pleural metastatic disease. Left pleural drainage catheter in place without significant pleural effusion. Small left-sided pneumothorax, which may be postprocedural. 1.5 cm indeterminate liver lesion. Metastatic bone disease again noted to the spine.   Left pleural pigtail drainage catheter terminates in the left posterior hemithorax. No significant pleural effusion. Small left-sided pneumothorax.  Discussed new CT Chest findings with patient and will call  to update him.   Patient evaluated by Orthopedics, underwent MRI brain, evaluated by Surgery  No surgical intervention for the gallstones, receiving Cipro and Flagyl  Underwent L2-L5 laminectomy, partial L2 corpectomy, T11-L5 posterior fusion and stabilization, pedicle screw instrumentation and possible vertebroplasty on 7/16  Likely metastatic lung cancer with metastasis to extrapulmonary areas including the vertebra and brain (per recent MRI results)  Continue monitoring WBC, hemodynamics   Follow up OR Pathology   Needs PT/OT, continue IS  Discussed plan with Hospitalist  Thank you for the consult; will continue to follow

## 2020-07-18 NOTE — PROGRESS NOTE ADULT - ASSESSMENT
Left hemovac removed due to low output.   Tip intact  Continue remaining drains for now  Continue care per PMD and all consultants

## 2020-07-18 NOTE — PROGRESS NOTE ADULT - SUBJECTIVE AND OBJECTIVE BOX
Pt seen and examined in SDU, feeling better this AM, pain better controlled. Was able to get to side of bed yesterday and work with PT. Moving all extremities.  No headaches, new onset numbness/tingling. D/w patient about getting out of bed with PT.     Vital Signs Last 24 Hrs  T(C): 36.2 (18 Jul 2020 06:43), Max: 36.8 (17 Jul 2020 14:52)  T(F): 97.2 (18 Jul 2020 06:43), Max: 98.3 (17 Jul 2020 14:52)  HR: 88 (18 Jul 2020 06:00) (79 - 94)  BP: 114/34 (18 Jul 2020 06:00) (108/55 - 135/53)  BP(mean): 55 (18 Jul 2020 06:00) (55 - 80)  RR: 14 (18 Jul 2020 06:00) (6 - 19)  SpO2: 100% (18 Jul 2020 06:00) (95% - 100%)    Gen: NAD  Spine:  Dressing C/D/I  Pt moving all extremities HF/LE/DF/PF 5/5 throughout  Sensation intact throughout, Similar discrepancy along B/L anterior thighs from preop.  KAYLAN and HMVx2 in place with SS output  SCDs in place, no calf TTP   +2 DP pulses B/L

## 2020-07-18 NOTE — PROGRESS NOTE ADULT - SUBJECTIVE AND OBJECTIVE BOX
74 y/o female with PMH of Hypothyroidism and L2 pathologic compression fracture presents to   on 7/10/20 with 1 month history of dyspnea and 1 week history of the dry cough. Patient was seen by ortho spine surgeon, pulmonologist and oncologist and send for evaluation of left sided pleural effusion with concern for lung mass. Pt reports onset of back pain was in March 2020, received cortisone shots and got an MRI of L2 and blood test x2 weeks ago. Pt went to Dr. Westfall (oncologist) and was sent for imaging with Dr. Sanchez (pulmonologist) who discussed results of CT abdomen and chest this AM with pt and told pt to come to ED for evaluation for SOB.  In Ed -  T(F): 98.9Max: 98.9  HR: 80  (80 - 94) BP: 149/63 (149/63 - 150/77) RR: 24(17 - 24) SpO2: 98% (98% - 99%) EKG - sinus , low voltage QRS, troponin x 1neg, CXR - large pleural effusion, WBC 12, Cr 0.73, , covid PCR neg , s/p left chest tube placement in ED , fluid analysis pending (10 Jul 2020 13:26)      Stable but can't overcome the fear of leaving the bed; last BP 4 days ago. Pain well ctr    Vital Signs Last 24 Hrs  T(C): 36.2 (18 Jul 2020 06:43), Max: 36.8 (17 Jul 2020 14:52)  T(F): 97.2 (18 Jul 2020 06:43), Max: 98.3 (17 Jul 2020 14:52)  HR: 99 (18 Jul 2020 09:00) (79 - 99)  BP: 127/54 (18 Jul 2020 09:00) (107/50 - 135/53)  BP(mean): 71 (18 Jul 2020 09:00) (55 - 80)  RR: 14 (18 Jul 2020 09:00) (7 - 19)  SpO2: 100% (18 Jul 2020 08:00) (95% - 100%)        HEAD:  Atraumatic, Normocephalic  EYES: EOMI, PERRLA, conjunctiva and sclera clear  NECK: Supple, No JVD  CHEST/LUNG: BS decreased over left base, ; No rales, no rhonchi, no wheezing, Left CT +   HEART: Regular rate and rhythm; No murmurs, no rubs or gallops  ABDOMEN: Soft, Nontender, Nondistended; Bowel sounds present  GENITOURINARY: FQ in place  EXTREMITIES:  2+ Peripheral Pulses, No clubbing, cyanosis, no  edema  CNS non focal                             10.5   21.47 )-----------( 310      ( 18 Jul 2020 07:00 )             31.8   07-18    136  |  101  |  14  ----------------------------<  124<H>  4.2   |  30  |  0.46<L>    Ca    7.4<L>      18 Jul 2020 07:00  Mg     2.2     07-17          < from: NM Hepatobiliary Imaging (07.14.20 @ 12:03) >  IMPRESSION:  Abnormal morphine-augmented hepatobiliary scan compatible with acute cholecystitis.

## 2020-07-18 NOTE — CONSULT NOTE ADULT - ASSESSMENT
Markedly distended gallbladder on imaging studies and positive HIDA scan. Findings suggestive of gallbladder hydrops. Abdominal symptoms may not be evident due to supression of inflammation by high dose steroids. Recommend decompression of gallbladder with percutaneous drainage. Patient not interestedin other procedure at the moment. Advised her of risk of sepsis, gallbladder perforation with gallbladder hydrops. She will consider recomendation. I will follow. Suggest repeating ultrasound to re evaluate gallbladder.

## 2020-07-18 NOTE — PROGRESS NOTE ADULT - ASSESSMENT
A/P:  76 yo F s/p L2-4 Lami/decompression, T11-L5 PSF POD 2      - Continue Decadron Taper  - FU OR Pathology , preliminary frozen adenocarcinoma   - FU drain outputs, HMV x2, KAYLAN drain  - pain control. PCA  -medical management per primary team  -Fields, can be removed when ambulating   -LSO at bedside, wear while in chair and ambulating  - PT/OT, OOB today  - D/w med about starting DVT ppx once drains are removed, patient is high risk for DVT given surgery and metastatic Ca  - SCD's and ambulation for DVT ppx now  - Will d/w Dr. George and advise if plan changes

## 2020-07-18 NOTE — CONSULT NOTE ADULT - SUBJECTIVE AND OBJECTIVE BOX
Asked to evaluate 76 yo female for positive HIDA scan and distended gallbladder on CT scan. 76 y/o female with PMH of Hypothyroidism and L2 pathologic compression fracture presented to   ED on 7/10/20 with 1 month history of increasing dyspnea and also 3 month history of low back pain.  CXR obtained in ED  - large pleural effusion, WBC 12, covid PCR neg , s/p left chest tube placement in ED , pleural fluid analysis pending.  MRI of t and l spine on 7-11-20 shows lesions at T10 and T12 which appear suspicious for metastases and pathologic compression fracture of L2 vertebral body with abnormal enhancement and retropulsion of bony fragments into the spinal canal.   CT chest reveals a 2.6 cm INDU spiculated nodule suspicious for primary tumor and nodularity along left major fissure suspicious for pleural disease.   MRI brain on 7-14-20 revealed  a 1.2 cm enhancing mass in right lateral frontal lobe and 4 subcentimeter lesions elsewhere in the supratentorial brain consistent with brain metastases; no abnormal leptomeningeal enhancement.  Patient has been evaluated by Dr. Handy of neurosurgery; no neurosurgical intervention is planned at this time.  She is 2 days post op s/p decompression of spine and fusion. I have been asked to evaluate her for recent positive HIDA scan.    PAST MEDICAL & SURGICAL HISTORY:  Osteoarthritis  Fracture: L2 pathologic fracture  Hypothyroidism  No significant past surgical history    + ex / remote/ light  smoker    Allergies    No Known Allergies    Intolerances    SOCIAL HISTORY: Remote and light smoker     FAMILY HISTORY:  FH: thyroid disease (Mother)    PE    VSS  76 yo female WDWN in NAD    lungs- celar  Cor- RRR  Abd- + BS soft, non tender  Ext- no edema    CT chest- markedly distended gallbladder on lower cuts.    HIDA- non visualized gallbladder. < from: NM Hepatobiliary Imaging (07.14.20 @ 12:03) >    Abnormal morphine-augmented hepatobiliary scan compatible with acute cholecystitis.      < end of copied text >

## 2020-07-18 NOTE — PROGRESS NOTE ADULT - SUBJECTIVE AND OBJECTIVE BOX
POD#2. Pt seen resting on chair. Pt has incisional site soreness. Pt denies lower extremities pain and weakness. She has occasional numbness of b/l quads. Driscoll and PCA intact-will be discontinued as soon as she gets back to bed.     PE  Gen appearance: awake and alert  Motor strength and neuro: intact and stable. 5/5 of b/l lower extremities with sensation intact  Incisional site: clean and dry dressing noted--Drain bulb: 60cc, right hvac 230cc, left hvac 20cc managed per Plastics--left HVAC removed by Plastic surgery today  No calf tenderness bilaterally. Venodynes on bilaterally    Plan  Afebrile, HR: 99, episodes of hypotension 107/50 at 8am-last BP:129/52 eval per Med  Drain bulb: 60cc, right hvac 230cc, left hvac 20cc managed per Plastics--left HVAC removed by Plastic surgery today  WBC: 21.47 decreased from 24.17 on steroids, H/H:10.5/31.8  PCA and driscoll already discontinued----transitioned to oral and IV analgesics ordered by Dr. Cullen. (Oxycodone 5mg q4H for mild pain, 10mg for moderate pain, Dilaudid 1mg IVP Q3H for BTP)  Encouraged incentive spirometer. Continue venodynes  F/u OR path (fresh frozen - adenocarcinoma)---Continue management per Medicine and Oncology team   Discussed case with Dr. Carey.

## 2020-07-18 NOTE — PROGRESS NOTE ADULT - SUBJECTIVE AND OBJECTIVE BOX
Patient is POD#2, surgical stabilization of spine.  Is awake alert, states she is very anxious.  Chart states frozen section / pathology is adenocarcinoma.    Plan   Continue post op care as per Ortho.  I explained to patient   that once definitive pathology available Dr Khoi gutierrez then review palliative oncology strategy

## 2020-07-19 LAB
ANION GAP SERPL CALC-SCNC: 5 MMOL/L — SIGNIFICANT CHANGE UP (ref 5–17)
BASOPHILS # BLD AUTO: 0.02 K/UL — SIGNIFICANT CHANGE UP (ref 0–0.2)
BASOPHILS NFR BLD AUTO: 0.1 % — SIGNIFICANT CHANGE UP (ref 0–2)
BUN SERPL-MCNC: 12 MG/DL — SIGNIFICANT CHANGE UP (ref 7–23)
CALCIUM SERPL-MCNC: 7.7 MG/DL — LOW (ref 8.5–10.1)
CHLORIDE SERPL-SCNC: 104 MMOL/L — SIGNIFICANT CHANGE UP (ref 96–108)
CO2 SERPL-SCNC: 28 MMOL/L — SIGNIFICANT CHANGE UP (ref 22–31)
CREAT SERPL-MCNC: 0.48 MG/DL — LOW (ref 0.5–1.3)
EOSINOPHIL # BLD AUTO: 0.03 K/UL — SIGNIFICANT CHANGE UP (ref 0–0.5)
EOSINOPHIL NFR BLD AUTO: 0.2 % — SIGNIFICANT CHANGE UP (ref 0–6)
GLUCOSE SERPL-MCNC: 109 MG/DL — HIGH (ref 70–99)
HCT VFR BLD CALC: 35.5 % — SIGNIFICANT CHANGE UP (ref 34.5–45)
HGB BLD-MCNC: 11.4 G/DL — LOW (ref 11.5–15.5)
IMM GRANULOCYTES NFR BLD AUTO: 2.1 % — HIGH (ref 0–1.5)
LYMPHOCYTES # BLD AUTO: 13.1 % — SIGNIFICANT CHANGE UP (ref 13–44)
LYMPHOCYTES # BLD AUTO: 2.38 K/UL — SIGNIFICANT CHANGE UP (ref 1–3.3)
MCHC RBC-ENTMCNC: 29.8 PG — SIGNIFICANT CHANGE UP (ref 27–34)
MCHC RBC-ENTMCNC: 32.1 GM/DL — SIGNIFICANT CHANGE UP (ref 32–36)
MCV RBC AUTO: 92.7 FL — SIGNIFICANT CHANGE UP (ref 80–100)
MONOCYTES # BLD AUTO: 1.41 K/UL — HIGH (ref 0–0.9)
MONOCYTES NFR BLD AUTO: 7.8 % — SIGNIFICANT CHANGE UP (ref 2–14)
NEUTROPHILS # BLD AUTO: 13.94 K/UL — HIGH (ref 1.8–7.4)
NEUTROPHILS NFR BLD AUTO: 76.7 % — SIGNIFICANT CHANGE UP (ref 43–77)
PLATELET # BLD AUTO: 286 K/UL — SIGNIFICANT CHANGE UP (ref 150–400)
POTASSIUM SERPL-MCNC: 4 MMOL/L — SIGNIFICANT CHANGE UP (ref 3.5–5.3)
POTASSIUM SERPL-SCNC: 4 MMOL/L — SIGNIFICANT CHANGE UP (ref 3.5–5.3)
RBC # BLD: 3.83 M/UL — SIGNIFICANT CHANGE UP (ref 3.8–5.2)
RBC # FLD: 13.2 % — SIGNIFICANT CHANGE UP (ref 10.3–14.5)
SODIUM SERPL-SCNC: 137 MMOL/L — SIGNIFICANT CHANGE UP (ref 135–145)
WBC # BLD: 18.16 K/UL — HIGH (ref 3.8–10.5)
WBC # FLD AUTO: 18.16 K/UL — HIGH (ref 3.8–10.5)

## 2020-07-19 PROCEDURE — 99233 SBSQ HOSP IP/OBS HIGH 50: CPT

## 2020-07-19 PROCEDURE — 71045 X-RAY EXAM CHEST 1 VIEW: CPT | Mod: 26

## 2020-07-19 RX ADMIN — GABAPENTIN 300 MILLIGRAM(S): 400 CAPSULE ORAL at 11:25

## 2020-07-19 RX ADMIN — Medication 975 MILLIGRAM(S): at 21:32

## 2020-07-19 RX ADMIN — Medication 975 MILLIGRAM(S): at 05:58

## 2020-07-19 RX ADMIN — Medication 975 MILLIGRAM(S): at 14:25

## 2020-07-19 RX ADMIN — OXYCODONE HYDROCHLORIDE 10 MILLIGRAM(S): 5 TABLET ORAL at 20:15

## 2020-07-19 RX ADMIN — Medication 1 TABLET(S): at 14:25

## 2020-07-19 RX ADMIN — HYDROMORPHONE HYDROCHLORIDE 1 MILLIGRAM(S): 2 INJECTION INTRAMUSCULAR; INTRAVENOUS; SUBCUTANEOUS at 03:14

## 2020-07-19 RX ADMIN — Medication 975 MILLIGRAM(S): at 05:57

## 2020-07-19 RX ADMIN — Medication 975 MILLIGRAM(S): at 22:05

## 2020-07-19 RX ADMIN — SODIUM CHLORIDE 3 MILLILITER(S): 9 INJECTION INTRAMUSCULAR; INTRAVENOUS; SUBCUTANEOUS at 05:53

## 2020-07-19 RX ADMIN — PIPERACILLIN AND TAZOBACTAM 25 GRAM(S): 4; .5 INJECTION, POWDER, LYOPHILIZED, FOR SOLUTION INTRAVENOUS at 21:33

## 2020-07-19 RX ADMIN — Medication 1 TABLET(S): at 21:32

## 2020-07-19 RX ADMIN — Medication 3 MILLIGRAM(S): at 21:32

## 2020-07-19 RX ADMIN — Medication 1 TABLET(S): at 11:27

## 2020-07-19 RX ADMIN — Medication 100 MICROGRAM(S): at 05:57

## 2020-07-19 RX ADMIN — CYCLOBENZAPRINE HYDROCHLORIDE 10 MILLIGRAM(S): 10 TABLET, FILM COATED ORAL at 21:32

## 2020-07-19 RX ADMIN — SENNA PLUS 2 TABLET(S): 8.6 TABLET ORAL at 21:32

## 2020-07-19 RX ADMIN — SODIUM CHLORIDE 3 MILLILITER(S): 9 INJECTION INTRAMUSCULAR; INTRAVENOUS; SUBCUTANEOUS at 21:34

## 2020-07-19 RX ADMIN — Medication 0.5 MILLIGRAM(S): at 11:26

## 2020-07-19 RX ADMIN — Medication 500 MILLIGRAM(S): at 11:26

## 2020-07-19 RX ADMIN — CYCLOBENZAPRINE HYDROCHLORIDE 10 MILLIGRAM(S): 10 TABLET, FILM COATED ORAL at 14:25

## 2020-07-19 RX ADMIN — Medication 1 TABLET(S): at 05:57

## 2020-07-19 RX ADMIN — OXYCODONE HYDROCHLORIDE 10 MILLIGRAM(S): 5 TABLET ORAL at 19:45

## 2020-07-19 RX ADMIN — SODIUM CHLORIDE 3 MILLILITER(S): 9 INJECTION INTRAMUSCULAR; INTRAVENOUS; SUBCUTANEOUS at 13:46

## 2020-07-19 RX ADMIN — PIPERACILLIN AND TAZOBACTAM 25 GRAM(S): 4; .5 INJECTION, POWDER, LYOPHILIZED, FOR SOLUTION INTRAVENOUS at 14:26

## 2020-07-19 RX ADMIN — GABAPENTIN 300 MILLIGRAM(S): 400 CAPSULE ORAL at 21:32

## 2020-07-19 RX ADMIN — LIOTHYRONINE SODIUM 5 MICROGRAM(S): 25 TABLET ORAL at 11:25

## 2020-07-19 RX ADMIN — CYCLOBENZAPRINE HYDROCHLORIDE 10 MILLIGRAM(S): 10 TABLET, FILM COATED ORAL at 05:57

## 2020-07-19 RX ADMIN — HYDROMORPHONE HYDROCHLORIDE 1 MILLIGRAM(S): 2 INJECTION INTRAMUSCULAR; INTRAVENOUS; SUBCUTANEOUS at 01:57

## 2020-07-19 RX ADMIN — Medication 1 MILLIGRAM(S): at 06:26

## 2020-07-19 RX ADMIN — PIPERACILLIN AND TAZOBACTAM 25 GRAM(S): 4; .5 INJECTION, POWDER, LYOPHILIZED, FOR SOLUTION INTRAVENOUS at 05:53

## 2020-07-19 NOTE — PROGRESS NOTE ADULT - ASSESSMENT
76 y/o female with PMH of Hypothyroidism and L2 pathologic compression fracture presents to  with 1 month history of dyspnea and 1 week history of the dry cough. Patient was seen by ortho spine surgeon, pulmonologist and oncologist and send for evaluation of left sided pleural effusion with concern for lung mass. Pt reports onset of back pain was in March 2020, received cortisone shots and got an MRI of L2 and blood test x2 weeks ago. Pt went to Dr. Westfall (oncologist) and was sent for imaging with Dr. Sanchez (pulmonologist) who discussed results of CT abdomen and chest this AM with pt and told pt to come to ED for evaluation for SOB.  In Ed -  T(F): 98.9Max: 98.9  HR: 80  (80 - 94) BP: 149/63 (149/63 - 150/77) RR: 24(17 - 24) SpO2: 98% (98% - 99%) EKG - sinus , low voltage QRS, troponin x 1neg, CXR - large pleural effusion, WBC 12, Cr 0.73, , COVID PCR neg , s/p left chest tube placement in ED , fluid analysis pending (10 Jul 2020 13:26)  Massive left sided pleural effusion  CT management as per thoracic surgery  Discussed MRI findings with the patient; awaiting pleural fluid cytology  CT Chest shows 2.6 cm spiculated left upper lobe mass, likely primary lung neoplasm. Nodularity along the left major fissure. Cannot differentiate intrapulmonary lymph nodes versus pleural metastatic disease. Left pleural drainage catheter in place without significant pleural effusion. Small left-sided pneumothorax, which may be postprocedural. 1.5 cm indeterminate liver lesion. Metastatic bone disease again noted to the spine.   Left pleural pigtail drainage catheter terminates in the left posterior hemithorax. No significant pleural effusion. Small left-sided pneumothorax.  Discussed new CT Chest findings with patient and will call  to update him.   Patient evaluated by Orthopedics, underwent MRI brain, evaluated by Surgery  No surgical intervention for the gallstones, receiving Cipro and Flagyl  Underwent L2-L5 laminectomy, partial L2 corpectomy, T11-L5 posterior fusion and stabilization, pedicle screw instrumentation and possible vertebroplasty on 7/16  Likely metastatic lung cancer with metastasis to extrapulmonary areas including the vertebra and brain (per recent MRI results)  Continue monitoring WBC, hemodynamics   Follow up OR Pathology   Needs PT/OT, continue IS  IR Decompression recommended by surgery for gallbladder distension; discussed with patient, will call patient's  Albert as well    Thank you for the consult; will continue to follow

## 2020-07-19 NOTE — PROGRESS NOTE ADULT - SUBJECTIVE AND OBJECTIVE BOX
Patient is a 75y old  Female who presents with a chief complaint of dyspnea , cough (2020 06:36)      HPI:  74 y/o female with PMH of Hypothyroidism and L2 pathologic compression fracture presents to  with 1 month history of dyspnea and 1 week history of the dry cough. Patient was seen by ortho spine surgeon, pulmonologist and oncologist and send for evaluation of left sided pleural effusion with concern for lung mass. Pt reports onset of back pain was in 2020, received cortisone shots and got an MRI of L2 and blood test x2 weeks ago. Pt went to Dr. Westfall (oncologist) and was sent for imaging with Dr. Sanchez (pulmonologist) who discussed results of CT abdomen and chest this AM with pt and told pt to come to ED for evaluation for SOB.  In Ed -  T(F): 98.9Max: 98.9  HR: 80  (80 - 94) BP: 149/63 (149/63 - 150/77) RR: 24(17 - 24) SpO2: 98% (98% - 99%) EKG - sinus , low voltage QRS, troponin x 1neg, CXR - large pleural effusion, WBC 12, Cr 0.73, , covid PCR neg , s/p left chest tube placement in ED , fluid analysis pending (10 Jul 2020 13:26)      Less drowsy today  Endorses diffuse pain  Plan for more PT/OT      No acute pulmonary events occurred overnight  CT [facility] changed, 40 cc this morning  IR Drain recommended by surgery for gallbladder distension      MEDICATIONS  (STANDING):  acetaminophen   Tablet .. 975 milliGRAM(s) Oral every 8 hours  ascorbic acid 500 milliGRAM(s) Oral daily  calcium carbonate   1250 mG (OsCal) 1 Tablet(s) Oral three times a day  ciprofloxacin   IVPB 400 milliGRAM(s) IV Intermittent every 12 hours  cyclobenzaprine 10 milliGRAM(s) Oral every 8 hours  dexAMETHasone  Injectable 1 milliGRAM(s) IV Push every 8 hours  gabapentin 300 milliGRAM(s) Oral two times a day  levothyroxine 100 MICROGram(s) Oral daily  liothyronine 5 MICROGram(s) Oral daily  metroNIDAZOLE  IVPB 500 milliGRAM(s) IV Intermittent every 8 hours  multivitamin 1 Tablet(s) Oral daily  senna 2 Tablet(s) Oral at bedtime  sodium chloride 0.9% lock flush 3 milliLiter(s) IV Push every 8 hours    MEDICATIONS  (PRN):  benzocaine 15 mG/menthol 3.6 mG (Sugar-Free) Lozenge 1 Lozenge Oral every 3 hours PRN Sore Throat  famotidine    Tablet 20 milliGRAM(s) Oral every 12 hours PRN Dyspepsia  HYDROmorphone  Injectable 1 milliGRAM(s) IV Push every 3 hours PRN breakthrough pain  magnesium hydroxide Suspension 30 milliLiter(s) Oral every 12 hours PRN Constipation  melatonin 3 milliGRAM(s) Oral at bedtime PRN Sleep  naloxone Injectable 0.1 milliGRAM(s) IV Push every 3 minutes PRN For ANY of the following changes in patient status:  A. RR LESS THAN 10 breaths per minute, B. Oxygen saturation LESS THAN 90%, C. Sedation score of 6  ondansetron Injectable 4 milliGRAM(s) IV Push every 6 hours PRN Nausea  ondansetron Injectable 4 milliGRAM(s) IV Push every 6 hours PRN Nausea  oxyCODONE    IR 10 milliGRAM(s) Oral every 4 hours PRN Moderate Pain (4 - 6)  oxyCODONE    IR 5 milliGRAM(s) Oral every 4 hours PRN Mild Pain (1 - 3)  prochlorperazine   Injectable 10 milliGRAM(s) IV Push every 8 hours PRN Nausea/vomiting        Vital Signs Last 24 Hrs  T(C): 36.2 (2020 06:43), Max: 36.8 (2020 14:52)  T(F): 97.2 (2020 06:43), Max: 98.3 (2020 14:52)  HR: 99 (2020 09:00) (79 - 99)  BP: 127/54 (2020 09:00) (107/50 - 135/53)  BP(mean): 71 (2020 09:00) (55 - 80)  RR: 14 (2020 09:00) (7 - 19)  SpO2: 100% (2020 08:00) (95% - 100%)    I&O's Detail    2020 07:01  -  2020 07:00  --------------------------------------------------------  IN:  Total IN: 0 mL    OUT:    Chest Tube: 1115 mL  Total OUT: 1115 mL    Total NET: -1115 mL          PHYSICAL EXAM  General Appearance: cooperative, no acute distress,   HEENT: PERRL, conjunctiva clear, EOM's intact, non injected pharynx, no exudate, TM   normal  Neck: Supple, , no adenopathy, thyroid: not enlarged, no carotid bruit or JVD  Back: Symmetric, no  tenderness,no soft tissue tenderness  Lungs: left sided CT attached, decreased BS left mid-lower lung, IMPROVED  Heart: Regular rate and rhythm, S1, S2 normal, no murmur, rub or gallop  Abdomen: Soft, non-tender, bowel sounds active , no hepatosplenomegaly  Extremities: no cyanosis or edema, no joint swelling  Skin: Skin color, texture normal, no rashes   Neurologic: Alert and oriented X3 , cranial nerves intact, sensory and motor normal,    ECG:    LABS:                          13.3   13.48 )-----------( 482      ( 2020 09:13 )             39.1     07-10    136  |  104  |  11  ----------------------------<  114<H>  4.2   |  25  |  0.73    Ca    8.7      10 Jul 2020 10:49  Mg     2.2     07-10    TPro  7.0  /  Alb  3.1<L>  /  TBili  0.4  /  DBili  x   /  AST  28  /  ALT  33  /  AlkPhos  126<H>  07-10    CARDIAC MARKERS ( 10 Jul 2020 10:49 )  <0.015 ng/mL / x     / x     / x     / x            Pro BNP  209 07-10 @ 10:49  D Dimer  -- 0710 @ 10:49    PT/INR - ( 10 Jul 2020 10:57 )   PT: 12.2 sec;   INR: 1.04 ratio         PTT - ( 10 Jul 2020 10:57 )  PTT:29.4 sec  Urinalysis Basic - ( 10 Jul 2020 17:50 )    Color: Yellow / Appearance: Clear / S.010 / pH: x  Gluc: x / Ketone: Small  / Bili: Negative / Urobili: Negative mg/dL   Blood: x / Protein: Negative mg/dL / Nitrite: Negative   Leuk Esterase: Trace / RBC: 0-2 /HPF / WBC 0-2   Sq Epi: x / Non Sq Epi: Occasional / Bacteria: Few            RADIOLOGY & ADDITIONAL STUDIES:    < from: Xray Chest 1 View- PORTABLE-Urgent (07.10.20 @ 14:20) >    PROCEDURE DATE:  07/10/2020          INTERPRETATION:  AP chest on July 10, 2020 at 2:12 PM. Patient had catheter left chest tube inserted for massive effusion.    Heart size cannot be assessed.    A catheter left chest tube has been inserted at the base.    Massive left effusion seen prior in the day is significantly diminished but a very large effusion remains. There is no visible pneumothorax.    Underlying pathology cannot be excluded.    IMPRESSION: Diminished left effusion after chest tube. Significant effusion remains.    < end of copied text >  < from: Xray Chest 1 View- PORTABLE-Routine (20 @ 10:08) >      PROCEDURE DATE:  2020  personally reviewed and shown to pt with prior xrays as well        INTERPRETATION:  INDICATION: Assess left pleural effusion.    PRIORS: 7/10/2020.    VIEWS: Portable AP radiography of the chest performed.    FINDINGS: Since prior evaluation the position of the indwelling left pleural space pigtail drainage catheter has changed, the pigtail now overlying the medial aspect of the left mid thorax. There is interval decrease in left pleural effusion from prior; a moderately sized left pleural effusion persists. Underlying lung parenchymal infiltrate, consolidation or atelectasis cannot be excluded. There is no evidence for left-sided pneumothorax. No acute right-sided infiltrate is identified. No evidence for right pleural effusion or pneumothorax. No mediastinal shift noted. Degenerative changes of the thoracic spine evident.    IMPRESSION: Interval decrease in left pleural effusion from prior with change in position of indwelling pigtail pleural space drainage catheter. Moderately sized left pleural effusion persists. See above discussion.      CT CHEST     LUNGS, AIRWAYS: The central airways are patent. 2.6 x 2.2 cm spiculated nodule in the left upper lobe with surrounding linear fibrotic reaction. Patchy left lower lobe airspace disease, likely infectious. Nodularity along the left major fissure.    PLEURA: Left pleural pigtail drainage catheter terminates in the left posterior hemithorax. No significant pleural effusion. Small left-sided pneumothorax.    VESSELS: Normal caliber aorta. Main pulmonary arteries are patent.    HEART: Normal heart size. No pericardial effusion.    MEDIASTINUM AND MARJAN: No adenopathy.    UPPER ABDOMEN: Distended gallbladder. 1.5 cm indeterminate lesion in the caudate lobe.    BONES AND SOFT TISSUES: Destructive lesion of L2 vertebral body again seen.    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    Nodularity along the left major fissure. Cannot differentiate intrapulmonary lymph nodes versus pleural metastatic disease.    Left pleural drainage catheter in place without significant pleural effusion.    Small left-sided pneumothorax, which may be postprocedural.    1.5 cm indeterminate liver lesion.    Metastatic bone disease again noted to the spine.

## 2020-07-19 NOTE — PROGRESS NOTE ADULT - SUBJECTIVE AND OBJECTIVE BOX
Pt seen and examined in SDU, feeling better this AM, pain better controlled. Was able to get to side of bed yesterday and work with PT. Moving all extremities.  No headaches, new onset numbness/tingling. D/w patient about getting out of bed with PT.     Vital Signs Last 24 Hrs  T(C): 36.9 (19 Jul 2020 06:00), Max: 37.1 (18 Jul 2020 20:45)  T(F): 98.4 (19 Jul 2020 06:00), Max: 98.8 (18 Jul 2020 20:45)  HR: 96 (19 Jul 2020 10:00) (73 - 101)  BP: 129/55 (19 Jul 2020 10:00) (80/59 - 129/55)  BP(mean): 69 (19 Jul 2020 10:00) (31 - 90)  RR: 18 (19 Jul 2020 10:00) (10 - 28)  SpO2: 100% (19 Jul 2020 10:00) (91% - 100%)        Gen: NAD  Spine:  Dressing C/D/I  Pt moving all extremities HF/LE/DF/PF 5/5 throughout  Sensation intact throughout, Similar discrepancy along B/L anterior thighs from preop.  KAYLAN and HMVx2 in place with SS output  SCDs in place, no calf TTP   +2 DP pulses B/L

## 2020-07-19 NOTE — PROGRESS NOTE ADULT - ASSESSMENT
74 y/o female with PMH of Hypothyroidism and L2 pathologic compression fracture presents to  with 1 month history of dyspnea and 1 week history of the dry cough. Patient was seen by ortho spine surgeon, pulmonologist and oncologist and send for evaluation of left sided pleural effusion with concern for lung mass.   In Ed -  T(F): 98.9Max: 98.9  HR: 80  (80 - 94) BP: 149/63 (149/63 - 150/77) RR: 24(17 - 24) SpO2: 98% (98% - 99%) EKG - sinus , low voltage QRS, troponin x 1neg, CXR - large pleural effusion, WBC 12, Cr 0.73, , covid PCR neg , s/p left chest tube placement in ED , fluid analysis pending     * Lower back pain due to pathologic L2 fracture, worsening on MRI,   - s/p L2-L4 laminectomy, T11-L5 PSF with bone graft, L2 Biopsy/partial tumor excision/Dr George   - pain management, dilaudid PCA - would change to IVP due to dizziness; s/p IV steroids      * Brain lesion in right frontal lobe with multiple subcentimeter lesions   - neurosurgery consult Dr. Handy -  radiology - oncology consult for brain RT  - no edema    * Dyspnea due to Large left pleural effusion s/p left chest tube placement, exudative effusion suspected malignancy   * ON CT CHEST: INDU 2.6 cm mass,  - suspect Primary Lung with Brain and Spine mets   * Suspected postobstructive PNA s/p IV abx    * Leukocytosis steroids induced, improving    - cytology - pending    - TTE EF wnl, liver cystic lesion 15x5 cm       *  Liver lesion cystic on 2 d echo and Enlarged GB on CT 22 cm  - HIDA scan - positive,   - change to Zosyn ( Cipro with toxic profile) and ask Dr. Linda  to review the film; just abx are not enough for ac cresencio; either percut drain to temporize the process and surgery later or surgery  - case d/w  will schedule IR for AM        DVT proph - to be started when cleared by Ortho/spine

## 2020-07-19 NOTE — PROGRESS NOTE ADULT - SUBJECTIVE AND OBJECTIVE BOX
76 y/o female with PMH of Hypothyroidism and L2 pathologic compression fracture presents to   on 7/10/20 with 1 month history of dyspnea and 1 week history of the dry cough. Patient was seen by ortho spine surgeon, pulmonologist and oncologist and send for evaluation of left sided pleural effusion with concern for lung mass. Pt reports onset of back pain was in March 2020, received cortisone shots and got an MRI of L2 and blood test x2 weeks ago. Pt went to Dr. Westfall (oncologist) and was sent for imaging with Dr. Sanchez (pulmonologist) who discussed results of CT abdomen and chest this AM with pt and told pt to come to ED for evaluation for SOB.  In Ed -  T(F): 98.9Max: 98.9  HR: 80  (80 - 94) BP: 149/63 (149/63 - 150/77) RR: 24(17 - 24) SpO2: 98% (98% - 99%) EKG - sinus , low voltage QRS, troponin x 1neg, CXR - large pleural effusion, WBC 12, Cr 0.73, , covid PCR neg , s/p left chest tube placement in ED , fluid analysis pending (10 Jul 2020 13:26)      Stable but very anxious; pain well ctr    Vital Signs Last 24 Hrs  T(C): 36.9 (19 Jul 2020 06:00), Max: 37.1 (18 Jul 2020 20:45)  T(F): 98.4 (19 Jul 2020 06:00), Max: 98.8 (18 Jul 2020 20:45)  HR: 96 (19 Jul 2020 10:00) (73 - 101)  BP: 129/55 (19 Jul 2020 10:00) (80/59 - 129/55)  BP(mean): 69 (19 Jul 2020 10:00) (31 - 90)  RR: 18 (19 Jul 2020 10:00) (10 - 28)  SpO2: 100% (19 Jul 2020 10:00) (91% - 100%)        HEAD:  Atraumatic, Normocephalic  EYES: EOMI, PERRLA, conjunctiva and sclera clear  NECK: Supple, No JVD  CHEST/LUNG: BS decreased over left base, ; No rales, no rhonchi, no wheezing, Left CT +   HEART: Regular rate and rhythm; No murmurs, no rubs or gallops  ABDOMEN: Soft, Nontender, Nondistended; Bowel sounds present  GENITOURINARY: FQ in place  EXTREMITIES:  2+ Peripheral Pulses, No clubbing, cyanosis, no  edema  CNS non focal      Vital Signs Last 24 Hrs  T(C): 36.9 (19 Jul 2020 06:00), Max: 37.1 (18 Jul 2020 20:45)  T(F): 98.4 (19 Jul 2020 06:00), Max: 98.8 (18 Jul 2020 20:45)  HR: 96 (19 Jul 2020 10:00) (73 - 101)  BP: 129/55 (19 Jul 2020 10:00) (80/59 - 129/55)  BP(mean): 69 (19 Jul 2020 10:00) (31 - 90)  RR: 18 (19 Jul 2020 10:00) (10 - 28)  SpO2: 100% (19 Jul 2020 10:00) (91% - 100%)        < from: NM Hepatobiliary Imaging (07.14.20 @ 12:03) >  IMPRESSION:  Abnormal morphine-augmented hepatobiliary scan compatible with acute cholecystitis.

## 2020-07-19 NOTE — PROGRESS NOTE ADULT - ASSESSMENT
A/P: 76 yo F s/p L2-4 Lami/decompression, T11-L5 PSF POD 3      - Continue Decadron Taper  - FU OR Pathology , preliminary frozen adenocarcinoma   - FU drain outputs, HMV x2, KAYLAN drain  - pain control. PCA  -medical management per primary team  -Fields, can be removed when ambulating   -LSO at bedside, wear while in chair and ambulating  - PT/OT, OOB today  - D/w med about starting DVT ppx once drains are removed, patient is high risk for DVT given surgery and metastatic Ca  - SCD's and ambulation for DVT ppx now  - Will d/w Dr. George and advise if plan changes

## 2020-07-20 DIAGNOSIS — F43.23 ADJUSTMENT DISORDER WITH MIXED ANXIETY AND DEPRESSED MOOD: ICD-10-CM

## 2020-07-20 DIAGNOSIS — F32.9 MAJOR DEPRESSIVE DISORDER, SINGLE EPISODE, UNSPECIFIED: ICD-10-CM

## 2020-07-20 LAB
ADD ON TEST-SPECIMEN IN LAB: SIGNIFICANT CHANGE UP
ALBUMIN SERPL ELPH-MCNC: 2.1 G/DL — LOW (ref 3.3–5)
ALP SERPL-CCNC: 137 U/L — HIGH (ref 40–120)
ALT FLD-CCNC: 34 U/L — SIGNIFICANT CHANGE UP (ref 12–78)
ANION GAP SERPL CALC-SCNC: 1 MMOL/L — LOW (ref 5–17)
AST SERPL-CCNC: 27 U/L — SIGNIFICANT CHANGE UP (ref 15–37)
BILIRUB SERPL-MCNC: 0.5 MG/DL — SIGNIFICANT CHANGE UP (ref 0.2–1.2)
BUN SERPL-MCNC: 14 MG/DL — SIGNIFICANT CHANGE UP (ref 7–23)
CALCIUM SERPL-MCNC: 8 MG/DL — LOW (ref 8.5–10.1)
CHLORIDE SERPL-SCNC: 104 MMOL/L — SIGNIFICANT CHANGE UP (ref 96–108)
CO2 SERPL-SCNC: 32 MMOL/L — HIGH (ref 22–31)
CREAT SERPL-MCNC: 0.53 MG/DL — SIGNIFICANT CHANGE UP (ref 0.5–1.3)
GLUCOSE SERPL-MCNC: 99 MG/DL — SIGNIFICANT CHANGE UP (ref 70–99)
HCT VFR BLD CALC: 34.2 % — LOW (ref 34.5–45)
HGB BLD-MCNC: 11.2 G/DL — LOW (ref 11.5–15.5)
MCHC RBC-ENTMCNC: 30.1 PG — SIGNIFICANT CHANGE UP (ref 27–34)
MCHC RBC-ENTMCNC: 32.7 GM/DL — SIGNIFICANT CHANGE UP (ref 32–36)
MCV RBC AUTO: 91.9 FL — SIGNIFICANT CHANGE UP (ref 80–100)
PLATELET # BLD AUTO: 289 K/UL — SIGNIFICANT CHANGE UP (ref 150–400)
POTASSIUM SERPL-MCNC: 3.8 MMOL/L — SIGNIFICANT CHANGE UP (ref 3.5–5.3)
POTASSIUM SERPL-SCNC: 3.8 MMOL/L — SIGNIFICANT CHANGE UP (ref 3.5–5.3)
PROT SERPL-MCNC: 5.7 GM/DL — LOW (ref 6–8.3)
RBC # BLD: 3.72 M/UL — LOW (ref 3.8–5.2)
RBC # FLD: 13.4 % — SIGNIFICANT CHANGE UP (ref 10.3–14.5)
SODIUM SERPL-SCNC: 137 MMOL/L — SIGNIFICANT CHANGE UP (ref 135–145)
WBC # BLD: 15.41 K/UL — HIGH (ref 3.8–10.5)
WBC # FLD AUTO: 15.41 K/UL — HIGH (ref 3.8–10.5)

## 2020-07-20 PROCEDURE — 47490 INCISION OF GALLBLADDER: CPT

## 2020-07-20 PROCEDURE — 99233 SBSQ HOSP IP/OBS HIGH 50: CPT

## 2020-07-20 PROCEDURE — 71045 X-RAY EXAM CHEST 1 VIEW: CPT | Mod: 26

## 2020-07-20 PROCEDURE — 99232 SBSQ HOSP IP/OBS MODERATE 35: CPT

## 2020-07-20 PROCEDURE — 90792 PSYCH DIAG EVAL W/MED SRVCS: CPT

## 2020-07-20 PROCEDURE — 76705 ECHO EXAM OF ABDOMEN: CPT | Mod: 26

## 2020-07-20 PROCEDURE — 78226 HEPATOBILIARY SYSTEM IMAGING: CPT | Mod: 26

## 2020-07-20 RX ORDER — FENTANYL CITRATE 50 UG/ML
50 INJECTION INTRAVENOUS
Refills: 0 | Status: DISCONTINUED | OUTPATIENT
Start: 2020-07-20 | End: 2020-07-20

## 2020-07-20 RX ORDER — ESCITALOPRAM OXALATE 10 MG/1
5 TABLET, FILM COATED ORAL DAILY
Refills: 0 | Status: DISCONTINUED | OUTPATIENT
Start: 2020-07-20 | End: 2020-07-23

## 2020-07-20 RX ORDER — OXYCODONE HYDROCHLORIDE 5 MG/1
5 TABLET ORAL ONCE
Refills: 0 | Status: DISCONTINUED | OUTPATIENT
Start: 2020-07-20 | End: 2020-07-20

## 2020-07-20 RX ORDER — SINCALIDE 5 UG/5ML
1.2 INJECTION, POWDER, LYOPHILIZED, FOR SOLUTION INTRAVENOUS ONCE
Refills: 0 | Status: COMPLETED | OUTPATIENT
Start: 2020-07-20 | End: 2020-07-20

## 2020-07-20 RX ORDER — PROCHLORPERAZINE MALEATE 5 MG
10 TABLET ORAL ONCE
Refills: 0 | Status: DISCONTINUED | OUTPATIENT
Start: 2020-07-20 | End: 2020-07-23

## 2020-07-20 RX ORDER — SODIUM CHLORIDE 9 MG/ML
1000 INJECTION INTRAMUSCULAR; INTRAVENOUS; SUBCUTANEOUS
Refills: 0 | Status: DISCONTINUED | OUTPATIENT
Start: 2020-07-20 | End: 2020-07-20

## 2020-07-20 RX ORDER — MORPHINE SULFATE 50 MG/1
2 CAPSULE, EXTENDED RELEASE ORAL ONCE
Refills: 0 | Status: DISCONTINUED | OUTPATIENT
Start: 2020-07-20 | End: 2020-07-20

## 2020-07-20 RX ORDER — HYDROMORPHONE HYDROCHLORIDE 2 MG/ML
0.5 INJECTION INTRAMUSCULAR; INTRAVENOUS; SUBCUTANEOUS
Refills: 0 | Status: DISCONTINUED | OUTPATIENT
Start: 2020-07-20 | End: 2020-07-20

## 2020-07-20 RX ORDER — ONDANSETRON 8 MG/1
4 TABLET, FILM COATED ORAL ONCE
Refills: 0 | Status: DISCONTINUED | OUTPATIENT
Start: 2020-07-20 | End: 2020-07-20

## 2020-07-20 RX ADMIN — GABAPENTIN 300 MILLIGRAM(S): 400 CAPSULE ORAL at 22:07

## 2020-07-20 RX ADMIN — Medication 975 MILLIGRAM(S): at 22:08

## 2020-07-20 RX ADMIN — ESCITALOPRAM OXALATE 5 MILLIGRAM(S): 10 TABLET, FILM COATED ORAL at 22:07

## 2020-07-20 RX ADMIN — PIPERACILLIN AND TAZOBACTAM 25 GRAM(S): 4; .5 INJECTION, POWDER, LYOPHILIZED, FOR SOLUTION INTRAVENOUS at 22:07

## 2020-07-20 RX ADMIN — Medication 0.5 MILLIGRAM(S): at 11:33

## 2020-07-20 RX ADMIN — SODIUM CHLORIDE 3 MILLILITER(S): 9 INJECTION INTRAMUSCULAR; INTRAVENOUS; SUBCUTANEOUS at 06:28

## 2020-07-20 RX ADMIN — CYCLOBENZAPRINE HYDROCHLORIDE 10 MILLIGRAM(S): 10 TABLET, FILM COATED ORAL at 05:42

## 2020-07-20 RX ADMIN — GABAPENTIN 300 MILLIGRAM(S): 400 CAPSULE ORAL at 10:26

## 2020-07-20 RX ADMIN — SENNA PLUS 2 TABLET(S): 8.6 TABLET ORAL at 22:08

## 2020-07-20 RX ADMIN — CYCLOBENZAPRINE HYDROCHLORIDE 10 MILLIGRAM(S): 10 TABLET, FILM COATED ORAL at 22:07

## 2020-07-20 RX ADMIN — OXYCODONE HYDROCHLORIDE 10 MILLIGRAM(S): 5 TABLET ORAL at 22:09

## 2020-07-20 RX ADMIN — PIPERACILLIN AND TAZOBACTAM 25 GRAM(S): 4; .5 INJECTION, POWDER, LYOPHILIZED, FOR SOLUTION INTRAVENOUS at 05:42

## 2020-07-20 RX ADMIN — SODIUM CHLORIDE 3 MILLILITER(S): 9 INJECTION INTRAMUSCULAR; INTRAVENOUS; SUBCUTANEOUS at 13:01

## 2020-07-20 RX ADMIN — Medication 1 TABLET(S): at 22:07

## 2020-07-20 RX ADMIN — Medication 100 MICROGRAM(S): at 05:42

## 2020-07-20 RX ADMIN — Medication 500 MILLIGRAM(S): at 10:26

## 2020-07-20 RX ADMIN — Medication 1 TABLET(S): at 10:26

## 2020-07-20 RX ADMIN — OXYCODONE HYDROCHLORIDE 10 MILLIGRAM(S): 5 TABLET ORAL at 23:48

## 2020-07-20 RX ADMIN — Medication 975 MILLIGRAM(S): at 05:42

## 2020-07-20 RX ADMIN — MORPHINE SULFATE 2 MILLIGRAM(S): 50 CAPSULE, EXTENDED RELEASE ORAL at 14:24

## 2020-07-20 RX ADMIN — Medication 975 MILLIGRAM(S): at 06:15

## 2020-07-20 RX ADMIN — SINCALIDE 102.4 MICROGRAM(S): 5 INJECTION, POWDER, LYOPHILIZED, FOR SOLUTION INTRAVENOUS at 12:15

## 2020-07-20 RX ADMIN — Medication 3 MILLIGRAM(S): at 22:25

## 2020-07-20 RX ADMIN — SODIUM CHLORIDE 3 MILLILITER(S): 9 INJECTION INTRAMUSCULAR; INTRAVENOUS; SUBCUTANEOUS at 22:27

## 2020-07-20 RX ADMIN — Medication 975 MILLIGRAM(S): at 23:46

## 2020-07-20 RX ADMIN — LIOTHYRONINE SODIUM 5 MICROGRAM(S): 25 TABLET ORAL at 10:26

## 2020-07-20 NOTE — PROGRESS NOTE ADULT - PROBLEM SELECTOR PLAN 3
unstable, fragmented L2  s/p spine stabilization   She'll require radiation to the spine as an outpatient

## 2020-07-20 NOTE — BEHAVIORAL HEALTH ASSESSMENT NOTE - RISK ASSESSMENT
Low Acute Suicide Risk LOW acute risks: Pt has no SI, HI, no aggression, not impulsive, no psych hx, sleep with meds, no acute pain   INCREASED long term risk considering depression and anxiety in the light of illness with poor prognosis,   Protective factors: NO psych hx, opt bull not want to die, future plans, supportive family.

## 2020-07-20 NOTE — CONSULT NOTE ADULT - PROVIDER SPECIALTY LIST ADULT
Thoracic Surgery
Orthopedics
Intervent Radiology
Heme/Onc
Neurosurgery
Infectious Disease
Pulmonology
Rad Onc
Surgery
Surgery
Neurosurgery
Plastic Surgery

## 2020-07-20 NOTE — BEHAVIORAL HEALTH ASSESSMENT NOTE - SUMMARY
Pt is a 75 YOWW with no past psychiatry hx and [id number] hx significant for multiple metastases.   In the light of her medical condition pt is expressing anxiety and depressed mood,  poor appetite low energy level, insomnia and fear of dying. Pt wants to live "I have so many reason to live, I have children,   I have grandchildren, I don't want to die". NO SI. NO HI, no AH, , PI.   Pt is scared and distressed, She dose not wan to see psychiatrist and she is interested in getting meds for anxiety and depression.  Pt si not suicidal or homicidal, not at imminent risk to harm self and others and she does not need inpatient level of care.     Meds discussed.   Suggest to take ativan 0.5 mg po q6 h PRN anxiety once she can resume PO intake and   Lexapro 5 mg po qd for depression and anxiety,. side effects/ benefits discussed, pt is wiling to take  meds, but not willing to see psychiatrist in community.

## 2020-07-20 NOTE — PROGRESS NOTE ADULT - ASSESSMENT
S/p cholecystostomy tube placement for gallbladder hydrops due to obstructing cystic duct stone, cholecystitis. Over 400 ml drained. Continue tube to drainage. Diet as tolerated. Pain management.

## 2020-07-20 NOTE — BEHAVIORAL HEALTH ASSESSMENT NOTE - NSBHCONSULTFOLLOWAFTERCARE_PSY_A_CORE FT
Pt bull snot wan to see psychiatrist in community, her meds could be prescribed by PMD . Pt would, benefit from psychotherapy and counseling.

## 2020-07-20 NOTE — PROGRESS NOTE ADULT - SUBJECTIVE AND OBJECTIVE BOX
POD#4. Pt seen resting in bed. Pt has incisional site soreness. Pt states she feels some weakness of b/l lower extremities.   Pt voided on own without complications. Numbness of b/l quads improving.     PE  Gen appearance:   Motor strength: 3+/5 of b/l hip flexors. 5/5 of b/l gastrocs, ant tibs, EHL  Sensation: intact to light touch  No calf tenderness bilaterally. Venodynes on bilaterally  Incisional site: clean and dry dressing. Bulb: 15cc removed by Plastics. Drain 100cc/24 hours kept    Plan  Afebrile, BP:118/52 eval per MED.   WBC:15.41, H/H: 11.2/34.2  Chest tube:154ML  possible IR cholecystostomy today  Mobilize as tolerated with physical therapy and encouraged incentive spirometer  awaiting final path. Continue care per Medicine and Oncology  Discussed case with Dr. George

## 2020-07-20 NOTE — CONSULT NOTE ADULT - SUBJECTIVE AND OBJECTIVE BOX
Vascular & Interventional Radiology Consult    HPI: 75y Female with mets to spine causing compression fx now s/p recent laminectomy, also pleural effusion s/p thoracentesis.     On admission, large GB noted on CT. Subsequent US showed large GB, no wall thickening, no pericholecystic fluid. Surgery unable to operate at this time due to medical comorbidities, so IR consult to evaluate for cholecystostomy.     Allergies:   Medications (Abx/Cardiac/Anticoagulation/Blood Products)    piperacillin/tazobactam IVPB..: 25 mL/Hr IV Intermittent (07-20 @ 05:42)    Data:    T(C): 36.5  HR: 97  BP: 120/67  RR: 25  SpO2: 98%    Exam  General: agitated, nervoud  Abdomen: soft, nontender, nondistended, no mcginnis sign    -WBC 15.41 / HgB 11.2 / Hct 34.2 / Plt 289  -Na 137 / Cl 104 / BUN 14 / Glucose 99  -K 3.8 / CO2 32 / Cr 0.53  -ALT 34 / Alk Phos 137 / T.Bili 0.5  -INR1.08    A:  - Concern for acute cholecystitis, with positive HIDA and enlarged GB, however, no wall thickening or surrounding fluid on repeat US today, no pain on exam, and no fever would all be unusual for acute cholecystitis. Leukocytosis confounded by ongoing steroid therapy following spine surgery, which may also blunt inflammatory response overall..   - Overall - unclear if patient actually has acute cholecystitis or simply a chronically dilated and akinetic GB    P:  - Will repeat HIDA today with CCK administration prior to scan in an attempt to avoid false positive HIDA - discussed with NM attending Dr. Fox.   - If HIDA still positive, cholecystostomy may be justified and may proceed today following discussion with family and patient. Vascular & Interventional Radiology Consult    HPI: 75y Female with mets to spine causing compression fx now s/p recent laminectomy, also pleural effusion s/p thoracentesis.     On admission, large GB noted on CT, no inflammatory changes. Subsequent US showed large GB, no wall thickening, no pericholecystic fluid. Surgery unable to operate at this time due to medical comorbidities, so IR consult to evaluate for cholecystostomy.     Allergies:   Medications (Abx/Cardiac/Anticoagulation/Blood Products)    piperacillin/tazobactam IVPB..: 25 mL/Hr IV Intermittent (07-20 @ 05:42)    Data:    T(C): 36.5  HR: 97  BP: 120/67  RR: 25  SpO2: 98%    Exam  General: agitated, nervoud  Abdomen: soft, nontender, nondistended, no mcginnis sign    -WBC 15.41 / HgB 11.2 / Hct 34.2 / Plt 289  -Na 137 / Cl 104 / BUN 14 / Glucose 99  -K 3.8 / CO2 32 / Cr 0.53  -ALT 34 / Alk Phos 137 / T.Bili 0.5  -INR1.08    A:  - Concern for acute cholecystitis, with positive HIDA and enlarged GB, however, no wall thickening or surrounding fluid on repeat US today, no pain on exam, and no fever would all be unusual for acute cholecystitis. Leukocytosis confounded by ongoing steroid therapy following spine surgery, which may also blunt inflammatory response overall..   - Overall - unclear if patient actually has acute cholecystitis or simply a chronically dilated and akinetic GB    P:  - Will repeat HIDA today with CCK administration prior to scan in an attempt to avoid false positive HIDA - discussed with [facility] attending Dr. Fox.   - If HIDA still positive, cholecystostomy may be justified and may proceed today following discussion with family and patient. Vascular & Interventional Radiology Consult    HPI: 75y Female with mets to spine causing compression fx now s/p recent laminectomy, also pleural effusion s/p L chest tube and thoracentesis.     On admission, large GB noted on CT, no inflammatory changes. Subsequent US showed large GB, no wall thickening, no pericholecystic fluid. Surgery unable to operate at this time due to medical comorbidities, so IR consult to evaluate for cholecystostomy.     Allergies:   Medications (Abx/Cardiac/Anticoagulation/Blood Products)    piperacillin/tazobactam IVPB..: 25 mL/Hr IV Intermittent (07-20 @ 05:42)    Data:    T(C): 36.5  HR: 97  BP: 120/67  RR: 25  SpO2: 98%    Exam  General: agitated, nervoud  Abdomen: soft, nontender, nondistended, no mcginnis sign    -WBC 15.41 / HgB 11.2 / Hct 34.2 / Plt 289  -Na 137 / Cl 104 / BUN 14 / Glucose 99  -K 3.8 / CO2 32 / Cr 0.53  -ALT 34 / Alk Phos 137 / T.Bili 0.5  -INR1.08    A:  - Concern for acute cholecystitis, with positive HIDA and enlarged GB  - No wall thickening or surrounding fluid on repeat US today, no pain on exam, and no fever would all be unusual for acute cholecystitis. Leukocytosis and aforementioned pertinent negatives confounded by ongoing steroid therapy following spine surgery.  - Overall - unclear if patient actually has acute cholecystitis or simply a chronically dilated and akinetic GB    P:  - Will repeat HIDA today with CCK administration prior to scan in an attempt to avoid false positive HIDA - discussed with NM attending Dr. Fox.   - If HIDA still positive, cholecystostomy may be justified and may proceed today following discussion with family and patient.   - Patient not toxic, continue antibiotic therapy  - Keep NPO  - Hold AC Vascular & Interventional Radiology Consult    HPI: 75y Female with mets to spine causing compression fx now s/p recent laminectomy, also pleural effusion s/p L chest tube and thoracentesis.     On admission, large GB noted on CT, no inflammatory changes. Subsequent US showed large GB, no wall thickening, no pericholecystic fluid. Surgery unable to operate at this time due to medical comorbidities, so IR consult to evaluate for cholecystostomy.     Allergies:   Medications (Abx/Cardiac/Anticoagulation/Blood Products)    piperacillin/tazobactam IVPB..: 25 mL/Hr IV Intermittent (07-20 @ 05:42)    Data:    T(C): 36.5  HR: 97  BP: 120/67  RR: 25  SpO2: 98%    Exam  General: agitated, nervoud  Abdomen: soft, nontender, nondistended, no mcginnis sign    -WBC 15.41 / HgB 11.2 / Hct 34.2 / Plt 289  -Na 137 / Cl 104 / BUN 14 / Glucose 99  -K 3.8 / CO2 32 / Cr 0.53  -ALT 34 / Alk Phos 137 / T.Bili 0.5  -INR1.08    A:  - Concern for acute cholecystitis due to positive HIDA and enlarged GB  - However - no wall thickening or surrounding fluid on repeat US today, no pain on exam, and no fever would all be unusual for acute cholecystitis, although leukocytosis and aforementioned pertinent negatives confounded by ongoing steroid therapy following spine surgery.  - Overall - unclear if patient actually has acute cholecystitis or simply a chronically dilated and akinetic GB    P:  - Will repeat HIDA today with CCK administration prior to scan in an attempt to avoid false positive HIDA - discussed with NM attending Dr. Fox.   - If HIDA still positive, cholecystostomy may be justified and may proceed today following discussion with family and patient.   - Patient not toxic, continue antibiotic therapy  - Keep NPO  - Hold AC

## 2020-07-20 NOTE — CONSULT NOTE ADULT - REASON FOR ADMISSION
dyspnea , cough

## 2020-07-20 NOTE — PROGRESS NOTE ADULT - ASSESSMENT
Removed the KAYLAN drain without diffulty  Continue hemovac for now  Continue care per PMD and all consultants

## 2020-07-20 NOTE — PROGRESS NOTE ADULT - SUBJECTIVE AND OBJECTIVE BOX
74 y/o female presented with back pain x 2 months , with recent worsening, and pain radiating down both extremities; Imaging revealed an L2 vertebral fracture / likely pathologic; on exam had left lung dullness to percussion and imaging with CT revealed extensive   left sided pleural effusion with concern for possible lung mass. She was seen by Pulmonary medicine and advised admission for thoracentesis and further HOLLIS; kept on dexamethasone for lower back fracture; WBC has increased/ placed on an antibiotic ( no overt infection)    In the ER a CT was placed with + drainage/  she now has a pigtail catheter in the left pleural cavity which is still draining significant amount of fluid daily.    cytology: atypical cells suspicious for malignancy  s/p spinal stabilization  verbal path report: adenocarcinoma    found to have 1.2 cm right frontal brain met as well as some smaller additional lesions, asymptomatic, no edema  for gammknife RT as OP    distended gallbladder seen on scan, HIDA scan shows obstructed GB , GB has stones, asymptomatic  s/p cholecystostomy drain as per surgery and IR    very anxious and depressed.    able to walk short distances        PAST MEDICAL & SURGICAL HISTORY:  Osteoarthritis  Fracture: L2 pathologic fracture  Hypothyroidism  No significant past surgical history    + ex / remote/ light  smoker 10 pack yr    MEDICATIONS  (STANDING):  acetaminophen   Tablet .. 975 milliGRAM(s) Oral every 8 hours  ascorbic acid 500 milliGRAM(s) Oral daily  calcium carbonate   1250 mG (OsCal) 1 Tablet(s) Oral three times a day  cyclobenzaprine 10 milliGRAM(s) Oral every 8 hours  escitalopram 5 milliGRAM(s) Oral daily  gabapentin 300 milliGRAM(s) Oral two times a day  levothyroxine 100 MICROGram(s) Oral daily  liothyronine 5 MICROGram(s) Oral daily  multivitamin 1 Tablet(s) Oral daily  piperacillin/tazobactam IVPB.. 3.375 Gram(s) IV Intermittent every 8 hours  senna 2 Tablet(s) Oral at bedtime  sodium chloride 0.9% lock flush 3 milliLiter(s) IV Push every 8 hours  sodium chloride 0.9%. 1000 milliLiter(s) (125 mL/Hr) IV Continuous <Continuous>    MEDICATIONS  (PRN):  benzocaine 15 mG/menthol 3.6 mG (Sugar-Free) Lozenge 1 Lozenge Oral every 3 hours PRN Sore Throat  famotidine    Tablet 20 milliGRAM(s) Oral every 12 hours PRN Dyspepsia  fentaNYL    Injectable 50 MICROGram(s) IV Push every 15 minutes PRN Severe Pain (7 - 10)  HYDROmorphone  Injectable 0.5 milliGRAM(s) IV Push every 10 minutes PRN Moderate Pain (4 - 6)  HYDROmorphone  Injectable 1 milliGRAM(s) IV Push every 3 hours PRN breakthrough pain  LORazepam     Tablet 0.5 milliGRAM(s) Oral every 6 hours PRN Anxiety  LORazepam   Injectable 0.5 milliGRAM(s) IV Push every 4 hours PRN Anxiety  magnesium hydroxide Suspension 30 milliLiter(s) Oral every 12 hours PRN Constipation  melatonin 3 milliGRAM(s) Oral at bedtime PRN Sleep  naloxone Injectable 0.1 milliGRAM(s) IV Push every 3 minutes PRN For ANY of the following changes in patient status:  A. RR LESS THAN 10 breaths per minute, B. Oxygen saturation LESS THAN 90%, C. Sedation score of 6  ondansetron Injectable 4 milliGRAM(s) IV Push every 6 hours PRN Nausea  ondansetron Injectable 4 milliGRAM(s) IV Push every 6 hours PRN Nausea  ondansetron Injectable 4 milliGRAM(s) IV Push once PRN Nausea and/or Vomiting  oxyCODONE    IR 5 milliGRAM(s) Oral once PRN Moderate Pain (4 - 6)  oxyCODONE    IR 10 milliGRAM(s) Oral every 4 hours PRN Moderate Pain (4 - 6)  oxyCODONE    IR 5 milliGRAM(s) Oral every 4 hours PRN Mild Pain (1 - 3)  prochlorperazine   Injectable 10 milliGRAM(s) IV Push once PRN Nausea  prochlorperazine   Injectable 10 milliGRAM(s) IV Push every 8 hours PRN Nausea/vomiting      ROS  back pain  anxiety  depression        Vital Signs Last 24 Hrs  T(C): 36.3 (20 Jul 2020 16:50), Max: 36.6 (19 Jul 2020 22:13)  T(F): 97.4 (20 Jul 2020 16:50), Max: 97.9 (20 Jul 2020 06:10)  HR: 85 (20 Jul 2020 17:15) (81 - 97)  BP: 124/65 (20 Jul 2020 17:15) (107/59 - 127/62)  BP(mean): 78 (20 Jul 2020 11:00) (62 - 78)  RR: 13 (20 Jul 2020 17:15) (13 - 25)  SpO2: 100% (20 Jul 2020 17:15) (93% - 100%)      PHYSICAL EXAM:        OE  Anxious  Patient keeping her eyes closed most of the time  Admits to being depressed  Left pigtail catheter draining  Abdomen soft  No edema                            12.6   15.70 )-----------( 464      ( 15 Jul 2020 06:33 )             37.7     07-15    138  |  104  |  21  ----------------------------<  116<H>  4.0   |  27  |  0.56    Ca    7.5<L>      15 Jul 2020 06:33    TPro  6.2  /  Alb  2.6<L>  /  TBili  0.4  /  DBili  x   /  AST  29  /  ALT  73  /  AlkPhos  135<H>  07-15    LIVER FUNCTIONS - ( 15 Jul 2020 06:33 )  Alb: 2.6 g/dL / Pro: 6.2 gm/dL / ALK PHOS: 135 U/L / ALT: 73 U/L / AST: 29 U/L / GGT: x           PT/INR - ( 15 Jul 2020 06:33 )   PT: 12.7 sec;   INR: 1.09 ratio         Pleural fluid  atypical cells seen . not diagnostic        INTERPRETATION:  Clinical indication: Staging MRI rule out metastasis.    MRI of the thoracic and lumbar spine was performed using sagittal T1-T2 and STIR sequence. Axial T1 and T2-weighted sequences were performed as well. The patient was injected with a possible 6.5 cc gadolinium this IV with 1 cc of contrast cardia.    Thoracic spine:    Abnormal enhancing lesions are seen involving the T10 and T12 vertebral bodies. These findings demonstrate slight T1 shortening and could be compatible with hemangiomas and less likely underlying pathologic lesions such as metastasis,. These findings can also be further evaluated with CT scan of the thoracic spine region.    There are no abnormal disc herniations or significant central or neural foraminal stenosis seen.    The spinal cord demonstrates normal signal and caliber.    Evaluation of the paraspinal soft tissues appear normal.    Hazy density seen involving the left lung. Dedicated imaging with CT scan is recommended for further evaluation.    Lumbar spine:    There is normal lumbar lordosis is seen.    Compression deformity is seen involving the L2 vertebral body. Abnormal T1 and T2 prolongation are identified with associated areas of enhancement. This finding could be compatible with a pathologic lesion such as metastasis, myeloma or lymphoma. Retropulsed fragment is identified which causes mild to moderate narrowing of the spinal canal. No abnormal paraspinal extension of this process is seen.    There is grade 1 anterolisthesis involving L4 and L5.    T12-L1: Normal    L1-2: Disc bulge and bilateral hypertrophic facet changes seen. This as well as retropulsed fragment causes moderate narrowing of the spinal canal.    L2-3: Disc bulge and bilateral hypertrophic facet joint changes seen. This as well as the retropulsed fragment causes moderate to severe narrowing of the spinal canal. Severe narrowing of both neural foramina    L3-4: Disc bulge and bilateral hypertrophic facet changes are seen. Severe narrowing of the spinal canal is seen.    L4-5: Disc bulge and bilateral hypertrophic facet changes seen. Moderate narrowing spinal canal and left neural foramen.    L5-S1: Disc bulge and bilateral hypertrophic facet changes are seen. No significant compromise of the, spinal canal is seen.    There is small focus of decreased signal seen involving the right iliac bone. This could be compatible with a bone island, the possibility of underlying lesion cannot be entirely excluded.    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    Evaluation of the paraspinal soft tissues appear normal    < end of copied text >    < from: Xray Chest 1 View- PORTABLE-Routine (07.11.20 @ 10:08) >    EXAM:  XR CHEST PORTABLE ROUTINE 1V                            PROCEDURE DATE:  07/11/2020          INTERPRETATION:  INDICATION: Assess left pleural effusion.    PRIORS: 7/10/2020.    VIEWS: Portable AP radiography of the chest performed.    FINDINGS: Since prior evaluation the position of the indwelling left pleural space pigtail drainage catheter has changed, the pigtail now overlying the medial aspect of the left mid thorax. There is interval decrease in left pleural effusion from prior; a moderately sized left pleural effusion persists. Underlying lung parenchymal infiltrate, consolidation or atelectasis cannot be excluded. There is no evidence for left-sided pneumothorax. No acute right-sided infiltrate is identified. No evidence for right pleural effusion or pneumothorax. No mediastinal shift noted. Degenerative changes of the thoracic spine evident.    IMPRESSION: Interval decrease in left pleural effusion from prior with change in position of indwelling pigtail pleural space drainage catheter. Moderately sized left pleural effusion persists. See above discussion.    < end of copied text >            INTERPRETATION:  INDICATION: Assess left pleural effusion.    PRIORS: 7/10/2020.    VIEWS: Portable AP radiography of the chest performed.    FINDINGS: Since prior evaluation the position of the indwelling left pleural space pigtail drainage catheter has changed, the pigtail now overlying the medial aspect of the left mid thorax. There is interval decrease in left pleural effusion from prior; a moderately sized left pleural effusion persists. Underlying lung parenchymal infiltrate, consolidation or atelectasis cannot be excluded. There is no evidence for left-sided pneumothorax. No acute right-sided infiltrate is identified. No evidence for right pleural effusion or pneumothorax. No mediastinal shift noted. Degenerative changes of the thoracic spine evident.    IMPRESSION: Interval decrease in left pleural effusion from prior with change in position of indwelling pigtail pleural space drainage catheter. Moderately sized left pleural effusion persists. See above discussion.    < end of copied text >      MRI Brain  1.2 cm right frontal lesion and a few smaller lesions

## 2020-07-20 NOTE — PROGRESS NOTE ADULT - SUBJECTIVE AND OBJECTIVE BOX
76 y/o female with PMH of Hypothyroidism and L2 pathologic compression fracture presents to   on 7/10/20 with 1 month history of dyspnea and 1 week history of the dry cough. Patient was seen by ortho spine surgeon, pulmonologist and oncologist and send for evaluation of left sided pleural effusion with concern for lung mass. Pt reports onset of back pain was in March 2020, received cortisone shots and got an MRI of L2 and blood test x2 weeks ago. Pt went to Dr. Westfall (oncologist) and was sent for imaging with Dr. Sanchez (pulmonologist) who discussed results of CT abdomen and chest this AM with pt and told pt to come to ED for evaluation for SOB.  In Ed -  T(F): 98.9Max: 98.9  HR: 80  (80 - 94) BP: 149/63 (149/63 - 150/77) RR: 24(17 - 24) SpO2: 98% (98% - 99%) EKG - sinus , low voltage QRS, troponin x 1neg, CXR - large pleural effusion, WBC 12, Cr 0.73, , covid PCR neg , s/p left chest tube placement in ED , fluid analysis pending (10 Jul 2020 13:26)      Stable but very anxious; pain well ctr; she underwent repeat HIDA and is now in IR for percut. drain of the GB for acute cresencio. This approach was chosen due to the possible masked by narcotics and IV steroids used for her back condition.    Vital Signs Last 24 Hrs  T(C): 36.5 (20 Jul 2020 08:45), Max: 36.6 (19 Jul 2020 22:13)  T(F): 97.7 (20 Jul 2020 08:45), Max: 97.9 (20 Jul 2020 06:10)  HR: 97 (20 Jul 2020 11:00) (81 - 97)  BP: 120/67 (20 Jul 2020 11:00) (107/59 - 123/58)  BP(mean): 78 (20 Jul 2020 11:00) (62 - 78)  RR: 25 (20 Jul 2020 11:00) (13 - 25)  SpO2: 98% (20 Jul 2020 11:00) (93% - 98%)      HEAD:  Atraumatic, Normocephalic  EYES: EOMI, PERRLA, conjunctiva and sclera clear  NECK: Supple, No JVD  CHEST/LUNG: BS decreased over left base, ; No rales, no rhonchi, no wheezing, Left CT +   HEART: Regular rate and rhythm; No murmurs, no rubs or gallops  ABDOMEN: Soft, Nontender, Nondistended; Bowel sounds present  GENITOURINARY: FQ in place  EXTREMITIES:  2+ Peripheral Pulses, No clubbing, cyanosis, no  edema  CNS non focal            < from: [facility] Hepatobiliary Imaging (07.20.20)  IMPRESSION:  Abnormal morphine-augmented hepatobiliary scan compatible with acute cholecystitis.

## 2020-07-20 NOTE — PROGRESS NOTE ADULT - SUBJECTIVE AND OBJECTIVE BOX
JOHAN Calvert      acetaminophen   Tablet .. 975 milliGRAM(s) Oral every 8 hours  ascorbic acid 500 milliGRAM(s) Oral daily  benzocaine 15 mG/menthol 3.6 mG (Sugar-Free) Lozenge 1 Lozenge Oral every 3 hours PRN  calcium carbonate   1250 mG (OsCal) 1 Tablet(s) Oral three times a day  cyclobenzaprine 10 milliGRAM(s) Oral every 8 hours  escitalopram 5 milliGRAM(s) Oral daily  famotidine    Tablet 20 milliGRAM(s) Oral every 12 hours PRN  gabapentin 300 milliGRAM(s) Oral two times a day  HYDROmorphone  Injectable 1 milliGRAM(s) IV Push every 3 hours PRN  levothyroxine 100 MICROGram(s) Oral daily  liothyronine 5 MICROGram(s) Oral daily  LORazepam     Tablet 0.5 milliGRAM(s) Oral every 6 hours PRN  LORazepam   Injectable 0.5 milliGRAM(s) IV Push every 4 hours PRN  magnesium hydroxide Suspension 30 milliLiter(s) Oral every 12 hours PRN  melatonin 3 milliGRAM(s) Oral at bedtime PRN  multivitamin 1 Tablet(s) Oral daily  naloxone Injectable 0.1 milliGRAM(s) IV Push every 3 minutes PRN  ondansetron Injectable 4 milliGRAM(s) IV Push every 6 hours PRN  ondansetron Injectable 4 milliGRAM(s) IV Push every 6 hours PRN  oxyCODONE    IR 10 milliGRAM(s) Oral every 4 hours PRN  oxyCODONE    IR 5 milliGRAM(s) Oral every 4 hours PRN  piperacillin/tazobactam IVPB.. 3.375 Gram(s) IV Intermittent every 8 hours  prochlorperazine   Injectable 10 milliGRAM(s) IV Push once PRN  prochlorperazine   Injectable 10 milliGRAM(s) IV Push every 8 hours PRN  senna 2 Tablet(s) Oral at bedtime  sodium chloride 0.9% lock flush 3 milliLiter(s) IV Push every 8 hours        Physical Exam  T(C): 36.2 (07-20-20 @ 21:00), Max: 36.6 (07-20-20 @ 06:10)  HR: 98 (07-20-20 @ 21:00) (80 - 98)  BP: 123/54 (07-20-20 @ 21:00) (107/59 - 129/63)  RR: 18 (07-20-20 @ 21:00) (13 - 25)  SpO2: 97% (07-20-20 @ 21:00) (93% - 100%)  Wt(kg): --  Constitutional  Resting comfortably, offers no complaints      Abd-  + BS soft non tender. Drainage bag with clear fluid.    Ext-  no edema

## 2020-07-20 NOTE — BEHAVIORAL HEALTH ASSESSMENT NOTE - SUICIDE RISK FACTORS
Access to lethal methods (pills, firearm, etc.: Ask specifically about presence or absence of a firearm in the home or ease of accessing/Current mood episode

## 2020-07-20 NOTE — CONSULT NOTE ADULT - CONSULT REQUESTED BY NAME
Divinisky
Dr Waite
Dr. Emelia George
Dr. George
Dr. Handy
Dr. Haque
Dr. Monk
Hospitalists
MED
medicine
Dr. Sanchez
Primary Attending

## 2020-07-20 NOTE — PROGRESS NOTE ADULT - ASSESSMENT
74 y/o female with PMH of Hypothyroidism and L2 pathologic compression fracture presents to  with 1 month history of dyspnea and 1 week history of the dry cough. Patient was seen by ortho spine surgeon, pulmonologist and oncologist and send for evaluation of left sided pleural effusion with concern for lung mass.   In Ed -  T(F): 98.9Max: 98.9  HR: 80  (80 - 94) BP: 149/63 (149/63 - 150/77) RR: 24(17 - 24) SpO2: 98% (98% - 99%) EKG - sinus , low voltage QRS, troponin x 1neg, CXR - large pleural effusion, WBC 12, Cr 0.73, , covid PCR neg , s/p left chest tube placement in ED , fluid analysis pending     * Lower back pain due to pathologic L2 fracture, worsening on MRI,   - s/p L2-L4 laminectomy, T11-L5 PSF with bone graft, L2 Biopsy/partial tumor excision/Dr George   - pain management, dilaudid PCA - would change to IVP due to dizziness; s/p IV steroids      * Brain lesion in right frontal lobe with multiple subcentimeter lesions   - neurosurgery consult Dr. Handy -  radiology - oncology consult for brain RT  - no edema    * Dyspnea due to Large left pleural effusion s/p left chest tube placement, exudative effusion suspected malignancy   * ON CT CHEST: INDU 2.6 cm mass,  - suspect Primary Lung with Brain and Spine mets   * Suspected postobstructive PNA s/p IV abx  - pending final PAth; naresh[pect adenoCa    * Leukocytosis steroids induced, improving    - cytology - pending    - TTE EF wnl, liver cystic lesion 15x5 cm       *  Liver lesion cystic on 2 d echo and Enlarged GB on CT 22 cm  - HIDA scan - positive twice and a markedly distended GB prompted the percut. cholecystostomy   - c/w Zosyn         DVT proph - to be started when cleared by Ortho/spine

## 2020-07-20 NOTE — PROGRESS NOTE ADULT - SUBJECTIVE AND OBJECTIVE BOX
Patient seen and examined at bedside. Pt reports that she is still frustrated that she is having difficulty sleeping. Back pain well controlled. Frustrted at NPO status.  Denies weakness, numbness or tingling. Denies chest pain, shortness of breath, nausea or vomiting.     PE:  Vital Signs Last 24 Hrs  T(C): 36.6 (07-19-20 @ 22:13), Max: 36.7 (07-19-20 @ 09:03)  T(F): 97.8 (07-19-20 @ 22:13), Max: 98 (07-19-20 @ 09:03)  HR: 90 (07-20-20 @ 06:00) (82 - 107)  BP: 123/58 (07-20-20 @ 06:00) (94/82 - 129/55)  BP(mean): 72 (07-20-20 @ 06:00) (62 - 85)  RR: 20 (07-20-20 @ 06:00) (12 - 23)  SpO2: 96% (07-20-20 @ 06:00) (95% - 100%)    General: NAD, resting comfortably in bed  Dressing C/D/I  Drains present  2+ radial pulses  2+ DP Pulses    Motor:                   C5                C6              C7               C8           T1   R             5/5                5/5            5/5              5/5          5/5  L             5/5                5/5            5/5              5/5          5/5                    L2                  L3             L4              L5            S1  R            4/5                5/5             5/5            5/5          5/5  L             4/5                5/5            5/5            5/5          5/5    Sensory:            C5         C6         C7      C8       T1        (0=absent, 1=impaired, 2=normal, NT=not testable)  R         2            2           2        2         2  L          2            2           2        2         2               L2          L3         L4      L5       S1         (0=absent, 1=impaired, 2=normal, NT=not testable)  R         2            2            2        2        2  L          2            2           2        2         2                              11.4   18.16 )-----------( 286      ( 19 Jul 2020 06:51 )             35.5     19 Jul 2020 06:51    137    |  104    |  12     ----------------------------<  109    4.0     |  28     |  0.48     Ca    7.7        19 Jul 2020 06:51          A/P:  75y f s/p L2-4 Lami/decompression, T11-L5 PSF  -PT/OT - WBAT  -Pain Control  -DVT ppx, Ok once drains are pulled  -Chest tube  -driscoll  -PCA  -fU Drain outputs  -FU AM Labs  -Rest, ice, compress and elevate the extremity as we needed  -Incentive Spirometry  -Medical management appreciated  -possible IR cholecystostomy today

## 2020-07-20 NOTE — CONSULT NOTE ADULT - CONSULT REASON
Evaluation for cholecystostomy
Gallstones with distention
Lumbar lesion, stenosis, pathological fracture
Lung cancer
Radiation therapy
Spinal tumor
brain lesions
cough
pleural effusion
positive HIDA scan
Left pleural effusion
possible L2 Pathologic Fracture

## 2020-07-20 NOTE — BEHAVIORAL HEALTH ASSESSMENT NOTE - BODY HABITUS
Well nourished
I, the Attending Physician, certify the above stated patient is homebound and upon completion of the Face-To-Face encounter, has the need for intermittent skilled nursing, physical therapy and/or speech or occupational therapy services in their home for their current diagnosis as outlined in their initiial plan of care. These services will continue to be monitored by myself or another physician

## 2020-07-20 NOTE — BEHAVIORAL HEALTH ASSESSMENT NOTE - HPI (INCLUDE ILLNESS QUALITY, SEVERITY, DURATION, TIMING, CONTEXT, MODIFYING FACTORS, ASSOCIATED SIGNS AND SYMPTOMS)
Pt is a 75 YOWW with no past psychiatry hx Pt is a 75 YOWW with no past psychiatry hx and [id number] hx significant for multiple metastases.   In the light of her medical condition pt is expressing anxiety and depressed mood,  poor appetite low energy level, insomnia and fear of dying. Pt wants to live "I have so many reason to live, I have children,   I have grandchildren, I don't want to die". NO SI. NO HI, no AH, , PI.   Pt is scared and distressed, She dose not wan to see psychiatrist and she is interested in getting meds for anxiety and depression.

## 2020-07-20 NOTE — PROGRESS NOTE ADULT - PROBLEM SELECTOR PLAN 1
. 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    the patient shall likely require a Pleurx catheter until effective treatment can be started as she is still draining significant amount of pleural fluid daily.    Discussed with patient and her  in detail  patient's  wants a 2nd opinion prior to commencement of chemotherapy or biological therapy is to be started and was encouraged to do so.

## 2020-07-20 NOTE — PROGRESS NOTE ADULT - SUBJECTIVE AND OBJECTIVE BOX
Subjective:  Left PTC in place.    Vital Signs:  Vital Signs Last 24 Hrs  T(C): 36.5 (07-20-20 @ 08:45), Max: 36.6 (07-19-20 @ 22:13)  T(F): 97.7 (07-20-20 @ 08:45), Max: 97.9 (07-20-20 @ 06:10)  HR: 97 (07-20-20 @ 11:00) (81 - 107)  BP: 120/67 (07-20-20 @ 11:00) (94/82 - 123/58)  RR: 25 (07-20-20 @ 11:00) (13 - 25)  SpO2: 98% (07-20-20 @ 11:00) (93% - 98%) on room air    Telemetry/Alarms:  sinus rhythm     Relevant labs, radiology and Medications reviewed                        11.2   15.41 )-----------( 289      ( 20 Jul 2020 10:53 )             34.2     07-20    137  |  104  |  14  ----------------------------<  99  3.8   |  32<H>  |  0.53    Ca    8.0<L>      20 Jul 2020 10:53    TPro  5.7<L>  /  Alb  2.1<L>  /  TBili  0.5  /  DBili  0.2  /  AST  27  /  ALT  34  /  AlkPhos  137<H>  07-20      MEDICATIONS  (STANDING):  acetaminophen   Tablet .. 975 milliGRAM(s) Oral every 8 hours  ascorbic acid 500 milliGRAM(s) Oral daily  calcium carbonate   1250 mG (OsCal) 1 Tablet(s) Oral three times a day  cyclobenzaprine 10 milliGRAM(s) Oral every 8 hours  escitalopram 5 milliGRAM(s) Oral daily  gabapentin 300 milliGRAM(s) Oral two times a day  levothyroxine 100 MICROGram(s) Oral daily  liothyronine 5 MICROGram(s) Oral daily  multivitamin 1 Tablet(s) Oral daily  piperacillin/tazobactam IVPB.. 3.375 Gram(s) IV Intermittent every 8 hours  senna 2 Tablet(s) Oral at bedtime  sincalide IVPB 1.2 MICROGram(s) IV Intermittent once  sodium chloride 0.9% lock flush 3 milliLiter(s) IV Push every 8 hours    MEDICATIONS  (PRN):  benzocaine 15 mG/menthol 3.6 mG (Sugar-Free) Lozenge 1 Lozenge Oral every 3 hours PRN Sore Throat  famotidine    Tablet 20 milliGRAM(s) Oral every 12 hours PRN Dyspepsia  HYDROmorphone  Injectable 1 milliGRAM(s) IV Push every 3 hours PRN breakthrough pain  LORazepam     Tablet 0.5 milliGRAM(s) Oral every 6 hours PRN Anxiety  LORazepam   Injectable 0.5 milliGRAM(s) IV Push every 4 hours PRN Anxiety  magnesium hydroxide Suspension 30 milliLiter(s) Oral every 12 hours PRN Constipation  melatonin 3 milliGRAM(s) Oral at bedtime PRN Sleep  naloxone Injectable 0.1 milliGRAM(s) IV Push every 3 minutes PRN For ANY of the following changes in patient status:  A. RR LESS THAN 10 breaths per minute, B. Oxygen saturation LESS THAN 90%, C. Sedation score of 6  ondansetron Injectable 4 milliGRAM(s) IV Push every 6 hours PRN Nausea  ondansetron Injectable 4 milliGRAM(s) IV Push every 6 hours PRN Nausea  oxyCODONE    IR 10 milliGRAM(s) Oral every 4 hours PRN Moderate Pain (4 - 6)  oxyCODONE    IR 5 milliGRAM(s) Oral every 4 hours PRN Mild Pain (1 - 3)  prochlorperazine   Injectable 10 milliGRAM(s) IV Push every 8 hours PRN Nausea/vomiting      Physical exam  Gen: NAD breathing comfortably  Neuro: AO x 3  Card: S1 S2  Pulm: CTA  Abd: Soft NT ND  Ext: no edema    Tubes:  Left CT to water seal, no air leak, serous output.    I&O's Summary    19 Jul 2020 07:01  -  20 Jul 2020 07:00  --------------------------------------------------------  IN: 0 mL / OUT: 1069 mL / NET: -1069 mL        Assessment  75y Female  w/ PAST MEDICAL & SURGICAL HISTORY:  Osteoarthritis  Fracture: L2 pathologic fracture  Hypothyroidism    Patient is a 75y old  Female who presents with a chief complaint of dyspnea , cough (20 Jul 2020 12:00)    Pathologic fx of L2 - s/p L2-4 Lami/decompression, T11-L5 PSF with bone graft    New left pleural effusion s/p left PTC placement.    Plan:    Repeat HIDA +/- cholecystomy.  Psych eval for severe depression.  Left CT to water seal.  Daily CXR.      Dr. Stein spoke with  - both patient and partner are feeling very overwhelmed.  They would like to take things one at a time.  Although have not consented - will tentatively plan for pleurx on Thursday.  Will need ortho clearance for lateral decubitus positioning.    Discussed with Cardiothoracic Team at AM rounds.

## 2020-07-20 NOTE — CONSULT NOTE ADULT - CONSULT REQUESTED DATE/TIME
07-Jul-2000 17:00
11-Jul-2020 09:53
12-Jul-2020 10:34
12-Jul-2020 10:43
12-Jul-2020 17:45
13-Jul-2020 00:00
14-Jul-2020 23:50
15-Jul-2020
18-Jul-2020
20-Jul-2020 12:01
10-Jul-2020
10-Jul-2020 19:55

## 2020-07-21 PROCEDURE — 72100 X-RAY EXAM L-S SPINE 2/3 VWS: CPT | Mod: 26

## 2020-07-21 PROCEDURE — 99232 SBSQ HOSP IP/OBS MODERATE 35: CPT

## 2020-07-21 PROCEDURE — 71045 X-RAY EXAM CHEST 1 VIEW: CPT | Mod: 26

## 2020-07-21 PROCEDURE — 99233 SBSQ HOSP IP/OBS HIGH 50: CPT

## 2020-07-21 RX ADMIN — SODIUM CHLORIDE 3 MILLILITER(S): 9 INJECTION INTRAMUSCULAR; INTRAVENOUS; SUBCUTANEOUS at 14:25

## 2020-07-21 RX ADMIN — CYCLOBENZAPRINE HYDROCHLORIDE 10 MILLIGRAM(S): 10 TABLET, FILM COATED ORAL at 22:15

## 2020-07-21 RX ADMIN — Medication 975 MILLIGRAM(S): at 22:15

## 2020-07-21 RX ADMIN — CYCLOBENZAPRINE HYDROCHLORIDE 10 MILLIGRAM(S): 10 TABLET, FILM COATED ORAL at 14:39

## 2020-07-21 RX ADMIN — Medication 975 MILLIGRAM(S): at 14:39

## 2020-07-21 RX ADMIN — GABAPENTIN 300 MILLIGRAM(S): 400 CAPSULE ORAL at 09:27

## 2020-07-21 RX ADMIN — Medication 975 MILLIGRAM(S): at 15:30

## 2020-07-21 RX ADMIN — OXYCODONE HYDROCHLORIDE 5 MILLIGRAM(S): 5 TABLET ORAL at 21:15

## 2020-07-21 RX ADMIN — PIPERACILLIN AND TAZOBACTAM 25 GRAM(S): 4; .5 INJECTION, POWDER, LYOPHILIZED, FOR SOLUTION INTRAVENOUS at 22:54

## 2020-07-21 RX ADMIN — Medication 975 MILLIGRAM(S): at 06:13

## 2020-07-21 RX ADMIN — Medication 975 MILLIGRAM(S): at 23:15

## 2020-07-21 RX ADMIN — OXYCODONE HYDROCHLORIDE 5 MILLIGRAM(S): 5 TABLET ORAL at 20:15

## 2020-07-21 RX ADMIN — ESCITALOPRAM OXALATE 5 MILLIGRAM(S): 10 TABLET, FILM COATED ORAL at 09:27

## 2020-07-21 RX ADMIN — PIPERACILLIN AND TAZOBACTAM 25 GRAM(S): 4; .5 INJECTION, POWDER, LYOPHILIZED, FOR SOLUTION INTRAVENOUS at 14:40

## 2020-07-21 RX ADMIN — Medication 1 TABLET(S): at 09:27

## 2020-07-21 RX ADMIN — CYCLOBENZAPRINE HYDROCHLORIDE 10 MILLIGRAM(S): 10 TABLET, FILM COATED ORAL at 06:13

## 2020-07-21 RX ADMIN — Medication 1 TABLET(S): at 22:15

## 2020-07-21 RX ADMIN — LIOTHYRONINE SODIUM 5 MICROGRAM(S): 25 TABLET ORAL at 09:27

## 2020-07-21 RX ADMIN — GABAPENTIN 300 MILLIGRAM(S): 400 CAPSULE ORAL at 22:15

## 2020-07-21 RX ADMIN — Medication 1 TABLET(S): at 16:54

## 2020-07-21 RX ADMIN — SODIUM CHLORIDE 3 MILLILITER(S): 9 INJECTION INTRAMUSCULAR; INTRAVENOUS; SUBCUTANEOUS at 22:13

## 2020-07-21 RX ADMIN — SODIUM CHLORIDE 3 MILLILITER(S): 9 INJECTION INTRAMUSCULAR; INTRAVENOUS; SUBCUTANEOUS at 06:25

## 2020-07-21 RX ADMIN — PIPERACILLIN AND TAZOBACTAM 25 GRAM(S): 4; .5 INJECTION, POWDER, LYOPHILIZED, FOR SOLUTION INTRAVENOUS at 06:13

## 2020-07-21 RX ADMIN — Medication 1 TABLET(S): at 06:13

## 2020-07-21 RX ADMIN — SENNA PLUS 2 TABLET(S): 8.6 TABLET ORAL at 22:15

## 2020-07-21 RX ADMIN — Medication 100 MICROGRAM(S): at 06:13

## 2020-07-21 RX ADMIN — Medication 0.5 MILLIGRAM(S): at 09:52

## 2020-07-21 NOTE — PROGRESS NOTE ADULT - SUBJECTIVE AND OBJECTIVE BOX
Subjective:  Patient has no new complaints.  Underwent IR cholecystostomy tube placement for distended GB.  Left PTC in place with copious serous drainage.    Vital Signs:  Vital Signs Last 24 Hrs  T(C): 36.7 (07-21-20 @ 08:27), Max: 36.7 (07-21-20 @ 08:27)  T(F): 98.1 (07-21-20 @ 08:27), Max: 98.1 (07-21-20 @ 08:27)  HR: 81 (07-21-20 @ 08:27) (80 - 98)  BP: 132/60 (07-21-20 @ 08:27) (117/61 - 132/60)  RR: 18 (07-21-20 @ 08:27) (13 - 25)  SpO2: 95% (07-21-20 @ 08:27) (95% - 100%) on room air    Relevant labs, radiology and Medications reviewed                        11.2   15.41 )-----------( 289      ( 20 Jul 2020 10:53 )             34.2     07-20    137  |  104  |  14  ----------------------------<  99  3.8   |  32<H>  |  0.53    Ca    8.0<L>      20 Jul 2020 10:53    TPro  5.7<L>  /  Alb  2.1<L>  /  TBili  0.5  /  DBili  0.2  /  AST  27  /  ALT  34  /  AlkPhos  137<H>  07-20      MEDICATIONS  (STANDING):  acetaminophen   Tablet .. 975 milliGRAM(s) Oral every 8 hours  ascorbic acid 500 milliGRAM(s) Oral daily  calcium carbonate   1250 mG (OsCal) 1 Tablet(s) Oral three times a day  cyclobenzaprine 10 milliGRAM(s) Oral every 8 hours  escitalopram 5 milliGRAM(s) Oral daily  gabapentin 300 milliGRAM(s) Oral two times a day  levothyroxine 100 MICROGram(s) Oral daily  liothyronine 5 MICROGram(s) Oral daily  multivitamin 1 Tablet(s) Oral daily  piperacillin/tazobactam IVPB.. 3.375 Gram(s) IV Intermittent every 8 hours  senna 2 Tablet(s) Oral at bedtime  sodium chloride 0.9% lock flush 3 milliLiter(s) IV Push every 8 hours    MEDICATIONS  (PRN):  benzocaine 15 mG/menthol 3.6 mG (Sugar-Free) Lozenge 1 Lozenge Oral every 3 hours PRN Sore Throat  famotidine    Tablet 20 milliGRAM(s) Oral every 12 hours PRN Dyspepsia  HYDROmorphone  Injectable 1 milliGRAM(s) IV Push every 3 hours PRN breakthrough pain  LORazepam     Tablet 0.5 milliGRAM(s) Oral every 6 hours PRN Anxiety  magnesium hydroxide Suspension 30 milliLiter(s) Oral every 12 hours PRN Constipation  melatonin 3 milliGRAM(s) Oral at bedtime PRN Sleep  naloxone Injectable 0.1 milliGRAM(s) IV Push every 3 minutes PRN For ANY of the following changes in patient status:  A. RR LESS THAN 10 breaths per minute, B. Oxygen saturation LESS THAN 90%, C. Sedation score of 6  ondansetron Injectable 4 milliGRAM(s) IV Push every 6 hours PRN Nausea  ondansetron Injectable 4 milliGRAM(s) IV Push every 6 hours PRN Nausea  oxyCODONE    IR 10 milliGRAM(s) Oral every 4 hours PRN Moderate Pain (4 - 6)  oxyCODONE    IR 5 milliGRAM(s) Oral every 4 hours PRN Mild Pain (1 - 3)  prochlorperazine   Injectable 10 milliGRAM(s) IV Push once PRN Nausea  prochlorperazine   Injectable 10 milliGRAM(s) IV Push every 8 hours PRN Nausea/vomiting      Physical exam  Gen:  NAD, appears distraught and sad  Neuro: AO x 3   Card: S1 S2  Pulm: CTA  Abd: Soft NT ND  Ext: No edema    Tubes:  Left PTC in place, no air leak, copious serous fluid    I&O's Summary    20 Jul 2020 07:01  -  21 Jul 2020 07:00  --------------------------------------------------------  IN: 200 mL / OUT: 1380 mL / NET: -1180 mL        Assessment  75y Female  w/ PAST MEDICAL & SURGICAL HISTORY:  Osteoarthritis  Fracture: L2 pathologic fracture  Hypothyroidism    admitted with complaints of Patient is a 75y old  Female who presents with a chief complaint of dyspnea , cough (21 Jul 2020 08:44)    Left PTC placed for pleural effusion - cytology nondiagnostic.  Pathologic fx of L2 - s/p L2-4 Lami/decompression, T11-L5 PSF with bone graft.  Frozen sent to pathology - consistent with adenocarcinoma.  +HIDA, consistent with cholecystitis, s/p IR cholecystostomy tube.      Plan:    Left CT to water seal.  Daily CXR.    Dr. Stein spoke with  - both patient and partner are feeling very overwhelmed.  They would like to take things one at a time.  Although have not consented - will tentatively plan for pleurx on Thursday.  Will need ortho clearance for lateral decubitus positioning.  Will need VNS and home care once discharged.      Discussed with Cardiothoracic Team at AM rounds.

## 2020-07-21 NOTE — DIETITIAN INITIAL EVALUATION ADULT. - PHYSICAL APPEARANCE
other (specify) pt not wanting to be touched at time of visit so NFPE not completed  (+1) Lt/Rt ankle edema  BM (+) 7/14; monitor and adjust bowel regimen prn.  sultana score of 14; no PU documented.

## 2020-07-21 NOTE — DIETITIAN INITIAL EVALUATION ADULT. - NAME AND PHONE
Kelly Boyce MA, RDN, CDN, [facility]  [phone] (office number)  [phone] (pager number)

## 2020-07-21 NOTE — DIETITIAN INITIAL EVALUATION ADULT. - OTHER INFO
76yo female with PMH significant for hypothyroidism and L2 pathologic compression Fx p/w 1 month history of dyspnea and dry cough.  Pt admitted with large pleural effusion s/p Lt chest tube placement pending fluid analysis.  Pt with lower back pain due to pathologic L2 Fx worsening in MRI.  s/p L2-L4 laminectomy, T11-L5 PSF with bone graft, L2 biopsy/partial tumor excision (7/16).  s/p cholecystostomy tube placement for gallbladder  due to obstructing cystic duct stone, cholecystitis on 7/20.  Pt also with brain lesion in Rt frontal lobe with multiple subcentimeter lesions.

## 2020-07-21 NOTE — PROGRESS NOTE ADULT - ASSESSMENT
76 y/o female with PMH of Hypothyroidism and L2 pathologic compression fracture presents to  with 1 month history of dyspnea and 1 week history of the dry cough. Patient was seen by ortho spine surgeon, pulmonologist and oncologist and send for evaluation of left sided pleural effusion with concern for lung mass.   In Ed -  T(F): 98.9Max: 98.9  HR: 80  (80 - 94) BP: 149/63 (149/63 - 150/77) RR: 24(17 - 24) SpO2: 98% (98% - 99%) EKG - sinus , low voltage QRS, troponin x 1neg, CXR - large pleural effusion, WBC 12, Cr 0.73, , covid PCR neg , s/p left chest tube placement in ED , fluid analysis pending     * Lower back pain due to pathologic L2 fracture, worsening on MRI,   - s/p L2-L4 laminectomy, T11-L5 PSF with bone graft, L2 Biopsy/partial tumor excision/Dr George   - pain management, dilaudid PCA - would change to IVP due to dizziness; s/p IV steroids      * Brain lesion in right frontal lobe with multiple subcentimeter lesions   - neurosurgery consult Dr. Handy -  radiology - oncology consult for brain RT  - no edema    * Dyspnea due to Large left pleural effusion s/p left chest tube placement, exudative effusion suspected malignancy   * ON CT CHEST: INDU 2.6 cm mass,  - suspect Primary Lung with Brain and Spine mets   * Suspected postobstructive PNA s/p IV abx  - pending final PAth; naresh[pect adenoCa    * Leukocytosis steroids induced, improving    - cytology - pending    - TTE EF wnl, liver cystic lesion 15x5 cm       *  Liver lesion cystic on 2 d echo and Enlarged GB on CT 22 cm  - HIDA scan - positive twice and a markedly distended GB prompted the percut. cholecystostomy   - c/w Zosyn         DVT proph - to be started when cleared by Ortho/spine 76 y/o female with PMH of Hypothyroidism and L2 pathologic compression fracture presents to  with 1 month history of dyspnea and 1 week history of the dry cough. Patient was seen by ortho spine surgeon, pulmonologist and oncologist and send for evaluation of left sided pleural effusion with concern for lung mass.   In Ed -  T(F): 98.9Max: 98.9  HR: 80  (80 - 94) BP: 149/63 (149/63 - 150/77) RR: 24(17 - 24) SpO2: 98% (98% - 99%) EKG - sinus , low voltage QRS, troponin x 1neg, CXR - large pleural effusion, WBC 12, Cr 0.73, , covid PCR neg , s/p left chest tube placement in ED , fluid analysis pending     * Lower back pain due to pathologic L2 fracture, worsening on MRI,   - s/p L2-L4 laminectomy, T11-L5 PSF with bone graft, L2 Biopsy/partial tumor excision/Dr George   - pain management, dilaudid PCA - would change to IVP due to dizziness; s/p IV steroids    * Brain lesion in right frontal lobe with multiple subcentimeter lesions   - neurosurgery consult Dr. Handy -  radiology - oncology consult for brain RT  - no edema    * Dyspnea due to Large left pleural effusion s/p left chest tube placement, exudative effusion suspected malignancy   * ON CT CHEST: INDU 2.6 cm mass,  - suspect Primary Lung with Brain and Spine mets   * Suspected postobstructive PNA s/p IV abx  - pending final PAth; suspect adenoCa  - PLAN FOR PLEURX ON 7/23 - NOTED ORTHO CLEARANCE FOR LAT DECUB POSITIONING   discussed with patient adn her  at bedside and they agree to getting pleurx - output in chest tube has been 300-400cc daily per discussion with CTSx       * Acute CHolecystitis   Enlarged GB on CT  - HIDA scan - positive, s/p  perc cholecystostomy by IR   - c/w Zosyn , WBS improving     Lightheadedness when she stands up, transient  check Orthostats      DVT proph - to be started when cleared by Ortho/spine   OOB and SCDs   discussed with CTSx and  at bedside

## 2020-07-21 NOTE — PROGRESS NOTE ADULT - ASSESSMENT
1) Metastatic Adenocarcinoma- Spine, brain  2) Left Pleural Effusion  3) Thoracostomy in Place  4) Dyspnea  5) Leukocytosis  6) Cholecystitis     74 y/o female with PMH of Hypothyroidism and L2 pathologic compression fracture presents to  with 1 month history of dyspnea and 1 week history of the dry cough. Patient was seen by ortho spine surgeon, pulmonologist and oncologist and send for evaluation of left sided pleural effusion with concern for lung mass. Pt reports onset of back pain was in March 2020, received cortisone shots and got an MRI of L2 and blood test x2 weeks ago. Pt went to Dr. Westfall (oncologist) and was sent for imaging with Dr. Sanchez (pulmonologist) who discussed results of CT abdomen and chest this AM with pt and told pt to come to ED for evaluation for SOB.  In Ed -  T(F): 98.9Max: 98.9  HR: 80  (80 - 94) BP: 149/63 (149/63 - 150/77) RR: 24(17 - 24) SpO2: 98% (98% - 99%) EKG - sinus , low voltage QRS, troponin x 1neg, CXR - large pleural effusion, WBC 12, Cr 0.73, , COVID PCR neg , s/p left chest tube placement in ED , fluid analysis pending (10 Jul 2020 13:26)  Massive left sided pleural effusion  CT Chest shows 2.6 cm spiculated left upper lobe mass, likely primary lung neoplasm. Nodularity along the left major fissure. Cannot differentiate intrapulmonary lymph nodes versus pleural metastatic disease. Left pleural drainage catheter in place without significant pleural effusion. Small left-sided pneumothorax, which may be postprocedural. 1.5 cm indeterminate liver lesion. Metastatic bone disease again noted to the spine.   Patient evaluated by Orthopedics, underwent MRI brain, evaluated by Surgery  Underwent L2-L5 laminectomy, partial L2 corpectomy, T11-L5 posterior fusion and stabilization, pedicle screw instrumentation and possible vertebroplasty on 7/16  Extrapulmonary metastasis noted, secondary to Adenocarcinoma (follow up immunohistochemistry)    Needs PT/OT, continue IS  Patient is s/p cholecystostomy tube  Thoracostomy in place for left pleural effusion;   Thank you for the consult; will continue to follow

## 2020-07-21 NOTE — PROGRESS NOTE ADULT - SUBJECTIVE AND OBJECTIVE BOX
Patient seen and examined at bedside. Underwent cholecystotomy tube placement with Gen Sx, w/o issue. Back pain well controlled.   Denies weakness, numbness or tingling. Denies chest pain, shortness of breath, nausea or vomiting. Walked a bit with PT yesterday, leg sensory changes in anterior thighs improving.     PE:  Vital Signs Last 24 Hrs  T(C): 36.2 (20 Jul 2020 21:00), Max: 36.5 (20 Jul 2020 08:45)  T(F): 97.1 (20 Jul 2020 21:00), Max: 97.7 (20 Jul 2020 08:45)  HR: 98 (20 Jul 2020 21:00) (80 - 98)  BP: 123/54 (20 Jul 2020 21:00) (117/61 - 129/63)  BP(mean): 78 (20 Jul 2020 11:00) (67 - 78)  RR: 18 (20 Jul 2020 21:00) (13 - 25)  SpO2: 97% (20 Jul 2020 21:00) (97% - 100%)    General: NAD, resting comfortably in bed  Dressing C/D/I  HMV present  2+ radial pulses  2+ DP Pulses    Motor:                   C5                C6              C7               C8           T1   R             5/5                5/5            5/5              5/5          5/5  L             5/5                5/5            5/5              5/5          5/5                    L2                  L3             L4              L5            S1  R            4/5                5/5             5/5            5/5          5/5  L             4/5                5/5            5/5            5/5          5/5    Sensory:            C5         C6         C7      C8       T1        (0=absent, 1=impaired, 2=normal, NT=not testable)  R         2            2           2        2         2  L          2            2           2        2         2               L2          L3         L4      L5       S1         (0=absent, 1=impaired, 2=normal, NT=not testable)  R         2            2            2        2        2  L          2            2           2        2         2           A/P:  75y f s/p L2-4 Lami/decompression, T11-L5 PSF POD 5    -PT/OT - WBAT  -Pain Control  -DVT ppx per primary team, Ok once drains are pulled  -Chest tube per CT Sx  -pain control   -fU Drain outputs  - FU OR path, frozen prelim is adenocarcinoma, likely mets Lung Ca  -Rest, ice, compress and elevate the extremity as we needed  -Incentive Spirometry  -Medical management appreciated  - no further orthopedic sx intervention at this time  - Dispo planning   - will d/w Dr. George and advise if plan changes

## 2020-07-21 NOTE — DIETITIAN INITIAL EVALUATION ADULT. - PERTINENT LABORATORY DATA
07-20 Na137 mmol/L Glu 99 mg/dL K+ 3.8 mmol/L Cr  0.53 mg/dL BUN 14 mg/dL Phos n/a   Alb 2.1 g/dL<L> PAB n/a

## 2020-07-21 NOTE — PROGRESS NOTE ADULT - SUBJECTIVE AND OBJECTIVE BOX
Patient seen and examined  Chart reviewed  Afebrile    patient sitting up in chair  C/O back pain  Legs subjectively weak, but able to flex hips and extend legs independently  Sensation intact throughout to light touch    70 cc out HV yesterday - minimal output today    S/P cholecystotomy  Chest tube in place     - POD #5 - partial corpectomy and reconstruction lumbar spine   - Progressing well   - Expect HV drain to be out in a day or 2   - Further care per surgery, CT surgery, medicine, oncology    GIULIANO George MD

## 2020-07-21 NOTE — PROGRESS NOTE ADULT - SUBJECTIVE AND OBJECTIVE BOX
76 y/o female with PMH of Hypothyroidism and L2 pathologic compression fracture presents to   on 7/10/20 with 1 month history of dyspnea and 1 week history of the dry cough. Patient was seen by ortho spine surgeon, pulmonologist and oncologist and send for evaluation of left sided pleural effusion with concern for lung mass. Pt reports onset of back pain was in March 2020, received cortisone shots and got an MRI of L2 and blood test x2 weeks ago. Pt went to Dr. Westfall (oncologist) and was sent for imaging with Dr. Sanchez (pulmonologist) who discussed results of CT abdomen and chest this AM with pt and told pt to come to ED for evaluation for SOB.  In Ed -  T(F): 98.9Max: 98.9  HR: 80  (80 - 94) BP: 149/63 (149/63 - 150/77) RR: 24(17 - 24) SpO2: 98% (98% - 99%) EKG - sinus , low voltage QRS, troponin x 1neg, CXR - large pleural effusion, WBC 12, Cr 0.73, , covid PCR neg , s/p left chest tube placement in ED , fluid analysis pending (10 Jul 2020 13:26)      Stable but very anxious; pain well ctr; she underwent repeat HIDA and is now in IR for percut. drain of the GB for acute cresencio. This approach was chosen due to the possible masked by narcotics and IV steroids used for her back condition.          HEAD:  Atraumatic, Normocephalic  EYES: EOMI, PERRLA, conjunctiva and sclera clear  NECK: Supple, No JVD  CHEST/LUNG: BS decreased over left base, ; No rales, no rhonchi, no wheezing, Left CT +   HEART: Regular rate and rhythm; No murmurs, no rubs or gallops  ABDOMEN: Soft, Nontender, Nondistended; Bowel sounds present  GENITOURINARY: FQ in place  EXTREMITIES:  2+ Peripheral Pulses, No clubbing, cyanosis, no  edema  CNS non focal            < from: [facility] (07.20.20)  IMPRESSION:  Abnormal morphine-augmented hepatobiliary scan compatible with acute cholecystitis. 74 y/o female with PMH of Hypothyroidism and L2 pathologic compression fracture presents to   on 7/10/20 with 1 month history of dyspnea and 1 week history of the dry cough. Patient was seen by ortho spine surgeon, pulmonologist and oncologist and send for evaluation of left sided pleural effusion with concern for lung mass. Pt reports onset of back pain was in March 2020, received cortisone shots and got an MRI of L2 and blood test x2 weeks ago. Pt went to Dr. Westfall (oncologist) and was sent for imaging with Dr. Sanchez (pulmonologist) who discussed results of CT abdomen and chest this AM with pt and told pt to come to ED for evaluation for SOB.  In Ed -  T(F): 98.9Max: 98.9  HR: 80  (80 - 94) BP: 149/63 (149/63 - 150/77) RR: 24(17 - 24) SpO2: 98% (98% - 99%) EKG - sinus , low voltage QRS, troponin x 1neg, CXR - large pleural effusion, WBC 12, Cr 0.73, , covid PCR neg , s/p left chest tube placement in ED , fluid analysis pending (10 Jul 2020 13:26)      Stable but very anxious; pain well ctr; she underwent repeat HIDA and is now in IR for percut. drain of the GB for acute cresencio. This approach was chosen due to the possible masked by narcotics and IV steroids used for her back condition.    7/21 - no bm in several days, is passing gas,  feels overwhelmed, occ lightheadedness when she stands up lasts for a min or so     GEN: sitting up in chair with brace   HEAD:  Atraumatic, Normocephalic  EYES: EOMI, PERRLA, conjunctiva and sclera clear  NECK: Supple, No JVD  CHEST/LUNG: BS decreased over left base, ; No rales, no rhonchi, no wheezing, Left CT +   HEART: Regular rate and rhythm; No murmurs, no rubs or gallops  ABDOMEN: Soft, Nontender, Nondistended; Bowel sounds present, has cholecystostomy tube and drain   EXTREMITIES:  2+ Peripheral Pulses, No clubbing, cyanosis, no  edema  CNS non focal      < from: [facility] (07.20.20)  IMPRESSION:  Abnormal morphine-augmented hepatobiliary scan compatible with acute cholecystitis.    LABS: All Labs Reviewed:                    11.2   15.41 )-----------( 289      ( 20 Jul 2020 10:53 )             34.2   137  |  104  |  14  ----------------------------<  99  3.8   |  32<H>  |  0.53    acetaminophen   Tablet .. 975 milliGRAM(s) Oral every 8 hours  ascorbic acid 500 milliGRAM(s) Oral daily  benzocaine 15 mG/menthol 3.6 mG (Sugar-Free) Lozenge 1 Lozenge Oral every 3 hours PRN  calcium carbonate   1250 mG (OsCal) 1 Tablet(s) Oral three times a day  cyclobenzaprine 10 milliGRAM(s) Oral every 8 hours  escitalopram 5 milliGRAM(s) Oral daily  famotidine    Tablet 20 milliGRAM(s) Oral every 12 hours PRN  gabapentin 300 milliGRAM(s) Oral two times a day  HYDROmorphone  Injectable 1 milliGRAM(s) IV Push every 3 hours PRN  levothyroxine 100 MICROGram(s) Oral daily  liothyronine 5 MICROGram(s) Oral daily  LORazepam     Tablet 0.5 milliGRAM(s) Oral every 6 hours PRN  magnesium hydroxide Suspension 30 milliLiter(s) Oral every 12 hours PRN  melatonin 3 milliGRAM(s) Oral at bedtime PRN  multivitamin 1 Tablet(s) Oral daily  naloxone Injectable 0.1 milliGRAM(s) IV Push every 3 minutes PRN  ondansetron Injectable 4 milliGRAM(s) IV Push every 6 hours PRN  oxyCODONE    IR 10 milliGRAM(s) Oral every 4 hours PRN  oxyCODONE    IR 5 milliGRAM(s) Oral every 4 hours PRN  piperacillin/tazobactam IVPB.. 3.375 Gram(s) IV Intermittent every 8 hours  prochlorperazine   Injectable 10 milliGRAM(s) IV Push once PRN  prochlorperazine   Injectable 10 milliGRAM(s) IV Push every 8 hours PRN  senna 2 Tablet(s) Oral at bedtime  sodium chloride 0.9% lock flush 3 milliLiter(s) IV Push every 8 hours

## 2020-07-21 NOTE — DIETITIAN INITIAL EVALUATION ADULT. - PERTINENT MEDS FT
MEDICATIONS  (STANDING):  acetaminophen   Tablet .. 975 milliGRAM(s) Oral every 8 hours  ascorbic acid 500 milliGRAM(s) Oral daily  calcium carbonate   1250 mG (OsCal) 1 Tablet(s) Oral three times a day  cyclobenzaprine 10 milliGRAM(s) Oral every 8 hours  escitalopram 5 milliGRAM(s) Oral daily  gabapentin 300 milliGRAM(s) Oral two times a day  levothyroxine 100 MICROGram(s) Oral daily  liothyronine 5 MICROGram(s) Oral daily  multivitamin 1 Tablet(s) Oral daily  piperacillin/tazobactam IVPB.. 3.375 Gram(s) IV Intermittent every 8 hours  senna 2 Tablet(s) Oral at bedtime  sodium chloride 0.9% lock flush 3 milliLiter(s) IV Push every 8 hours    MEDICATIONS  (PRN):  benzocaine 15 mG/menthol 3.6 mG (Sugar-Free) Lozenge 1 Lozenge Oral every 3 hours PRN Sore Throat  famotidine    Tablet 20 milliGRAM(s) Oral every 12 hours PRN Dyspepsia  HYDROmorphone  Injectable 1 milliGRAM(s) IV Push every 3 hours PRN breakthrough pain  LORazepam     Tablet 0.5 milliGRAM(s) Oral every 6 hours PRN Anxiety  magnesium hydroxide Suspension 30 milliLiter(s) Oral every 12 hours PRN Constipation  melatonin 3 milliGRAM(s) Oral at bedtime PRN Sleep  naloxone Injectable 0.1 milliGRAM(s) IV Push every 3 minutes PRN For ANY of the following changes in patient status:  A. RR LESS THAN 10 breaths per minute, B. Oxygen saturation LESS THAN 90%, C. Sedation score of 6  ondansetron Injectable 4 milliGRAM(s) IV Push every 6 hours PRN Nausea  oxyCODONE    IR 10 milliGRAM(s) Oral every 4 hours PRN Moderate Pain (4 - 6)  oxyCODONE    IR 5 milliGRAM(s) Oral every 4 hours PRN Mild Pain (1 - 3)  prochlorperazine   Injectable 10 milliGRAM(s) IV Push once PRN Nausea  prochlorperazine   Injectable 10 milliGRAM(s) IV Push every 8 hours PRN Nausea/vomiting

## 2020-07-21 NOTE — PROGRESS NOTE ADULT - SUBJECTIVE AND OBJECTIVE BOX
POD#5. Pt seen resting on chair. Pt was able to mobilize with physical therapy. PT denies lower extremities pain. She states her hips feel weak at times. Pt has numbness of b/l quads.  Low back pain is improving. B/l quad numbness improving  HIDA scan positive twice s/p cholecystostomy.  She did physical therapy earlier today.     PE  Gen appearance: NAD  Motor strength: 4/5 of b/l HF, otherwise 5/5 of b/l ant tibs, gastrocs, EHL  Sensation: intact to light touch bilaterally  Incisional site: clean and dry dressing.     Chest tube: 230ml  drain right hemovac:70cc    Plan  VSS afebrile  CBC, BMP pending  Drain managed per Plastic surgery  Brain lesions in right frontal lobe with multiple subcentimeter lesions--neurosurgery consult and continue management per ONCOLOGY  Continue management per ONCOLOGY and MEDICINE  Mobilize with physical therapy   Discussed case with Dr. George

## 2020-07-21 NOTE — PROGRESS NOTE ADULT - SUBJECTIVE AND OBJECTIVE BOX
Patient is a 75y old  Female who presents with a chief complaint of dyspnea , cough (2020 06:36)      HPI:  76 y/o female with PMH of Hypothyroidism and L2 pathologic compression fracture presents to  with 1 month history of dyspnea and 1 week history of the dry cough. Patient was seen by ortho spine surgeon, pulmonologist and oncologist and send for evaluation of left sided pleural effusion with concern for lung mass. Pt reports onset of back pain was in 2020, received cortisone shots and got an MRI of L2 and blood test x2 weeks ago. Pt went to Dr. Westfall (oncologist) and was sent for imaging with Dr. Sanchez (pulmonologist) who discussed results of CT abdomen and chest this AM with pt and told pt to come to ED for evaluation for SOB.  In Ed -  T(F): 98.9Max: 98.9  HR: 80  (80 - 94) BP: 149/63 (149/63 - 150/77) RR: 24(17 - 24) SpO2: 98% (98% - 99%) EKG - sinus , low voltage QRS, troponin x 1neg, CXR - large pleural effusion, WBC 12, Cr 0.73, , covid PCR neg , s/p left chest tube placement in ED , fluid analysis pending (10 Jul 2020 13:26)      Patient is s/p cholecystostomy tube  Pathology resulted Adenocarcinoma  Endorses pain and discomfort at the site of thoracostomy but improves with positioning within the bed    MEDICATIONS  (STANDING):  acetaminophen   Tablet .. 975 milliGRAM(s) Oral every 8 hours  ascorbic acid 500 milliGRAM(s) Oral daily  calcium carbonate   1250 mG (OsCal) 1 Tablet(s) Oral three times a day  ciprofloxacin   IVPB 400 milliGRAM(s) IV Intermittent every 12 hours  cyclobenzaprine 10 milliGRAM(s) Oral every 8 hours  dexAMETHasone  Injectable 1 milliGRAM(s) IV Push every 8 hours  gabapentin 300 milliGRAM(s) Oral two times a day  levothyroxine 100 MICROGram(s) Oral daily  liothyronine 5 MICROGram(s) Oral daily  metroNIDAZOLE  IVPB 500 milliGRAM(s) IV Intermittent every 8 hours  multivitamin 1 Tablet(s) Oral daily  senna 2 Tablet(s) Oral at bedtime  sodium chloride 0.9% lock flush 3 milliLiter(s) IV Push every 8 hours    MEDICATIONS  (PRN):  benzocaine 15 mG/menthol 3.6 mG (Sugar-Free) Lozenge 1 Lozenge Oral every 3 hours PRN Sore Throat  famotidine    Tablet 20 milliGRAM(s) Oral every 12 hours PRN Dyspepsia  HYDROmorphone  Injectable 1 milliGRAM(s) IV Push every 3 hours PRN breakthrough pain  magnesium hydroxide Suspension 30 milliLiter(s) Oral every 12 hours PRN Constipation  melatonin 3 milliGRAM(s) Oral at bedtime PRN Sleep  naloxone Injectable 0.1 milliGRAM(s) IV Push every 3 minutes PRN For ANY of the following changes in patient status:  A. RR LESS THAN 10 breaths per minute, B. Oxygen saturation LESS THAN 90%, C. Sedation score of 6  ondansetron Injectable 4 milliGRAM(s) IV Push every 6 hours PRN Nausea  ondansetron Injectable 4 milliGRAM(s) IV Push every 6 hours PRN Nausea  oxyCODONE    IR 10 milliGRAM(s) Oral every 4 hours PRN Moderate Pain (4 - 6)  oxyCODONE    IR 5 milliGRAM(s) Oral every 4 hours PRN Mild Pain (1 - 3)  prochlorperazine   Injectable 10 milliGRAM(s) IV Push every 8 hours PRN Nausea/vomiting        Vital Signs Last 24 Hrs  T(C): 36.2 (2020 06:43), Max: 36.8 (2020 14:52)  T(F): 97.2 (2020 06:43), Max: 98.3 (2020 14:52)  HR: 99 (2020 09:00) (79 - 99)  BP: 127/54 (2020 09:00) (107/50 - 135/53)  BP(mean): 71 (2020 09:00) (55 - 80)  RR: 14 (2020 09:00) (7 - 19)  SpO2: 100% (2020 08:00) (95% - 100%)    I&O's Detail    2020 07:01  -  2020 07:00  --------------------------------------------------------  IN:  Total IN: 0 mL    OUT:    Chest Tube: 1115 mL  Total OUT: 1115 mL    Total NET: -1115 mL          PHYSICAL EXAM  General Appearance: cooperative, no acute distress,   HEENT: PERRL, conjunctiva clear, EOM's intact, non injected pharynx, no exudate, TM   normal  Neck: Supple, , no adenopathy, thyroid: not enlarged, no carotid bruit or JVD  Back: Symmetric, no  tenderness,no soft tissue tenderness  Lungs: left sided CT attached, decreased BS left mid-lower lung, IMPROVED  Heart: Regular rate and rhythm, S1, S2 normal, no murmur, rub or gallop  Abdomen: Soft, non-tender, bowel sounds active , no hepatosplenomegaly  Extremities: no cyanosis or edema, no joint swelling  Skin: Skin color, texture normal, no rashes   Neurologic: Alert and oriented X3 , cranial nerves intact, sensory and motor normal,    ECG:    LABS:                          13.3   13.48 )-----------( 482      ( 2020 09:13 )             39.1     07-10    136  |  104  |  11  ----------------------------<  114<H>  4.2   |  25  |  0.73    Ca    8.7      10 Jul 2020 10:49  Mg     2.2     07-10    TPro  7.0  /  Alb  3.1<L>  /  TBili  0.4  /  DBili  x   /  AST  28  /  ALT  33  /  AlkPhos  126<H>  07-10    CARDIAC MARKERS ( 10 Jul 2020 10:49 )  <0.015 ng/mL / x     / x     / x     / x            Pro BNP  209 07-10 @ 10:49  D Dimer  -- 07-10 @ 10:49    PT/INR - ( 10 Jul 2020 10:57 )   PT: 12.2 sec;   INR: 1.04 ratio         PTT - ( 10 Jul 2020 10:57 )  PTT:29.4 sec  Urinalysis Basic - ( 10 Jul 2020 17:50 )    Color: Yellow / Appearance: Clear / S.010 / pH: x  Gluc: x / Ketone: Small  / Bili: Negative / Urobili: Negative mg/dL   Blood: x / Protein: Negative mg/dL / Nitrite: Negative   Leuk Esterase: Trace / RBC: 0-2 /HPF / WBC 0-2   Sq Epi: x / Non Sq Epi: Occasional / Bacteria: Few            RADIOLOGY & ADDITIONAL STUDIES:    < from: Xray Chest 1 View- PORTABLE-Urgent (07.10.20 @ 14:20) >    PROCEDURE DATE:  07/10/2020          INTERPRETATION:  AP chest on July 10, 2020 at 2:12 PM. Patient had catheter left chest tube inserted for massive effusion.    Heart size cannot be assessed.    A catheter left chest tube has been inserted at the base.    Massive left effusion seen prior in the day is significantly diminished but a very large effusion remains. There is no visible pneumothorax.    Underlying pathology cannot be excluded.    IMPRESSION: Diminished left effusion after chest tube. Significant effusion remains.    < end of copied text >  < from: Xray Chest 1 View- PORTABLE-Routine (20 @ 10:08) >      PROCEDURE DATE:  2020  personally reviewed and shown to pt with prior xrays as well        INTERPRETATION:  INDICATION: Assess left pleural effusion.    PRIORS: 7/10/2020.    VIEWS: Portable AP radiography of the chest performed.    FINDINGS: Since prior evaluation the position of the indwelling left pleural space pigtail drainage catheter has changed, the pigtail now overlying the medial aspect of the left mid thorax. There is interval decrease in left pleural effusion from prior; a moderately sized left pleural effusion persists. Underlying lung parenchymal infiltrate, consolidation or atelectasis cannot be excluded. There is no evidence for left-sided pneumothorax. No acute right-sided infiltrate is identified. No evidence for right pleural effusion or pneumothorax. No mediastinal shift noted. Degenerative changes of the thoracic spine evident.    IMPRESSION: Interval decrease in left pleural effusion from prior with change in position of indwelling pigtail pleural space drainage catheter. Moderately sized left pleural effusion persists. See above discussion.      CT CHEST     LUNGS, AIRWAYS: The central airways are patent. 2.6 x 2.2 cm spiculated nodule in the left upper lobe with surrounding linear fibrotic reaction. Patchy left lower lobe airspace disease, likely infectious. Nodularity along the left major fissure.    PLEURA: Left pleural pigtail drainage catheter terminates in the left posterior hemithorax. No significant pleural effusion. Small left-sided pneumothorax.    VESSELS: Normal caliber aorta. Main pulmonary arteries are patent.    HEART: Normal heart size. No pericardial effusion.    MEDIASTINUM AND MARJAN: No adenopathy.    UPPER ABDOMEN: Distended gallbladder. 1.5 cm indeterminate lesion in the caudate lobe.    BONES AND SOFT TISSUES: Destructive lesion of L2 vertebral body again seen.    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    Nodularity along the left major fissure. Cannot differentiate intrapulmonary lymph nodes versus pleural metastatic disease.    Left pleural drainage catheter in place without significant pleural effusion.    Small left-sided pneumothorax, which may be postprocedural.    1.5 cm indeterminate liver lesion.    Metastatic bone disease again noted to the spine.

## 2020-07-21 NOTE — DIETITIAN INITIAL EVALUATION ADULT. - ADD RECOMMEND
1) add ensure enlive TID 2) add MVI with minerals daily to ensure 100% RDI met 3) obtain new wt weekly to track/trend changes 4) monitor PO intake/tolerance closely

## 2020-07-21 NOTE — DIETITIAN INITIAL EVALUATION ADULT. - ENERGY INTAKE
pt only able to provide vague information.  however, able to  pt likely meeting <50% of estimated nutr needs since admission. Poor (<50%)

## 2020-07-21 NOTE — CHART NOTE - NSCHARTNOTEFT_GEN_A_CORE
No contraindications for removing brace and positioning during VATS surgery.  Please refrain from hyperflexion or hyperextension positions of the lumbar spine.

## 2020-07-22 LAB
ANION GAP SERPL CALC-SCNC: 4 MMOL/L — LOW (ref 5–17)
APTT BLD: 29.9 SEC — SIGNIFICANT CHANGE UP (ref 27.5–35.5)
BUN SERPL-MCNC: 11 MG/DL — SIGNIFICANT CHANGE UP (ref 7–23)
CALCIUM SERPL-MCNC: 8 MG/DL — LOW (ref 8.5–10.1)
CHLORIDE SERPL-SCNC: 103 MMOL/L — SIGNIFICANT CHANGE UP (ref 96–108)
CO2 SERPL-SCNC: 32 MMOL/L — HIGH (ref 22–31)
CREAT SERPL-MCNC: 0.42 MG/DL — LOW (ref 0.5–1.3)
GLUCOSE SERPL-MCNC: 95 MG/DL — SIGNIFICANT CHANGE UP (ref 70–99)
HCT VFR BLD CALC: 27.3 % — LOW (ref 34.5–45)
HGB BLD-MCNC: 8.9 G/DL — LOW (ref 11.5–15.5)
INR BLD: 1.07 RATIO — SIGNIFICANT CHANGE UP (ref 0.88–1.16)
MCHC RBC-ENTMCNC: 29.7 PG — SIGNIFICANT CHANGE UP (ref 27–34)
MCHC RBC-ENTMCNC: 32.6 GM/DL — SIGNIFICANT CHANGE UP (ref 32–36)
MCV RBC AUTO: 91 FL — SIGNIFICANT CHANGE UP (ref 80–100)
PLATELET # BLD AUTO: 245 K/UL — SIGNIFICANT CHANGE UP (ref 150–400)
POTASSIUM SERPL-MCNC: 3.7 MMOL/L — SIGNIFICANT CHANGE UP (ref 3.5–5.3)
POTASSIUM SERPL-SCNC: 3.7 MMOL/L — SIGNIFICANT CHANGE UP (ref 3.5–5.3)
PROTHROM AB SERPL-ACNC: 12.5 SEC — SIGNIFICANT CHANGE UP (ref 10.6–13.6)
RBC # BLD: 3 M/UL — LOW (ref 3.8–5.2)
RBC # FLD: 13.5 % — SIGNIFICANT CHANGE UP (ref 10.3–14.5)
SODIUM SERPL-SCNC: 139 MMOL/L — SIGNIFICANT CHANGE UP (ref 135–145)
WBC # BLD: 14.6 K/UL — HIGH (ref 3.8–10.5)
WBC # FLD AUTO: 14.6 K/UL — HIGH (ref 3.8–10.5)

## 2020-07-22 PROCEDURE — 71045 X-RAY EXAM CHEST 1 VIEW: CPT | Mod: 26

## 2020-07-22 PROCEDURE — 99232 SBSQ HOSP IP/OBS MODERATE 35: CPT

## 2020-07-22 RX ORDER — ALPRAZOLAM 0.25 MG
0.25 TABLET ORAL EVERY 8 HOURS
Refills: 0 | Status: DISCONTINUED | OUTPATIENT
Start: 2020-07-22 | End: 2020-07-23

## 2020-07-22 RX ADMIN — OXYCODONE HYDROCHLORIDE 5 MILLIGRAM(S): 5 TABLET ORAL at 20:33

## 2020-07-22 RX ADMIN — SENNA PLUS 2 TABLET(S): 8.6 TABLET ORAL at 22:55

## 2020-07-22 RX ADMIN — LIOTHYRONINE SODIUM 5 MICROGRAM(S): 25 TABLET ORAL at 10:47

## 2020-07-22 RX ADMIN — Medication 0.25 MILLIGRAM(S): at 23:10

## 2020-07-22 RX ADMIN — PIPERACILLIN AND TAZOBACTAM 25 GRAM(S): 4; .5 INJECTION, POWDER, LYOPHILIZED, FOR SOLUTION INTRAVENOUS at 22:57

## 2020-07-22 RX ADMIN — Medication 975 MILLIGRAM(S): at 06:06

## 2020-07-22 RX ADMIN — PIPERACILLIN AND TAZOBACTAM 25 GRAM(S): 4; .5 INJECTION, POWDER, LYOPHILIZED, FOR SOLUTION INTRAVENOUS at 15:05

## 2020-07-22 RX ADMIN — SODIUM CHLORIDE 3 MILLILITER(S): 9 INJECTION INTRAMUSCULAR; INTRAVENOUS; SUBCUTANEOUS at 05:22

## 2020-07-22 RX ADMIN — Medication 100 MICROGRAM(S): at 06:05

## 2020-07-22 RX ADMIN — Medication 975 MILLIGRAM(S): at 15:02

## 2020-07-22 RX ADMIN — Medication 1 TABLET(S): at 15:02

## 2020-07-22 RX ADMIN — Medication 1 TABLET(S): at 06:06

## 2020-07-22 RX ADMIN — GABAPENTIN 300 MILLIGRAM(S): 400 CAPSULE ORAL at 10:48

## 2020-07-22 RX ADMIN — CYCLOBENZAPRINE HYDROCHLORIDE 10 MILLIGRAM(S): 10 TABLET, FILM COATED ORAL at 06:05

## 2020-07-22 RX ADMIN — Medication 1 TABLET(S): at 10:48

## 2020-07-22 RX ADMIN — Medication 1 TABLET(S): at 22:56

## 2020-07-22 RX ADMIN — GABAPENTIN 300 MILLIGRAM(S): 400 CAPSULE ORAL at 22:55

## 2020-07-22 RX ADMIN — ESCITALOPRAM OXALATE 5 MILLIGRAM(S): 10 TABLET, FILM COATED ORAL at 10:47

## 2020-07-22 RX ADMIN — CYCLOBENZAPRINE HYDROCHLORIDE 10 MILLIGRAM(S): 10 TABLET, FILM COATED ORAL at 15:02

## 2020-07-22 RX ADMIN — Medication 975 MILLIGRAM(S): at 22:56

## 2020-07-22 RX ADMIN — Medication 500 MILLIGRAM(S): at 10:48

## 2020-07-22 RX ADMIN — Medication 975 MILLIGRAM(S): at 16:02

## 2020-07-22 RX ADMIN — SODIUM CHLORIDE 3 MILLILITER(S): 9 INJECTION INTRAMUSCULAR; INTRAVENOUS; SUBCUTANEOUS at 22:00

## 2020-07-22 RX ADMIN — PIPERACILLIN AND TAZOBACTAM 25 GRAM(S): 4; .5 INJECTION, POWDER, LYOPHILIZED, FOR SOLUTION INTRAVENOUS at 06:25

## 2020-07-22 RX ADMIN — SODIUM CHLORIDE 3 MILLILITER(S): 9 INJECTION INTRAMUSCULAR; INTRAVENOUS; SUBCUTANEOUS at 15:11

## 2020-07-22 RX ADMIN — CYCLOBENZAPRINE HYDROCHLORIDE 10 MILLIGRAM(S): 10 TABLET, FILM COATED ORAL at 22:56

## 2020-07-22 NOTE — PROGRESS NOTE ADULT - SUBJECTIVE AND OBJECTIVE BOX
Patient seen and examined at bedside. Back pain well controlled.   Denies weakness, numbness or tingling. Denies chest pain, shortness of breath, nausea or vomiting. Walked a bit with PT yesterday, leg sensory changes in anterior thighs continue to improve.     Vital Signs Last 24 Hrs  T(C): 37.2 (21 Jul 2020 21:15), Max: 37.2 (21 Jul 2020 21:15)  T(F): 99 (21 Jul 2020 21:15), Max: 99 (21 Jul 2020 21:15)  HR: 99 (21 Jul 2020 21:15) (81 - 99)  BP: 121/59 (21 Jul 2020 21:15) (121/59 - 132/60)  BP(mean): 78 (21 Jul 2020 08:27) (78 - 78)  RR: 20 (21 Jul 2020 21:15) (18 - 20)  SpO2: 96% (21 Jul 2020 21:15) (95% - 97%)    General: NAD, resting comfortably in bed  Dressing C/D/I  HMV present  2+ radial pulses  2+ DP Pulses    Motor:                   C5                C6              C7               C8           T1   R             5/5                5/5            5/5              5/5          5/5  L             5/5                5/5            5/5              5/5          5/5                    L2                  L3             L4              L5            S1  R            4/5                5/5             5/5            5/5          5/5  L             4/5                5/5            5/5            5/5          5/5    Sensory:            C5         C6         C7      C8       T1        (0=absent, 1=impaired, 2=normal, NT=not testable)  R         2            2           2        2         2  L          2            2           2        2         2               L2          L3         L4      L5       S1         (0=absent, 1=impaired, 2=normal, NT=not testable)  R         2            2            2        2        2  L          2            2           2        2         2           A/P:  75y f s/p L2-4 Lami/decompression, T11-L5 PSF POD 6    -PT/OT - WBAT  -Pain Control  -DVT ppx per primary team, Ok once drains are pulled  -Chest tube per CT S  -pain control   -fU Drain outputs  - FU OR path, frozen prelim is adenocarcinoma, likely mets Lung Ca  -pt for VATS on thursday, no contraindications for removing brace for positionsing  -Rest, ice, compress and elevate the extremity as we needed  -Incentive Spirometry  -Medical management appreciated  - no further orthopedic sx intervention at this time  - Dispo planning   - will d/w Dr. George and advise if plan changes

## 2020-07-22 NOTE — PROGRESS NOTE ADULT - SUBJECTIVE AND OBJECTIVE BOX
POD#6. PT seen resting in bed. Tearful. Pt has incisional site soreness with weakness of b/l hips. She states she has numbness of b/l quads. She is awaiting physical therapy sessions.   Pt s/p Status post cholecystostomy drain.     PE  Gen appearance: NAD  Motor strength: 4/5 of b/l HF, 5/5 of b/l ant tibs, gastrocs, EHL  Sensation: mild hyposensitivity of b/l quads  No calf tenderness bilaterally. Venodynes on  Incisional site: clean and dry dressing. Drain serosang drainage:45cc kept per Plastics    Plan  Tmax:99, VSS  WBC: 14.60, H/H:8.9/27.3  Mobilize as tolerated with physical therapy.  X-rays of lumbar spine results pending  Continue venodynes   Continue management per MED and Oncology

## 2020-07-22 NOTE — PROGRESS NOTE ADULT - SUBJECTIVE AND OBJECTIVE BOX
74 y/o female with PMH of Hypothyroidism and L2 pathologic compression fracture presents to   on 7/10/20 with 1 month history of dyspnea and 1 week history of the dry cough. Patient was seen by ortho spine surgeon, pulmonologist and oncologist and send for evaluation of left sided pleural effusion with concern for lung mass. Pt reports onset of back pain was in March 2020, received cortisone shots and got an MRI of L2 and blood test x2 weeks ago. Pt went to Dr. Westfall (oncologist) and was sent for imaging with Dr. Sanchez (pulmonologist) who discussed results of CT abdomen and chest this AM with pt and told pt to come to ED for evaluation for SOB.  In Ed -  T(F): 98.9Max: 98.9  HR: 80  (80 - 94) BP: 149/63 (149/63 - 150/77) RR: 24(17 - 24) SpO2: 98% (98% - 99%) EKG - sinus , low voltage QRS, troponin x 1neg, CXR - large pleural effusion, WBC 12, Cr 0.73, , covid PCR neg , s/p left chest tube placement in ED , fluid analysis pending (10 Jul 2020 13:26)      Stable but very anxious; pain well ctr; she underwent repeat HIDA and is now in IR for percut. drain of the GB for acute cresencio. This approach was chosen due to the possible masked by narcotics and IV steroids used for her back condition.    7/21 - no bm in several days, is passing gas,  feels overwhelmed, occ lightheadedness when she stands up lasts for a min or so   7/22 - pt seen and examined, + gen weakness, denies cp, dyspnea, abdominal pain, + anxious, POC discussed  Review of system- Rest of the review of system are negative except mentioned in HPI  T(C): 36.7 (07-22-20 @ 16:00), Max: 36.7 (07-22-20 @ 16:00)  T(F): 98.1 (07-22-20 @ 16:00), Max: 98.1 (07-22-20 @ 16:00)  HR: 101 (07-22-20 @ 16:00) (78 - 101)  BP: 110/56 (07-22-20 @ 16:00) (110/56 - 124/60)  RR: 18 (07-22-20 @ 16:00) (18 - 19)  SpO2: 95% (07-22-20 @ 16:00) (95% - 96%)  Wt(kg): --      GEN: sitting up in chair with brace   HEAD:  Atraumatic, Normocephalic  EYES: EOMI, PERRLA, conjunctiva and sclera clear  NECK: Supple, No JVD  CHEST/LUNG: BS decreased over left base, ; No rales, no rhonchi, no wheezing, Left CT +   HEART: Regular rate and rhythm; No murmurs, no rubs or gallops  ABDOMEN: Soft, Nontender, Nondistended; Bowel sounds present, has cholecystostomy tube and drain   EXTREMITIES:  2+ Peripheral Pulses, No clubbing, cyanosis, no  edema  CNS non focal      < from: [facility] Hepatobiliary Imaging (07.20.20)  IMPRESSION:  Abnormal morphine-augmented hepatobiliary scan compatible with acute cholecystitis.    LABS: All Labs Reviewed:         07-22    139  |  103  |  11  ----------------------------<  95  3.7   |  32<H>  |  0.42<L>    Ca    8.0<L>      22 Jul 2020 06:59                              8.9    14.60 )-----------( 245      ( 22 Jul 2020 06:59 )             27.3           PT/INR - ( 22 Jul 2020 06:59 )   PT: 12.5 sec;   INR: 1.07 ratio         PTT - ( 22 Jul 2020 06:59 )  PTT:29.9 sec                 11.2   15.41 )-----------( 289      ( 20 Jul 2020 10:53 )             34.2   137  |  104  |  14  ----------------------------<  99  3.8   |  32<H>  |  0.53    acetaminophen   Tablet .. 975 milliGRAM(s) Oral every 8 hours  ascorbic acid 500 milliGRAM(s) Oral daily  benzocaine 15 mG/menthol 3.6 mG (Sugar-Free) Lozenge 1 Lozenge Oral every 3 hours PRN  calcium carbonate   1250 mG (OsCal) 1 Tablet(s) Oral three times a day  cyclobenzaprine 10 milliGRAM(s) Oral every 8 hours  escitalopram 5 milliGRAM(s) Oral daily  famotidine    Tablet 20 milliGRAM(s) Oral every 12 hours PRN  gabapentin 300 milliGRAM(s) Oral two times a day  HYDROmorphone  Injectable 1 milliGRAM(s) IV Push every 3 hours PRN  levothyroxine 100 MICROGram(s) Oral daily  liothyronine 5 MICROGram(s) Oral daily  LORazepam     Tablet 0.5 milliGRAM(s) Oral every 6 hours PRN  magnesium hydroxide Suspension 30 milliLiter(s) Oral every 12 hours PRN  melatonin 3 milliGRAM(s) Oral at bedtime PRN  multivitamin 1 Tablet(s) Oral daily  naloxone Injectable 0.1 milliGRAM(s) IV Push every 3 minutes PRN  ondansetron Injectable 4 milliGRAM(s) IV Push every 6 hours PRN  oxyCODONE    IR 10 milliGRAM(s) Oral every 4 hours PRN  oxyCODONE    IR 5 milliGRAM(s) Oral every 4 hours PRN  piperacillin/tazobactam IVPB.. 3.375 Gram(s) IV Intermittent every 8 hours  prochlorperazine   Injectable 10 milliGRAM(s) IV Push once PRN  prochlorperazine   Injectable 10 milliGRAM(s) IV Push every 8 hours PRN  senna 2 Tablet(s) Oral at bedtime  sodium chloride 0.9% lock flush 3 milliLiter(s) IV Push every 8 hours

## 2020-07-22 NOTE — PROGRESS NOTE ADULT - ASSESSMENT
74 y/o female with PMH of Hypothyroidism and L2 pathologic compression fracture presents to  with 1 month history of dyspnea and 1 week history of the dry cough. Patient was seen by ortho spine surgeon, pulmonologist and oncologist and send for evaluation of left sided pleural effusion with concern for lung mass.   In Ed -  T(F): 98.9Max: 98.9  HR: 80  (80 - 94) BP: 149/63 (149/63 - 150/77) RR: 24(17 - 24) SpO2: 98% (98% - 99%) EKG - sinus , low voltage QRS, troponin x 1neg, CXR - large pleural effusion, WBC 12, Cr 0.73, , covid PCR neg , s/p left chest tube placement in ED , fluid analysis pending     * Lower back pain due to pathologic L2 fracture, worsening on MRI,   - s/p L2-L4 laminectomy, T11-L5 PSF with bone graft, L2 Biopsy/partial tumor excision/Dr George   - pain management, dilaudid PCA - would change to IVP due to dizziness; s/p IV steroids    * Brain lesion in right frontal lobe with multiple subcentimeter lesions   - neurosurgery consult Dr. Handy -  radiology - oncology consult for brain RT  - no edema    * Dyspnea due to Large left pleural effusion s/p left chest tube placement, exudative effusion suspected malignancy   * ON CT CHEST: INDU 2.6 cm mass,  - suspect Primary Lung with Brain and Spine mets   * Suspected postobstructive PNA s/p IV abx  - pending final PAth; suspect adenoCa  - PLAN FOR PLEURX ON 7/23 - NOTED ORTHO CLEARANCE FOR LAT DECUB POSITIONING   discussed with patient adn her  at bedside and they agree to getting pleurx - output in chest tube has been 300-400cc daily per discussion with CTSx       * Acute CHolecystitis   Enlarged GB on CT  - HIDA scan - positive, s/p  perc cholecystostomy by IR   - c/w Zosyn , WBS improving     Lightheadedness when she stands up, transient  check Orthostats      DVT proph - to be started when cleared by Ortho/spine   OOB and SCDs   discussed with CTSx and  at bedside 7/21

## 2020-07-22 NOTE — PROGRESS NOTE ADULT - SUBJECTIVE AND OBJECTIVE BOX
Subjective:  Patient continues to feel overwhelmed by current state and recent diagnosis.    Vital Signs:  Vital Signs Last 24 Hrs  T(C): 36.3 (07-22-20 @ 08:34), Max: 37.2 (07-21-20 @ 21:15)  T(F): 97.4 (07-22-20 @ 08:34), Max: 99 (07-21-20 @ 21:15)  HR: 78 (07-22-20 @ 08:34) (78 - 99)  BP: 124/60 (07-22-20 @ 08:34) (121/59 - 127/59)  RR: 19 (07-22-20 @ 08:34) (19 - 20)  SpO2: 96% (07-22-20 @ 08:34) (96% - 97%) on room air      Relevant labs, radiology and Medications reviewed                        8.9    14.60 )-----------( 245      ( 22 Jul 2020 06:59 )             27.3     07-22    139  |  103  |  11  ----------------------------<  95  3.7   |  32<H>  |  0.42<L>    Ca    8.0<L>      22 Jul 2020 06:59      PT/INR - ( 22 Jul 2020 06:59 )   PT: 12.5 sec;   INR: 1.07 ratio         PTT - ( 22 Jul 2020 06:59 )  PTT:29.9 sec  MEDICATIONS  (STANDING):  acetaminophen   Tablet .. 975 milliGRAM(s) Oral every 8 hours  ascorbic acid 500 milliGRAM(s) Oral daily  calcium carbonate   1250 mG (OsCal) 1 Tablet(s) Oral three times a day  cyclobenzaprine 10 milliGRAM(s) Oral every 8 hours  escitalopram 5 milliGRAM(s) Oral daily  gabapentin 300 milliGRAM(s) Oral two times a day  levothyroxine 100 MICROGram(s) Oral daily  liothyronine 5 MICROGram(s) Oral daily  multivitamin 1 Tablet(s) Oral daily  piperacillin/tazobactam IVPB.. 3.375 Gram(s) IV Intermittent every 8 hours  senna 2 Tablet(s) Oral at bedtime  sodium chloride 0.9% lock flush 3 milliLiter(s) IV Push every 8 hours    MEDICATIONS  (PRN):  ALPRAZolam 0.25 milliGRAM(s) Oral every 8 hours PRN anxiety  benzocaine 15 mG/menthol 3.6 mG (Sugar-Free) Lozenge 1 Lozenge Oral every 3 hours PRN Sore Throat  famotidine    Tablet 20 milliGRAM(s) Oral every 12 hours PRN Dyspepsia  HYDROmorphone  Injectable 1 milliGRAM(s) IV Push every 3 hours PRN breakthrough pain  magnesium hydroxide Suspension 30 milliLiter(s) Oral every 12 hours PRN Constipation  melatonin 3 milliGRAM(s) Oral at bedtime PRN Sleep  naloxone Injectable 0.1 milliGRAM(s) IV Push every 3 minutes PRN For ANY of the following changes in patient status:  A. RR LESS THAN 10 breaths per minute, B. Oxygen saturation LESS THAN 90%, C. Sedation score of 6  ondansetron Injectable 4 milliGRAM(s) IV Push every 6 hours PRN Nausea  oxyCODONE    IR 10 milliGRAM(s) Oral every 4 hours PRN Moderate Pain (4 - 6)  oxyCODONE    IR 5 milliGRAM(s) Oral every 4 hours PRN Mild Pain (1 - 3)  prochlorperazine   Injectable 10 milliGRAM(s) IV Push once PRN Nausea  prochlorperazine   Injectable 10 milliGRAM(s) IV Push every 8 hours PRN Nausea/vomiting      Physical exam  Gen: NAD breathing comfortably  Neuro: AO x 3   Card: S1 S2  Pulm: CTA  Abd: Soft NT ND  Ext: no edema    Tubes:  Left CT water seal, no air leak, serous drainage.    I&O's Summary    21 Jul 2020 07:01  -  22 Jul 2020 07:00  --------------------------------------------------------  IN: 0 mL / OUT: 672 mL / NET: -672 mL        Assessment  75y Female  w/ PAST MEDICAL & SURGICAL HISTORY:  Osteoarthritis  Fracture: L2 pathologic fracture  Hypothyroidism    Left PTC placed for pleural effusion - cytology nondiagnostic.  Pathologic fx of L2 - s/p L2-4 Lami/decompression, T11-L5 PSF with bone graft.  Frozen sent to pathology - consistent with adenocarcinoma primary lung.  +HIDA, consistent with cholecystitis, s/p IR cholecystostomy tube.      Plan:    Left CT to water seal.  Daily CXR.    NPO p MN for pleurx placement tomorrow.  Will need VNS once discharged.  Cleared from orthopedic standpoint to position patient in lateral decubitus position.      Discussed with Cardiothoracic Team at AM rounds.

## 2020-07-22 NOTE — PROGRESS NOTE ADULT - SUBJECTIVE AND OBJECTIVE BOX
74 y/o female presented with back pain x 2 months , with recent worsening, and pain radiating down both extremities; Imaging revealed an L2 vertebral fracture / likely pathologic; on exam had left lung dullness to percussion and imaging with CT revealed extensive   left sided pleural effusion with concern for possible lung mass. She was seen by Pulmonary medicine and advised admission for thoracentesis and further HOLLIS; kept on dexamethasone for lower back fracture; WBC has increased/ placed on an antibiotic ( no overt infection)    In the ER a CT was placed with + drainage/  she now has a pigtail catheter in the left pleural cavity which is still draining significant amount of fluid daily.    cytology: atypical cells suspicious for malignancy  s/p spinal stabilization    final path: Adenocarcinoma TTF-1 + cw lung primary. 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    able to walk short distances    left chest tube draining significant amount of fluid    started on ativan and low dose lexapro 5m for anxiety and depression    able to participate with PT today      PAST MEDICAL & SURGICAL HISTORY:  Osteoarthritis  Fracture: L2 pathologic fracture  Hypothyroidism  No significant past surgical history    + ex / remote/ light  smoker 10 pack yr      MEDICATIONS  (STANDING):  acetaminophen   Tablet .. 975 milliGRAM(s) Oral every 8 hours  ascorbic acid 500 milliGRAM(s) Oral daily  calcium carbonate   1250 mG (OsCal) 1 Tablet(s) Oral three times a day  cyclobenzaprine 10 milliGRAM(s) Oral every 8 hours  escitalopram 5 milliGRAM(s) Oral daily  gabapentin 300 milliGRAM(s) Oral two times a day  levothyroxine 100 MICROGram(s) Oral daily  liothyronine 5 MICROGram(s) Oral daily  multivitamin 1 Tablet(s) Oral daily  piperacillin/tazobactam IVPB.. 3.375 Gram(s) IV Intermittent every 8 hours  senna 2 Tablet(s) Oral at bedtime  sodium chloride 0.9% lock flush 3 milliLiter(s) IV Push every 8 hours    MEDICATIONS  (PRN):  benzocaine 15 mG/menthol 3.6 mG (Sugar-Free) Lozenge 1 Lozenge Oral every 3 hours PRN Sore Throat  famotidine    Tablet 20 milliGRAM(s) Oral every 12 hours PRN Dyspepsia  HYDROmorphone  Injectable 1 milliGRAM(s) IV Push every 3 hours PRN breakthrough pain  LORazepam     Tablet 0.5 milliGRAM(s) Oral every 6 hours PRN Anxiety  magnesium hydroxide Suspension 30 milliLiter(s) Oral every 12 hours PRN Constipation  melatonin 3 milliGRAM(s) Oral at bedtime PRN Sleep  naloxone Injectable 0.1 milliGRAM(s) IV Push every 3 minutes PRN For ANY of the following changes in patient status:  A. RR LESS THAN 10 breaths per minute, B. Oxygen saturation LESS THAN 90%, C. Sedation score of 6  ondansetron Injectable 4 milliGRAM(s) IV Push every 6 hours PRN Nausea  oxyCODONE    IR 10 milliGRAM(s) Oral every 4 hours PRN Moderate Pain (4 - 6)  oxyCODONE    IR 5 milliGRAM(s) Oral every 4 hours PRN Mild Pain (1 - 3)  prochlorperazine   Injectable 10 milliGRAM(s) IV Push once PRN Nausea  prochlorperazine   Injectable 10 milliGRAM(s) IV Push every 8 hours PRN Nausea/vomiting        ROS  back pain  anxiety  depression  back pain    Vital Signs Last 24 Hrs  T(C): 36.3 (22 Jul 2020 08:34), Max: 37.2 (21 Jul 2020 21:15)  T(F): 97.4 (22 Jul 2020 08:34), Max: 99 (21 Jul 2020 21:15)  HR: 78 (22 Jul 2020 08:34) (78 - 99)  BP: 124/60 (22 Jul 2020 08:34) (121/59 - 127/59)  BP(mean): --  RR: 19 (22 Jul 2020 08:34) (19 - 20)  SpO2: 96% (22 Jul 2020 08:34) (96% - 97%)        PHYSICAL EXAM:        OE  Anxious  walking in the hallway with a walker with PT  chest clear  H1H2  left chest tube  right cholecystostomy tube                          8.9    14.60 )-----------( 245      ( 22 Jul 2020 06:59 )             27.3     07-22    139  |  103  |  11  ----------------------------<  95  3.7   |  32<H>  |  0.42<L>    Ca    8.0<L>      22 Jul 2020 06:59    TPro  5.7<L>  /  Alb  2.1<L>  /  TBili  0.5  /  DBili  0.2  /  AST  27  /  ALT  34  /  AlkPhos  137<H>  07-20    LIVER FUNCTIONS - ( 20 Jul 2020 10:53 )  Alb: 2.1 g/dL / Pro: 5.7 gm/dL / ALK PHOS: 137 U/L / ALT: 34 U/L / AST: 27 U/L / GGT: x           PT/INR - ( 22 Jul 2020 06:59 )   PT: 12.5 sec;   INR: 1.07 ratio         PTT - ( 22 Jul 2020 06:59 )  PTT:29.9 sec         Pleural fluid  atypical cells seen . not diagnostic    Vertebral path; final path: Adenocarcinoma TTF-1 + cw lung primary. PDL-1 +    INTERPRETATION:  Clinical indication: Staging MRI rule out metastasis.    MRI of the thoracic and lumbar spine was performed using sagittal T1-T2 and STIR sequence. Axial T1 and T2-weighted sequences were performed as well. The patient was injected with a possible 6.5 cc gadolinium this IV with 1 cc of contrast cardia.    Thoracic spine:    Abnormal enhancing lesions are seen involving the T10 and T12 vertebral bodies. These findings demonstrate slight T1 shortening and could be compatible with hemangiomas and less likely underlying pathologic lesions such as metastasis,. These findings can also be further evaluated with CT scan of the thoracic spine region.    There are no abnormal disc herniations or significant central or neural foraminal stenosis seen.    The spinal cord demonstrates normal signal and caliber.    Evaluation of the paraspinal soft tissues appear normal.    Hazy density seen involving the left lung. Dedicated imaging with CT scan is recommended for further evaluation.    Lumbar spine:    There is normal lumbar lordosis is seen.    Compression deformity is seen involving the L2 vertebral body. Abnormal T1 and T2 prolongation are identified with associated areas of enhancement. This finding could be compatible with a pathologic lesion such as metastasis, myeloma or lymphoma. Retropulsed fragment is identified which causes mild to moderate narrowing of the spinal canal. No abnormal paraspinal extension of this process is seen.    There is grade 1 anterolisthesis involving L4 and L5.    T12-L1: Normal    L1-2: Disc bulge and bilateral hypertrophic facet changes seen. This as well as retropulsed fragment causes moderate narrowing of the spinal canal.    L2-3: Disc bulge and bilateral hypertrophic facet joint changes seen. This as well as the retropulsed fragment causes moderate to severe narrowing of the spinal canal. Severe narrowing of both neural foramina    L3-4: Disc bulge and bilateral hypertrophic facet changes are seen. Severe narrowing of the spinal canal is seen.    L4-5: Disc bulge and bilateral hypertrophic facet changes seen. Moderate narrowing spinal canal and left neural foramen.    L5-S1: Disc bulge and bilateral hypertrophic facet changes are seen. No significant compromise of the, spinal canal is seen.    There is small focus of decreased signal seen involving the right iliac bone. This could be compatible with a bone island, the possibility of underlying lesion cannot be entirely excluded.    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    Evaluation of the paraspinal soft tissues appear normal    < end of copied text >    < from: Xray Chest 1 View- PORTABLE-Routine (07.11.20 @ 10:08) >    EXAM:  XR CHEST PORTABLE ROUTINE 1V                            PROCEDURE DATE:  07/11/2020          INTERPRETATION:  INDICATION: Assess left pleural effusion.    PRIORS: 7/10/2020.    VIEWS: Portable AP radiography of the chest performed.    FINDINGS: Since prior evaluation the position of the indwelling left pleural space pigtail drainage catheter has changed, the pigtail now overlying the medial aspect of the left mid thorax. There is interval decrease in left pleural effusion from prior; a moderately sized left pleural effusion persists. Underlying lung parenchymal infiltrate, consolidation or atelectasis cannot be excluded. There is no evidence for left-sided pneumothorax. No acute right-sided infiltrate is identified. No evidence for right pleural effusion or pneumothorax. No mediastinal shift noted. Degenerative changes of the thoracic spine evident.    IMPRESSION: Interval decrease in left pleural effusion from prior with change in position of indwelling pigtail pleural space drainage catheter. Moderately sized left pleural effusion persists. See above discussion.    < end of copied text >            INTERPRETATION:  INDICATION: Assess left pleural effusion.    PRIORS: 7/10/2020.    VIEWS: Portable AP radiography of the chest performed.    FINDINGS: Since prior evaluation the position of the indwelling left pleural space pigtail drainage catheter has changed, the pigtail now overlying the medial aspect of the left mid thorax. There is interval decrease in left pleural effusion from prior; a moderately sized left pleural effusion persists. Underlying lung parenchymal infiltrate, consolidation or atelectasis cannot be excluded. There is no evidence for left-sided pneumothorax. No acute right-sided infiltrate is identified. No evidence for right pleural effusion or pneumothorax. No mediastinal shift noted. Degenerative changes of the thoracic spine evident.    IMPRESSION: Interval decrease in left pleural effusion from prior with change in position of indwelling pigtail pleural space drainage catheter. Moderately sized left pleural effusion persists. See above discussion.    < end of copied text >      MRI Brain  1.2 cm right frontal lesion and a few smaller lesions

## 2020-07-22 NOTE — PROGRESS NOTE ADULT - PROBLEM SELECTOR PLAN 1
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 PDL-1 +    shall likely need systemic chemo once recovered from surgery   molecular studies will need to be done as well to rule out EGFR/ALK mutations  start B12 folate in preparation.    the patient shall likely require a Pleurx catheter until effective treatment can be started as she is still draining significant amount of pleural fluid daily.    Discussed with patient and her  in detail  patient's  wants a 2nd opinion prior to commencement of chemotherapy or biological therapy is to be started and was encouraged to do so.    plan for chemotherapy in 2-3 weeks and changing to oral targeted agent if a targetable mutation is found.

## 2020-07-23 ENCOUNTER — APPOINTMENT (OUTPATIENT)
Dept: THORACIC SURGERY | Facility: HOSPITAL | Age: 75
End: 2020-07-23

## 2020-07-23 ENCOUNTER — TRANSCRIPTION ENCOUNTER (OUTPATIENT)
Age: 75
End: 2020-07-23

## 2020-07-23 LAB
ALBUMIN SERPL ELPH-MCNC: 1.7 G/DL — LOW (ref 3.3–5)
ALP SERPL-CCNC: 116 U/L — SIGNIFICANT CHANGE UP (ref 40–120)
ALT FLD-CCNC: 34 U/L — SIGNIFICANT CHANGE UP (ref 12–78)
ANION GAP SERPL CALC-SCNC: 6 MMOL/L — SIGNIFICANT CHANGE UP (ref 5–17)
AST SERPL-CCNC: 27 U/L — SIGNIFICANT CHANGE UP (ref 15–37)
BASOPHILS # BLD AUTO: 0.02 K/UL — SIGNIFICANT CHANGE UP (ref 0–0.2)
BASOPHILS NFR BLD AUTO: 0.1 % — SIGNIFICANT CHANGE UP (ref 0–2)
BILIRUB SERPL-MCNC: 0.3 MG/DL — SIGNIFICANT CHANGE UP (ref 0.2–1.2)
BUN SERPL-MCNC: 13 MG/DL — SIGNIFICANT CHANGE UP (ref 7–23)
CALCIUM SERPL-MCNC: 7.9 MG/DL — LOW (ref 8.5–10.1)
CHLORIDE SERPL-SCNC: 104 MMOL/L — SIGNIFICANT CHANGE UP (ref 96–108)
CO2 SERPL-SCNC: 29 MMOL/L — SIGNIFICANT CHANGE UP (ref 22–31)
CREAT SERPL-MCNC: 0.46 MG/DL — LOW (ref 0.5–1.3)
EOSINOPHIL # BLD AUTO: 0.42 K/UL — SIGNIFICANT CHANGE UP (ref 0–0.5)
EOSINOPHIL NFR BLD AUTO: 2.9 % — SIGNIFICANT CHANGE UP (ref 0–6)
GLUCOSE SERPL-MCNC: 89 MG/DL — SIGNIFICANT CHANGE UP (ref 70–99)
HCT VFR BLD CALC: 27.5 % — LOW (ref 34.5–45)
HGB BLD-MCNC: 8.7 G/DL — LOW (ref 11.5–15.5)
IMM GRANULOCYTES NFR BLD AUTO: 2.4 % — HIGH (ref 0–1.5)
LYMPHOCYTES # BLD AUTO: 22.6 % — SIGNIFICANT CHANGE UP (ref 13–44)
LYMPHOCYTES # BLD AUTO: 3.26 K/UL — SIGNIFICANT CHANGE UP (ref 1–3.3)
MCHC RBC-ENTMCNC: 29.6 PG — SIGNIFICANT CHANGE UP (ref 27–34)
MCHC RBC-ENTMCNC: 31.6 GM/DL — LOW (ref 32–36)
MCV RBC AUTO: 93.5 FL — SIGNIFICANT CHANGE UP (ref 80–100)
MONOCYTES # BLD AUTO: 0.89 K/UL — SIGNIFICANT CHANGE UP (ref 0–0.9)
MONOCYTES NFR BLD AUTO: 6.2 % — SIGNIFICANT CHANGE UP (ref 2–14)
NEUTROPHILS # BLD AUTO: 9.47 K/UL — HIGH (ref 1.8–7.4)
NEUTROPHILS NFR BLD AUTO: 65.8 % — SIGNIFICANT CHANGE UP (ref 43–77)
PLATELET # BLD AUTO: 255 K/UL — SIGNIFICANT CHANGE UP (ref 150–400)
POTASSIUM SERPL-MCNC: 3.7 MMOL/L — SIGNIFICANT CHANGE UP (ref 3.5–5.3)
POTASSIUM SERPL-SCNC: 3.7 MMOL/L — SIGNIFICANT CHANGE UP (ref 3.5–5.3)
PROT SERPL-MCNC: 4.7 GM/DL — LOW (ref 6–8.3)
RBC # BLD: 2.94 M/UL — LOW (ref 3.8–5.2)
RBC # FLD: 13.9 % — SIGNIFICANT CHANGE UP (ref 10.3–14.5)
SODIUM SERPL-SCNC: 139 MMOL/L — SIGNIFICANT CHANGE UP (ref 135–145)
WBC # BLD: 14.41 K/UL — HIGH (ref 3.8–10.5)
WBC # FLD AUTO: 14.41 K/UL — HIGH (ref 3.8–10.5)

## 2020-07-23 PROCEDURE — 32550 INSERT PLEURAL CATH: CPT | Mod: LT

## 2020-07-23 PROCEDURE — 99232 SBSQ HOSP IP/OBS MODERATE 35: CPT

## 2020-07-23 PROCEDURE — 71045 X-RAY EXAM CHEST 1 VIEW: CPT | Mod: 26

## 2020-07-23 PROCEDURE — 99233 SBSQ HOSP IP/OBS HIGH 50: CPT

## 2020-07-23 RX ORDER — MEPERIDINE HYDROCHLORIDE 50 MG/ML
12.5 INJECTION INTRAMUSCULAR; INTRAVENOUS; SUBCUTANEOUS
Refills: 0 | Status: DISCONTINUED | OUTPATIENT
Start: 2020-07-23 | End: 2020-07-23

## 2020-07-23 RX ORDER — SODIUM CHLORIDE 9 MG/ML
1000 INJECTION, SOLUTION INTRAVENOUS
Refills: 0 | Status: DISCONTINUED | OUTPATIENT
Start: 2020-07-23 | End: 2020-07-23

## 2020-07-23 RX ORDER — ALPRAZOLAM 0.25 MG
0.25 TABLET ORAL EVERY 8 HOURS
Refills: 0 | Status: DISCONTINUED | OUTPATIENT
Start: 2020-07-23 | End: 2020-07-24

## 2020-07-23 RX ORDER — CALCIUM CARBONATE 500(1250)
1 TABLET ORAL THREE TIMES A DAY
Refills: 0 | Status: DISCONTINUED | OUTPATIENT
Start: 2020-07-23 | End: 2020-07-24

## 2020-07-23 RX ORDER — CYCLOBENZAPRINE HYDROCHLORIDE 10 MG/1
10 TABLET, FILM COATED ORAL EVERY 8 HOURS
Refills: 0 | Status: DISCONTINUED | OUTPATIENT
Start: 2020-07-23 | End: 2020-07-24

## 2020-07-23 RX ORDER — ONDANSETRON 8 MG/1
4 TABLET, FILM COATED ORAL ONCE
Refills: 0 | Status: DISCONTINUED | OUTPATIENT
Start: 2020-07-23 | End: 2020-07-23

## 2020-07-23 RX ORDER — LEVOTHYROXINE SODIUM 125 MCG
100 TABLET ORAL DAILY
Refills: 0 | Status: DISCONTINUED | OUTPATIENT
Start: 2020-07-23 | End: 2020-07-24

## 2020-07-23 RX ORDER — FENTANYL CITRATE 50 UG/ML
50 INJECTION INTRAVENOUS
Refills: 0 | Status: DISCONTINUED | OUTPATIENT
Start: 2020-07-23 | End: 2020-07-23

## 2020-07-23 RX ORDER — ONDANSETRON 8 MG/1
4 TABLET, FILM COATED ORAL EVERY 6 HOURS
Refills: 0 | Status: DISCONTINUED | OUTPATIENT
Start: 2020-07-23 | End: 2020-07-24

## 2020-07-23 RX ORDER — MAGNESIUM HYDROXIDE 400 MG/1
30 TABLET, CHEWABLE ORAL EVERY 12 HOURS
Refills: 0 | Status: DISCONTINUED | OUTPATIENT
Start: 2020-07-23 | End: 2020-07-24

## 2020-07-23 RX ORDER — OXYCODONE HYDROCHLORIDE 5 MG/1
10 TABLET ORAL ONCE
Refills: 0 | Status: DISCONTINUED | OUTPATIENT
Start: 2020-07-23 | End: 2020-07-23

## 2020-07-23 RX ORDER — NALOXONE HYDROCHLORIDE 4 MG/.1ML
0.1 SPRAY NASAL
Refills: 0 | Status: DISCONTINUED | OUTPATIENT
Start: 2020-07-23 | End: 2020-07-24

## 2020-07-23 RX ORDER — HYDROMORPHONE HYDROCHLORIDE 2 MG/ML
0.5 INJECTION INTRAMUSCULAR; INTRAVENOUS; SUBCUTANEOUS
Refills: 0 | Status: DISCONTINUED | OUTPATIENT
Start: 2020-07-23 | End: 2020-07-23

## 2020-07-23 RX ORDER — ASCORBIC ACID 60 MG
500 TABLET,CHEWABLE ORAL DAILY
Refills: 0 | Status: DISCONTINUED | OUTPATIENT
Start: 2020-07-23 | End: 2020-07-24

## 2020-07-23 RX ORDER — SENNA PLUS 8.6 MG/1
2 TABLET ORAL AT BEDTIME
Refills: 0 | Status: DISCONTINUED | OUTPATIENT
Start: 2020-07-23 | End: 2020-07-24

## 2020-07-23 RX ORDER — BENZOCAINE AND MENTHOL 5; 1 G/100ML; G/100ML
1 LIQUID ORAL
Refills: 0 | Status: DISCONTINUED | OUTPATIENT
Start: 2020-07-23 | End: 2020-07-24

## 2020-07-23 RX ORDER — SODIUM CHLORIDE 9 MG/ML
3 INJECTION INTRAMUSCULAR; INTRAVENOUS; SUBCUTANEOUS EVERY 8 HOURS
Refills: 0 | Status: DISCONTINUED | OUTPATIENT
Start: 2020-07-23 | End: 2020-07-24

## 2020-07-23 RX ORDER — HYDROMORPHONE HYDROCHLORIDE 2 MG/ML
1 INJECTION INTRAMUSCULAR; INTRAVENOUS; SUBCUTANEOUS
Refills: 0 | Status: DISCONTINUED | OUTPATIENT
Start: 2020-07-23 | End: 2020-07-24

## 2020-07-23 RX ORDER — GABAPENTIN 400 MG/1
300 CAPSULE ORAL
Refills: 0 | Status: DISCONTINUED | OUTPATIENT
Start: 2020-07-23 | End: 2020-07-24

## 2020-07-23 RX ORDER — ESCITALOPRAM OXALATE 10 MG/1
5 TABLET, FILM COATED ORAL DAILY
Refills: 0 | Status: DISCONTINUED | OUTPATIENT
Start: 2020-07-23 | End: 2020-07-24

## 2020-07-23 RX ORDER — SODIUM CHLORIDE 9 MG/ML
1000 INJECTION INTRAMUSCULAR; INTRAVENOUS; SUBCUTANEOUS
Refills: 0 | Status: DISCONTINUED | OUTPATIENT
Start: 2020-07-23 | End: 2020-07-24

## 2020-07-23 RX ORDER — LIOTHYRONINE SODIUM 25 UG/1
5 TABLET ORAL DAILY
Refills: 0 | Status: DISCONTINUED | OUTPATIENT
Start: 2020-07-23 | End: 2020-07-24

## 2020-07-23 RX ORDER — LANOLIN ALCOHOL/MO/W.PET/CERES
3 CREAM (GRAM) TOPICAL AT BEDTIME
Refills: 0 | Status: DISCONTINUED | OUTPATIENT
Start: 2020-07-23 | End: 2020-07-24

## 2020-07-23 RX ORDER — OXYCODONE HYDROCHLORIDE 5 MG/1
5 TABLET ORAL EVERY 4 HOURS
Refills: 0 | Status: DISCONTINUED | OUTPATIENT
Start: 2020-07-23 | End: 2020-07-24

## 2020-07-23 RX ORDER — SODIUM CHLORIDE 9 MG/ML
1000 INJECTION INTRAMUSCULAR; INTRAVENOUS; SUBCUTANEOUS
Refills: 0 | Status: DISCONTINUED | OUTPATIENT
Start: 2020-07-23 | End: 2020-07-23

## 2020-07-23 RX ORDER — OXYCODONE HYDROCHLORIDE 5 MG/1
10 TABLET ORAL EVERY 4 HOURS
Refills: 0 | Status: DISCONTINUED | OUTPATIENT
Start: 2020-07-23 | End: 2020-07-24

## 2020-07-23 RX ORDER — FAMOTIDINE 10 MG/ML
20 INJECTION INTRAVENOUS EVERY 12 HOURS
Refills: 0 | Status: DISCONTINUED | OUTPATIENT
Start: 2020-07-23 | End: 2020-07-24

## 2020-07-23 RX ORDER — ACETAMINOPHEN 500 MG
975 TABLET ORAL EVERY 8 HOURS
Refills: 0 | Status: DISCONTINUED | OUTPATIENT
Start: 2020-07-23 | End: 2020-07-24

## 2020-07-23 RX ADMIN — PIPERACILLIN AND TAZOBACTAM 25 GRAM(S): 4; .5 INJECTION, POWDER, LYOPHILIZED, FOR SOLUTION INTRAVENOUS at 05:47

## 2020-07-23 RX ADMIN — ESCITALOPRAM OXALATE 5 MILLIGRAM(S): 10 TABLET, FILM COATED ORAL at 10:45

## 2020-07-23 RX ADMIN — GABAPENTIN 300 MILLIGRAM(S): 400 CAPSULE ORAL at 23:27

## 2020-07-23 RX ADMIN — Medication 1 TABLET(S): at 10:45

## 2020-07-23 RX ADMIN — Medication 975 MILLIGRAM(S): at 23:29

## 2020-07-23 RX ADMIN — SODIUM CHLORIDE 3 MILLILITER(S): 9 INJECTION INTRAMUSCULAR; INTRAVENOUS; SUBCUTANEOUS at 13:59

## 2020-07-23 RX ADMIN — HYDROMORPHONE HYDROCHLORIDE 0.5 MILLIGRAM(S): 2 INJECTION INTRAMUSCULAR; INTRAVENOUS; SUBCUTANEOUS at 22:28

## 2020-07-23 RX ADMIN — LIOTHYRONINE SODIUM 5 MICROGRAM(S): 25 TABLET ORAL at 10:45

## 2020-07-23 RX ADMIN — SODIUM CHLORIDE 3 MILLILITER(S): 9 INJECTION INTRAMUSCULAR; INTRAVENOUS; SUBCUTANEOUS at 06:53

## 2020-07-23 RX ADMIN — SODIUM CHLORIDE 40 MILLILITER(S): 9 INJECTION INTRAMUSCULAR; INTRAVENOUS; SUBCUTANEOUS at 22:46

## 2020-07-23 RX ADMIN — Medication 100 MICROGRAM(S): at 05:48

## 2020-07-23 RX ADMIN — Medication 975 MILLIGRAM(S): at 05:48

## 2020-07-23 RX ADMIN — CYCLOBENZAPRINE HYDROCHLORIDE 10 MILLIGRAM(S): 10 TABLET, FILM COATED ORAL at 05:48

## 2020-07-23 RX ADMIN — Medication 1 TABLET(S): at 23:28

## 2020-07-23 RX ADMIN — Medication 0.25 MILLIGRAM(S): at 12:23

## 2020-07-23 RX ADMIN — SODIUM CHLORIDE 3 MILLILITER(S): 9 INJECTION INTRAMUSCULAR; INTRAVENOUS; SUBCUTANEOUS at 23:30

## 2020-07-23 RX ADMIN — CYCLOBENZAPRINE HYDROCHLORIDE 10 MILLIGRAM(S): 10 TABLET, FILM COATED ORAL at 23:28

## 2020-07-23 RX ADMIN — SODIUM CHLORIDE 40 MILLILITER(S): 9 INJECTION INTRAMUSCULAR; INTRAVENOUS; SUBCUTANEOUS at 10:45

## 2020-07-23 RX ADMIN — Medication 500 MILLIGRAM(S): at 10:45

## 2020-07-23 RX ADMIN — PIPERACILLIN AND TAZOBACTAM 25 GRAM(S): 4; .5 INJECTION, POWDER, LYOPHILIZED, FOR SOLUTION INTRAVENOUS at 13:59

## 2020-07-23 RX ADMIN — SODIUM CHLORIDE 50 MILLILITER(S): 9 INJECTION, SOLUTION INTRAVENOUS at 16:26

## 2020-07-23 RX ADMIN — Medication 1 TABLET(S): at 05:48

## 2020-07-23 RX ADMIN — GABAPENTIN 300 MILLIGRAM(S): 400 CAPSULE ORAL at 10:45

## 2020-07-23 NOTE — PROGRESS NOTE ADULT - SUBJECTIVE AND OBJECTIVE BOX
POD#7. Pt seen resting in bed. Pt is anxious and tearful. PT has mild incisional site soreness of the back. Pt has weakness of b/l hips. States overall the numbness of b/l quads has improved.   She voided on own without complications and walked with physical therapy.    PE  Gen appearance: anxious and tearful  Motor strength: 4/5 of b/l HF, otherwise 5/5 of b/l ant tibs, gastrocs, EHL  Sensation: intact to light touch   No calf tenderness bilaterally  Incisional site: clean and dry dressing noted. Drain hemovac: 75cc last shift 30cc manage per Plastics    Plan  VSS, Afeb  WBC:14.41, H/H:8.7/27.5  Mobilize with physical therapy  Continue management per MED and Oncology  Continue venodynes  Pt is NPO. Scheduled for Pleurex catheter today

## 2020-07-23 NOTE — BRIEF OPERATIVE NOTE - NSICDXBRIEFPREOP_GEN_ALL_CORE_FT
PRE-OP DIAGNOSIS:  Malignant pleural effusion 23-Jul-2020 22:06:26  Jania Stein
PRE-OP DIAGNOSIS:  Pathologic compression fracture of spine 16-Jul-2020 13:51:32  Mansoor Cullen

## 2020-07-23 NOTE — PROGRESS NOTE ADULT - ASSESSMENT
76 y/o female with PMH of Hypothyroidism and L2 pathologic compression fracture presents to  with 1 month history of dyspnea and 1 week history of the dry cough. Patient was seen by ortho spine surgeon, pulmonologist and oncologist and send for evaluation of left sided pleural effusion with concern for lung mass.   In Ed -  T(F): 98.9Max: 98.9  HR: 80  (80 - 94) BP: 149/63 (149/63 - 150/77) RR: 24(17 - 24) SpO2: 98% (98% - 99%) EKG - sinus , low voltage QRS, troponin x 1neg, CXR - large pleural effusion, WBC 12, Cr 0.73, , covid PCR neg , s/p left chest tube placement in ED , fluid analysis pending     * Lower back pain due to pathologic L2 fracture, worsening on MRI,   - s/p L2-L4 laminectomy, T11-L5 PSF with bone graft, L2 Biopsy/partial tumor excision/Dr George   - pain management, dilaudid PCA - would change to IVP due to dizziness; s/p IV steroids    * Brain lesion in right frontal lobe with multiple subcentimeter lesions   - neurosurgery consult Dr. Handy -  radiology - oncology consult for brain RT  - no edema    * Dyspnea due to Large left pleural effusion s/p left chest tube placement, exudative effusion suspected malignancy   * ON CT CHEST: INDU 2.6 cm mass,  - suspect Primary Lung with Brain and Spine mets   * Suspected postobstructive PNA s/p IV abx  - pending final PAth; suspect adenoCa  - PLAN FOR PLEURX ON 7/23 - NOTED ORTHO CLEARANCE FOR LAT DECUB POSITIONING   discussed with patient adn her  at bedside and they agree to getting pleurx - output in chest tube has been 300-400cc daily per discussion with CTSx     * Acute CHolecystitis   Enlarged GB on CT  - HIDA scan - positive, s/p  perc cholecystostomy by IR   - c/w Zosyn since 7/18 -day 6   , WBc 37--> 14 improving     Lightheadedness when she stands up, transient  check Orthostats      DVT proph - to be started when cleared by Ortho/spine   OOB and SCDs   discussed with CTSx and  at bedside 7/21    Dispo - for d/c oskar

## 2020-07-23 NOTE — PROGRESS NOTE ADULT - PROBLEM SELECTOR PLAN 1
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 PDL-1 +    shall likely need systemic chemo once recovered from surgery   molecular studies will need to be done as well to rule out EGFR/ALK mutations  start B12 folate in preparation.    the patient shall likely require a Pleurx catheter until effective treatment can be started as she is still draining significant amount of pleural fluid daily.    Discussed with patient and her  in detail  patient's  wants a 2nd opinion prior to commencement of chemotherapy or biological therapy is to be started and was encouraged to do so.    plan for chemotherapy in 2-3 weeks carboplatin, pemetrexexd and immunotherapy and changing to oral targeted agent if a targetable mutation is found.     wants a second opinion before starting any chemotherapy.    ok to Dc from onc standpoint if home PT is can be arranged ,

## 2020-07-23 NOTE — BRIEF OPERATIVE NOTE - NSICDXBRIEFPROCEDURE_GEN_ALL_CORE_FT
PROCEDURES:  Cholecystostomy 20-Jul-2020 17:00:34  Too Cuevas
PROCEDURES:  Thoracoscopy, with chest tube insertion 23-Jul-2020 22:06:08  Jania Stein
PROCEDURES:  Decompression, spine, lumbar, posterior approach, with fusion of posterior spinal column 16-Jul-2020 13:50:56  Mansoor Cullen

## 2020-07-23 NOTE — PROGRESS NOTE ADULT - SUBJECTIVE AND OBJECTIVE BOX
Subjective:  Pt seen, NPO for pleurx.     Vital Signs:  Vital Signs Last 24 Hrs  T(C): 36.4 (07-23-20 @ 10:07), Max: 36.9 (07-22-20 @ 22:00)  T(F): 97.5 (07-23-20 @ 10:07), Max: 98.4 (07-22-20 @ 22:00)  HR: 90 (07-23-20 @ 10:07) (89 - 101)  BP: 99/78 (07-23-20 @ 10:07) (99/78 - 125/56)  RR: 18 (07-23-20 @ 10:07) (18 - 18)  SpO2: 95% (07-23-20 @ 10:07) (95% - 96%) on (O2)    Telemetry/Alarms:    Relevant labs, radiology and Medications reviewed                        8.7    14.41 )-----------( 255      ( 23 Jul 2020 07:26 )             27.5     07-23    139  |  104  |  13  ----------------------------<  89  3.7   |  29  |  0.46<L>    Ca    7.9<L>      23 Jul 2020 07:26    TPro  4.7<L>  /  Alb  1.7<L>  /  TBili  0.3  /  DBili  x   /  AST  27  /  ALT  34  /  AlkPhos  116  07-23    PT/INR - ( 22 Jul 2020 06:59 )   PT: 12.5 sec;   INR: 1.07 ratio         PTT - ( 22 Jul 2020 06:59 )  PTT:29.9 sec  MEDICATIONS  (STANDING):  acetaminophen   Tablet .. 975 milliGRAM(s) Oral every 8 hours  ascorbic acid 500 milliGRAM(s) Oral daily  calcium carbonate   1250 mG (OsCal) 1 Tablet(s) Oral three times a day  cyclobenzaprine 10 milliGRAM(s) Oral every 8 hours  escitalopram 5 milliGRAM(s) Oral daily  gabapentin 300 milliGRAM(s) Oral two times a day  levothyroxine 100 MICROGram(s) Oral daily  liothyronine 5 MICROGram(s) Oral daily  multivitamin 1 Tablet(s) Oral daily  piperacillin/tazobactam IVPB.. 3.375 Gram(s) IV Intermittent every 8 hours  senna 2 Tablet(s) Oral at bedtime  sodium chloride 0.9% lock flush 3 milliLiter(s) IV Push every 8 hours  sodium chloride 0.9%. 1000 milliLiter(s) (40 mL/Hr) IV Continuous <Continuous>    MEDICATIONS  (PRN):  ALPRAZolam 0.25 milliGRAM(s) Oral every 8 hours PRN anxiety  benzocaine 15 mG/menthol 3.6 mG (Sugar-Free) Lozenge 1 Lozenge Oral every 3 hours PRN Sore Throat  famotidine    Tablet 20 milliGRAM(s) Oral every 12 hours PRN Dyspepsia  HYDROmorphone  Injectable 1 milliGRAM(s) IV Push every 3 hours PRN breakthrough pain  magnesium hydroxide Suspension 30 milliLiter(s) Oral every 12 hours PRN Constipation  melatonin 3 milliGRAM(s) Oral at bedtime PRN Sleep  naloxone Injectable 0.1 milliGRAM(s) IV Push every 3 minutes PRN For ANY of the following changes in patient status:  A. RR LESS THAN 10 breaths per minute, B. Oxygen saturation LESS THAN 90%, C. Sedation score of 6  ondansetron Injectable 4 milliGRAM(s) IV Push every 6 hours PRN Nausea  oxyCODONE    IR 10 milliGRAM(s) Oral every 4 hours PRN Moderate Pain (4 - 6)  oxyCODONE    IR 5 milliGRAM(s) Oral every 4 hours PRN Mild Pain (1 - 3)  prochlorperazine   Injectable 10 milliGRAM(s) IV Push once PRN Nausea  prochlorperazine   Injectable 10 milliGRAM(s) IV Push every 8 hours PRN Nausea/vomiting      Physical exam  Gen NAD  Neuro AAOx3  Card RRR  Pulm no accessory muscle use, decreased bases  Abd soft  Ext warm    Tubes: Left pigtail to waterseal, no AL, minimal drainage    I&O's Summary    22 Jul 2020 07:01  -  23 Jul 2020 07:00  --------------------------------------------------------  IN: 0 mL / OUT: 565 mL / NET: -565 mL        Assessment  75y Female  w/ PAST MEDICAL & SURGICAL HISTORY:  Osteoarthritis  Fracture: L2 pathologic fracture  Hypothyroidism  No significant past surgical history  admitted with complaints of Patient is a 75y old  Female who presents with a chief complaint of dyspnea , cough (23 Jul 2020 10:34)  .  76 yo female w pathologic L2 fracture, INDU mass and large effusions admitted 7/10 w SOB. Had CT placed 7/10. Now POD 7 Decompression, spine, lumbar, posterior approach, with fusion of posterior spinal column. Pathology consistent with adenocarcinoma.    NPO for pleurx  IVF    Discussed with Cardiothoracic Team at AM rounds.

## 2020-07-23 NOTE — PROGRESS NOTE ADULT - SUBJECTIVE AND OBJECTIVE BOX
74 y/o female presented with back pain x 2 months , with recent worsening, and pain radiating down both extremities; Imaging revealed an L2 vertebral fracture / likely pathologic; on exam had left lung dullness to percussion and imaging with CT revealed extensive   left sided pleural effusion     In the ER a CT was placed with + drainage/  she now has a pigtail catheter in the left pleural cavity which is still draining significant amount of fluid daily.    Due for pleurex catheter later this afternoon    cytology: atypical cells suspicious for malignancy  s/p spinal stabilization    final path: Adenocarcinoma TTF-1 + cw lung primary. 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    able to walk short distances    left chest tube draining significant amount of fluid    On Xanax and low dose lexapro 5m for anxiety and depression          PAST MEDICAL & SURGICAL HISTORY:  Osteoarthritis  Fracture: L2 pathologic fracture  Hypothyroidism  No significant past surgical history    + ex / remote/ light  smoker 10 pack yr    MEDICATIONS  (STANDING):  acetaminophen   Tablet .. 975 milliGRAM(s) Oral every 8 hours  ascorbic acid 500 milliGRAM(s) Oral daily  calcium carbonate   1250 mG (OsCal) 1 Tablet(s) Oral three times a day  cyclobenzaprine 10 milliGRAM(s) Oral every 8 hours  escitalopram 5 milliGRAM(s) Oral daily  gabapentin 300 milliGRAM(s) Oral two times a day  levothyroxine 100 MICROGram(s) Oral daily  liothyronine 5 MICROGram(s) Oral daily  multivitamin 1 Tablet(s) Oral daily  piperacillin/tazobactam IVPB.. 3.375 Gram(s) IV Intermittent every 8 hours  senna 2 Tablet(s) Oral at bedtime  sodium chloride 0.9% lock flush 3 milliLiter(s) IV Push every 8 hours  sodium chloride 0.9%. 1000 milliLiter(s) (40 mL/Hr) IV Continuous <Continuous>    MEDICATIONS  (PRN):  ALPRAZolam 0.25 milliGRAM(s) Oral every 8 hours PRN anxiety  benzocaine 15 mG/menthol 3.6 mG (Sugar-Free) Lozenge 1 Lozenge Oral every 3 hours PRN Sore Throat  famotidine    Tablet 20 milliGRAM(s) Oral every 12 hours PRN Dyspepsia  HYDROmorphone  Injectable 1 milliGRAM(s) IV Push every 3 hours PRN breakthrough pain  magnesium hydroxide Suspension 30 milliLiter(s) Oral every 12 hours PRN Constipation  melatonin 3 milliGRAM(s) Oral at bedtime PRN Sleep  naloxone Injectable 0.1 milliGRAM(s) IV Push every 3 minutes PRN For ANY of the following changes in patient status:  A. RR LESS THAN 10 breaths per minute, B. Oxygen saturation LESS THAN 90%, C. Sedation score of 6  ondansetron Injectable 4 milliGRAM(s) IV Push every 6 hours PRN Nausea  oxyCODONE    IR 10 milliGRAM(s) Oral every 4 hours PRN Moderate Pain (4 - 6)  oxyCODONE    IR 5 milliGRAM(s) Oral every 4 hours PRN Mild Pain (1 - 3)  prochlorperazine   Injectable 10 milliGRAM(s) IV Push once PRN Nausea  prochlorperazine   Injectable 10 milliGRAM(s) IV Push every 8 hours PRN Nausea/vomiting          ROS  back pain  anxiety  depression  back pain    Vital Signs Last 24 Hrs  T(C): 36.9 (22 Jul 2020 22:00), Max: 36.9 (22 Jul 2020 22:00)  T(F): 98.4 (22 Jul 2020 22:00), Max: 98.4 (22 Jul 2020 22:00)  HR: 89 (22 Jul 2020 22:00) (89 - 101)  BP: 125/56 (22 Jul 2020 22:00) (110/56 - 125/56)  BP(mean): --  RR: 18 (22 Jul 2020 22:00) (18 - 18)  SpO2: 96% (22 Jul 2020 22:00) (95% - 96%)        PHYSICAL EXAM:        OE  Anxious  wants pleurex   chest clear  H1H2  left chest tube  right cholecystostomy tube                          8.7    14.41 )-----------( 255      ( 23 Jul 2020 07:26 )             27.5     07-23    139  |  104  |  13  ----------------------------<  89  3.7   |  29  |  0.46<L>    Ca    7.9<L>      23 Jul 2020 07:26    TPro  4.7<L>  /  Alb  1.7<L>  /  TBili  0.3  /  DBili  x   /  AST  27  /  ALT  34  /  AlkPhos  116  07-23    LIVER FUNCTIONS - ( 23 Jul 2020 07:26 )  Alb: 1.7 g/dL / Pro: 4.7 gm/dL / ALK PHOS: 116 U/L / ALT: 34 U/L / AST: 27 U/L / GGT: x           PT/INR - ( 22 Jul 2020 06:59 )   PT: 12.5 sec;   INR: 1.07 ratio         PTT - ( 22 Jul 2020 06:59 )  PTT:29.9 sec           Pleural fluid  atypical cells seen . not diagnostic    Vertebral path; final path: Adenocarcinoma TTF-1 + cw lung primary. PDL-1 +    INTERPRETATION:  Clinical indication: Staging MRI rule out metastasis.    MRI of the thoracic and lumbar spine was performed using sagittal T1-T2 and STIR sequence. Axial T1 and T2-weighted sequences were performed as well. The patient was injected with a possible 6.5 cc gadolinium this IV with 1 cc of contrast cardia.    Thoracic spine:    Abnormal enhancing lesions are seen involving the T10 and T12 vertebral bodies. These findings demonstrate slight T1 shortening and could be compatible with hemangiomas and less likely underlying pathologic lesions such as metastasis,. These findings can also be further evaluated with CT scan of the thoracic spine region.    There are no abnormal disc herniations or significant central or neural foraminal stenosis seen.    The spinal cord demonstrates normal signal and caliber.    Evaluation of the paraspinal soft tissues appear normal.    Hazy density seen involving the left lung. Dedicated imaging with CT scan is recommended for further evaluation.    Lumbar spine:    There is normal lumbar lordosis is seen.    Compression deformity is seen involving the L2 vertebral body. Abnormal T1 and T2 prolongation are identified with associated areas of enhancement. This finding could be compatible with a pathologic lesion such as metastasis, myeloma or lymphoma. Retropulsed fragment is identified which causes mild to moderate narrowing of the spinal canal. No abnormal paraspinal extension of this process is seen.    There is grade 1 anterolisthesis involving L4 and L5.    T12-L1: Normal    L1-2: Disc bulge and bilateral hypertrophic facet changes seen. This as well as retropulsed fragment causes moderate narrowing of the spinal canal.    L2-3: Disc bulge and bilateral hypertrophic facet joint changes seen. This as well as the retropulsed fragment causes moderate to severe narrowing of the spinal canal. Severe narrowing of both neural foramina    L3-4: Disc bulge and bilateral hypertrophic facet changes are seen. Severe narrowing of the spinal canal is seen.    L4-5: Disc bulge and bilateral hypertrophic facet changes seen. Moderate narrowing spinal canal and left neural foramen.    L5-S1: Disc bulge and bilateral hypertrophic facet changes are seen. No significant compromise of the, spinal canal is seen.    There is small focus of decreased signal seen involving the right iliac bone. This could be compatible with a bone island, the possibility of underlying lesion cannot be entirely excluded.    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    Evaluation of the paraspinal soft tissues appear normal    < end of copied text >    < from: Xray Chest 1 View- PORTABLE-Routine (07.11.20 @ 10:08) >    EXAM:  XR CHEST PORTABLE ROUTINE 1V                            PROCEDURE DATE:  07/11/2020          INTERPRETATION:  INDICATION: Assess left pleural effusion.    PRIORS: 7/10/2020.    VIEWS: Portable AP radiography of the chest performed.    FINDINGS: Since prior evaluation the position of the indwelling left pleural space pigtail drainage catheter has changed, the pigtail now overlying the medial aspect of the left mid thorax. There is interval decrease in left pleural effusion from prior; a moderately sized left pleural effusion persists. Underlying lung parenchymal infiltrate, consolidation or atelectasis cannot be excluded. There is no evidence for left-sided pneumothorax. No acute right-sided infiltrate is identified. No evidence for right pleural effusion or pneumothorax. No mediastinal shift noted. Degenerative changes of the thoracic spine evident.    IMPRESSION: Interval decrease in left pleural effusion from prior with change in position of indwelling pigtail pleural space drainage catheter. Moderately sized left pleural effusion persists. See above discussion.    < end of copied text >            INTERPRETATION:  INDICATION: Assess left pleural effusion.    PRIORS: 7/10/2020.    VIEWS: Portable AP radiography of the chest performed.    FINDINGS: Since prior evaluation the position of the indwelling left pleural space pigtail drainage catheter has changed, the pigtail now overlying the medial aspect of the left mid thorax. There is interval decrease in left pleural effusion from prior; a moderately sized left pleural effusion persists. Underlying lung parenchymal infiltrate, consolidation or atelectasis cannot be excluded. There is no evidence for left-sided pneumothorax. No acute right-sided infiltrate is identified. No evidence for right pleural effusion or pneumothorax. No mediastinal shift noted. Degenerative changes of the thoracic spine evident.    IMPRESSION: Interval decrease in left pleural effusion from prior with change in position of indwelling pigtail pleural space drainage catheter. Moderately sized left pleural effusion persists. See above discussion.    < end of copied text >      MRI Brain  1.2 cm right frontal lesion and a few smaller lesions

## 2020-07-23 NOTE — PROGRESS NOTE ADULT - ASSESSMENT
Dressing changed  Daily dry sterile dressing as per orders  Continue care per PMD and all consultants

## 2020-07-23 NOTE — PROGRESS NOTE ADULT - SUBJECTIVE AND OBJECTIVE BOX
76 y/o female with PMH of Hypothyroidism and L2 pathologic compression fracture presents to   on 7/10/20 with 1 month history of dyspnea and 1 week history of the dry cough. Patient was seen by ortho spine surgeon, pulmonologist and oncologist and send for evaluation of left sided pleural effusion with concern for lung mass. Pt reports onset of back pain was in March 2020, received cortisone shots and got an MRI of L2 and blood test x2 weeks ago. Pt went to Dr. Westfall (oncologist) and was sent for imaging with Dr. Sanchez (pulmonologist) who discussed results of CT abdomen and chest this AM with pt and told pt to come to ED for evaluation for SOB.  In Ed -  T(F): 98.9Max: 98.9  HR: 80  (80 - 94) BP: 149/63 (149/63 - 150/77) RR: 24(17 - 24) SpO2: 98% (98% - 99%) EKG - sinus , low voltage QRS, troponin x 1neg, CXR - large pleural effusion, WBC 12, Cr 0.73, , covid PCR neg , s/p left chest tube placement in ED , fluid analysis pending (10 Jul 2020 13:26)      Stable but very anxious; pain well ctr; she underwent repeat HIDA and is now in IR for percut. drain of the GB for acute cresencio. This approach was chosen due to the possible masked by narcotics and IV steroids used for her back condition.    7/21 - no bm in several days, is passing gas,  feels overwhelmed, occ lightheadedness when she stands up lasts for a min or so   7/22 - pt seen and examined, + gen weakness, denies cp, dyspnea, abdominal pain, + anxious, POC discussed  7/23 - pt seen and examined, + anxious about pleurex placement, denies pain, OOB in the chair, afebrile, POC discussed     Review of system- Rest of the review of system are negative except mentioned in HPI    T(C): 36.3 (07-23-20 @ 15:43), Max: 36.9 (07-22-20 @ 22:00)  T(F): 97.4 (07-23-20 @ 15:43), Max: 98.4 (07-22-20 @ 22:00)  HR: 83 (07-23-20 @ 15:43) (83 - 90)  BP: 124/63 (07-23-20 @ 15:43) (99/78 - 125/56)  RR: 18 (07-23-20 @ 15:43) (18 - 18)  SpO2: 97% (07-23-20 @ 15:43) (95% - 97%)  Wt(kg): --    GEN: sitting up in chair with brace   HEAD:  Atraumatic, Normocephalic  EYES: EOMI, PERRLA, conjunctiva and sclera clear  NECK: Supple, No JVD  CHEST/LUNG: BS decreased over left base, ; No rales, no rhonchi, no wheezing, Left CT +   HEART: Regular rate and rhythm; No murmurs, no rubs or gallops  ABDOMEN: Soft, Nontender, Nondistended; Bowel sounds present, has cholecystostomy tube and drain   EXTREMITIES:  2+ Peripheral Pulses, No clubbing, cyanosis, no  edema  CNS non focal      < from: [facility] Hepatobiliary Imaging (07.20.20)  IMPRESSION:  Abnormal morphine-augmented hepatobiliary scan compatible with acute cholecystitis.    LABS: All Labs Reviewed:         07-23    139  |  104  |  13  ----------------------------<  89  3.7   |  29  |  0.46<L>    Ca    7.9<L>      23 Jul 2020 07:26    TPro  4.7<L>  /  Alb  1.7<L>  /  TBili  0.3  /  DBili  x   /  AST  27  /  ALT  34  /  AlkPhos  116  07-23    Vitamin D, 25-Hydroxy (07.14.20 @ 08:16)    Vitamin D, 25-Hydroxy: 44.9: VITD Interpretive Data Result:  30.0-80.0 ng/mL Optimal levels (Reference Range)  >80.0 ng/mL Toxicity possible  20.0-29.0 ng/mL Insufficiency  10.0-19.0 ng/mL Mild to Moderate Deficiency  <10.0 ng/mL Severe Deficiency  Optimal levels for 25-Hydroxy vitamin D are 30.0 ng/mL and above based  upon the Endocrine Society guidelines 2011.  However, there is a lack of  consensus on this and the [place] of [employer] recommends 20.0 ng/mL and  above as optimal levels.  Vitamin D results may vary depending on the  method of analysis.  The Roche cyndi e801 electrochemiluminescent  immunoassay method measures both D2 and D3. ng/mL                            8.7    14.41 )-----------( 255      ( 23 Jul 2020 07:26 )             27.5             LIVER FUNCTIONS - ( 23 Jul 2020 07:26 )  Alb: 1.7 g/dL / Pro: 4.7 gm/dL / ALK PHOS: 116 U/L / ALT: 34 U/L / AST: 27 U/L / GGT: x             PT/INR - ( 22 Jul 2020 06:59 )   PT: 12.5 sec;   INR: 1.07 ratio         PTT - ( 22 Jul 2020 06:59 )  PTT:29.9 sec      07-22    139  |  103  |  11  ----------------------------<  95  3.7   |  32<H>  |  0.42<L>    Ca    8.0<L>      22 Jul 2020 06:59                              8.9    14.60 )-----------( 245      ( 22 Jul 2020 06:59 )             27.3           PT/INR - ( 22 Jul 2020 06:59 )   PT: 12.5 sec;   INR: 1.07 ratio         PTT - ( 22 Jul 2020 06:59 )  PTT:29.9 sec                 11.2   15.41 )-----------( 289      ( 20 Jul 2020 10:53 )             34.2   137  |  104  |  14  ----------------------------<  99  3.8   |  32<H>  |  0.53    acetaminophen   Tablet .. 975 milliGRAM(s) Oral every 8 hours  ascorbic acid 500 milliGRAM(s) Oral daily  benzocaine 15 mG/menthol 3.6 mG (Sugar-Free) Lozenge 1 Lozenge Oral every 3 hours PRN  calcium carbonate   1250 mG (OsCal) 1 Tablet(s) Oral three times a day  cyclobenzaprine 10 milliGRAM(s) Oral every 8 hours  escitalopram 5 milliGRAM(s) Oral daily  famotidine    Tablet 20 milliGRAM(s) Oral every 12 hours PRN  gabapentin 300 milliGRAM(s) Oral two times a day  HYDROmorphone  Injectable 1 milliGRAM(s) IV Push every 3 hours PRN  levothyroxine 100 MICROGram(s) Oral daily  liothyronine 5 MICROGram(s) Oral daily  LORazepam     Tablet 0.5 milliGRAM(s) Oral every 6 hours PRN  magnesium hydroxide Suspension 30 milliLiter(s) Oral every 12 hours PRN  melatonin 3 milliGRAM(s) Oral at bedtime PRN  multivitamin 1 Tablet(s) Oral daily  naloxone Injectable 0.1 milliGRAM(s) IV Push every 3 minutes PRN  ondansetron Injectable 4 milliGRAM(s) IV Push every 6 hours PRN  oxyCODONE    IR 10 milliGRAM(s) Oral every 4 hours PRN  oxyCODONE    IR 5 milliGRAM(s) Oral every 4 hours PRN  piperacillin/tazobactam IVPB.. 3.375 Gram(s) IV Intermittent every 8 hours  prochlorperazine   Injectable 10 milliGRAM(s) IV Push once PRN  prochlorperazine   Injectable 10 milliGRAM(s) IV Push every 8 hours PRN  senna 2 Tablet(s) Oral at bedtime  sodium chloride 0.9% lock flush 3 milliLiter(s) IV Push every 8 hours

## 2020-07-24 ENCOUNTER — TRANSCRIPTION ENCOUNTER (OUTPATIENT)
Age: 75
End: 2020-07-24

## 2020-07-24 VITALS
DIASTOLIC BLOOD PRESSURE: 57 MMHG | TEMPERATURE: 98 F | OXYGEN SATURATION: 97 % | SYSTOLIC BLOOD PRESSURE: 107 MMHG | HEART RATE: 111 BPM | RESPIRATION RATE: 18 BRPM

## 2020-07-24 LAB
ALBUMIN SERPL ELPH-MCNC: 1.8 G/DL — LOW (ref 3.3–5)
ALP SERPL-CCNC: 124 U/L — HIGH (ref 40–120)
ALT FLD-CCNC: 32 U/L — SIGNIFICANT CHANGE UP (ref 12–78)
ANION GAP SERPL CALC-SCNC: 5 MMOL/L — SIGNIFICANT CHANGE UP (ref 5–17)
AST SERPL-CCNC: 27 U/L — SIGNIFICANT CHANGE UP (ref 15–37)
BASOPHILS # BLD AUTO: 0.02 K/UL — SIGNIFICANT CHANGE UP (ref 0–0.2)
BASOPHILS NFR BLD AUTO: 0.1 % — SIGNIFICANT CHANGE UP (ref 0–2)
BILIRUB SERPL-MCNC: 0.3 MG/DL — SIGNIFICANT CHANGE UP (ref 0.2–1.2)
BUN SERPL-MCNC: 15 MG/DL — SIGNIFICANT CHANGE UP (ref 7–23)
CALCIUM SERPL-MCNC: 8 MG/DL — LOW (ref 8.5–10.1)
CHLORIDE SERPL-SCNC: 105 MMOL/L — SIGNIFICANT CHANGE UP (ref 96–108)
CO2 SERPL-SCNC: 30 MMOL/L — SIGNIFICANT CHANGE UP (ref 22–31)
CREAT SERPL-MCNC: 0.39 MG/DL — LOW (ref 0.5–1.3)
EOSINOPHIL # BLD AUTO: 0.44 K/UL — SIGNIFICANT CHANGE UP (ref 0–0.5)
EOSINOPHIL NFR BLD AUTO: 3.3 % — SIGNIFICANT CHANGE UP (ref 0–6)
GLUCOSE SERPL-MCNC: 85 MG/DL — SIGNIFICANT CHANGE UP (ref 70–99)
HCT VFR BLD CALC: 29 % — LOW (ref 34.5–45)
HGB BLD-MCNC: 9.1 G/DL — LOW (ref 11.5–15.5)
IMM GRANULOCYTES NFR BLD AUTO: 2.1 % — HIGH (ref 0–1.5)
LYMPHOCYTES # BLD AUTO: 17.1 % — SIGNIFICANT CHANGE UP (ref 13–44)
LYMPHOCYTES # BLD AUTO: 2.32 K/UL — SIGNIFICANT CHANGE UP (ref 1–3.3)
MCHC RBC-ENTMCNC: 29.9 PG — SIGNIFICANT CHANGE UP (ref 27–34)
MCHC RBC-ENTMCNC: 31.4 GM/DL — LOW (ref 32–36)
MCV RBC AUTO: 95.4 FL — SIGNIFICANT CHANGE UP (ref 80–100)
MONOCYTES # BLD AUTO: 1.1 K/UL — HIGH (ref 0–0.9)
MONOCYTES NFR BLD AUTO: 8.1 % — SIGNIFICANT CHANGE UP (ref 2–14)
NEUTROPHILS # BLD AUTO: 9.37 K/UL — HIGH (ref 1.8–7.4)
NEUTROPHILS NFR BLD AUTO: 69.3 % — SIGNIFICANT CHANGE UP (ref 43–77)
PLATELET # BLD AUTO: 268 K/UL — SIGNIFICANT CHANGE UP (ref 150–400)
POTASSIUM SERPL-MCNC: 3.9 MMOL/L — SIGNIFICANT CHANGE UP (ref 3.5–5.3)
POTASSIUM SERPL-SCNC: 3.9 MMOL/L — SIGNIFICANT CHANGE UP (ref 3.5–5.3)
PROT SERPL-MCNC: 4.9 GM/DL — LOW (ref 6–8.3)
RBC # BLD: 3.04 M/UL — LOW (ref 3.8–5.2)
RBC # FLD: 14 % — SIGNIFICANT CHANGE UP (ref 10.3–14.5)
SODIUM SERPL-SCNC: 140 MMOL/L — SIGNIFICANT CHANGE UP (ref 135–145)
WBC # BLD: 13.53 K/UL — HIGH (ref 3.8–10.5)
WBC # FLD AUTO: 13.53 K/UL — HIGH (ref 3.8–10.5)

## 2020-07-24 PROCEDURE — 99232 SBSQ HOSP IP/OBS MODERATE 35: CPT

## 2020-07-24 PROCEDURE — 71045 X-RAY EXAM CHEST 1 VIEW: CPT | Mod: 26

## 2020-07-24 PROCEDURE — 99239 HOSP IP/OBS DSCHRG MGMT >30: CPT

## 2020-07-24 RX ORDER — ACETAMINOPHEN 500 MG
1 TABLET ORAL
Qty: 90 | Refills: 0
Start: 2020-07-24 | End: 2020-08-22

## 2020-07-24 RX ORDER — CALCIUM CARBONATE 500(1250)
1 TABLET ORAL
Qty: 90 | Refills: 0
Start: 2020-07-24 | End: 2020-08-22

## 2020-07-24 RX ORDER — ALPRAZOLAM 0.25 MG
1 TABLET ORAL
Qty: 10 | Refills: 0
Start: 2020-07-24 | End: 2020-07-28

## 2020-07-24 RX ORDER — PIPERACILLIN AND TAZOBACTAM 4; .5 G/20ML; G/20ML
3.38 INJECTION, POWDER, LYOPHILIZED, FOR SOLUTION INTRAVENOUS EVERY 8 HOURS
Refills: 0 | Status: DISCONTINUED | OUTPATIENT
Start: 2020-07-24 | End: 2020-07-24

## 2020-07-24 RX ORDER — SENNA PLUS 8.6 MG/1
2 TABLET ORAL
Qty: 60 | Refills: 0
Start: 2020-07-24 | End: 2020-08-22

## 2020-07-24 RX ORDER — LANOLIN ALCOHOL/MO/W.PET/CERES
1 CREAM (GRAM) TOPICAL
Qty: 30 | Refills: 0
Start: 2020-07-24 | End: 2020-08-22

## 2020-07-24 RX ORDER — CYCLOBENZAPRINE HYDROCHLORIDE 10 MG/1
1 TABLET, FILM COATED ORAL
Qty: 90 | Refills: 0
Start: 2020-07-24 | End: 2020-08-22

## 2020-07-24 RX ORDER — ESCITALOPRAM OXALATE 10 MG/1
1 TABLET, FILM COATED ORAL
Qty: 30 | Refills: 0
Start: 2020-07-24 | End: 2020-08-22

## 2020-07-24 RX ORDER — GABAPENTIN 400 MG/1
1 CAPSULE ORAL
Qty: 60 | Refills: 0
Start: 2020-07-24 | End: 2020-08-22

## 2020-07-24 RX ADMIN — ESCITALOPRAM OXALATE 5 MILLIGRAM(S): 10 TABLET, FILM COATED ORAL at 12:42

## 2020-07-24 RX ADMIN — Medication 1 TABLET(S): at 09:48

## 2020-07-24 RX ADMIN — Medication 975 MILLIGRAM(S): at 13:31

## 2020-07-24 RX ADMIN — Medication 975 MILLIGRAM(S): at 06:16

## 2020-07-24 RX ADMIN — GABAPENTIN 300 MILLIGRAM(S): 400 CAPSULE ORAL at 09:48

## 2020-07-24 RX ADMIN — Medication 100 MICROGRAM(S): at 06:16

## 2020-07-24 RX ADMIN — PIPERACILLIN AND TAZOBACTAM 25 GRAM(S): 4; .5 INJECTION, POWDER, LYOPHILIZED, FOR SOLUTION INTRAVENOUS at 12:43

## 2020-07-24 RX ADMIN — LIOTHYRONINE SODIUM 5 MICROGRAM(S): 25 TABLET ORAL at 06:15

## 2020-07-24 RX ADMIN — Medication 500 MILLIGRAM(S): at 09:48

## 2020-07-24 RX ADMIN — SODIUM CHLORIDE 3 MILLILITER(S): 9 INJECTION INTRAMUSCULAR; INTRAVENOUS; SUBCUTANEOUS at 13:34

## 2020-07-24 RX ADMIN — SODIUM CHLORIDE 3 MILLILITER(S): 9 INJECTION INTRAMUSCULAR; INTRAVENOUS; SUBCUTANEOUS at 08:05

## 2020-07-24 RX ADMIN — CYCLOBENZAPRINE HYDROCHLORIDE 10 MILLIGRAM(S): 10 TABLET, FILM COATED ORAL at 12:43

## 2020-07-24 RX ADMIN — Medication 1 TABLET(S): at 06:16

## 2020-07-24 RX ADMIN — CYCLOBENZAPRINE HYDROCHLORIDE 10 MILLIGRAM(S): 10 TABLET, FILM COATED ORAL at 06:16

## 2020-07-24 NOTE — PROGRESS NOTE ADULT - SUBJECTIVE AND OBJECTIVE BOX
74 y/o female presented with back pain x 2 months , with recent worsening, and pain radiating down both extremities; Imaging revealed an L2 vertebral fracture / likely pathologic; on exam had left lung dullness to percussion and imaging with CT revealed extensive   left sided pleural effusion with concern for possible lung mass. She was seen by Pulmonary medicine and advised admission for thoracentesis and further HOLLIS; kept on dexamethasone for lower back fracture; WBC has increased/ placed on an antibiotic ( no overt infection)    In the ER a CT was placed with + drainage/  she now has a pigtail catheter in the left pleural cavity which is still draining significant amount of fluid daily.    cytology: atypical cells suspicious for malignancy  s/p spinal stabilization    final path: Adenocarcinoma TTF-1 + cw lung primary. 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    able to walk short distances    s/p left pleurex catheter    started on ativan and low dose lexapro 5m for anxiety and depression    able to participate with PT today    due for discharge today      PAST MEDICAL & SURGICAL HISTORY:  Osteoarthritis  Fracture: L2 pathologic fracture  Hypothyroidism  No significant past surgical history    MEDICATIONS  (STANDING):  acetaminophen   Tablet .. 975 milliGRAM(s) Oral every 8 hours  ascorbic acid 500 milliGRAM(s) Oral daily  calcium carbonate   1250 mG (OsCal) 1 Tablet(s) Oral three times a day  cyclobenzaprine 10 milliGRAM(s) Oral every 8 hours  escitalopram 5 milliGRAM(s) Oral daily  gabapentin 300 milliGRAM(s) Oral two times a day  levothyroxine 100 MICROGram(s) Oral daily  liothyronine 5 MICROGram(s) Oral daily  multivitamin 1 Tablet(s) Oral daily  piperacillin/tazobactam IVPB.. 3.375 Gram(s) IV Intermittent every 8 hours  senna 2 Tablet(s) Oral at bedtime  sodium chloride 0.9% lock flush 3 milliLiter(s) IV Push every 8 hours    MEDICATIONS  (PRN):  ALPRAZolam 0.25 milliGRAM(s) Oral every 8 hours PRN anxiety  benzocaine 15 mG/menthol 3.6 mG (Sugar-Free) Lozenge 1 Lozenge Oral every 3 hours PRN Mouth Sores  famotidine    Tablet 20 milliGRAM(s) Oral every 12 hours PRN Dyspepsia  HYDROmorphone  Injectable 1 milliGRAM(s) IV Push every 3 hours PRN breakthrough pain  magnesium hydroxide Suspension 30 milliLiter(s) Oral every 12 hours PRN Constipation  melatonin 3 milliGRAM(s) Oral at bedtime PRN Sleep  naloxone Injectable 0.1 milliGRAM(s) IV Push every 3 minutes PRN For ANY of the following changes in patient status:  A. RR LESS THAN 10 breaths per minute, B. Oxygen saturation LESS THAN 90%, C. Sedation score of 6  ondansetron Injectable 4 milliGRAM(s) IV Push every 6 hours PRN Nausea  oxyCODONE    IR 10 milliGRAM(s) Oral every 4 hours PRN Moderate Pain (4 - 6)  oxyCODONE    IR 5 milliGRAM(s) Oral every 4 hours PRN Mild Pain (1 - 3)          ROS  back pain  anxiety  depression  back pain    Vital Signs Last 24 Hrs  T(C): 36.6 (24 Jul 2020 15:12), Max: 36.7 (23 Jul 2020 22:45)  T(F): 97.9 (24 Jul 2020 15:12), Max: 98 (23 Jul 2020 22:45)  HR: 111 (24 Jul 2020 15:12) (68 - 111)  BP: 107/57 (24 Jul 2020 15:12) (107/57 - 130/63)  BP(mean): --  RR: 18 (24 Jul 2020 15:12) (17 - 20)  SpO2: 97% (24 Jul 2020 15:12) (93% - 100%)        PHYSICAL EXAM:        OE  Anxious  walking in the hallway with a walker with PT  chest clear  H1H2  left chest tube  right cholecystostomy tube  left pleurex                                     9.1    13.53 )-----------( 268      ( 24 Jul 2020 06:52 )             29.0     07-24    140  |  105  |  15  ----------------------------<  85  3.9   |  30  |  0.39<L>    Ca    8.0<L>      24 Jul 2020 06:52    TPro  4.9<L>  /  Alb  1.8<L>  /  TBili  0.3  /  DBili  x   /  AST  27  /  ALT  32  /  AlkPhos  124<H>  07-24    LIVER FUNCTIONS - ( 24 Jul 2020 06:52 )  Alb: 1.8 g/dL / Pro: 4.9 gm/dL / ALK PHOS: 124 U/L / ALT: 32 U/L / AST: 27 U/L / GGT: x               	     Pleural fluid  atypical cells seen . not diagnostic    Vertebral path; final path: Adenocarcinoma TTF-1 + cw lung primary. PDL-1 +    INTERPRETATION:  Clinical indication: Staging MRI rule out metastasis.    MRI of the thoracic and lumbar spine was performed using sagittal T1-T2 and STIR sequence. Axial T1 and T2-weighted sequences were performed as well. The patient was injected with a possible 6.5 cc gadolinium this IV with 1 cc of contrast cardia.    Thoracic spine:    Abnormal enhancing lesions are seen involving the T10 and T12 vertebral bodies. These findings demonstrate slight T1 shortening and could be compatible with hemangiomas and less likely underlying pathologic lesions such as metastasis,. These findings can also be further evaluated with CT scan of the thoracic spine region.    There are no abnormal disc herniations or significant central or neural foraminal stenosis seen.    The spinal cord demonstrates normal signal and caliber.    Evaluation of the paraspinal soft tissues appear normal.    Hazy density seen involving the left lung. Dedicated imaging with CT scan is recommended for further evaluation.    Lumbar spine:    There is normal lumbar lordosis is seen.    Compression deformity is seen involving the L2 vertebral body. Abnormal T1 and T2 prolongation are identified with associated areas of enhancement. This finding could be compatible with a pathologic lesion such as metastasis, myeloma or lymphoma. Retropulsed fragment is identified which causes mild to moderate narrowing of the spinal canal. No abnormal paraspinal extension of this process is seen.    There is grade 1 anterolisthesis involving L4 and L5.    T12-L1: Normal    L1-2: Disc bulge and bilateral hypertrophic facet changes seen. This as well as retropulsed fragment causes moderate narrowing of the spinal canal.    L2-3: Disc bulge and bilateral hypertrophic facet joint changes seen. This as well as the retropulsed fragment causes moderate to severe narrowing of the spinal canal. Severe narrowing of both neural foramina    L3-4: Disc bulge and bilateral hypertrophic facet changes are seen. Severe narrowing of the spinal canal is seen.    L4-5: Disc bulge and bilateral hypertrophic facet changes seen. Moderate narrowing spinal canal and left neural foramen.    L5-S1: Disc bulge and bilateral hypertrophic facet changes are seen. No significant compromise of the, spinal canal is seen.    There is small focus of decreased signal seen involving the right iliac bone. This could be compatible with a bone island, the possibility of underlying lesion cannot be entirely excluded.    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    Evaluation of the paraspinal soft tissues appear normal    < end of copied text >    < from: Xray Chest 1 View- PORTABLE-Routine (07.11.20 @ 10:08) >    EXAM:  XR CHEST PORTABLE ROUTINE 1V                            PROCEDURE DATE:  07/11/2020          INTERPRETATION:  INDICATION: Assess left pleural effusion.    PRIORS: 7/10/2020.    VIEWS: Portable AP radiography of the chest performed.    FINDINGS: Since prior evaluation the position of the indwelling left pleural space pigtail drainage catheter has changed, the pigtail now overlying the medial aspect of the left mid thorax. There is interval decrease in left pleural effusion from prior; a moderately sized left pleural effusion persists. Underlying lung parenchymal infiltrate, consolidation or atelectasis cannot be excluded. There is no evidence for left-sided pneumothorax. No acute right-sided infiltrate is identified. No evidence for right pleural effusion or pneumothorax. No mediastinal shift noted. Degenerative changes of the thoracic spine evident.    IMPRESSION: Interval decrease in left pleural effusion from prior with change in position of indwelling pigtail pleural space drainage catheter. Moderately sized left pleural effusion persists. See above discussion.    < end of copied text >            INTERPRETATION:  INDICATION: Assess left pleural effusion.    PRIORS: 7/10/2020.    VIEWS: Portable AP radiography of the chest performed.    FINDINGS: Since prior evaluation the position of the indwelling left pleural space pigtail drainage catheter has changed, the pigtail now overlying the medial aspect of the left mid thorax. There is interval decrease in left pleural effusion from prior; a moderately sized left pleural effusion persists. Underlying lung parenchymal infiltrate, consolidation or atelectasis cannot be excluded. There is no evidence for left-sided pneumothorax. No acute right-sided infiltrate is identified. No evidence for right pleural effusion or pneumothorax. No mediastinal shift noted. Degenerative changes of the thoracic spine evident.    IMPRESSION: Interval decrease in left pleural effusion from prior with change in position of indwelling pigtail pleural space drainage catheter. Moderately sized left pleural effusion persists. See above discussion.    < end of copied text >      MRI Brain  1.2 cm right frontal lesion and a few smaller lesions

## 2020-07-24 NOTE — PROGRESS NOTE BEHAVIORAL HEALTH - AXIS III
multiple metastases, hypothyroidism

## 2020-07-24 NOTE — DISCHARGE NOTE PROVIDER - NSDCCPCAREPLAN_GEN_ALL_CORE_FT
PRINCIPAL DISCHARGE DIAGNOSIS  Diagnosis: Pathologic compression fracture of spine  Assessment and Plan of Treatment: s/p surgery , dressing chnages daily, follow up with Dr. Flood within 1 week      SECONDARY DISCHARGE DIAGNOSES  Diagnosis: Brain lesion  Assessment and Plan of Treatment: follow up with radiologist oncologist Dr. Ryan within 1-2 weeks    Diagnosis: Lung mass  Assessment and Plan of Treatment: follow up with Dr. Westfall within 1 week for further care and management    Diagnosis: Pleural effusion  Assessment and Plan of Treatment: s/p Pleurx , drain tree times a day up to 1 L , follow up with Dr. Stein within 1-2 weeks for further management    Diagnosis: Cholecystitis  Assessment and Plan of Treatment: complete antibiotics for 7 more days , follow up with Dr. Linda in 2 weeks for gallbladder drain, flush the drain daily with 10 cc NS

## 2020-07-24 NOTE — PROGRESS NOTE ADULT - ASSESSMENT
1) Metastatic Adenocarcinoma- Spine, brain  2) Left Pleural Effusion  3) Thoracostomy in Place  4) Dyspnea  5) Leukocytosis  6) Cholecystitis     74 y/o female with PMH of Hypothyroidism and L2 pathologic compression fracture presents to  with 1 month history of dyspnea and 1 week history of the dry cough. Patient was seen by ortho spine surgeon, pulmonologist and oncologist and send for evaluation of left sided pleural effusion with concern for lung mass. Pt reports onset of back pain was in March 2020, received cortisone shots and got an MRI of L2 and blood test x2 weeks ago. Pt went to Dr. Westfall (oncologist) and was sent for imaging with Dr. Sanchez (pulmonologist) who discussed results of CT abdomen and chest this AM with pt and told pt to come to ED for evaluation for SOB.  In Ed -  T(F): 98.9Max: 98.9  HR: 80  (80 - 94) BP: 149/63 (149/63 - 150/77) RR: 24(17 - 24) SpO2: 98% (98% - 99%) EKG - sinus , low voltage QRS, troponin x 1neg, CXR - large pleural effusion, WBC 12, Cr 0.73, , COVID PCR neg , s/p left chest tube placement in ED , fluid analysis pending (10 Jul 2020 13:26)  Massive left sided pleural effusion  CT Chest shows 2.6 cm spiculated left upper lobe mass, likely primary lung neoplasm. Nodularity along the left major fissure. Cannot differentiate intrapulmonary lymph nodes versus pleural metastatic disease. Left pleural drainage catheter in place without significant pleural effusion. Small left-sided pneumothorax, which may be postprocedural. 1.5 cm indeterminate liver lesion. Metastatic bone disease again noted to the spine.   Patient evaluated by Orthopedics, underwent MRI brain, evaluated by Surgery  Underwent L2-L5 laminectomy, partial L2 corpectomy, T11-L5 posterior fusion and stabilization, pedicle screw instrumentation and possible vertebroplasty on 7/16  Extrapulmonary metastasis noted, secondary to Adenocarcinoma (+TTF/PDL1)  Needs PT/OT, continue IS  Patient is s/p cholecystostomy tube  Chronic indwelling pleural catheter in place for malignant effusion;   Thank you for the consult; will continue to follow

## 2020-07-24 NOTE — PROGRESS NOTE BEHAVIORAL HEALTH - SUMMARY
Pt is a 75 YOWW with no past psychiatry hx and [id number] hx significant for multiple metastases.   In the light of her medical condition pt is expressing anxiety and depressed mood,  poor appetite low energy level, insomnia and fear of dying. Pt wants to live "I have so many reason to live, I have children,   I have grandchildren, I don't want to die". No SI. NO HI, no AH, , PI.   Pt is scared and distressed, She dose not wan to see psychiatrist and she is interested in getting meds for anxiety and depression.  Pt si not suicidal or homicidal, not at imminent risk to harm self and others and she does not need inpatient level of care.     Meds discussed.    ativan 0.5 mg po q6 h PRN anxiety once she can resume PO intake and   Lexapro 5 mg po qd for depression and anxiety,. side effects/ benefits discussed, pt is wiling to take  meds, but not willing to see psychiatrist in community.
Pt is a 75 YOWW with no past psychiatry hx and [id number] hx significant for multiple metastases.   In the light of her medical condition pt is expressing anxiety and depressed mood,  poor appetite low energy level, insomnia and fear of dying. Pt wants to live "I have so many reason to live, I have children,   I have grandchildren, I don't want to die". No SI. NO HI, no AH, , PI.   Pt is scared and distressed, She dose not wan to see psychiatrist and she is interested in getting meds for anxiety and depression.  Pt si not suicidal or homicidal, not at imminent risk to harm self and others and she does not need inpatient level of care.     Meds discussed.    Primary team changes ativan to xanax PO.   Continue Lexapro 5 mg po qd for depression and anxiety.
Pt is a 75 YOWW with no past psychiatry hx and [id number] hx significant for multiple metastases.   In the light of her medical condition pt is expressing anxiety and depressed mood,  poor appetite low energy level, insomnia and fear of dying. Pt wants to live "I have so many reason to live, I have children,   I have grandchildren, I don't want to die". No SI. NO HI, no AH, , PI.   Pt is scared and distressed, She dose not wan to see psychiatrist and she is interested in getting meds for anxiety and depression.  Pt si not suicidal or homicidal, not at imminent risk to harm self and others and she does not need inpatient level of care.     Meds discussed.    ativan 0.5 mg po q6 h PRN anxiety once she can resume PO intake and   Lexapro 5 mg po qd for depression and anxiety,. side effects/ benefits discussed, pt is wiling to take  meds, but not willing to see psychiatrist in community.

## 2020-07-24 NOTE — PROGRESS NOTE BEHAVIORAL HEALTH - RISK ASSESSMENT
LOW acute risks: Pt has no SI, HI, no aggression, not impulsive, no psych hx, sleep with meds, no acute pain   INCREASED long term risk considering depression and anxiety in the light of illness with poor prognosis,   Protective factors: NO psych hx, opt bull not want to die, future plans, supportive family.

## 2020-07-24 NOTE — DISCHARGE NOTE PROVIDER - NSDCMRMEDTOKEN_GEN_ALL_CORE_FT
ALPRAZolam 0.25 mg oral tablet: 1 tab(s) orally 2 times a day, As Needed -anxiety MDD:0.5  amoxicillin-clavulanate 875 mg-125 mg oral tablet: 1 milligram(s) orally 2 times a day   ascorbic acid 500 mg oral tablet: 1 tab(s) orally once a day  biotin 1000 mcg oral tablet: 1 tab(s) orally once a day  calcium carbonate 1250 mg (500 mg elemental calcium) oral tablet: 1 tab(s) orally 3 times a day  cyclobenzaprine 10 mg oral tablet: 1 tab(s) orally every 8 hours  escitalopram 5 mg oral tablet: 1 tab(s) orally once a day  gabapentin 300 mg oral capsule: 1 cap(s) orally 2 times a day  liothyronine 5 mcg oral tablet: 0.5 tab(s) orally 2 times a day  melatonin 3 mg oral tablet: 1 tab(s) orally once a day (at bedtime), As needed, Sleep  Multiple Vitamins oral tablet: 1 tab(s) orally once a day  senna oral tablet: 2 tab(s) orally once a day (at bedtime)  Synthroid 100 mcg (0.1 mg) oral tablet: 1 tab(s) orally once a day  Tylenol 8 Hour 650 mg oral tablet, extended release: 1 tab(s) orally every 8 hours, As Needed -for mild pain

## 2020-07-24 NOTE — PROGRESS NOTE ADULT - SUBJECTIVE AND OBJECTIVE BOX
Subjective:  Pt seen, s/p pleurx catheter placement last night.    Vital Signs:  Vital Signs Last 24 Hrs  T(C): 36.3 (07-24-20 @ 08:08), Max: 36.7 (07-23-20 @ 22:45)  T(F): 97.4 (07-24-20 @ 08:08), Max: 98 (07-23-20 @ 22:45)  HR: 77 (07-24-20 @ 08:08) (68 - 83)  BP: 116/52 (07-24-20 @ 08:08) (112/49 - 130/63)  RR: 18 (07-24-20 @ 08:08) (17 - 20)  SpO2: 100% (07-24-20 @ 08:08) (93% - 100%) on (O2)    Telemetry/Alarms:    Relevant labs, radiology and Medications reviewed                        9.1    13.53 )-----------( 268      ( 24 Jul 2020 06:52 )             29.0     07-24    140  |  105  |  15  ----------------------------<  85  3.9   |  30  |  0.39<L>    Ca    8.0<L>      24 Jul 2020 06:52    TPro  4.9<L>  /  Alb  1.8<L>  /  TBili  0.3  /  DBili  x   /  AST  27  /  ALT  32  /  AlkPhos  124<H>  07-24      MEDICATIONS  (STANDING):  acetaminophen   Tablet .. 975 milliGRAM(s) Oral every 8 hours  ascorbic acid 500 milliGRAM(s) Oral daily  calcium carbonate   1250 mG (OsCal) 1 Tablet(s) Oral three times a day  cyclobenzaprine 10 milliGRAM(s) Oral every 8 hours  escitalopram 5 milliGRAM(s) Oral daily  gabapentin 300 milliGRAM(s) Oral two times a day  levothyroxine 100 MICROGram(s) Oral daily  liothyronine 5 MICROGram(s) Oral daily  multivitamin 1 Tablet(s) Oral daily  piperacillin/tazobactam IVPB.. 3.375 Gram(s) IV Intermittent every 8 hours  senna 2 Tablet(s) Oral at bedtime  sodium chloride 0.9% lock flush 3 milliLiter(s) IV Push every 8 hours    MEDICATIONS  (PRN):  ALPRAZolam 0.25 milliGRAM(s) Oral every 8 hours PRN anxiety  benzocaine 15 mG/menthol 3.6 mG (Sugar-Free) Lozenge 1 Lozenge Oral every 3 hours PRN Mouth Sores  famotidine    Tablet 20 milliGRAM(s) Oral every 12 hours PRN Dyspepsia  HYDROmorphone  Injectable 1 milliGRAM(s) IV Push every 3 hours PRN breakthrough pain  magnesium hydroxide Suspension 30 milliLiter(s) Oral every 12 hours PRN Constipation  melatonin 3 milliGRAM(s) Oral at bedtime PRN Sleep  naloxone Injectable 0.1 milliGRAM(s) IV Push every 3 minutes PRN For ANY of the following changes in patient status:  A. RR LESS THAN 10 breaths per minute, B. Oxygen saturation LESS THAN 90%, C. Sedation score of 6  ondansetron Injectable 4 milliGRAM(s) IV Push every 6 hours PRN Nausea  oxyCODONE    IR 10 milliGRAM(s) Oral every 4 hours PRN Moderate Pain (4 - 6)  oxyCODONE    IR 5 milliGRAM(s) Oral every 4 hours PRN Mild Pain (1 - 3)      Physical exam  Gen NAD  Neuro AAox3  Card tachycardic  Pulm equal BS  Abd soft  Ext warm    Tubes: left pleurx to suction -20, no AL, drained 215 last 24 hours    I&O's Summary    23 Jul 2020 07:01  -  24 Jul 2020 07:00  --------------------------------------------------------  IN: 200 mL / OUT: 535 mL / NET: -335 mL        Assessment  75y Female  w/ PAST MEDICAL & SURGICAL HISTORY:  Osteoarthritis  Fracture: L2 pathologic fracture  Hypothyroidism  No significant past surgical history  admitted with complaints of Patient is a 75y old  Female who presents with a chief complaint of dyspnea , cough (24 Jul 2020 10:52)  .  76 yo female w pathologic L2 fracture, INDU mass and large effusions admitted 7/10 w SOB. Had CT placed 7/10. Now POD 7 Decompression, spine, lumbar, posterior approach, with fusion of posterior spinal column. Pathology consistent with adenocarcinoma. S/p cholecystostomy. Now POD 1 from pleurx catheter    pleurx catheter drainage q M, W, F up to 1L , first drainage 7/27  home care, record output  f/u Dr. Stein as needed  OK for d/c from thoracic standpoint once home care set up and medically ready    Discussed with Cardiothoracic Team at AM rounds.

## 2020-07-24 NOTE — DISCHARGE NOTE PROVIDER - NSDCFUADDINST_GEN_ALL_CORE_FT
Final Diagnosis    1, 2. Tissue (L2 vertebral body and L2 tumor):  - METASTATIC ADENOCARCINOMA (see comment).    Verified by: DARYN LANGLEY M.D.  (Electronic Signature)  Reported on: 07/22/20 10:21 EDT, [facility], [address] [place] [address]  Phone: [phone]   Fax: [phone]  _________________________________________________________________    Intraoperative Consultation  1. TOUCH PREP CONSULTATION:  - Positive for malignant cells, favor adenocarcinoma. By Dr. SHANNAN Langley    Comment  Positive IHC stains for TTF-1 support a lung primary site.

## 2020-07-24 NOTE — PROGRESS NOTE BEHAVIORAL HEALTH - NSBHCONSULTFOLLOWAFTERCARE_PSY_A_CORE FT
Pt does not wan to see psychiatrist in community, her meds could be prescribed by PMD . Pt would, benefit from psychotherapy and counseling.
Pt does not want to see psychiatrist in community; her meds could be prescribed by PMD . Pt would, benefit from psychotherapy and counseling.
Pt does not wan to see psychiatrist in community, her meds could be prescribed by PMD . Pt would, benefit from psychotherapy and counseling.

## 2020-07-24 NOTE — PROGRESS NOTE ADULT - ASSESSMENT
A/P: s/p L1-5 laminectomy, T11-L5 fusion, vertebroplasty, partial L2 corpectomy - stable  d/w Dr. George  1. cleared from spine standpoint for discharge when medically cleared, f/u in the office with Dr. George

## 2020-07-24 NOTE — DISCHARGE NOTE PROVIDER - PROVIDER TOKENS
PROVIDER:[TOKEN:[[id number]],FOLLOWUP:[1-3 days]],PROVIDER:[TOKEN:[[id number]],FOLLOWUP:[1 week]],PROVIDER:[TOKEN:[[id number]],FOLLOWUP:[1 week]],PROVIDER:[TOKEN:[[id number]],FOLLOWUP:[2 weeks]],PROVIDER:[TOKEN:[[id number]],FOLLOWUP:[2 weeks]],PROVIDER:[TOKEN:[[id number]],FOLLOWUP:[1 week]],PROVIDER:[TOKEN:[[id number]],FOLLOWUP:[1 week]]

## 2020-07-24 NOTE — PROGRESS NOTE BEHAVIORAL HEALTH - NSBHCONSULTRECOMMENDOTHER_PSY_A_CORE FT
side effects/ benefits, answered the questions.
side effects/ benefits, answered the questions.
side effects/ benefits, answered the questions, pt agrees.

## 2020-07-24 NOTE — PROGRESS NOTE BEHAVIORAL HEALTH - NSBHFUPINTERVALHXFT_PSY_A_CORE
Pt  is sitting in her chair, appears in emotional distress, continues to verbalize anxiety and depression, tearful.  PT does not want to die and she is afraid of dying. She also does not want te see therapist psychiatrist or councilors .  Pt si not suicidal or homicidal.   She si aware that she has ordered Ativan PRN for anxiety, Reeducated that she needs to tel the RN if she feels anxious in order to get her PRN meds.
Pt is sitting in her chair, appears comfortable today,  states that she feel es better, but she continues to verbalize anxiety and depression, no tearfulness.  PT does not want to die and she is afraid of dying. Pt is not suicidal or homicidal. She would like to go home.   Energy level is still on, low side, but improving, Sleep is described as "good" and appetite is described as " a little".
Pt engaged ion OPT this AM. She is sitting in her chair, continues to verbalize anxiety and depression,. In addition she is nauseated and "woozy". PT does not want to die and she is afraid of dying.   She gets tearful, states she is  loosing hope.   She is afraid "I will never leave this hospital", "I want to go home"   Pt si not suicidal or homicidal.   She si aware that she has ordered Ativan PRN for anxiety. She is aware that it takes time for Lexapro to take a therapeutic effect.

## 2020-07-24 NOTE — DISCHARGE NOTE PROVIDER - CARE PROVIDER_API CALL
DEEJAY SIMMS  Internal Medicine  [address]  [place] [address]  Phone: [phone]  Fax: [phone]  Follow Up Time: 1-3 days    Richie Westfall  HEMATOLOGY  [address] [address]  Phone: [phone]  Fax: [phone]  Follow Up Time: 1 week    Sharyn Ryan  RADIATION ONCOLOGY  [address] [place] [address]  Phone: [phone]  Fax: [phone]  Follow Up Time: 1 week    Emelia George  ORTHOPAEDIC SURGERY  [address] [address]  Phone: [phone]  Fax: [phone]  Follow Up Time: 2 weeks    Ford Linda  SURGERY  [address] [place] [address]  Phone: [phone]  Fax: [phone]  Follow Up Time: 2 weeks

## 2020-07-24 NOTE — PROGRESS NOTE BEHAVIORAL HEALTH - NSBHCHARTREVIEWVS_PSY_A_CORE FT
Vital Signs Last 24 Hrs  T(C): 36.3 (22 Jul 2020 08:34), Max: 37.2 (21 Jul 2020 21:15)  T(F): 97.4 (22 Jul 2020 08:34), Max: 99 (21 Jul 2020 21:15)  HR: 78 (22 Jul 2020 08:34) (78 - 99)  BP: 124/60 (22 Jul 2020 08:34) (121/59 - 127/59)  BP(mean): --  RR: 19 (22 Jul 2020 08:34) (19 - 20)  SpO2: 96% (22 Jul 2020 08:34) (96% - 97%)
Vital Signs Last 24 Hrs  T(C): 36.3 (24 Jul 2020 08:08), Max: 36.7 (23 Jul 2020 22:45)  T(F): 97.4 (24 Jul 2020 08:08), Max: 98 (23 Jul 2020 22:45)  HR: 77 (24 Jul 2020 08:08) (68 - 83)  BP: 116/52 (24 Jul 2020 08:08) (112/49 - 130/63)  BP(mean): --  RR: 18 (24 Jul 2020 08:08) (17 - 20)  SpO2: 100% (24 Jul 2020 08:08) (93% - 100%)
Vital Signs Last 24 Hrs  T(C): 36.7 (21 Jul 2020 08:27), Max: 36.7 (21 Jul 2020 08:27)  T(F): 98.1 (21 Jul 2020 08:27), Max: 98.1 (21 Jul 2020 08:27)  HR: 81 (21 Jul 2020 08:27) (80 - 98)  BP: 132/60 (21 Jul 2020 08:27) (117/61 - 132/60)  BP(mean): 78 (21 Jul 2020 08:27) (78 - 78)  RR: 18 (21 Jul 2020 08:27) (13 - 20)  SpO2: 95% (21 Jul 2020 08:27) (95% - 100%)

## 2020-07-24 NOTE — PROGRESS NOTE BEHAVIORAL HEALTH - NSBHCONSULTMEDANXIETY_PSY_A_CORE FT
ativan 0.5 mg po q 6 h PRN anxiety

## 2020-07-24 NOTE — PROGRESS NOTE BEHAVIORAL HEALTH - NSBHCONSFOLLOWNEEDS_PSY_A_CORE
Private Vehicle
no psychiatric contraindications to discharge

## 2020-07-24 NOTE — DISCHARGE NOTE PROVIDER - HOSPITAL COURSE
74 y/o female with PMH of Hypothyroidism and L2 pathologic compression fracture presents to   on 7/10/20 with 1 month history of dyspnea and 1 week history of the dry cough. Patient was seen by ortho spine surgeon, pulmonologist and oncologist and send for evaluation of left sided pleural effusion with concern for lung mass. Pt reports onset of back pain was in March 2020, received cortisone shots and got an MRI of L2 and blood test x2 weeks ago. Pt went to Dr. Westfall (oncologist) and was sent for imaging with Dr. Sanchze (pulmonologist) who discussed results of CT abdomen and chest this AM with pt and told pt to come to ED for evaluation for SOB.    In Ed -  T(F): 98.9Max: 98.9  HR: 80  (80 - 94) BP: 149/63 (149/63 - 150/77) RR: 24(17 - 24) SpO2: 98% (98% - 99%) EKG - sinus , low voltage QRS, troponin x 1neg, CXR - large pleural effusion, WBC 12, Cr 0.73, , covid PCR neg , s/p left chest tube placement in ED , fluid analysis pending (10 Jul 2020 13:26)            Stable but very anxious; pain well ctr; she underwent repeat HIDA and is now in IR for percut. drain of the GB for acute cresencio. This approach was chosen due to the possible masked by narcotics and IV steroids used for her back condition.        7/21 - no bm in several days, is passing gas,  feels overwhelmed, occ lightheadedness when she stands up lasts for a min or so     7/22 - pt seen and examined, + gen weakness, denies cp, dyspnea, abdominal pain, + anxious, POC discussed    7/23 - pt seen and examined, + anxious about pleurex placement, denies pain, OOB in the chair, afebrile, POC discussed     7/24 - pt seen and examined , left sided chest wall pain after pleurex placement, controlled with tylenol, afebrile, denies dyspnea, cough    Review of system- Rest of the review of system are negative except mentioned in HPI    T(C): 36.3 (07-24-20 @ 08:08), Max: 36.7 (07-23-20 @ 22:45)    T(F): 97.4 (07-24-20 @ 08:08), Max: 98 (07-23-20 @ 22:45)    HR: 77 (07-24-20 @ 08:08) (68 - 83)    BP: 116/52 (07-24-20 @ 08:08) (112/49 - 130/63)    RR: 18 (07-24-20 @ 08:08) (17 - 20)    SpO2: 100% (07-24-20 @ 08:08) (93% - 100%)    Wt(kg): --    HEAD:  Atraumatic, Normocephalic    EYES: EOMI, PERRLA, conjunctiva and sclera clear    NECK: Supple, No JVD    CHEST/LUNG: BS decreased over left base, ; No rales, no rhonchi, no wheezing, Left CT +     HEART: Regular rate and rhythm; No murmurs, no rubs or gallops    ABDOMEN: Soft, Nontender, Nondistended; Bowel sounds present, has cholecystostomy tube and drain     EXTREMITIES:  2+ Peripheral Pulses, No clubbing, cyanosis, no  edema    CNS non focal        * Lower back pain due to pathologic L2 fracture, worsening on MRI     - s/p L2-L4 laminectomy, T11-L5 PSF with bone graft, L2 Biopsy/partial tumor excision/Dr George     - pain management, s/p dilaudid PCA  s/p IV steroids    - tylenol prn     * Brain lesion in right frontal lobe with multiple subcentimeter lesions     - neurosurgery consult Dr. Handy -  radiology - oncology consult for brain RT    - no edema, f/u Dr. Ryan    * Dyspnea due to Large left pleural effusion s/p left chest tube placement, exudative effusion suspected malignancy     * ON CT CHEST: INDU 2.6 cm mass,  - suspect Primary Lung with Brain and Spine mets     * Suspected postobstructive PNA s/p IV abx    - pending final PAth; suspect adenoCa    - s/p Pleurx on 7/23 - drain 3 times a week up to 1 L     * Acute Cholecystitis , Enlarged GB on CT    - HIDA scan - positive, s/p  perc cholecystostomy by IR     - c/w Zosyn since 7/18 -day 7 --> augmentin 7 more days   , WBc 37--> 14 --> 13    * Lightheadedness when she stands up, transient    check Orthostats - neg     * Anxiety - xanax prn    Disposition - medically optimized to be discharged home with close follow up with PCP , oncologist, surgeon, spine surgeon within 1 week     complete abx    return to ED if fever, abdominal pain, nausea, vomiting, chest pain, dyspnea    Discharge plan discussed with patient, RN    Patient advised to follow up with PCP within 3-7 days    time spend 42 min    Discharge note faxed to PCP with my contact information to call me back     PCP Dr. Silva

## 2020-07-24 NOTE — DISCHARGE NOTE PROVIDER - CARE PROVIDERS DIRECT ADDRESSES
[email or web address]_Unknown,DirectAddress_Unknown,[email or web address],DirectAddress_Unknown

## 2020-07-24 NOTE — PROGRESS NOTE BEHAVIORAL HEALTH - NSBHCHARTREVIEWLAB_PSY_A_CORE FT
8.9    14.60 )-----------( 245      ( 22 Jul 2020 06:59 )             27.3   07-22    139  |  103  |  11  ----------------------------<  95  3.7   |  32<H>  |  0.42<L>    Ca    8.0<L>      22 Jul 2020 06:59
9.1    13.53 )-----------( 268      ( 24 Jul 2020 06:52 )             29.0     07-24    140  |  105  |  15  ----------------------------<  85  3.9   |  30  |  0.39<L>    Ca    8.0<L>      24 Jul 2020 06:52    TPro  4.9<L>  /  Alb  1.8<L>  /  TBili  0.3  /  DBili  x   /  AST  27  /  ALT  32  /  AlkPhos  124<H>  07-24
11.2   15.41 )-----------( 289      ( 20 Jul 2020 10:53 )             34.2   07-20    137  |  104  |  14  ----------------------------<  99  3.8   |  32<H>  |  0.53    Ca    8.0<L>      20 Jul 2020 10:53    TPro  5.7<L>  /  Alb  2.1<L>  /  TBili  0.5  /  DBili  0.2  /  AST  27  /  ALT  34  /  AlkPhos  137<H>  07-20

## 2020-07-24 NOTE — PROGRESS NOTE ADULT - SUBJECTIVE AND OBJECTIVE BOX
Patient is a 75y old  Female who presents with a chief complaint of dyspnea , cough (2020 06:36)      HPI:  74 y/o female with PMH of Hypothyroidism and L2 pathologic compression fracture presents to  with 1 month history of dyspnea and 1 week history of the dry cough. Patient was seen by ortho spine surgeon, pulmonologist and oncologist and send for evaluation of left sided pleural effusion with concern for lung mass. Pt reports onset of back pain was in 2020, received cortisone shots and got an MRI of L2 and blood test x2 weeks ago. Pt went to Dr. Westfall (oncologist) and was sent for imaging with Dr. Sanchez (pulmonologist) who discussed results of CT abdomen and chest this AM with pt and told pt to come to ED for evaluation for SOB.  In Ed -  T(F): 98.9Max: 98.9  HR: 80  (80 - 94) BP: 149/63 (149/63 - 150/77) RR: 24(17 - 24) SpO2: 98% (98% - 99%) EKG - sinus , low voltage QRS, troponin x 1neg, CXR - large pleural effusion, WBC 12, Cr 0.73, , covid PCR neg , s/p left chest tube placement in ED , fluid analysis pending (10 Jul 2020 13:26)      Patient is s/p chronic indwelling pleural catheter   Pathology resulted Adenocarcinoma, TTF+, PDL1      MEDICATIONS  (STANDING):  acetaminophen   Tablet .. 975 milliGRAM(s) Oral every 8 hours  ascorbic acid 500 milliGRAM(s) Oral daily  calcium carbonate   1250 mG (OsCal) 1 Tablet(s) Oral three times a day  ciprofloxacin   IVPB 400 milliGRAM(s) IV Intermittent every 12 hours  cyclobenzaprine 10 milliGRAM(s) Oral every 8 hours  dexAMETHasone  Injectable 1 milliGRAM(s) IV Push every 8 hours  gabapentin 300 milliGRAM(s) Oral two times a day  levothyroxine 100 MICROGram(s) Oral daily  liothyronine 5 MICROGram(s) Oral daily  metroNIDAZOLE  IVPB 500 milliGRAM(s) IV Intermittent every 8 hours  multivitamin 1 Tablet(s) Oral daily  senna 2 Tablet(s) Oral at bedtime  sodium chloride 0.9% lock flush 3 milliLiter(s) IV Push every 8 hours    MEDICATIONS  (PRN):  benzocaine 15 mG/menthol 3.6 mG (Sugar-Free) Lozenge 1 Lozenge Oral every 3 hours PRN Sore Throat  famotidine    Tablet 20 milliGRAM(s) Oral every 12 hours PRN Dyspepsia  HYDROmorphone  Injectable 1 milliGRAM(s) IV Push every 3 hours PRN breakthrough pain  magnesium hydroxide Suspension 30 milliLiter(s) Oral every 12 hours PRN Constipation  melatonin 3 milliGRAM(s) Oral at bedtime PRN Sleep  naloxone Injectable 0.1 milliGRAM(s) IV Push every 3 minutes PRN For ANY of the following changes in patient status:  A. RR LESS THAN 10 breaths per minute, B. Oxygen saturation LESS THAN 90%, C. Sedation score of 6  ondansetron Injectable 4 milliGRAM(s) IV Push every 6 hours PRN Nausea  ondansetron Injectable 4 milliGRAM(s) IV Push every 6 hours PRN Nausea  oxyCODONE    IR 10 milliGRAM(s) Oral every 4 hours PRN Moderate Pain (4 - 6)  oxyCODONE    IR 5 milliGRAM(s) Oral every 4 hours PRN Mild Pain (1 - 3)  prochlorperazine   Injectable 10 milliGRAM(s) IV Push every 8 hours PRN Nausea/vomiting      Vital Signs Last 24 Hrs  T(C): 36.3 (2020 08:08), Max: 36.7 (2020 22:45)  T(F): 97.4 (2020 08:08), Max: 98 (2020 22:45)  HR: 77 (2020 08:08) (68 - 90)  BP: 116/52 (2020 08:08) (99/78 - 130/63)  BP(mean): --  RR: 18 (2020 08:08) (17 - 20)  SpO2: 100% (2020 08:08) (93% - 100%)  I&O's Detail    2020 07:01  -  2020 07:00  --------------------------------------------------------  IN:  Total IN: 0 mL    OUT:    Chest Tube: 1115 mL  Total OUT: 1115 mL    Total NET: -1115 mL          PHYSICAL EXAM  General Appearance: cooperative, no acute distress,   HEENT: PERRL, conjunctiva clear, EOM's intact, non injected pharynx, no exudate, TM   normal  Neck: Supple, , no adenopathy, thyroid: not enlarged, no carotid bruit or JVD  Back: Symmetric, no  tenderness,no soft tissue tenderness  Lungs: left sided indwelling pleural catheter, decreased BS left mid-lower lung,  Heart: Regular rate and rhythm, S1, S2 normal, no murmur, rub or gallop  Abdomen: Soft, non-tender, bowel sounds active , no hepatosplenomegaly  Extremities: no cyanosis or edema, no joint swelling  Skin: Skin color, texture normal, no rashes   Neurologic: Alert and oriented X3 , cranial nerves intact, sensory and motor normal,    ECG:    LABS:                                   9.1    13.53 )-----------( 268      ( 2020 06:52 )             29.0       140  |  105  |  15  ----------------------------<  85  3.9   |  30  |  0.39<L>    Ca    8.0<L>      2020 06:52    TPro  4.9<L>  /  Alb  1.8<L>  /  TBili  0.3  /  DBili  x   /  AST  27  /  ALT  32  /  AlkPhos  124<H>                 13.3   13.48 )-----------( 482      ( 2020 09:13 )             39.1     07-10    136  |  104  |  11  ----------------------------<  114<H>  4.2   |  25  |  0.73    Ca    8.7      10 Jul 2020 10:49  Mg     2.2     07-10    TPro  7.0  /  Alb  3.1<L>  /  TBili  0.4  /  DBili  x   /  AST  28  /  ALT  33  /  AlkPhos  126<H>  07-10    CARDIAC MARKERS ( 10 Jul 2020 10:49 )  <0.015 ng/mL / x     / x     / x     / x            Pro BNP  209 07-10 @ 10:49  D Dimer  -- 07-10 @ 10:49    PT/INR - ( 10 Jul 2020 10:57 )   PT: 12.2 sec;   INR: 1.04 ratio         PTT - ( 10 Jul 2020 10:57 )  PTT:29.4 sec  Urinalysis Basic - ( 10 Jul 2020 17:50 )    Color: Yellow / Appearance: Clear / S.010 / pH: x  Gluc: x / Ketone: Small  / Bili: Negative / Urobili: Negative mg/dL   Blood: x / Protein: Negative mg/dL / Nitrite: Negative   Leuk Esterase: Trace / RBC: 0-2 /HPF / WBC 0-2   Sq Epi: x / Non Sq Epi: Occasional / Bacteria: Few            RADIOLOGY & ADDITIONAL STUDIES:    < from: Xray Chest 1 View- PORTABLE-Urgent (07.10.20 @ 14:20) >    PROCEDURE DATE:  07/10/2020          INTERPRETATION:  AP chest on July 10, 2020 at 2:12 PM. Patient had catheter left chest tube inserted for massive effusion.    Heart size cannot be assessed.    A catheter left chest tube has been inserted at the base.    Massive left effusion seen prior in the day is significantly diminished but a very large effusion remains. There is no visible pneumothorax.    Underlying pathology cannot be excluded.    IMPRESSION: Diminished left effusion after chest tube. Significant effusion remains.    < end of copied text >  < from: Xray Chest 1 View- PORTABLE-Routine (20 @ 10:08) >      PROCEDURE DATE:  2020  personally reviewed and shown to pt with prior xrays as well        INTERPRETATION:  INDICATION: Assess left pleural effusion.    PRIORS: 7/10/2020.    VIEWS: Portable AP radiography of the chest performed.    FINDINGS: Since prior evaluation the position of the indwelling left pleural space pigtail drainage catheter has changed, the pigtail now overlying the medial aspect of the left mid thorax. There is interval decrease in left pleural effusion from prior; a moderately sized left pleural effusion persists. Underlying lung parenchymal infiltrate, consolidation or atelectasis cannot be excluded. There is no evidence for left-sided pneumothorax. No acute right-sided infiltrate is identified. No evidence for right pleural effusion or pneumothorax. No mediastinal shift noted. Degenerative changes of the thoracic spine evident.    IMPRESSION: Interval decrease in left pleural effusion from prior with change in position of indwelling pigtail pleural space drainage catheter. Moderately sized left pleural effusion persists. See above discussion.      CT CHEST     LUNGS, AIRWAYS: The central airways are patent. 2.6 x 2.2 cm spiculated nodule in the left upper lobe with surrounding linear fibrotic reaction. Patchy left lower lobe airspace disease, likely infectious. Nodularity along the left major fissure.    PLEURA: Left pleural pigtail drainage catheter terminates in the left posterior hemithorax. No significant pleural effusion. Small left-sided pneumothorax.    VESSELS: Normal caliber aorta. Main pulmonary arteries are patent.    HEART: Normal heart size. No pericardial effusion.    MEDIASTINUM AND MARJAN: No adenopathy.    UPPER ABDOMEN: Distended gallbladder. 1.5 cm indeterminate lesion in the caudate lobe.    BONES AND SOFT TISSUES: Destructive lesion of L2 vertebral body again seen.    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    Nodularity along the left major fissure. Cannot differentiate intrapulmonary lymph nodes versus pleural metastatic disease.    Left pleural drainage catheter in place without significant pleural effusion.    Small left-sided pneumothorax, which may be postprocedural.    1.5 cm indeterminate liver lesion.    Metastatic bone disease again noted to the spine.

## 2020-07-24 NOTE — PROGRESS NOTE ADULT - REASON FOR ADMISSION
dyspnea , cough
L2 fracture
Pathologic compression fracture
Pathologic fracture L2, severe lumbar stenosis
dyspnea , cough
dyspnea , cough Spinal surgery
dyspnea , cough, spinal surgery
dyspnea , cough, spinal surgery and reconstruction
dyspnea , cough

## 2020-07-24 NOTE — PROGRESS NOTE ADULT - PROVIDER SPECIALTY LIST ADULT
Heme/Onc
Hospitalist
Infectious Disease
Orthopedics
Plastic Surgery
Pulmonology
Surgery
Thoracic Surgery
Hospitalist
Orthopedics
Pulmonology
Hospitalist

## 2020-07-24 NOTE — PROGRESS NOTE ADULT - PROBLEM SELECTOR PLAN 1
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 PDL-1 +    shall likely need systemic chemo once recovered from surgery   molecular studies will need to be done as well to rule out EGFR/ALK mutations  start B12 folate in preparation.  s/p pleurex    Discussed with patient and her  in detail  patient's  wants a 2nd opinion prior to commencement of chemotherapy or biological therapy is to be started and was encouraged to do so.    plan for chemotherapy in 2weeks and changing to oral targeted agent if a targetable mutation is found.    FU with me in 1-2 weeks

## 2020-07-24 NOTE — PROGRESS NOTE ADULT - SUBJECTIVE AND OBJECTIVE BOX
[facility]                                                           Orthopedic Spine Progress Note      POST OPERATIVE DAY #: 8  STATUS POST: L1-5 laminectomy, T11-L5 fusion, vertebroplasty, partial L2 corpectomy               Pre-Op Dx: Malignant pleural effusion  Pathologic compression fracture of spine    Post-Op Dx:  Malignant pleural effusion  Pathologic compression fracture of spine     SUBJECTIVE: Patient seen and examined at 0940 this morning, was out of bed in chair, possible discharge home later today, seen by Dr. Briseno yesterday (plastic surgeon)    Current Pain Management:  [ ] PCA   [x] Po Analgesics [ ] IM /IV Anagesics     Vital Signs Last 24 Hrs  T(C): 36.3 (24 Jul 2020 08:08), Max: 36.7 (23 Jul 2020 22:45)  T(F): 97.4 (24 Jul 2020 08:08), Max: 98 (23 Jul 2020 22:45)  HR: 77 (24 Jul 2020 08:08) (68 - 83)  BP: 116/52 (24 Jul 2020 08:08) (112/49 - 130/63)  BP(mean): --  RR: 18 (24 Jul 2020 08:08) (17 - 20)  SpO2: 100% (24 Jul 2020 08:08) (93% - 100%)  I&O's Detail    23 Jul 2020 07:01  -  24 Jul 2020 07:00  --------------------------------------------------------  IN:    Other: 200 mL  Total IN: 200 mL    OUT:    Chest Tube: 150 mL    Chest Tube: 65 mL    Drain: 20 mL    Voided: 300 mL  Total OUT: 535 mL    Total NET: -335 mL          OBJECTIVE:       Wound /Dressing: dressing clean, dry, intact  Cervical ROM: wnl  Lumbar ROM: not tested  Neurological: A/O x 3              Sensation: [x] intact to light touch  [ ] decreased:          Motor exam: [x]          [x] Upper extremity    Delt      Bicp       Tri      Wrist ext  Wrst Flex       Digit Ext Digit Flex                               R         5/5       5/5        5/5       5/5           5/5         5/5       5/5          5/5                               L          5/        5/5        5/5       5/5            5/5         5/5       5/5          5/5         [x] Lower ext.     Hip Flx    Quad   Hamstrg     TA       EHL      GS                          R        5/5        5/5        5/5          5/5      5/5      5/5                               L         5/5 5/5        5/5          5/5      5/       5/5                                                               [x] Vascular: intact           Tension Signs: none          Long Tract Findings: none                                                LABS:                        9.1    13.53 )-----------( 268      ( 24 Jul 2020 06:52 )             29.0     07-24    140  |  105  |  15  ----------------------------<  85  3.9   |  30  |  0.39<L>    Ca    8.0<L>      24 Jul 2020 06:52    TPro  4.9<L>  /  Alb  1.8<L>  /  TBili  0.3  /  DBili  x   /  AST  27  /  ALT  32  /  AlkPhos  124<H>  07-24

## 2020-07-24 NOTE — PROGRESS NOTE BEHAVIORAL HEALTH - PRIMARY DX
Adjustment disorder with mixed anxiety and depressed mood

## 2020-07-27 PROBLEM — E03.9 HYPOTHYROIDISM, UNSPECIFIED: Chronic | Status: ACTIVE | Noted: 2020-07-10

## 2020-07-27 PROBLEM — M19.90 UNSPECIFIED OSTEOARTHRITIS, UNSPECIFIED SITE: Chronic | Status: ACTIVE | Noted: 2020-07-10

## 2020-07-27 PROBLEM — T14.8XXA OTHER INJURY OF UNSPECIFIED BODY REGION, INITIAL ENCOUNTER: Chronic | Status: ACTIVE | Noted: 2020-07-10

## 2020-07-30 DIAGNOSIS — Z87.891 PERSONAL HISTORY OF NICOTINE DEPENDENCE: ICD-10-CM

## 2020-07-30 DIAGNOSIS — E03.9 HYPOTHYROIDISM, UNSPECIFIED: ICD-10-CM

## 2020-07-30 DIAGNOSIS — T38.0X5A ADVERSE EFFECT OF GLUCOCORTICOIDS AND SYNTHETIC ANALOGUES, INITIAL ENCOUNTER: ICD-10-CM

## 2020-07-30 DIAGNOSIS — M54.16 RADICULOPATHY, LUMBAR REGION: ICD-10-CM

## 2020-07-30 DIAGNOSIS — M48.061 SPINAL STENOSIS, LUMBAR REGION WITHOUT NEUROGENIC CLAUDICATION: ICD-10-CM

## 2020-07-30 DIAGNOSIS — D47.3 ESSENTIAL (HEMORRHAGIC) THROMBOCYTHEMIA: ICD-10-CM

## 2020-07-30 DIAGNOSIS — K82.1 HYDROPS OF GALLBLADDER: ICD-10-CM

## 2020-07-30 DIAGNOSIS — Z11.59 ENCOUNTER FOR SCREENING FOR OTHER VIRAL DISEASES: ICD-10-CM

## 2020-07-30 DIAGNOSIS — D72.829 ELEVATED WHITE BLOOD CELL COUNT, UNSPECIFIED: ICD-10-CM

## 2020-07-30 DIAGNOSIS — M84.58XA PATHOLOGICAL FRACTURE IN NEOPLASTIC DISEASE, OTHER SPECIFIED SITE, INITIAL ENCOUNTER FOR FRACTURE: ICD-10-CM

## 2020-07-30 DIAGNOSIS — M19.90 UNSPECIFIED OSTEOARTHRITIS, UNSPECIFIED SITE: ICD-10-CM

## 2020-07-30 DIAGNOSIS — J18.8 OTHER PNEUMONIA, UNSPECIFIED ORGANISM: ICD-10-CM

## 2020-07-30 DIAGNOSIS — F43.23 ADJUSTMENT DISORDER WITH MIXED ANXIETY AND DEPRESSED MOOD: ICD-10-CM

## 2020-07-30 DIAGNOSIS — K76.89 OTHER SPECIFIED DISEASES OF LIVER: ICD-10-CM

## 2020-07-30 DIAGNOSIS — J91.0 MALIGNANT PLEURAL EFFUSION: ICD-10-CM

## 2020-07-30 DIAGNOSIS — C34.90 MALIGNANT NEOPLASM OF UNSPECIFIED PART OF UNSPECIFIED BRONCHUS OR LUNG: ICD-10-CM

## 2020-07-30 DIAGNOSIS — C79.51 SECONDARY MALIGNANT NEOPLASM OF BONE: ICD-10-CM

## 2020-07-30 DIAGNOSIS — K81.0 ACUTE CHOLECYSTITIS: ICD-10-CM

## 2020-07-30 DIAGNOSIS — C79.31 SECONDARY MALIGNANT NEOPLASM OF BRAIN: ICD-10-CM

## 2020-08-05 DIAGNOSIS — K80.00 CALCULUS OF GALLBLADDER WITH ACUTE CHOLECYSTITIS WITHOUT OBSTRUCTION: ICD-10-CM

## 2020-08-05 DIAGNOSIS — C34.92 MALIGNANT NEOPLASM OF UNSPECIFIED PART OF LEFT BRONCHUS OR LUNG: ICD-10-CM

## 2020-08-08 LAB
CULTURE RESULTS: SIGNIFICANT CHANGE UP
SPECIMEN SOURCE: SIGNIFICANT CHANGE UP

## 2020-09-17 ENCOUNTER — OUTPATIENT (OUTPATIENT)
Dept: OUTPATIENT SERVICES | Facility: HOSPITAL | Age: 75
LOS: 1 days | End: 2020-09-17
Payer: COMMERCIAL

## 2020-09-17 DIAGNOSIS — K81.0 ACUTE CHOLECYSTITIS: ICD-10-CM

## 2020-09-17 DIAGNOSIS — Z01.818 ENCOUNTER FOR OTHER PREPROCEDURAL EXAMINATION: ICD-10-CM

## 2020-09-17 PROCEDURE — 85025 COMPLETE CBC W/AUTO DIFF WBC: CPT

## 2020-09-17 PROCEDURE — 86900 BLOOD TYPING SEROLOGIC ABO: CPT

## 2020-09-17 PROCEDURE — 86901 BLOOD TYPING SEROLOGIC RH(D): CPT

## 2020-09-17 PROCEDURE — 36415 COLL VENOUS BLD VENIPUNCTURE: CPT

## 2020-09-17 PROCEDURE — 80048 BASIC METABOLIC PNL TOTAL CA: CPT

## 2020-09-17 PROCEDURE — 86850 RBC ANTIBODY SCREEN: CPT

## 2020-09-17 NOTE — ASU PATIENT PROFILE, ADULT - VISION (WITH CORRECTIVE LENSES IF THE PATIENT USUALLY WEARS THEM):
Partially impaired: cannot see medication labels or newsprint, but can see obstacles in path, and the surrounding layout; can count fingers at arm's length
(2) well flexed

## 2020-09-17 NOTE — ASU PATIENT PROFILE, ADULT - PMH
Cholecystitis    Fracture  L2 pathologic fracture  Hypothyroidism    Lung cancer  has a drain left lung  Osteoarthritis

## 2020-09-17 NOTE — ASU PATIENT PROFILE, ADULT - PSH
H/O thyroidectomy    History of lumbar spinal fusion    History of other surgery  gallbladder drain  History of other surgery  percutaneous endoscopic left pleural cavity drain  History of tonsillectomy    S/P left oophorectomy

## 2020-09-18 DIAGNOSIS — K81.0 ACUTE CHOLECYSTITIS: ICD-10-CM

## 2020-09-18 DIAGNOSIS — Z01.818 ENCOUNTER FOR OTHER PREPROCEDURAL EXAMINATION: ICD-10-CM

## 2020-09-20 ENCOUNTER — OUTPATIENT (OUTPATIENT)
Dept: OUTPATIENT SERVICES | Facility: HOSPITAL | Age: 75
LOS: 1 days | End: 2020-09-20
Payer: COMMERCIAL

## 2020-09-20 DIAGNOSIS — Z11.59 ENCOUNTER FOR SCREENING FOR OTHER VIRAL DISEASES: ICD-10-CM

## 2020-09-20 LAB — SARS-COV-2 RNA SPEC QL NAA+PROBE: SIGNIFICANT CHANGE UP

## 2020-09-20 PROCEDURE — U0003: CPT

## 2020-09-21 DIAGNOSIS — Z11.59 ENCOUNTER FOR SCREENING FOR OTHER VIRAL DISEASES: ICD-10-CM

## 2020-09-23 ENCOUNTER — OUTPATIENT (OUTPATIENT)
Dept: INPATIENT UNIT | Facility: HOSPITAL | Age: 75
LOS: 1 days | Discharge: HOME | End: 2020-09-23
Payer: COMMERCIAL

## 2020-09-23 ENCOUNTER — RESULT REVIEW (OUTPATIENT)
Age: 75
End: 2020-09-23

## 2020-09-23 VITALS
WEIGHT: 140.21 LBS | TEMPERATURE: 98 F | DIASTOLIC BLOOD PRESSURE: 68 MMHG | HEIGHT: 64 IN | RESPIRATION RATE: 15 BRPM | OXYGEN SATURATION: 99 % | SYSTOLIC BLOOD PRESSURE: 122 MMHG | HEART RATE: 75 BPM

## 2020-09-23 VITALS
DIASTOLIC BLOOD PRESSURE: 67 MMHG | OXYGEN SATURATION: 97 % | SYSTOLIC BLOOD PRESSURE: 137 MMHG | HEART RATE: 83 BPM | TEMPERATURE: 98 F | RESPIRATION RATE: 14 BRPM

## 2020-09-23 DIAGNOSIS — K81.0 ACUTE CHOLECYSTITIS: ICD-10-CM

## 2020-09-23 PROCEDURE — 47562 LAPAROSCOPIC CHOLECYSTECTOMY: CPT | Mod: AS

## 2020-09-23 PROCEDURE — C1889: CPT

## 2020-09-23 PROCEDURE — 88304 TISSUE EXAM BY PATHOLOGIST: CPT | Mod: 26

## 2020-09-23 PROCEDURE — 88304 TISSUE EXAM BY PATHOLOGIST: CPT

## 2020-09-23 RX ORDER — SODIUM CHLORIDE 9 MG/ML
1000 INJECTION, SOLUTION INTRAVENOUS
Refills: 0 | Status: DISCONTINUED | OUTPATIENT
Start: 2020-09-23 | End: 2020-09-23

## 2020-09-23 RX ORDER — OXYCODONE HYDROCHLORIDE 5 MG/1
1 TABLET ORAL
Qty: 12 | Refills: 0
Start: 2020-09-23 | End: 2020-09-24

## 2020-09-23 RX ORDER — OXYCODONE HYDROCHLORIDE 5 MG/1
10 TABLET ORAL ONCE
Refills: 0 | Status: DISCONTINUED | OUTPATIENT
Start: 2020-09-23 | End: 2020-09-23

## 2020-09-23 RX ORDER — FENTANYL CITRATE 50 UG/ML
50 INJECTION INTRAVENOUS
Refills: 0 | Status: DISCONTINUED | OUTPATIENT
Start: 2020-09-23 | End: 2020-09-23

## 2020-09-23 RX ORDER — ACETAMINOPHEN 500 MG
1000 TABLET ORAL ONCE
Refills: 0 | Status: DISCONTINUED | OUTPATIENT
Start: 2020-09-23 | End: 2020-09-23

## 2020-09-23 RX ORDER — ONDANSETRON 8 MG/1
4 TABLET, FILM COATED ORAL ONCE
Refills: 0 | Status: DISCONTINUED | OUTPATIENT
Start: 2020-09-23 | End: 2020-09-23

## 2020-09-23 RX ADMIN — FENTANYL CITRATE 50 MICROGRAM(S): 50 INJECTION INTRAVENOUS at 16:35

## 2020-09-23 RX ADMIN — FENTANYL CITRATE 50 MICROGRAM(S): 50 INJECTION INTRAVENOUS at 15:59

## 2020-09-23 RX ADMIN — FENTANYL CITRATE 50 MICROGRAM(S): 50 INJECTION INTRAVENOUS at 16:53

## 2020-09-23 RX ADMIN — FENTANYL CITRATE 50 MICROGRAM(S): 50 INJECTION INTRAVENOUS at 15:20

## 2020-09-23 RX ADMIN — FENTANYL CITRATE 50 MICROGRAM(S): 50 INJECTION INTRAVENOUS at 15:41

## 2020-09-23 RX ADMIN — OXYCODONE HYDROCHLORIDE 10 MILLIGRAM(S): 5 TABLET ORAL at 17:21

## 2020-09-23 RX ADMIN — FENTANYL CITRATE 50 MICROGRAM(S): 50 INJECTION INTRAVENOUS at 16:09

## 2020-09-23 RX ADMIN — FENTANYL CITRATE 50 MICROGRAM(S): 50 INJECTION INTRAVENOUS at 16:34

## 2020-09-23 RX ADMIN — OXYCODONE HYDROCHLORIDE 10 MILLIGRAM(S): 5 TABLET ORAL at 16:36

## 2020-09-23 RX ADMIN — FENTANYL CITRATE 50 MICROGRAM(S): 50 INJECTION INTRAVENOUS at 15:50

## 2020-09-23 NOTE — ASU DISCHARGE PLAN (ADULT/PEDIATRIC) - CARE PROVIDER_API CALL
Ford Linda  SURGERY  [address] [place] [address]  Phone: [phone]  Fax: [phone]  Follow Up Time:

## 2020-09-23 NOTE — ASU DISCHARGE PLAN (ADULT/PEDIATRIC) - CALL YOUR DOCTOR IF YOU HAVE ANY OF THE FOLLOWING:
Fever greater than (need to indicate Fahrenheit or Celsius)/Inability to tolerate liquids or foods/Bleeding that does not stop/Nausea and vomiting that does not stop/Pain not relieved by Medications/Increased irritability or sluggishness

## 2020-09-29 DIAGNOSIS — K80.10 CALCULUS OF GALLBLADDER WITH CHRONIC CHOLECYSTITIS WITHOUT OBSTRUCTION: ICD-10-CM

## 2020-09-29 DIAGNOSIS — F32.9 MAJOR DEPRESSIVE DISORDER, SINGLE EPISODE, UNSPECIFIED: ICD-10-CM

## 2020-09-29 DIAGNOSIS — Z91.048 OTHER NONMEDICINAL SUBSTANCE ALLERGY STATUS: ICD-10-CM

## 2020-09-29 DIAGNOSIS — Z88.8 ALLERGY STATUS TO OTHER DRUGS, MEDICAMENTS AND BIOLOGICAL SUBSTANCES: ICD-10-CM

## 2020-09-29 DIAGNOSIS — F41.9 ANXIETY DISORDER, UNSPECIFIED: ICD-10-CM

## 2020-09-29 DIAGNOSIS — E89.0 POSTPROCEDURAL HYPOTHYROIDISM: ICD-10-CM

## 2020-09-29 DIAGNOSIS — Z85.828 PERSONAL HISTORY OF OTHER MALIGNANT NEOPLASM OF SKIN: ICD-10-CM

## 2020-09-29 DIAGNOSIS — Z88.5 ALLERGY STATUS TO NARCOTIC AGENT: ICD-10-CM

## 2020-09-29 DIAGNOSIS — Z90.721 ACQUIRED ABSENCE OF OVARIES, UNILATERAL: ICD-10-CM

## 2020-09-29 DIAGNOSIS — Z92.3 PERSONAL HISTORY OF IRRADIATION: ICD-10-CM

## 2020-09-29 DIAGNOSIS — Z85.118 PERSONAL HISTORY OF OTHER MALIGNANT NEOPLASM OF BRONCHUS AND LUNG: ICD-10-CM

## 2020-09-29 DIAGNOSIS — Z87.891 PERSONAL HISTORY OF NICOTINE DEPENDENCE: ICD-10-CM

## 2020-09-29 DIAGNOSIS — K82.8 OTHER SPECIFIED DISEASES OF GALLBLADDER: ICD-10-CM

## 2020-09-29 DIAGNOSIS — E78.2 MIXED HYPERLIPIDEMIA: ICD-10-CM

## 2020-09-29 DIAGNOSIS — Z88.0 ALLERGY STATUS TO PENICILLIN: ICD-10-CM

## 2020-10-19 ENCOUNTER — OUTPATIENT (OUTPATIENT)
Dept: OUTPATIENT SERVICES | Facility: HOSPITAL | Age: 75
LOS: 1 days | End: 2020-10-19
Payer: COMMERCIAL

## 2020-10-19 ENCOUNTER — APPOINTMENT (OUTPATIENT)
Dept: CT IMAGING | Facility: CLINIC | Age: 75
End: 2020-10-19
Payer: COMMERCIAL

## 2020-10-19 DIAGNOSIS — Z00.8 ENCOUNTER FOR OTHER GENERAL EXAMINATION: ICD-10-CM

## 2020-10-19 PROCEDURE — 71250 CT THORAX DX C-: CPT | Mod: 26

## 2020-10-19 PROCEDURE — 71250 CT THORAX DX C-: CPT

## 2020-10-26 ENCOUNTER — NON-APPOINTMENT (OUTPATIENT)
Age: 75
End: 2020-10-26

## 2020-10-26 DIAGNOSIS — Z87.891 PERSONAL HISTORY OF NICOTINE DEPENDENCE: ICD-10-CM

## 2020-10-26 DIAGNOSIS — E03.9 HYPOTHYROIDISM, UNSPECIFIED: ICD-10-CM

## 2020-10-26 DIAGNOSIS — Z87.19 PERSONAL HISTORY OF OTHER DISEASES OF THE DIGESTIVE SYSTEM: ICD-10-CM

## 2020-10-26 DIAGNOSIS — M84.48XA PATHOLOGICAL FRACTURE, OTHER SITE, INITIAL ENCOUNTER FOR FRACTURE: ICD-10-CM

## 2020-10-26 DIAGNOSIS — C79.31 SECONDARY MALIGNANT NEOPLASM OF BRAIN: ICD-10-CM

## 2020-10-26 RX ORDER — LIOTHYRONINE SODIUM 5 UG/1
5 TABLET ORAL DAILY
Refills: 0 | Status: ACTIVE | COMMUNITY

## 2020-10-26 RX ORDER — LEVOTHYROXINE SODIUM 100 UG/1
100 TABLET ORAL
Refills: 0 | Status: ACTIVE | COMMUNITY

## 2020-12-30 RX ORDER — FOLIC ACID 5 MG
5 CAPSULE ORAL
Refills: 0 | Status: DISCONTINUED | COMMUNITY
End: 2020-12-30

## 2020-12-30 RX ORDER — ALPRAZOLAM 0.25 MG/1
0.25 TABLET ORAL
Refills: 0 | Status: DISCONTINUED | COMMUNITY
End: 2020-12-30

## 2020-12-30 RX ORDER — CAPMATINIB 200 MG/1
200 TABLET, FILM COATED ORAL
Refills: 0 | Status: ACTIVE | COMMUNITY

## 2020-12-30 RX ORDER — DEXAMETHASONE 4 MG/1
4 TABLET ORAL
Refills: 0 | Status: DISCONTINUED | COMMUNITY
End: 2020-12-30

## 2021-01-01 ENCOUNTER — APPOINTMENT (OUTPATIENT)
Dept: THORACIC SURGERY | Facility: CLINIC | Age: 76
End: 2021-01-01
Payer: COMMERCIAL

## 2021-01-01 VITALS
SYSTOLIC BLOOD PRESSURE: 117 MMHG | WEIGHT: 145 LBS | BODY MASS INDEX: 24.75 KG/M2 | DIASTOLIC BLOOD PRESSURE: 71 MMHG | RESPIRATION RATE: 18 BRPM | HEIGHT: 64 IN | OXYGEN SATURATION: 99 % | HEART RATE: 70 BPM

## 2021-01-01 DIAGNOSIS — C79.51 SECONDARY MALIGNANT NEOPLASM OF BONE: ICD-10-CM

## 2021-01-01 DIAGNOSIS — J90 PLEURAL EFFUSION, NOT ELSEWHERE CLASSIFIED: ICD-10-CM

## 2021-01-01 DIAGNOSIS — C34.92 MALIGNANT NEOPLASM OF UNSPECIFIED PART OF LEFT BRONCHUS OR LUNG: ICD-10-CM

## 2021-01-01 PROCEDURE — 99212 OFFICE O/P EST SF 10 MIN: CPT

## 2021-01-01 PROCEDURE — 99072 ADDL SUPL MATRL&STAF TM PHE: CPT

## 2021-01-01 RX ORDER — FUROSEMIDE 80 MG/1
TABLET ORAL
Refills: 0 | Status: ACTIVE | COMMUNITY

## 2021-01-01 RX ORDER — ONDANSETRON HYDROCHLORIDE 4 MG/1
4 TABLET, FILM COATED ORAL
Refills: 0 | Status: ACTIVE | COMMUNITY

## 2021-01-01 RX ORDER — POTASSIUM 75 MG
TABLET ORAL
Refills: 0 | Status: ACTIVE | COMMUNITY

## 2021-01-04 ENCOUNTER — APPOINTMENT (OUTPATIENT)
Dept: RADIATION ONCOLOGY | Facility: CLINIC | Age: 76
End: 2021-01-04
Payer: COMMERCIAL

## 2021-01-04 ENCOUNTER — TRANSCRIPTION ENCOUNTER (OUTPATIENT)
Age: 76
End: 2021-01-04

## 2021-01-04 PROCEDURE — 99213 OFFICE O/P EST LOW 20 MIN: CPT | Mod: 95

## 2021-01-04 NOTE — HISTORY OF PRESENT ILLNESS
[Home] : at home, [unfilled] , at the time of the visit. [Medical Office: ([facility])___] : at the medical office located in  [Verbal consent obtained from patient] : the patient, [unfilled] [FreeTextEntry1] : 74 year old female with newly diagnosed stage IV adenocarcinoma, PDL-1 positive, of INDU lung.  Patient has a small 1.5 cm liver metastasis, pathologic compression fracture at L2 for which she underwent surgical decompression, and a 1.2 cm right frontal brain metastasis and 4 other subcentimeter brain metastases.  The patient has started systemic chemotherapy and immunotherapy under the direction of Dr. Westfall.  She was offered palliative radiotherapy to the lumbar spine region and SRS to the right lateral frontal brain metastasis.\par \par She completed radiation therapy  2000 cGy to L1-L3 on 8/11/20 and SRS radiation therapy 2700 cGy to the right frontal brain lesion on 8/14/20. \par \par She presents today for a follow up via telehealth. MRI on 12/23/20 revealed 6x6 mm focus of residual enhancement in the right frontal lobe and no other brain metastases. Minimal adjacent edema. PET scan on 12/28/20 revealed a stable left upper lobe spiculated nodule which demonstrated mild metabolic activity. Additional  spiculated left upper lobe nodules decreased in size. Also seen was an interval increase in size of the left pleural effusion despite the Pleurex catheter and a new small right sided pleural effusion. Multiple non-FDG avid sclerotic osseous lesions seen consistent with osseous mets. Gallbladder removed on 9/23/20 due to an incidental finding on imaging. 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 Patietn has been receiving Capmitinib systemic therapy under the direction of Dr. Westfall.\par \par Denies pain and is feeling well. No current complaints.

## 2021-01-04 NOTE — DISEASE MANAGEMENT
[Clinical] : TNM Stage: c [IV] : IV [FreeTextEntry4] : adenoca lung [TTNM] : x [NTNM] : x [MTNM] : 1

## 2021-01-25 ENCOUNTER — APPOINTMENT (OUTPATIENT)
Dept: THORACIC SURGERY | Facility: CLINIC | Age: 76
End: 2021-01-25
Payer: COMMERCIAL

## 2021-01-25 VITALS
HEART RATE: 81 BPM | SYSTOLIC BLOOD PRESSURE: 152 MMHG | WEIGHT: 145 LBS | HEIGHT: 64 IN | OXYGEN SATURATION: 95 % | BODY MASS INDEX: 24.75 KG/M2 | DIASTOLIC BLOOD PRESSURE: 77 MMHG

## 2021-01-25 VITALS — TEMPERATURE: 97.2 F

## 2021-01-25 PROCEDURE — 99072 ADDL SUPL MATRL&STAF TM PHE: CPT

## 2021-01-25 PROCEDURE — 99214 OFFICE O/P EST MOD 30 MIN: CPT

## 2021-01-26 NOTE — PHYSICAL EXAM
[Sclera] : the sclera and conjunctiva were normal [PERRL With Normal Accommodation] : pupils were equal in size, round, and reactive to light [Extraocular Movements] : extraocular movements were intact [Neck Appearance] : the appearance of the neck was normal [Neck Cervical Mass (___cm)] : no neck mass was observed [Jugular Venous Distention Increased] : there was no jugular-venous distention [Thyroid Diffuse Enlargement] : the thyroid was not enlarged [Thyroid Nodule] : there were no palpable thyroid nodules [Auscultation Breath Sounds / Voice Sounds] : lungs were clear to auscultation bilaterally [Heart Rate And Rhythm] : heart rate was normal and rhythm regular [Heart Sounds] : normal S1 and S2 [Heart Sounds Gallop] : no gallops [Murmurs] : no murmurs [Heart Sounds Pericardial Friction Rub] : no pericardial rub [Chest Visual Inspection Thoracic Asymmetry] : no chest asymmetry [Examination Of The Chest] : the chest was normal in appearance [Diminished Respiratory Excursion] : normal chest expansion [Bowel Sounds] : normal bowel sounds [Abdomen Soft] : soft [Abdomen Tenderness] : non-tender [Abdomen Mass (___ Cm)] : no abdominal mass palpated [No CVA Tenderness] : no ~M costovertebral angle tenderness [No Spinal Tenderness] : no spinal tenderness [Abnormal Walk] : normal gait [Nail Clubbing] : no clubbing  or cyanosis of the fingernails [Musculoskeletal - Swelling] : no joint swelling seen [Motor Tone] : muscle strength and tone were normal [Skin Color & Pigmentation] : normal skin color and pigmentation [Skin Turgor] : normal skin turgor [] : no rash [Deep Tendon Reflexes (DTR)] : deep tendon reflexes were 2+ and symmetric [Sensation] : the sensory exam was normal to light touch and pinprick [No Focal Deficits] : no focal deficits [Oriented To Time, Place, And Person] : oriented to person, place, and time [Impaired Insight] : insight and judgment were intact [Affect] : the affect was normal

## 2021-01-26 NOTE — ASSESSMENT
[FreeTextEntry1] : Patient seen and examined. PET scan from 12/2020 reviewed. Patient has some residual fluid in the left chest. \par There are two areas where the fluid is there that the catheter is not communicating with the fluid. One area is medial by the heart, between the fissure of the lobes. There is some fluid apically where the catheter is that does not appear to be draining well. \par \par I discussed with the Patient that we could try to tPA the catheter to see if we can drain more fluid and improve lung aeration and expansion. It is difficult to tell if the lung is trapped. She was hesitant to try tPA due to COVID etc. \par Patient states that she will call me back and let me know what she wants done. \par

## 2021-04-06 PROBLEM — C34.92 ADENOCARCINOMA OF LEFT LUNG: Status: ACTIVE | Noted: 2021-01-26

## 2021-04-06 PROBLEM — J90 RECURRENT LEFT PLEURAL EFFUSION: Status: ACTIVE | Noted: 2021-01-26

## 2021-04-06 PROBLEM — C79.51: Status: ACTIVE | Noted: 2020-10-26

## 2021-04-12 NOTE — HISTORY OF PRESENT ILLNESS
[FreeTextEntry1] : Patient is a 74 yo female with metastatic adenocarcinoma consistent with lung primary. She underwent a Left VATS pleural biopsy and insertion of pleurX catheter in July of 2020. She is under the care of the Dr. Westfall for chemotherapy. She states her quality of life is poor because of her pleurX catheter  discomfort. \par \par She reports Pleurx catheter is drained once every 2 weeks on average 100 cc per drain.  She denies dizziness, shortness of breath with climbing up/down stairs or with exertion, unintentional weight loss, cough, fever or chills.

## 2021-04-12 NOTE — CONSULT LETTER
[Dear  ___] : Dear  [unfilled], [Consult Letter:] : I had the pleasure of evaluating your patient, [unfilled]. [Please see my note below.] : Please see my note below. [Consult Closing:] : Thank you very much for allowing me to participate in the care of this patient.  If you have any questions, please do not hesitate to contact me. [Sincerely,] : Sincerely, [FreeTextEntry2] : Dr. Sanchez  [FreeTextEntry3] : Jania Stein MD\par Cardiothoracic Surgery\par [facility]\par [address] [place] [address] \[place] [address]\par [phone]\par [phone]\par \par

## 2021-04-12 NOTE — PHYSICAL EXAM
[Sclera] : the sclera and conjunctiva were normal [Neck Appearance] : the appearance of the neck was normal [] : no respiratory distress [Exaggerated Use Of Accessory Muscles For Inspiration] : no accessory muscle use [Heart Rate And Rhythm] : heart rate was normal and rhythm regular [Examination Of The Chest] : the chest was normal in appearance [Skin Color & Pigmentation] : normal skin color and pigmentation [Sensation] : the sensory exam was normal to light touch and pinprick [Motor Exam] : the motor exam was normal [Oriented To Time, Place, And Person] : oriented to person, place, and time [Chest Visual Inspection Thoracic Asymmetry] : no chest asymmetry [FreeTextEntry1] : use of cane. Antalgic gait

## 2021-04-12 NOTE — ASSESSMENT
[FreeTextEntry1] : I had the pleasure of re-evaluating Ms. Hyman. She is accompanied by her significant other. She underwent a Left VATS pleural biopsy and insertion of Pleurx catheter in July of 2020. Today I have  discussed the amount of output of  her Pleurx catheter.  On average  she is draining  every 2 weeks;  on average 100 cc.  She offers complaint of some discomfort at the Pleurx site. \par \par The patient's past medical history, past surgical history, family history, social history, allergies, medications, and multisystem review of systems were individually reviewed with the patient. The patient was personally seen and examined. Pleurx drain was removed in the  office using aseptic technique. 15 cc lidocaine  injected locally  around left Pleurx site. Patient tolerated the  removal of Pleurx drain with gauze and tape applied to region. She denies   dizziness or chest pain post Pleurx  removal. At this time  I'm recommending  a follow up with Dr. Hernández  and she may return to our office as needed. Expectations reviewed with patient. All questions addressed. Patient agrees and will adhere to plan. \par \par PLAN: \par -Follow up with Dr. Hernández \par -Return to our office as needed \par \par Enedina ADAMS NP am scribing for and in the presence of Dr. Romeo the following sections HISTORY OF PRESENT ILLNESS, PAST MEDICAL/FAMILY/SOCIAL HISTORY; REVIEW OF SYSTEMS; VITAL SIGNS; PHYSICAL EXAM; DISPOSITION.\par \par "I personally performed the services described in the documentation, reviewed the documentation recorded by the scribe in my presence and accurately and completely records my words and actions."\par \par

## 2022-01-01 ENCOUNTER — INPATIENT (INPATIENT)
Facility: HOSPITAL | Age: 77
LOS: 3 days | Discharge: HOME HEALTH | DRG: 167 | End: 2022-01-28
Attending: FAMILY MEDICINE | Admitting: FAMILY MEDICINE
Payer: COMMERCIAL

## 2022-01-01 ENCOUNTER — TRANSCRIPTION ENCOUNTER (OUTPATIENT)
Age: 77
End: 2022-01-01

## 2022-01-01 ENCOUNTER — APPOINTMENT (OUTPATIENT)
Dept: THORACIC SURGERY | Facility: HOSPITAL | Age: 77
End: 2022-01-01

## 2022-01-01 ENCOUNTER — RESULT REVIEW (OUTPATIENT)
Age: 77
End: 2022-01-01

## 2022-01-01 ENCOUNTER — INPATIENT (INPATIENT)
Facility: HOSPITAL | Age: 77
LOS: 7 days | DRG: 981 | End: 2022-02-11
Attending: FAMILY MEDICINE | Admitting: HOSPITALIST
Payer: COMMERCIAL

## 2022-01-01 ENCOUNTER — APPOINTMENT (OUTPATIENT)
Dept: THORACIC SURGERY | Facility: HOSPITAL | Age: 77
End: 2022-01-01
Payer: COMMERCIAL

## 2022-01-01 VITALS
HEIGHT: 64 IN | DIASTOLIC BLOOD PRESSURE: 74 MMHG | RESPIRATION RATE: 14 BRPM | WEIGHT: 145.06 LBS | HEART RATE: 85 BPM | SYSTOLIC BLOOD PRESSURE: 143 MMHG | OXYGEN SATURATION: 100 % | TEMPERATURE: 98 F

## 2022-01-01 VITALS
WEIGHT: 145.95 LBS | HEART RATE: 86 BPM | SYSTOLIC BLOOD PRESSURE: 139 MMHG | RESPIRATION RATE: 22 BRPM | OXYGEN SATURATION: 93 % | HEIGHT: 64 IN | TEMPERATURE: 98 F | DIASTOLIC BLOOD PRESSURE: 66 MMHG

## 2022-01-01 VITALS
DIASTOLIC BLOOD PRESSURE: 60 MMHG | RESPIRATION RATE: 17 BRPM | TEMPERATURE: 98 F | HEART RATE: 84 BPM | SYSTOLIC BLOOD PRESSURE: 107 MMHG | OXYGEN SATURATION: 93 %

## 2022-01-01 VITALS
DIASTOLIC BLOOD PRESSURE: 68 MMHG | OXYGEN SATURATION: 94 % | SYSTOLIC BLOOD PRESSURE: 145 MMHG | HEART RATE: 100 BPM | RESPIRATION RATE: 30 BRPM

## 2022-01-01 DIAGNOSIS — C79.51 SECONDARY MALIGNANT NEOPLASM OF BONE: ICD-10-CM

## 2022-01-01 DIAGNOSIS — N17.9 ACUTE KIDNEY FAILURE, UNSPECIFIED: ICD-10-CM

## 2022-01-01 DIAGNOSIS — I27.20 PULMONARY HYPERTENSION, UNSPECIFIED: ICD-10-CM

## 2022-01-01 DIAGNOSIS — Z88.8 ALLERGY STATUS TO OTHER DRUGS, MEDICAMENTS AND BIOLOGICAL SUBSTANCES: ICD-10-CM

## 2022-01-01 DIAGNOSIS — G47.00 INSOMNIA, UNSPECIFIED: ICD-10-CM

## 2022-01-01 DIAGNOSIS — E03.9 HYPOTHYROIDISM, UNSPECIFIED: ICD-10-CM

## 2022-01-01 DIAGNOSIS — R06.02 SHORTNESS OF BREATH: ICD-10-CM

## 2022-01-01 DIAGNOSIS — C34.92 MALIGNANT NEOPLASM OF UNSPECIFIED PART OF LEFT BRONCHUS OR LUNG: ICD-10-CM

## 2022-01-01 DIAGNOSIS — J90 PLEURAL EFFUSION, NOT ELSEWHERE CLASSIFIED: ICD-10-CM

## 2022-01-01 DIAGNOSIS — Z92.3 PERSONAL HISTORY OF IRRADIATION: ICD-10-CM

## 2022-01-01 DIAGNOSIS — E44.0 MODERATE PROTEIN-CALORIE MALNUTRITION: ICD-10-CM

## 2022-01-01 DIAGNOSIS — M19.90 UNSPECIFIED OSTEOARTHRITIS, UNSPECIFIED SITE: ICD-10-CM

## 2022-01-01 DIAGNOSIS — F41.9 ANXIETY DISORDER, UNSPECIFIED: ICD-10-CM

## 2022-01-01 DIAGNOSIS — Z98.1 ARTHRODESIS STATUS: ICD-10-CM

## 2022-01-01 DIAGNOSIS — C34.90 MALIGNANT NEOPLASM OF UNSPECIFIED PART OF UNSPECIFIED BRONCHUS OR LUNG: ICD-10-CM

## 2022-01-01 DIAGNOSIS — K21.9 GASTRO-ESOPHAGEAL REFLUX DISEASE WITHOUT ESOPHAGITIS: ICD-10-CM

## 2022-01-01 DIAGNOSIS — Z88.1 ALLERGY STATUS TO OTHER ANTIBIOTIC AGENTS STATUS: ICD-10-CM

## 2022-01-01 DIAGNOSIS — E83.51 HYPOCALCEMIA: ICD-10-CM

## 2022-01-01 DIAGNOSIS — C79.31 SECONDARY MALIGNANT NEOPLASM OF BRAIN: ICD-10-CM

## 2022-01-01 DIAGNOSIS — J98.11 ATELECTASIS: ICD-10-CM

## 2022-01-01 DIAGNOSIS — Z88.5 ALLERGY STATUS TO NARCOTIC AGENT: ICD-10-CM

## 2022-01-01 DIAGNOSIS — E89.0 POSTPROCEDURAL HYPOTHYROIDISM: ICD-10-CM

## 2022-01-01 DIAGNOSIS — J91.0 MALIGNANT PLEURAL EFFUSION: ICD-10-CM

## 2022-01-01 DIAGNOSIS — C78.7 SECONDARY MALIGNANT NEOPLASM OF LIVER AND INTRAHEPATIC BILE DUCT: ICD-10-CM

## 2022-01-01 DIAGNOSIS — E88.09 OTHER DISORDERS OF PLASMA-PROTEIN METABOLISM, NOT ELSEWHERE CLASSIFIED: ICD-10-CM

## 2022-01-01 DIAGNOSIS — J96.01 ACUTE RESPIRATORY FAILURE WITH HYPOXIA: ICD-10-CM

## 2022-01-01 DIAGNOSIS — R06.00 DYSPNEA, UNSPECIFIED: ICD-10-CM

## 2022-01-01 DIAGNOSIS — K59.00 CONSTIPATION, UNSPECIFIED: ICD-10-CM

## 2022-01-01 DIAGNOSIS — Z92.21 PERSONAL HISTORY OF ANTINEOPLASTIC CHEMOTHERAPY: ICD-10-CM

## 2022-01-01 LAB
24R-OH-CALCIDIOL SERPL-MCNC: 58.7 NG/ML — SIGNIFICANT CHANGE UP (ref 30–80)
ADD ON TEST-SPECIMEN IN LAB: SIGNIFICANT CHANGE UP
ALBUMIN FLD-MCNC: 1.6 G/DL — SIGNIFICANT CHANGE UP
ALBUMIN SERPL ELPH-MCNC: 1.8 G/DL — LOW (ref 3.3–5)
ALBUMIN SERPL ELPH-MCNC: 1.9 G/DL — LOW (ref 3.3–5)
ALBUMIN SERPL ELPH-MCNC: 2 G/DL — LOW (ref 3.3–5)
ALBUMIN SERPL ELPH-MCNC: 2 G/DL — LOW (ref 3.3–5)
ALBUMIN SERPL ELPH-MCNC: 2.1 G/DL — LOW (ref 3.3–5)
ALBUMIN SERPL ELPH-MCNC: 2.2 G/DL — LOW (ref 3.3–5)
ALBUMIN SERPL ELPH-MCNC: 2.5 G/DL — LOW (ref 3.3–5)
ALP SERPL-CCNC: 104 U/L — SIGNIFICANT CHANGE UP (ref 40–120)
ALP SERPL-CCNC: 105 U/L — SIGNIFICANT CHANGE UP (ref 40–120)
ALP SERPL-CCNC: 106 U/L — SIGNIFICANT CHANGE UP (ref 40–120)
ALP SERPL-CCNC: 107 U/L — SIGNIFICANT CHANGE UP (ref 40–120)
ALP SERPL-CCNC: 130 U/L — HIGH (ref 40–120)
ALP SERPL-CCNC: 83 U/L — SIGNIFICANT CHANGE UP (ref 40–120)
ALP SERPL-CCNC: 85 U/L — SIGNIFICANT CHANGE UP (ref 40–120)
ALT FLD-CCNC: 108 U/L — HIGH (ref 12–78)
ALT FLD-CCNC: 16 U/L — SIGNIFICANT CHANGE UP (ref 12–78)
ALT FLD-CCNC: 18 U/L — SIGNIFICANT CHANGE UP (ref 12–78)
ALT FLD-CCNC: 22 U/L — SIGNIFICANT CHANGE UP (ref 12–78)
ALT FLD-CCNC: 40 U/L — SIGNIFICANT CHANGE UP (ref 12–78)
ALT FLD-CCNC: 44 U/L — SIGNIFICANT CHANGE UP (ref 12–78)
ALT FLD-CCNC: 92 U/L — HIGH (ref 12–78)
ANION GAP SERPL CALC-SCNC: 1 MMOL/L — LOW (ref 5–17)
ANION GAP SERPL CALC-SCNC: 2 MMOL/L — LOW (ref 5–17)
ANION GAP SERPL CALC-SCNC: 2 MMOL/L — LOW (ref 5–17)
ANION GAP SERPL CALC-SCNC: 4 MMOL/L — LOW (ref 5–17)
ANION GAP SERPL CALC-SCNC: 4 MMOL/L — LOW (ref 5–17)
ANION GAP SERPL CALC-SCNC: 5 MMOL/L — SIGNIFICANT CHANGE UP (ref 5–17)
ANION GAP SERPL CALC-SCNC: 6 MMOL/L — SIGNIFICANT CHANGE UP (ref 5–17)
ANION GAP SERPL CALC-SCNC: 6 MMOL/L — SIGNIFICANT CHANGE UP (ref 5–17)
ANION GAP SERPL CALC-SCNC: 7 MMOL/L — SIGNIFICANT CHANGE UP (ref 5–17)
APTT BLD: 29.6 SEC — SIGNIFICANT CHANGE UP (ref 27.5–35.5)
APTT BLD: 33.2 SEC — SIGNIFICANT CHANGE UP (ref 27.5–35.5)
AST SERPL-CCNC: 17 U/L — SIGNIFICANT CHANGE UP (ref 15–37)
AST SERPL-CCNC: 21 U/L — SIGNIFICANT CHANGE UP (ref 15–37)
AST SERPL-CCNC: 21 U/L — SIGNIFICANT CHANGE UP (ref 15–37)
AST SERPL-CCNC: 26 U/L — SIGNIFICANT CHANGE UP (ref 15–37)
AST SERPL-CCNC: 35 U/L — SIGNIFICANT CHANGE UP (ref 15–37)
AST SERPL-CCNC: 36 U/L — SIGNIFICANT CHANGE UP (ref 15–37)
AST SERPL-CCNC: 46 U/L — HIGH (ref 15–37)
B PERT IGG+IGM PNL SER: ABNORMAL
BASE EXCESS BLDA CALC-SCNC: 3.7 MMOL/L — HIGH (ref -2–3)
BASE EXCESS BLDV CALC-SCNC: 1.9 MMOL/L — SIGNIFICANT CHANGE UP
BASOPHILS # BLD AUTO: 0 K/UL — SIGNIFICANT CHANGE UP (ref 0–0.2)
BASOPHILS # BLD AUTO: 0.04 K/UL — SIGNIFICANT CHANGE UP (ref 0–0.2)
BASOPHILS # BLD AUTO: 0.06 K/UL — SIGNIFICANT CHANGE UP (ref 0–0.2)
BASOPHILS NFR BLD AUTO: 0 % — SIGNIFICANT CHANGE UP (ref 0–2)
BASOPHILS NFR BLD AUTO: 0.5 % — SIGNIFICANT CHANGE UP (ref 0–2)
BASOPHILS NFR BLD AUTO: 0.7 % — SIGNIFICANT CHANGE UP (ref 0–2)
BILIRUB SERPL-MCNC: 0.2 MG/DL — SIGNIFICANT CHANGE UP (ref 0.2–1.2)
BILIRUB SERPL-MCNC: 0.2 MG/DL — SIGNIFICANT CHANGE UP (ref 0.2–1.2)
BILIRUB SERPL-MCNC: 0.3 MG/DL — SIGNIFICANT CHANGE UP (ref 0.2–1.2)
BILIRUB SERPL-MCNC: 0.3 MG/DL — SIGNIFICANT CHANGE UP (ref 0.2–1.2)
BILIRUB SERPL-MCNC: 0.6 MG/DL — SIGNIFICANT CHANGE UP (ref 0.2–1.2)
BILIRUB SERPL-MCNC: 0.6 MG/DL — SIGNIFICANT CHANGE UP (ref 0.2–1.2)
BILIRUB SERPL-MCNC: 0.7 MG/DL — SIGNIFICANT CHANGE UP (ref 0.2–1.2)
BLOOD GAS COMMENTS ARTERIAL: SIGNIFICANT CHANGE UP
BUN SERPL-MCNC: 18 MG/DL — SIGNIFICANT CHANGE UP (ref 7–23)
BUN SERPL-MCNC: 19 MG/DL — SIGNIFICANT CHANGE UP (ref 7–23)
BUN SERPL-MCNC: 22 MG/DL — SIGNIFICANT CHANGE UP (ref 7–23)
BUN SERPL-MCNC: 23 MG/DL — SIGNIFICANT CHANGE UP (ref 7–23)
BUN SERPL-MCNC: 31 MG/DL — HIGH (ref 7–23)
BUN SERPL-MCNC: 33 MG/DL — HIGH (ref 7–23)
BUN SERPL-MCNC: 34 MG/DL — HIGH (ref 7–23)
BUN SERPL-MCNC: 36 MG/DL — HIGH (ref 7–23)
BUN SERPL-MCNC: 38 MG/DL — HIGH (ref 7–23)
CALCIUM SERPL-MCNC: 6.9 MG/DL — LOW (ref 8.5–10.1)
CALCIUM SERPL-MCNC: 7 MG/DL — LOW (ref 8.5–10.1)
CALCIUM SERPL-MCNC: 7 MG/DL — LOW (ref 8.5–10.1)
CALCIUM SERPL-MCNC: 7.1 MG/DL — LOW (ref 8.5–10.1)
CALCIUM SERPL-MCNC: 7.5 MG/DL — LOW (ref 8.5–10.1)
CALCIUM SERPL-MCNC: 7.5 MG/DL — LOW (ref 8.5–10.1)
CALCIUM SERPL-MCNC: 7.7 MG/DL — LOW (ref 8.5–10.1)
CALCIUM SERPL-MCNC: 8 MG/DL — LOW (ref 8.5–10.1)
CALCIUM SERPL-MCNC: 8.1 MG/DL — LOW (ref 8.5–10.1)
CALCIUM SERPL-MCNC: 8.1 MG/DL — LOW (ref 8.5–10.1)
CALCIUM SERPL-MCNC: 8.5 MG/DL — SIGNIFICANT CHANGE UP (ref 8.5–10.1)
CHLORIDE SERPL-SCNC: 101 MMOL/L — SIGNIFICANT CHANGE UP (ref 96–108)
CHLORIDE SERPL-SCNC: 103 MMOL/L — SIGNIFICANT CHANGE UP (ref 96–108)
CHLORIDE SERPL-SCNC: 104 MMOL/L — SIGNIFICANT CHANGE UP (ref 96–108)
CHLORIDE SERPL-SCNC: 104 MMOL/L — SIGNIFICANT CHANGE UP (ref 96–108)
CHLORIDE SERPL-SCNC: 105 MMOL/L — SIGNIFICANT CHANGE UP (ref 96–108)
CHLORIDE SERPL-SCNC: 105 MMOL/L — SIGNIFICANT CHANGE UP (ref 96–108)
CHLORIDE SERPL-SCNC: 106 MMOL/L — SIGNIFICANT CHANGE UP (ref 96–108)
CO2 BLDA-SCNC: 29 MMOL/L — HIGH (ref 19–24)
CO2 BLDV-SCNC: 28 MMOL/L — HIGH (ref 22–26)
CO2 SERPL-SCNC: 26 MMOL/L — SIGNIFICANT CHANGE UP (ref 22–31)
CO2 SERPL-SCNC: 27 MMOL/L — SIGNIFICANT CHANGE UP (ref 22–31)
CO2 SERPL-SCNC: 27 MMOL/L — SIGNIFICANT CHANGE UP (ref 22–31)
CO2 SERPL-SCNC: 28 MMOL/L — SIGNIFICANT CHANGE UP (ref 22–31)
CO2 SERPL-SCNC: 29 MMOL/L — SIGNIFICANT CHANGE UP (ref 22–31)
CO2 SERPL-SCNC: 30 MMOL/L — SIGNIFICANT CHANGE UP (ref 22–31)
CO2 SERPL-SCNC: 31 MMOL/L — SIGNIFICANT CHANGE UP (ref 22–31)
CO2 SERPL-SCNC: 32 MMOL/L — HIGH (ref 22–31)
CO2 SERPL-SCNC: 33 MMOL/L — HIGH (ref 22–31)
CO2 SERPL-SCNC: 36 MMOL/L — HIGH (ref 22–31)
CO2 SERPL-SCNC: 36 MMOL/L — HIGH (ref 22–31)
COLOR FLD: YELLOW — SIGNIFICANT CHANGE UP
CREAT SERPL-MCNC: 0.68 MG/DL — SIGNIFICANT CHANGE UP (ref 0.5–1.3)
CREAT SERPL-MCNC: 0.71 MG/DL — SIGNIFICANT CHANGE UP (ref 0.5–1.3)
CREAT SERPL-MCNC: 0.76 MG/DL — SIGNIFICANT CHANGE UP (ref 0.5–1.3)
CREAT SERPL-MCNC: 0.78 MG/DL — SIGNIFICANT CHANGE UP (ref 0.5–1.3)
CREAT SERPL-MCNC: 0.81 MG/DL — SIGNIFICANT CHANGE UP (ref 0.5–1.3)
CREAT SERPL-MCNC: 0.82 MG/DL — SIGNIFICANT CHANGE UP (ref 0.5–1.3)
CREAT SERPL-MCNC: 0.82 MG/DL — SIGNIFICANT CHANGE UP (ref 0.5–1.3)
CREAT SERPL-MCNC: 0.84 MG/DL — SIGNIFICANT CHANGE UP (ref 0.5–1.3)
CREAT SERPL-MCNC: 1.04 MG/DL — SIGNIFICANT CHANGE UP (ref 0.5–1.3)
CREAT SERPL-MCNC: 1.08 MG/DL — SIGNIFICANT CHANGE UP (ref 0.5–1.3)
CREAT SERPL-MCNC: 1.38 MG/DL — HIGH (ref 0.5–1.3)
CULTURE RESULTS: SIGNIFICANT CHANGE UP
EOSINOPHIL # BLD AUTO: 0.1 K/UL — SIGNIFICANT CHANGE UP (ref 0–0.5)
EOSINOPHIL # BLD AUTO: 0.15 K/UL — SIGNIFICANT CHANGE UP (ref 0–0.5)
EOSINOPHIL # BLD AUTO: 0.17 K/UL — SIGNIFICANT CHANGE UP (ref 0–0.5)
EOSINOPHIL # FLD: 1 % — SIGNIFICANT CHANGE UP
EOSINOPHIL NFR BLD AUTO: 1 % — SIGNIFICANT CHANGE UP (ref 0–6)
EOSINOPHIL NFR BLD AUTO: 1.9 % — SIGNIFICANT CHANGE UP (ref 0–6)
EOSINOPHIL NFR BLD AUTO: 2 % — SIGNIFICANT CHANGE UP (ref 0–6)
FLUID INTAKE SUBSTANCE CLASS: SIGNIFICANT CHANGE UP
FLUID SEGMENTED GRANULOCYTES: 13 % — SIGNIFICANT CHANGE UP
FOLATE SERPL-MCNC: >20 NG/ML — SIGNIFICANT CHANGE UP
FOLATE+VIT B12 SERBLD-IMP: 0 % — SIGNIFICANT CHANGE UP
GAS PNL BLDA: SIGNIFICANT CHANGE UP
GAS PNL BLDV: SIGNIFICANT CHANGE UP
GLUCOSE FLD-MCNC: 118 MG/DL — SIGNIFICANT CHANGE UP
GLUCOSE SERPL-MCNC: 102 MG/DL — HIGH (ref 70–99)
GLUCOSE SERPL-MCNC: 108 MG/DL — HIGH (ref 70–99)
GLUCOSE SERPL-MCNC: 112 MG/DL — HIGH (ref 70–99)
GLUCOSE SERPL-MCNC: 130 MG/DL — HIGH (ref 70–99)
GLUCOSE SERPL-MCNC: 130 MG/DL — HIGH (ref 70–99)
GLUCOSE SERPL-MCNC: 131 MG/DL — HIGH (ref 70–99)
GLUCOSE SERPL-MCNC: 138 MG/DL — HIGH (ref 70–99)
GLUCOSE SERPL-MCNC: 158 MG/DL — HIGH (ref 70–99)
GLUCOSE SERPL-MCNC: 167 MG/DL — HIGH (ref 70–99)
GLUCOSE SERPL-MCNC: 94 MG/DL — SIGNIFICANT CHANGE UP (ref 70–99)
GLUCOSE SERPL-MCNC: 95 MG/DL — SIGNIFICANT CHANGE UP (ref 70–99)
GRAM STN FLD: SIGNIFICANT CHANGE UP
HCO3 BLDA-SCNC: 28 MMOL/L — SIGNIFICANT CHANGE UP (ref 21–28)
HCO3 BLDV-SCNC: 27 MMOL/L — SIGNIFICANT CHANGE UP (ref 22–29)
HCT VFR BLD CALC: 37 % — SIGNIFICANT CHANGE UP (ref 34.5–45)
HCT VFR BLD CALC: 37.4 % — SIGNIFICANT CHANGE UP (ref 34.5–45)
HCT VFR BLD CALC: 38.2 % — SIGNIFICANT CHANGE UP (ref 34.5–45)
HCT VFR BLD CALC: 38.3 % — SIGNIFICANT CHANGE UP (ref 34.5–45)
HCT VFR BLD CALC: 38.8 % — SIGNIFICANT CHANGE UP (ref 34.5–45)
HCT VFR BLD CALC: 39.1 % — SIGNIFICANT CHANGE UP (ref 34.5–45)
HCT VFR BLD CALC: 39.5 % — SIGNIFICANT CHANGE UP (ref 34.5–45)
HCT VFR BLD CALC: 42.5 % — SIGNIFICANT CHANGE UP (ref 34.5–45)
HCT VFR BLD CALC: 42.6 % — SIGNIFICANT CHANGE UP (ref 34.5–45)
HCT VFR BLD CALC: 42.7 % — SIGNIFICANT CHANGE UP (ref 34.5–45)
HCT VFR BLD CALC: 43 % — SIGNIFICANT CHANGE UP (ref 34.5–45)
HGB BLD-MCNC: 12 G/DL — SIGNIFICANT CHANGE UP (ref 11.5–15.5)
HGB BLD-MCNC: 12.1 G/DL — SIGNIFICANT CHANGE UP (ref 11.5–15.5)
HGB BLD-MCNC: 12.2 G/DL — SIGNIFICANT CHANGE UP (ref 11.5–15.5)
HGB BLD-MCNC: 12.4 G/DL — SIGNIFICANT CHANGE UP (ref 11.5–15.5)
HGB BLD-MCNC: 12.4 G/DL — SIGNIFICANT CHANGE UP (ref 11.5–15.5)
HGB BLD-MCNC: 12.8 G/DL — SIGNIFICANT CHANGE UP (ref 11.5–15.5)
HGB BLD-MCNC: 13 G/DL — SIGNIFICANT CHANGE UP (ref 11.5–15.5)
HGB BLD-MCNC: 13.1 G/DL — SIGNIFICANT CHANGE UP (ref 11.5–15.5)
HGB BLD-MCNC: 13.2 G/DL — SIGNIFICANT CHANGE UP (ref 11.5–15.5)
HGB BLD-MCNC: 13.8 G/DL — SIGNIFICANT CHANGE UP (ref 11.5–15.5)
HGB BLD-MCNC: 14.2 G/DL — SIGNIFICANT CHANGE UP (ref 11.5–15.5)
IMM GRANULOCYTES NFR BLD AUTO: 0.4 % — SIGNIFICANT CHANGE UP (ref 0–1.5)
IMM GRANULOCYTES NFR BLD AUTO: 0.9 % — SIGNIFICANT CHANGE UP (ref 0–1.5)
INR BLD: 0.96 RATIO — SIGNIFICANT CHANGE UP (ref 0.88–1.16)
INR BLD: 1.14 RATIO — SIGNIFICANT CHANGE UP (ref 0.88–1.16)
LDH SERPL L TO P-CCNC: 173 U/L — SIGNIFICANT CHANGE UP
LYMPHOCYTES # BLD AUTO: 0.38 K/UL — LOW (ref 1–3.3)
LYMPHOCYTES # BLD AUTO: 0.53 K/UL — LOW (ref 1–3.3)
LYMPHOCYTES # BLD AUTO: 0.72 K/UL — LOW (ref 1–3.3)
LYMPHOCYTES # BLD AUTO: 4 % — LOW (ref 13–44)
LYMPHOCYTES # BLD AUTO: 6.6 % — LOW (ref 13–44)
LYMPHOCYTES # BLD AUTO: 8.6 % — LOW (ref 13–44)
LYMPHOCYTES # FLD: 49 % — SIGNIFICANT CHANGE UP
MAGNESIUM SERPL-MCNC: 2.2 MG/DL — SIGNIFICANT CHANGE UP (ref 1.6–2.6)
MAGNESIUM SERPL-MCNC: 2.3 MG/DL — SIGNIFICANT CHANGE UP (ref 1.6–2.6)
MAGNESIUM SERPL-MCNC: 2.3 MG/DL — SIGNIFICANT CHANGE UP (ref 1.6–2.6)
MCHC RBC-ENTMCNC: 30.2 PG — SIGNIFICANT CHANGE UP (ref 27–34)
MCHC RBC-ENTMCNC: 30.4 GM/DL — LOW (ref 32–36)
MCHC RBC-ENTMCNC: 30.5 PG — SIGNIFICANT CHANGE UP (ref 27–34)
MCHC RBC-ENTMCNC: 30.5 PG — SIGNIFICANT CHANGE UP (ref 27–34)
MCHC RBC-ENTMCNC: 30.6 PG — SIGNIFICANT CHANGE UP (ref 27–34)
MCHC RBC-ENTMCNC: 30.7 GM/DL — LOW (ref 32–36)
MCHC RBC-ENTMCNC: 30.7 PG — SIGNIFICANT CHANGE UP (ref 27–34)
MCHC RBC-ENTMCNC: 30.7 PG — SIGNIFICANT CHANGE UP (ref 27–34)
MCHC RBC-ENTMCNC: 30.8 PG — SIGNIFICANT CHANGE UP (ref 27–34)
MCHC RBC-ENTMCNC: 30.9 PG — SIGNIFICANT CHANGE UP (ref 27–34)
MCHC RBC-ENTMCNC: 31.4 GM/DL — LOW (ref 32–36)
MCHC RBC-ENTMCNC: 31.4 GM/DL — LOW (ref 32–36)
MCHC RBC-ENTMCNC: 31.7 GM/DL — LOW (ref 32–36)
MCHC RBC-ENTMCNC: 32.5 GM/DL — SIGNIFICANT CHANGE UP (ref 32–36)
MCHC RBC-ENTMCNC: 32.7 GM/DL — SIGNIFICANT CHANGE UP (ref 32–36)
MCHC RBC-ENTMCNC: 33.2 GM/DL — SIGNIFICANT CHANGE UP (ref 32–36)
MCHC RBC-ENTMCNC: 33.2 GM/DL — SIGNIFICANT CHANGE UP (ref 32–36)
MCHC RBC-ENTMCNC: 33.3 GM/DL — SIGNIFICANT CHANGE UP (ref 32–36)
MCHC RBC-ENTMCNC: 33.4 GM/DL — SIGNIFICANT CHANGE UP (ref 32–36)
MCV RBC AUTO: 92.2 FL — SIGNIFICANT CHANGE UP (ref 80–100)
MCV RBC AUTO: 92.3 FL — SIGNIFICANT CHANGE UP (ref 80–100)
MCV RBC AUTO: 92.7 FL — SIGNIFICANT CHANGE UP (ref 80–100)
MCV RBC AUTO: 93.2 FL — SIGNIFICANT CHANGE UP (ref 80–100)
MCV RBC AUTO: 93.3 FL — SIGNIFICANT CHANGE UP (ref 80–100)
MCV RBC AUTO: 94.9 FL — SIGNIFICANT CHANGE UP (ref 80–100)
MCV RBC AUTO: 97.2 FL — SIGNIFICANT CHANGE UP (ref 80–100)
MCV RBC AUTO: 97.3 FL — SIGNIFICANT CHANGE UP (ref 80–100)
MCV RBC AUTO: 97.7 FL — SIGNIFICANT CHANGE UP (ref 80–100)
MCV RBC AUTO: 99.1 FL — SIGNIFICANT CHANGE UP (ref 80–100)
MCV RBC AUTO: 99.8 FL — SIGNIFICANT CHANGE UP (ref 80–100)
MESOTHL CELL # FLD: 4 % — SIGNIFICANT CHANGE UP
MONOCYTES # BLD AUTO: 0 K/UL — SIGNIFICANT CHANGE UP (ref 0–0.9)
MONOCYTES # BLD AUTO: 0.62 K/UL — SIGNIFICANT CHANGE UP (ref 0–0.9)
MONOCYTES # BLD AUTO: 0.64 K/UL — SIGNIFICANT CHANGE UP (ref 0–0.9)
MONOCYTES NFR BLD AUTO: 0 % — LOW (ref 2–14)
MONOCYTES NFR BLD AUTO: 7.6 % — SIGNIFICANT CHANGE UP (ref 2–14)
MONOCYTES NFR BLD AUTO: 7.8 % — SIGNIFICANT CHANGE UP (ref 2–14)
MONOS+MACROS # FLD: 33 % — SIGNIFICANT CHANGE UP
NEUTROPHILS # BLD AUTO: 6.58 K/UL — SIGNIFICANT CHANGE UP (ref 1.8–7.4)
NEUTROPHILS # BLD AUTO: 6.79 K/UL — SIGNIFICANT CHANGE UP (ref 1.8–7.4)
NEUTROPHILS # BLD AUTO: 9.03 K/UL — HIGH (ref 1.8–7.4)
NEUTROPHILS NFR BLD AUTO: 80.7 % — HIGH (ref 43–77)
NEUTROPHILS NFR BLD AUTO: 82.3 % — HIGH (ref 43–77)
NEUTROPHILS NFR BLD AUTO: 94 % — HIGH (ref 43–77)
NRBC # BLD: SIGNIFICANT CHANGE UP /100 WBCS (ref 0–0)
NRBC # FLD: 0 — SIGNIFICANT CHANGE UP
NT-PROBNP SERPL-SCNC: 170 PG/ML — HIGH (ref 0–125)
NT-PROBNP SERPL-SCNC: 195 PG/ML — HIGH (ref 0–125)
OTHER CELLS FLD MANUAL: 0 % — SIGNIFICANT CHANGE UP
PCO2 BLDA: 39 MMHG — HIGH (ref 32–35)
PCO2 BLDV: 41 MMHG — SIGNIFICANT CHANGE UP (ref 39–42)
PH BLDA: 7.46 — HIGH (ref 7.35–7.45)
PH BLDV: 7.42 — SIGNIFICANT CHANGE UP (ref 7.32–7.43)
PH FLD: 8.2 — SIGNIFICANT CHANGE UP
PHOSPHATE SERPL-MCNC: 3 MG/DL — SIGNIFICANT CHANGE UP (ref 2.5–4.5)
PHOSPHATE SERPL-MCNC: 4.1 MG/DL — SIGNIFICANT CHANGE UP (ref 2.5–4.5)
PHOSPHATE SERPL-MCNC: 5.5 MG/DL — HIGH (ref 2.5–4.5)
PLATELET # BLD AUTO: 243 K/UL — SIGNIFICANT CHANGE UP (ref 150–400)
PLATELET # BLD AUTO: 274 K/UL — SIGNIFICANT CHANGE UP (ref 150–400)
PLATELET # BLD AUTO: 279 K/UL — SIGNIFICANT CHANGE UP (ref 150–400)
PLATELET # BLD AUTO: 285 K/UL — SIGNIFICANT CHANGE UP (ref 150–400)
PLATELET # BLD AUTO: 318 K/UL — SIGNIFICANT CHANGE UP (ref 150–400)
PLATELET # BLD AUTO: 321 K/UL — SIGNIFICANT CHANGE UP (ref 150–400)
PLATELET # BLD AUTO: 387 K/UL — SIGNIFICANT CHANGE UP (ref 150–400)
PLATELET # BLD AUTO: 418 K/UL — HIGH (ref 150–400)
PLATELET # BLD AUTO: 460 K/UL — HIGH (ref 150–400)
PLATELET # BLD AUTO: 485 K/UL — HIGH (ref 150–400)
PLATELET # BLD AUTO: SIGNIFICANT CHANGE UP K/UL (ref 150–400)
PO2 BLDA: 79 MMHG — LOW (ref 83–108)
PO2 BLDV: 103 MMHG — SIGNIFICANT CHANGE UP
POTASSIUM SERPL-MCNC: 3.6 MMOL/L — SIGNIFICANT CHANGE UP (ref 3.5–5.3)
POTASSIUM SERPL-MCNC: 3.8 MMOL/L — SIGNIFICANT CHANGE UP (ref 3.5–5.3)
POTASSIUM SERPL-MCNC: 3.9 MMOL/L — SIGNIFICANT CHANGE UP (ref 3.5–5.3)
POTASSIUM SERPL-MCNC: 4 MMOL/L — SIGNIFICANT CHANGE UP (ref 3.5–5.3)
POTASSIUM SERPL-MCNC: 4.1 MMOL/L — SIGNIFICANT CHANGE UP (ref 3.5–5.3)
POTASSIUM SERPL-MCNC: 4.5 MMOL/L — SIGNIFICANT CHANGE UP (ref 3.5–5.3)
POTASSIUM SERPL-MCNC: 5.1 MMOL/L — SIGNIFICANT CHANGE UP (ref 3.5–5.3)
POTASSIUM SERPL-MCNC: 5.2 MMOL/L — SIGNIFICANT CHANGE UP (ref 3.5–5.3)
POTASSIUM SERPL-MCNC: 5.2 MMOL/L — SIGNIFICANT CHANGE UP (ref 3.5–5.3)
POTASSIUM SERPL-MCNC: 5.5 MMOL/L — HIGH (ref 3.5–5.3)
POTASSIUM SERPL-MCNC: 5.6 MMOL/L — HIGH (ref 3.5–5.3)
POTASSIUM SERPL-SCNC: 3.6 MMOL/L — SIGNIFICANT CHANGE UP (ref 3.5–5.3)
POTASSIUM SERPL-SCNC: 3.8 MMOL/L — SIGNIFICANT CHANGE UP (ref 3.5–5.3)
POTASSIUM SERPL-SCNC: 3.9 MMOL/L — SIGNIFICANT CHANGE UP (ref 3.5–5.3)
POTASSIUM SERPL-SCNC: 4 MMOL/L — SIGNIFICANT CHANGE UP (ref 3.5–5.3)
POTASSIUM SERPL-SCNC: 4.1 MMOL/L — SIGNIFICANT CHANGE UP (ref 3.5–5.3)
POTASSIUM SERPL-SCNC: 4.5 MMOL/L — SIGNIFICANT CHANGE UP (ref 3.5–5.3)
POTASSIUM SERPL-SCNC: 5.1 MMOL/L — SIGNIFICANT CHANGE UP (ref 3.5–5.3)
POTASSIUM SERPL-SCNC: 5.2 MMOL/L — SIGNIFICANT CHANGE UP (ref 3.5–5.3)
POTASSIUM SERPL-SCNC: 5.2 MMOL/L — SIGNIFICANT CHANGE UP (ref 3.5–5.3)
POTASSIUM SERPL-SCNC: 5.5 MMOL/L — HIGH (ref 3.5–5.3)
POTASSIUM SERPL-SCNC: 5.6 MMOL/L — HIGH (ref 3.5–5.3)
PROT FLD-MCNC: 2.5 G/DL — SIGNIFICANT CHANGE UP
PROT SERPL-MCNC: 5 GM/DL — LOW (ref 6–8.3)
PROT SERPL-MCNC: 5.2 GM/DL — LOW (ref 6–8.3)
PROT SERPL-MCNC: 5.6 GM/DL — LOW (ref 6–8.3)
PROT SERPL-MCNC: 5.7 GM/DL — LOW (ref 6–8.3)
PROT SERPL-MCNC: 6.1 GM/DL — SIGNIFICANT CHANGE UP (ref 6–8.3)
PROT SERPL-MCNC: 6.2 GM/DL — SIGNIFICANT CHANGE UP (ref 6–8.3)
PROT SERPL-MCNC: 6.3 GM/DL — SIGNIFICANT CHANGE UP (ref 6–8.3)
PROTHROM AB SERPL-ACNC: 11.2 SEC — SIGNIFICANT CHANGE UP (ref 10.6–13.6)
PROTHROM AB SERPL-ACNC: 13.1 SEC — SIGNIFICANT CHANGE UP (ref 10.6–13.6)
RBC # BLD: 3.93 M/UL — SIGNIFICANT CHANGE UP (ref 3.8–5.2)
RBC # BLD: 3.97 M/UL — SIGNIFICANT CHANGE UP (ref 3.8–5.2)
RBC # BLD: 3.97 M/UL — SIGNIFICANT CHANGE UP (ref 3.8–5.2)
RBC # BLD: 4.01 M/UL — SIGNIFICANT CHANGE UP (ref 3.8–5.2)
RBC # BLD: 4.02 M/UL — SIGNIFICANT CHANGE UP (ref 3.8–5.2)
RBC # BLD: 4.15 M/UL — SIGNIFICANT CHANGE UP (ref 3.8–5.2)
RBC # BLD: 4.26 M/UL — SIGNIFICANT CHANGE UP (ref 3.8–5.2)
RBC # BLD: 4.31 M/UL — SIGNIFICANT CHANGE UP (ref 3.8–5.2)
RBC # BLD: 4.31 M/UL — SIGNIFICANT CHANGE UP (ref 3.8–5.2)
RBC # BLD: 4.48 M/UL — SIGNIFICANT CHANGE UP (ref 3.8–5.2)
RBC # BLD: 4.62 M/UL — SIGNIFICANT CHANGE UP (ref 3.8–5.2)
RBC # FLD: 13.7 % — SIGNIFICANT CHANGE UP (ref 10.3–14.5)
RBC # FLD: 13.8 % — SIGNIFICANT CHANGE UP (ref 10.3–14.5)
RBC # FLD: 14 % — SIGNIFICANT CHANGE UP (ref 10.3–14.5)
RBC # FLD: 14 % — SIGNIFICANT CHANGE UP (ref 10.3–14.5)
RBC # FLD: 14.3 % — SIGNIFICANT CHANGE UP (ref 10.3–14.5)
RCV VOL RI: 1000 /UL — HIGH (ref 0–0)
SAO2 % BLDA: 98 % — SIGNIFICANT CHANGE UP
SAO2 % BLDV: 99.2 % — SIGNIFICANT CHANGE UP
SARS-COV-2 RNA SPEC QL NAA+PROBE: SIGNIFICANT CHANGE UP
SODIUM SERPL-SCNC: 136 MMOL/L — SIGNIFICANT CHANGE UP (ref 135–145)
SODIUM SERPL-SCNC: 137 MMOL/L — SIGNIFICANT CHANGE UP (ref 135–145)
SODIUM SERPL-SCNC: 138 MMOL/L — SIGNIFICANT CHANGE UP (ref 135–145)
SODIUM SERPL-SCNC: 138 MMOL/L — SIGNIFICANT CHANGE UP (ref 135–145)
SODIUM SERPL-SCNC: 139 MMOL/L — SIGNIFICANT CHANGE UP (ref 135–145)
SODIUM SERPL-SCNC: 140 MMOL/L — SIGNIFICANT CHANGE UP (ref 135–145)
SODIUM SERPL-SCNC: 140 MMOL/L — SIGNIFICANT CHANGE UP (ref 135–145)
SODIUM SERPL-SCNC: 141 MMOL/L — SIGNIFICANT CHANGE UP (ref 135–145)
SPECIMEN SOURCE: SIGNIFICANT CHANGE UP
TOTAL NUCLEATED CELL COUNT, BODY FLUID: 1153 /UL — SIGNIFICANT CHANGE UP
TROPONIN I, HIGH SENSITIVITY RESULT: 7 NG/L — SIGNIFICANT CHANGE UP
TROPONIN I, HIGH SENSITIVITY RESULT: 9.33 NG/L — SIGNIFICANT CHANGE UP
TUBE TYPE: SIGNIFICANT CHANGE UP
VIT B12 SERPL-MCNC: >2000 PG/ML — HIGH (ref 232–1245)
WBC # BLD: 14.97 K/UL — HIGH (ref 3.8–10.5)
WBC # BLD: 15.04 K/UL — HIGH (ref 3.8–10.5)
WBC # BLD: 15.06 K/UL — HIGH (ref 3.8–10.5)
WBC # BLD: 15.28 K/UL — HIGH (ref 3.8–10.5)
WBC # BLD: 19.07 K/UL — HIGH (ref 3.8–10.5)
WBC # BLD: 7.24 K/UL — SIGNIFICANT CHANGE UP (ref 3.8–10.5)
WBC # BLD: 7.99 K/UL — SIGNIFICANT CHANGE UP (ref 3.8–10.5)
WBC # BLD: 8.41 K/UL — SIGNIFICANT CHANGE UP (ref 3.8–10.5)
WBC # BLD: 8.68 K/UL — SIGNIFICANT CHANGE UP (ref 3.8–10.5)
WBC # BLD: 9.51 K/UL — SIGNIFICANT CHANGE UP (ref 3.8–10.5)
WBC # BLD: 9.94 K/UL — SIGNIFICANT CHANGE UP (ref 3.8–10.5)
WBC # FLD AUTO: 14.97 K/UL — HIGH (ref 3.8–10.5)
WBC # FLD AUTO: 15.04 K/UL — HIGH (ref 3.8–10.5)
WBC # FLD AUTO: 15.06 K/UL — HIGH (ref 3.8–10.5)
WBC # FLD AUTO: 15.28 K/UL — HIGH (ref 3.8–10.5)
WBC # FLD AUTO: 19.07 K/UL — HIGH (ref 3.8–10.5)
WBC # FLD AUTO: 7.24 K/UL — SIGNIFICANT CHANGE UP (ref 3.8–10.5)
WBC # FLD AUTO: 7.99 K/UL — SIGNIFICANT CHANGE UP (ref 3.8–10.5)
WBC # FLD AUTO: 8.41 K/UL — SIGNIFICANT CHANGE UP (ref 3.8–10.5)
WBC # FLD AUTO: 8.68 K/UL — SIGNIFICANT CHANGE UP (ref 3.8–10.5)
WBC # FLD AUTO: 9.51 K/UL — SIGNIFICANT CHANGE UP (ref 3.8–10.5)
WBC # FLD AUTO: 9.94 K/UL — SIGNIFICANT CHANGE UP (ref 3.8–10.5)

## 2022-01-01 PROCEDURE — 32550 INSERT PLEURAL CATH: CPT | Mod: RT

## 2022-01-01 PROCEDURE — 84157 ASSAY OF PROTEIN OTHER: CPT

## 2022-01-01 PROCEDURE — 82746 ASSAY OF FOLIC ACID SERUM: CPT

## 2022-01-01 PROCEDURE — 71045 X-RAY EXAM CHEST 1 VIEW: CPT | Mod: 26

## 2022-01-01 PROCEDURE — ZZZZZ: CPT

## 2022-01-01 PROCEDURE — 72157 MRI CHEST SPINE W/O & W/DYE: CPT

## 2022-01-01 PROCEDURE — 99253 IP/OBS CNSLTJ NEW/EST LOW 45: CPT | Mod: 57

## 2022-01-01 PROCEDURE — 87635 SARS-COV-2 COVID-19 AMP PRB: CPT

## 2022-01-01 PROCEDURE — C1788: CPT

## 2022-01-01 PROCEDURE — 93010 ELECTROCARDIOGRAM REPORT: CPT

## 2022-01-01 PROCEDURE — 77001 FLUOROGUIDE FOR VEIN DEVICE: CPT | Mod: 26,59

## 2022-01-01 PROCEDURE — 99252 IP/OBS CONSLTJ NEW/EST SF 35: CPT | Mod: 25

## 2022-01-01 PROCEDURE — 89051 BODY FLUID CELL COUNT: CPT

## 2022-01-01 PROCEDURE — 99231 SBSQ HOSP IP/OBS SF/LOW 25: CPT

## 2022-01-01 PROCEDURE — 77280 THER RAD SIMULAJ FIELD SMPL: CPT

## 2022-01-01 PROCEDURE — 99233 SBSQ HOSP IP/OBS HIGH 50: CPT

## 2022-01-01 PROCEDURE — 82306 VITAMIN D 25 HYDROXY: CPT

## 2022-01-01 PROCEDURE — 32557 INSERT CATH PLEURA W/ IMAGE: CPT

## 2022-01-01 PROCEDURE — 71045 X-RAY EXAM CHEST 1 VIEW: CPT

## 2022-01-01 PROCEDURE — 85049 AUTOMATED PLATELET COUNT: CPT

## 2022-01-01 PROCEDURE — 87102 FUNGUS ISOLATION CULTURE: CPT

## 2022-01-01 PROCEDURE — 74220 X-RAY XM ESOPHAGUS 1CNTRST: CPT

## 2022-01-01 PROCEDURE — 87075 CULTR BACTERIA EXCEPT BLOOD: CPT

## 2022-01-01 PROCEDURE — C1729: CPT

## 2022-01-01 PROCEDURE — 71045 X-RAY EXAM CHEST 1 VIEW: CPT | Mod: 26,76

## 2022-01-01 PROCEDURE — 84484 ASSAY OF TROPONIN QUANT: CPT

## 2022-01-01 PROCEDURE — 97530 THERAPEUTIC ACTIVITIES: CPT | Mod: GP

## 2022-01-01 PROCEDURE — 70491 CT SOFT TISSUE NECK W/DYE: CPT | Mod: 26

## 2022-01-01 PROCEDURE — 82803 BLOOD GASES ANY COMBINATION: CPT

## 2022-01-01 PROCEDURE — 77334 RADIATION TREATMENT AID(S): CPT

## 2022-01-01 PROCEDURE — 85610 PROTHROMBIN TIME: CPT

## 2022-01-01 PROCEDURE — S2900: CPT

## 2022-01-01 PROCEDURE — 85027 COMPLETE CBC AUTOMATED: CPT

## 2022-01-01 PROCEDURE — 76000 FLUOROSCOPY <1 HR PHYS/QHP: CPT

## 2022-01-01 PROCEDURE — A9579: CPT

## 2022-01-01 PROCEDURE — 99233 SBSQ HOSP IP/OBS HIGH 50: CPT | Mod: 25

## 2022-01-01 PROCEDURE — 80048 BASIC METABOLIC PNL TOTAL CA: CPT

## 2022-01-01 PROCEDURE — 36561 INSERT TUNNELED CV CATH: CPT

## 2022-01-01 PROCEDURE — 77263 THER RADIOLOGY TX PLNG CPLX: CPT

## 2022-01-01 PROCEDURE — 93306 TTE W/DOPPLER COMPLETE: CPT | Mod: 26

## 2022-01-01 PROCEDURE — 36415 COLL VENOUS BLD VENIPUNCTURE: CPT

## 2022-01-01 PROCEDURE — 80053 COMPREHEN METABOLIC PANEL: CPT

## 2022-01-01 PROCEDURE — 86850 RBC ANTIBODY SCREEN: CPT

## 2022-01-01 PROCEDURE — 88108 CYTOPATH CONCENTRATE TECH: CPT | Mod: 26

## 2022-01-01 PROCEDURE — 71260 CT THORAX DX C+: CPT

## 2022-01-01 PROCEDURE — 83880 ASSAY OF NATRIURETIC PEPTIDE: CPT

## 2022-01-01 PROCEDURE — 88108 CYTOPATH CONCENTRATE TECH: CPT

## 2022-01-01 PROCEDURE — 99253 IP/OBS CNSLTJ NEW/EST LOW 45: CPT

## 2022-01-01 PROCEDURE — 85025 COMPLETE CBC W/AUTO DIFF WBC: CPT

## 2022-01-01 PROCEDURE — 32552 REMOVE LUNG CATHETER: CPT

## 2022-01-01 PROCEDURE — 99232 SBSQ HOSP IP/OBS MODERATE 35: CPT

## 2022-01-01 PROCEDURE — 99285 EMERGENCY DEPT VISIT HI MDM: CPT

## 2022-01-01 PROCEDURE — 86900 BLOOD TYPING SEROLOGIC ABO: CPT

## 2022-01-01 PROCEDURE — 88305 TISSUE EXAM BY PATHOLOGIST: CPT | Mod: 26

## 2022-01-01 PROCEDURE — 94640 AIRWAY INHALATION TREATMENT: CPT

## 2022-01-01 PROCEDURE — 83615 LACTATE (LD) (LDH) ENZYME: CPT

## 2022-01-01 PROCEDURE — 83986 ASSAY PH BODY FLUID NOS: CPT

## 2022-01-01 PROCEDURE — 94760 N-INVAS EAR/PLS OXIMETRY 1: CPT

## 2022-01-01 PROCEDURE — 82042 OTHER SOURCE ALBUMIN QUAN EA: CPT

## 2022-01-01 PROCEDURE — 70491 CT SOFT TISSUE NECK W/DYE: CPT

## 2022-01-01 PROCEDURE — 99223 1ST HOSP IP/OBS HIGH 75: CPT

## 2022-01-01 PROCEDURE — 97162 PT EVAL MOD COMPLEX 30 MIN: CPT | Mod: GP

## 2022-01-01 PROCEDURE — 70553 MRI BRAIN STEM W/O & W/DYE: CPT

## 2022-01-01 PROCEDURE — 84100 ASSAY OF PHOSPHORUS: CPT

## 2022-01-01 PROCEDURE — 70553 MRI BRAIN STEM W/O & W/DYE: CPT | Mod: 26

## 2022-01-01 PROCEDURE — 36600 WITHDRAWAL OF ARTERIAL BLOOD: CPT

## 2022-01-01 PROCEDURE — 77332 RADIATION TREATMENT AID(S): CPT | Mod: 59

## 2022-01-01 PROCEDURE — 71045 X-RAY EXAM CHEST 1 VIEW: CPT | Mod: 26,77

## 2022-01-01 PROCEDURE — 88305 TISSUE EXAM BY PATHOLOGIST: CPT

## 2022-01-01 PROCEDURE — 97116 GAIT TRAINING THERAPY: CPT | Mod: GP

## 2022-01-01 PROCEDURE — 93306 TTE W/DOPPLER COMPLETE: CPT

## 2022-01-01 PROCEDURE — 76937 US GUIDE VASCULAR ACCESS: CPT | Mod: 26

## 2022-01-01 PROCEDURE — 87070 CULTURE OTHR SPECIMN AEROBIC: CPT

## 2022-01-01 PROCEDURE — 82607 VITAMIN B-12: CPT

## 2022-01-01 PROCEDURE — 71260 CT THORAX DX C+: CPT | Mod: 26

## 2022-01-01 PROCEDURE — 74220 X-RAY XM ESOPHAGUS 1CNTRST: CPT | Mod: 26

## 2022-01-01 PROCEDURE — 99239 HOSP IP/OBS DSCHRG MGMT >30: CPT

## 2022-01-01 PROCEDURE — 99024 POSTOP FOLLOW-UP VISIT: CPT

## 2022-01-01 PROCEDURE — 77307 TELETHX ISODOSE PLAN CPLX: CPT

## 2022-01-01 PROCEDURE — 85730 THROMBOPLASTIN TIME PARTIAL: CPT

## 2022-01-01 PROCEDURE — 86901 BLOOD TYPING SEROLOGIC RH(D): CPT

## 2022-01-01 PROCEDURE — 77290 THER RAD SIMULAJ FIELD CPLX: CPT

## 2022-01-01 PROCEDURE — 83735 ASSAY OF MAGNESIUM: CPT

## 2022-01-01 PROCEDURE — 99254 IP/OBS CNSLTJ NEW/EST MOD 60: CPT

## 2022-01-01 PROCEDURE — 71275 CT ANGIOGRAPHY CHEST: CPT | Mod: 26,MA

## 2022-01-01 PROCEDURE — 82945 GLUCOSE OTHER FLUID: CPT

## 2022-01-01 PROCEDURE — 99497 ADVNCD CARE PLAN 30 MIN: CPT | Mod: 25

## 2022-01-01 PROCEDURE — 97163 PT EVAL HIGH COMPLEX 45 MIN: CPT | Mod: GP

## 2022-01-01 PROCEDURE — 72157 MRI CHEST SPINE W/O & W/DYE: CPT | Mod: 26

## 2022-01-01 RX ORDER — OXYCODONE HYDROCHLORIDE 5 MG/1
1 TABLET ORAL
Qty: 30 | Refills: 0
Start: 2022-01-01 | End: 2022-01-01

## 2022-01-01 RX ORDER — ALBUTEROL 90 UG/1
2 AEROSOL, METERED ORAL EVERY 6 HOURS
Refills: 0 | Status: DISCONTINUED | OUTPATIENT
Start: 2022-01-01 | End: 2022-01-01

## 2022-01-01 RX ORDER — FENTANYL CITRATE 50 UG/ML
50 INJECTION INTRAVENOUS
Refills: 0 | Status: DISCONTINUED | OUTPATIENT
Start: 2022-01-01 | End: 2022-01-01

## 2022-01-01 RX ORDER — ONDANSETRON 8 MG/1
1 TABLET, FILM COATED ORAL
Qty: 0 | Refills: 0 | DISCHARGE

## 2022-01-01 RX ORDER — APREPITANT 80 MG/1
125 CAPSULE ORAL ONCE
Refills: 0 | Status: COMPLETED | OUTPATIENT
Start: 2022-01-01 | End: 2022-01-01

## 2022-01-01 RX ORDER — CALCIUM CARBONATE 500(1250)
1 TABLET ORAL
Refills: 0 | Status: DISCONTINUED | OUTPATIENT
Start: 2022-01-01 | End: 2022-01-01

## 2022-01-01 RX ORDER — CICLOPIROX OLAMINE 7.7 MG/G
1 CREAM TOPICAL
Qty: 0 | Refills: 0 | DISCHARGE

## 2022-01-01 RX ORDER — MIRTAZAPINE 45 MG/1
7.5 TABLET, ORALLY DISINTEGRATING ORAL AT BEDTIME
Refills: 0 | Status: DISCONTINUED | OUTPATIENT
Start: 2022-01-01 | End: 2022-01-01

## 2022-01-01 RX ORDER — POLYETHYLENE GLYCOL 3350 17 G/17G
17 POWDER, FOR SOLUTION ORAL DAILY
Refills: 0 | Status: DISCONTINUED | OUTPATIENT
Start: 2022-01-01 | End: 2022-01-01

## 2022-01-01 RX ORDER — MORPHINE SULFATE 50 MG/1
3 CAPSULE, EXTENDED RELEASE ORAL
Qty: 100 | Refills: 0 | Status: DISCONTINUED | OUTPATIENT
Start: 2022-01-01 | End: 2022-01-01

## 2022-01-01 RX ORDER — LIOTHYRONINE SODIUM 25 UG/1
1 TABLET ORAL
Qty: 0 | Refills: 0 | DISCHARGE

## 2022-01-01 RX ORDER — LANOLIN ALCOHOL/MO/W.PET/CERES
3 CREAM (GRAM) TOPICAL AT BEDTIME
Refills: 0 | Status: DISCONTINUED | OUTPATIENT
Start: 2022-01-01 | End: 2022-01-01

## 2022-01-01 RX ORDER — ALPRAZOLAM 0.25 MG
1 TABLET ORAL
Qty: 0 | Refills: 0 | DISCHARGE

## 2022-01-01 RX ORDER — ACETAMINOPHEN 500 MG
1000 TABLET ORAL ONCE
Refills: 0 | Status: COMPLETED | OUTPATIENT
Start: 2022-01-01 | End: 2022-01-01

## 2022-01-01 RX ORDER — POLYETHYLENE GLYCOL 3350 17 G/17G
17 POWDER, FOR SOLUTION ORAL
Qty: 238 | Refills: 0
Start: 2022-01-01 | End: 2022-01-01

## 2022-01-01 RX ORDER — ALPRAZOLAM 0.25 MG
1 TABLET ORAL
Qty: 20 | Refills: 0
Start: 2022-01-01

## 2022-01-01 RX ORDER — BUDESONIDE AND FORMOTEROL FUMARATE DIHYDRATE 160; 4.5 UG/1; UG/1
2 AEROSOL RESPIRATORY (INHALATION)
Qty: 120 | Refills: 0
Start: 2022-01-01 | End: 2022-03-08

## 2022-01-01 RX ORDER — DEXAMETHASONE 0.5 MG/5ML
4 ELIXIR ORAL EVERY 12 HOURS
Refills: 0 | Status: DISCONTINUED | OUTPATIENT
Start: 2022-01-01 | End: 2022-01-01

## 2022-01-01 RX ORDER — ONDANSETRON 8 MG/1
8 TABLET, FILM COATED ORAL EVERY 8 HOURS
Refills: 0 | Status: DISCONTINUED | OUTPATIENT
Start: 2022-01-01 | End: 2022-01-01

## 2022-01-01 RX ORDER — CARBOPLATIN 50 MG
440 VIAL (EA) INTRAVENOUS ONCE
Refills: 0 | Status: COMPLETED | OUTPATIENT
Start: 2022-01-01 | End: 2022-01-01

## 2022-01-01 RX ORDER — FLUCONAZOLE 150 MG/1
100 TABLET ORAL DAILY
Refills: 0 | Status: DISCONTINUED | OUTPATIENT
Start: 2022-01-01 | End: 2022-01-01

## 2022-01-01 RX ORDER — OXYCODONE HYDROCHLORIDE 5 MG/1
5 TABLET ORAL EVERY 4 HOURS
Refills: 0 | Status: DISCONTINUED | OUTPATIENT
Start: 2022-01-01 | End: 2022-01-01

## 2022-01-01 RX ORDER — OXYCODONE HYDROCHLORIDE 5 MG/1
5 TABLET ORAL ONCE
Refills: 0 | Status: DISCONTINUED | OUTPATIENT
Start: 2022-01-01 | End: 2022-01-01

## 2022-01-01 RX ORDER — CAPMATINIB 200 MG/1
2 TABLET, FILM COATED ORAL
Qty: 0 | Refills: 0 | DISCHARGE

## 2022-01-01 RX ORDER — OXYCODONE HYDROCHLORIDE 5 MG/1
7.5 TABLET ORAL EVERY 6 HOURS
Refills: 0 | Status: DISCONTINUED | OUTPATIENT
Start: 2022-01-01 | End: 2022-01-01

## 2022-01-01 RX ORDER — SODIUM CHLORIDE 9 MG/ML
1000 INJECTION, SOLUTION INTRAVENOUS
Refills: 0 | Status: DISCONTINUED | OUTPATIENT
Start: 2022-01-01 | End: 2022-01-01

## 2022-01-01 RX ORDER — ONDANSETRON 8 MG/1
4 TABLET, FILM COATED ORAL EVERY 6 HOURS
Refills: 0 | Status: DISCONTINUED | OUTPATIENT
Start: 2022-01-01 | End: 2022-01-01

## 2022-01-01 RX ORDER — ONDANSETRON 8 MG/1
4 TABLET, FILM COATED ORAL ONCE
Refills: 0 | Status: DISCONTINUED | OUTPATIENT
Start: 2022-01-01 | End: 2022-01-01

## 2022-01-01 RX ORDER — HYDROMORPHONE HYDROCHLORIDE 2 MG/ML
0.5 INJECTION INTRAMUSCULAR; INTRAVENOUS; SUBCUTANEOUS EVERY 4 HOURS
Refills: 0 | Status: DISCONTINUED | OUTPATIENT
Start: 2022-01-01 | End: 2022-01-01

## 2022-01-01 RX ORDER — ZOLPIDEM TARTRATE 10 MG/1
5 TABLET ORAL AT BEDTIME
Refills: 0 | Status: DISCONTINUED | OUTPATIENT
Start: 2022-01-01 | End: 2022-01-01

## 2022-01-01 RX ORDER — CEPHALEXIN 500 MG
500 CAPSULE ORAL
Refills: 0 | Status: DISCONTINUED | OUTPATIENT
Start: 2022-01-01 | End: 2022-01-01

## 2022-01-01 RX ORDER — BUDESONIDE AND FORMOTEROL FUMARATE DIHYDRATE 160; 4.5 UG/1; UG/1
2 AEROSOL RESPIRATORY (INHALATION)
Refills: 0 | Status: DISCONTINUED | OUTPATIENT
Start: 2022-01-01 | End: 2022-01-01

## 2022-01-01 RX ORDER — ONDANSETRON 8 MG/1
4 TABLET, FILM COATED ORAL EVERY 8 HOURS
Refills: 0 | Status: DISCONTINUED | OUTPATIENT
Start: 2022-01-01 | End: 2022-01-01

## 2022-01-01 RX ORDER — NYSTATIN 500MM UNIT
500000 POWDER (EA) MISCELLANEOUS
Refills: 0 | Status: DISCONTINUED | OUTPATIENT
Start: 2022-01-01 | End: 2022-01-01

## 2022-01-01 RX ORDER — ESOMEPRAZOLE MAGNESIUM 40 MG/1
1 CAPSULE, DELAYED RELEASE ORAL
Qty: 0 | Refills: 0 | DISCHARGE

## 2022-01-01 RX ORDER — PEMETREXED 500 MG/20ML
860 INJECTION, SOLUTION, CONCENTRATE INTRAVENOUS ONCE
Refills: 0 | Status: COMPLETED | OUTPATIENT
Start: 2022-01-01 | End: 2022-01-01

## 2022-01-01 RX ORDER — LANOLIN ALCOHOL/MO/W.PET/CERES
5 CREAM (GRAM) TOPICAL AT BEDTIME
Refills: 0 | Status: DISCONTINUED | OUTPATIENT
Start: 2022-01-01 | End: 2022-01-01

## 2022-01-01 RX ORDER — LIOTHYRONINE SODIUM 25 UG/1
0.5 TABLET ORAL
Qty: 0 | Refills: 0 | DISCHARGE

## 2022-01-01 RX ORDER — MIRTAZAPINE 45 MG/1
1 TABLET, ORALLY DISINTEGRATING ORAL
Qty: 30 | Refills: 0
Start: 2022-01-01 | End: 2022-02-26

## 2022-01-01 RX ORDER — GABAPENTIN 400 MG/1
1 CAPSULE ORAL
Qty: 0 | Refills: 0 | DISCHARGE

## 2022-01-01 RX ORDER — FLUCONAZOLE 150 MG/1
200 TABLET ORAL ONCE
Refills: 0 | Status: COMPLETED | OUTPATIENT
Start: 2022-01-01 | End: 2022-01-01

## 2022-01-01 RX ORDER — CALCIUM CARBONATE 500(1250)
1 TABLET ORAL
Qty: 0 | Refills: 0 | DISCHARGE

## 2022-01-01 RX ORDER — FUROSEMIDE 40 MG
1 TABLET ORAL
Qty: 0 | Refills: 0 | DISCHARGE

## 2022-01-01 RX ORDER — ALPRAZOLAM 0.25 MG
0.25 TABLET ORAL ONCE
Refills: 0 | Status: DISCONTINUED | OUTPATIENT
Start: 2022-01-01 | End: 2022-01-01

## 2022-01-01 RX ORDER — ACETAMINOPHEN 500 MG
650 TABLET ORAL EVERY 6 HOURS
Refills: 0 | Status: DISCONTINUED | OUTPATIENT
Start: 2022-01-01 | End: 2022-01-01

## 2022-01-01 RX ORDER — ACETAMINOPHEN 500 MG
1 TABLET ORAL
Qty: 90 | Refills: 0
Start: 2022-01-01 | End: 2022-02-26

## 2022-01-01 RX ORDER — PANTOPRAZOLE SODIUM 20 MG/1
40 TABLET, DELAYED RELEASE ORAL
Refills: 0 | Status: DISCONTINUED | OUTPATIENT
Start: 2022-01-01 | End: 2022-01-01

## 2022-01-01 RX ORDER — ACETAMINOPHEN 500 MG
650 TABLET ORAL ONCE
Refills: 0 | Status: DISCONTINUED | OUTPATIENT
Start: 2022-01-01 | End: 2022-01-01

## 2022-01-01 RX ORDER — APREPITANT 80 MG/1
80 CAPSULE ORAL DAILY
Refills: 0 | Status: COMPLETED | OUTPATIENT
Start: 2022-01-01 | End: 2022-01-01

## 2022-01-01 RX ORDER — ROBINUL 0.2 MG/ML
0.2 INJECTION INTRAMUSCULAR; INTRAVENOUS EVERY 6 HOURS
Refills: 0 | Status: DISCONTINUED | OUTPATIENT
Start: 2022-01-01 | End: 2022-01-01

## 2022-01-01 RX ORDER — ALPRAZOLAM 0.25 MG
0.25 TABLET ORAL EVERY 8 HOURS
Refills: 0 | Status: DISCONTINUED | OUTPATIENT
Start: 2022-01-01 | End: 2022-01-01

## 2022-01-01 RX ORDER — ASCORBIC ACID 60 MG
1 TABLET,CHEWABLE ORAL
Qty: 0 | Refills: 0 | DISCHARGE

## 2022-01-01 RX ORDER — FOLIC ACID 0.8 MG
1 TABLET ORAL DAILY
Refills: 0 | Status: DISCONTINUED | OUTPATIENT
Start: 2022-01-01 | End: 2022-01-01

## 2022-01-01 RX ORDER — SODIUM CHLORIDE 9 MG/ML
1000 INJECTION INTRAMUSCULAR; INTRAVENOUS; SUBCUTANEOUS
Refills: 0 | Status: DISCONTINUED | OUTPATIENT
Start: 2022-01-01 | End: 2022-01-01

## 2022-01-01 RX ORDER — RNA INGREDIENT BNT-162B2 0.23 G/1.8ML
0.3 INJECTION, SUSPENSION INTRAMUSCULAR
Qty: 0 | Refills: 0 | DISCHARGE

## 2022-01-01 RX ORDER — SODIUM ZIRCONIUM CYCLOSILICATE 10 G/10G
5 POWDER, FOR SUSPENSION ORAL ONCE
Refills: 0 | Status: COMPLETED | OUTPATIENT
Start: 2022-01-01 | End: 2022-01-01

## 2022-01-01 RX ORDER — DEXAMETHASONE 0.5 MG/5ML
4 ELIXIR ORAL ONCE
Refills: 0 | Status: COMPLETED | OUTPATIENT
Start: 2022-01-01 | End: 2022-01-01

## 2022-01-01 RX ORDER — LEVOTHYROXINE SODIUM 125 MCG
1 TABLET ORAL
Qty: 0 | Refills: 0 | DISCHARGE

## 2022-01-01 RX ORDER — MORPHINE SULFATE 50 MG/1
2 CAPSULE, EXTENDED RELEASE ORAL
Refills: 0 | Status: DISCONTINUED | OUTPATIENT
Start: 2022-01-01 | End: 2022-01-01

## 2022-01-01 RX ORDER — TIOTROPIUM BROMIDE 18 UG/1
1 CAPSULE ORAL; RESPIRATORY (INHALATION)
Qty: 30 | Refills: 0
Start: 2022-01-01

## 2022-01-01 RX ORDER — ALPRAZOLAM 0.25 MG
1 TABLET ORAL
Qty: 15 | Refills: 0
Start: 2022-01-01 | End: 2022-01-01

## 2022-01-01 RX ORDER — LOPERAMIDE HCL 2 MG
2 TABLET ORAL
Refills: 0 | Status: DISCONTINUED | OUTPATIENT
Start: 2022-01-01 | End: 2022-01-01

## 2022-01-01 RX ORDER — LIOTHYRONINE SODIUM 25 UG/1
5 TABLET ORAL DAILY
Refills: 0 | Status: DISCONTINUED | OUTPATIENT
Start: 2022-01-01 | End: 2022-01-01

## 2022-01-01 RX ORDER — MORPHINE SULFATE 50 MG/1
2 CAPSULE, EXTENDED RELEASE ORAL ONCE
Refills: 0 | Status: DISCONTINUED | OUTPATIENT
Start: 2022-01-01 | End: 2022-01-01

## 2022-01-01 RX ORDER — MORPHINE SULFATE 50 MG/1
1 CAPSULE, EXTENDED RELEASE ORAL
Qty: 100 | Refills: 0 | Status: DISCONTINUED | OUTPATIENT
Start: 2022-01-01 | End: 2022-01-01

## 2022-01-01 RX ORDER — ALPRAZOLAM 0.25 MG
0.25 TABLET ORAL EVERY 6 HOURS
Refills: 0 | Status: DISCONTINUED | OUTPATIENT
Start: 2022-01-01 | End: 2022-01-01

## 2022-01-01 RX ORDER — CEPHALEXIN 500 MG
1 CAPSULE ORAL
Qty: 8 | Refills: 0
Start: 2022-01-01 | End: 2022-01-01

## 2022-01-01 RX ORDER — PREGABALIN 225 MG/1
1000 CAPSULE ORAL ONCE
Refills: 0 | Status: COMPLETED | OUTPATIENT
Start: 2022-01-01 | End: 2022-01-01

## 2022-01-01 RX ORDER — FUROSEMIDE 40 MG
2 TABLET ORAL
Qty: 0 | Refills: 0 | DISCHARGE

## 2022-01-01 RX ORDER — ALBUTEROL 90 UG/1
2 AEROSOL, METERED ORAL
Qty: 0 | Refills: 0 | DISCHARGE

## 2022-01-01 RX ORDER — TIOTROPIUM BROMIDE 18 UG/1
1 CAPSULE ORAL; RESPIRATORY (INHALATION) DAILY
Refills: 0 | Status: DISCONTINUED | OUTPATIENT
Start: 2022-01-01 | End: 2022-01-01

## 2022-01-01 RX ORDER — ONDANSETRON 8 MG/1
12 TABLET, FILM COATED ORAL ONCE
Refills: 0 | Status: COMPLETED | OUTPATIENT
Start: 2022-01-01 | End: 2022-01-01

## 2022-01-01 RX ORDER — HYDROMORPHONE HYDROCHLORIDE 2 MG/ML
2 INJECTION INTRAMUSCULAR; INTRAVENOUS; SUBCUTANEOUS ONCE
Refills: 0 | Status: DISCONTINUED | OUTPATIENT
Start: 2022-01-01 | End: 2022-01-01

## 2022-01-01 RX ORDER — POTASSIUM CHLORIDE 20 MEQ
1 PACKET (EA) ORAL
Qty: 0 | Refills: 0 | DISCHARGE

## 2022-01-01 RX ORDER — LEVOTHYROXINE SODIUM 125 MCG
100 TABLET ORAL DAILY
Refills: 0 | Status: DISCONTINUED | OUTPATIENT
Start: 2022-01-01 | End: 2022-01-01

## 2022-01-01 RX ORDER — DEXAMETHASONE 0.5 MG/5ML
12 ELIXIR ORAL ONCE
Refills: 0 | Status: COMPLETED | OUTPATIENT
Start: 2022-01-01 | End: 2022-01-01

## 2022-01-01 RX ORDER — MORPHINE SULFATE 50 MG/1
2 CAPSULE, EXTENDED RELEASE ORAL EVERY 4 HOURS
Refills: 0 | Status: DISCONTINUED | OUTPATIENT
Start: 2022-01-01 | End: 2022-01-01

## 2022-01-01 RX ORDER — DEXTROSE MONOHYDRATE, SODIUM CHLORIDE, AND POTASSIUM CHLORIDE 50; .745; 4.5 G/1000ML; G/1000ML; G/1000ML
1000 INJECTION, SOLUTION INTRAVENOUS
Refills: 0 | Status: DISCONTINUED | OUTPATIENT
Start: 2022-01-01 | End: 2022-01-01

## 2022-01-01 RX ORDER — ALPRAZOLAM 0.25 MG
0.5 TABLET ORAL EVERY 6 HOURS
Refills: 0 | Status: DISCONTINUED | OUTPATIENT
Start: 2022-01-01 | End: 2022-01-01

## 2022-01-01 RX ORDER — MELOXICAM 15 MG/1
1 TABLET ORAL
Qty: 0 | Refills: 0 | DISCHARGE

## 2022-01-01 RX ORDER — ACETAMINOPHEN 500 MG
1000 TABLET ORAL ONCE
Refills: 0 | Status: DISCONTINUED | OUTPATIENT
Start: 2022-01-01 | End: 2022-01-01

## 2022-01-01 RX ORDER — FOLIC ACID 0.8 MG
1 TABLET ORAL
Qty: 30 | Refills: 0
Start: 2022-01-01 | End: 2022-02-26

## 2022-01-01 RX ORDER — LIDOCAINE 4 G/100G
1 CREAM TOPICAL DAILY
Refills: 0 | Status: DISCONTINUED | OUTPATIENT
Start: 2022-01-01 | End: 2022-01-01

## 2022-01-01 RX ORDER — HEPARIN SODIUM 5000 [USP'U]/ML
5000 INJECTION INTRAVENOUS; SUBCUTANEOUS EVERY 8 HOURS
Refills: 0 | Status: DISCONTINUED | OUTPATIENT
Start: 2022-01-01 | End: 2022-01-01

## 2022-01-01 RX ORDER — PREGABALIN 225 MG/1
1000 CAPSULE ORAL ONCE
Refills: 0 | Status: DISCONTINUED | OUTPATIENT
Start: 2022-01-01 | End: 2022-01-01

## 2022-01-01 RX ORDER — CALCIUM CARBONATE 500(1250)
1 TABLET ORAL DAILY
Refills: 0 | Status: DISCONTINUED | OUTPATIENT
Start: 2022-01-01 | End: 2022-01-01

## 2022-01-01 RX ORDER — HEPARIN SODIUM 5000 [USP'U]/ML
5000 INJECTION INTRAVENOUS; SUBCUTANEOUS EVERY 12 HOURS
Refills: 0 | Status: DISCONTINUED | OUTPATIENT
Start: 2022-01-01 | End: 2022-01-01

## 2022-01-01 RX ADMIN — PANTOPRAZOLE SODIUM 40 MILLIGRAM(S): 20 TABLET, DELAYED RELEASE ORAL at 05:47

## 2022-01-01 RX ADMIN — Medication 106 MILLIGRAM(S): at 11:54

## 2022-01-01 RX ADMIN — PANTOPRAZOLE SODIUM 40 MILLIGRAM(S): 20 TABLET, DELAYED RELEASE ORAL at 05:45

## 2022-01-01 RX ADMIN — LIOTHYRONINE SODIUM 5 MICROGRAM(S): 25 TABLET ORAL at 11:20

## 2022-01-01 RX ADMIN — Medication 30 MILLILITER(S): at 08:58

## 2022-01-01 RX ADMIN — Medication 40 MILLIGRAM(S): at 10:11

## 2022-01-01 RX ADMIN — Medication 0.25 MILLIGRAM(S): at 14:14

## 2022-01-01 RX ADMIN — Medication 500 MILLIGRAM(S): at 05:46

## 2022-01-01 RX ADMIN — HYDROMORPHONE HYDROCHLORIDE 0.5 MILLIGRAM(S): 2 INJECTION INTRAMUSCULAR; INTRAVENOUS; SUBCUTANEOUS at 00:28

## 2022-01-01 RX ADMIN — ONDANSETRON 4 MILLIGRAM(S): 8 TABLET, FILM COATED ORAL at 18:33

## 2022-01-01 RX ADMIN — ONDANSETRON 4 MILLIGRAM(S): 8 TABLET, FILM COATED ORAL at 10:10

## 2022-01-01 RX ADMIN — Medication 0.5 MILLIGRAM(S): at 11:33

## 2022-01-01 RX ADMIN — HYDROMORPHONE HYDROCHLORIDE 2 MILLIGRAM(S): 2 INJECTION INTRAMUSCULAR; INTRAVENOUS; SUBCUTANEOUS at 20:37

## 2022-01-01 RX ADMIN — Medication 30 MILLILITER(S): at 18:11

## 2022-01-01 RX ADMIN — BUDESONIDE AND FORMOTEROL FUMARATE DIHYDRATE 2 PUFF(S): 160; 4.5 AEROSOL RESPIRATORY (INHALATION) at 08:31

## 2022-01-01 RX ADMIN — HEPARIN SODIUM 5000 UNIT(S): 5000 INJECTION INTRAVENOUS; SUBCUTANEOUS at 19:54

## 2022-01-01 RX ADMIN — Medication 500 MILLIGRAM(S): at 06:27

## 2022-01-01 RX ADMIN — Medication 1 TABLET(S): at 11:34

## 2022-01-01 RX ADMIN — Medication 100 MICROGRAM(S): at 06:28

## 2022-01-01 RX ADMIN — Medication 1 TABLET(S): at 22:33

## 2022-01-01 RX ADMIN — BUDESONIDE AND FORMOTEROL FUMARATE DIHYDRATE 2 PUFF(S): 160; 4.5 AEROSOL RESPIRATORY (INHALATION) at 08:21

## 2022-01-01 RX ADMIN — MORPHINE SULFATE 3 MG/HR: 50 CAPSULE, EXTENDED RELEASE ORAL at 22:16

## 2022-01-01 RX ADMIN — ONDANSETRON 4 MILLIGRAM(S): 8 TABLET, FILM COATED ORAL at 17:52

## 2022-01-01 RX ADMIN — Medication 500000 UNIT(S): at 17:43

## 2022-01-01 RX ADMIN — PREGABALIN 1000 MICROGRAM(S): 225 CAPSULE ORAL at 14:08

## 2022-01-01 RX ADMIN — Medication 2 MILLIGRAM(S): at 17:10

## 2022-01-01 RX ADMIN — LIDOCAINE 1 PATCH: 4 CREAM TOPICAL at 20:45

## 2022-01-01 RX ADMIN — ALBUTEROL 2 PUFF(S): 90 AEROSOL, METERED ORAL at 08:21

## 2022-01-01 RX ADMIN — HYDROMORPHONE HYDROCHLORIDE 0.5 MILLIGRAM(S): 2 INJECTION INTRAMUSCULAR; INTRAVENOUS; SUBCUTANEOUS at 16:37

## 2022-01-01 RX ADMIN — Medication 0.5 MILLIGRAM(S): at 05:50

## 2022-01-01 RX ADMIN — Medication 1 TABLET(S): at 10:11

## 2022-01-01 RX ADMIN — MIRTAZAPINE 7.5 MILLIGRAM(S): 45 TABLET, ORALLY DISINTEGRATING ORAL at 20:36

## 2022-01-01 RX ADMIN — ALBUTEROL 2 PUFF(S): 90 AEROSOL, METERED ORAL at 16:36

## 2022-01-01 RX ADMIN — Medication 0.5 MILLIGRAM(S): at 23:00

## 2022-01-01 RX ADMIN — OXYCODONE HYDROCHLORIDE 5 MILLIGRAM(S): 5 TABLET ORAL at 11:16

## 2022-01-01 RX ADMIN — HEPARIN SODIUM 5000 UNIT(S): 5000 INJECTION INTRAVENOUS; SUBCUTANEOUS at 05:48

## 2022-01-01 RX ADMIN — Medication 500000 UNIT(S): at 17:29

## 2022-01-01 RX ADMIN — ALBUTEROL 2 PUFF(S): 90 AEROSOL, METERED ORAL at 08:32

## 2022-01-01 RX ADMIN — BUDESONIDE AND FORMOTEROL FUMARATE DIHYDRATE 2 PUFF(S): 160; 4.5 AEROSOL RESPIRATORY (INHALATION) at 21:35

## 2022-01-01 RX ADMIN — Medication 500000 UNIT(S): at 05:40

## 2022-01-01 RX ADMIN — MIRTAZAPINE 7.5 MILLIGRAM(S): 45 TABLET, ORALLY DISINTEGRATING ORAL at 21:29

## 2022-01-01 RX ADMIN — Medication 1 TABLET(S): at 11:19

## 2022-01-01 RX ADMIN — FLUCONAZOLE 200 MILLIGRAM(S): 150 TABLET ORAL at 14:09

## 2022-01-01 RX ADMIN — ALBUTEROL 2 PUFF(S): 90 AEROSOL, METERED ORAL at 01:51

## 2022-01-01 RX ADMIN — HEPARIN SODIUM 5000 UNIT(S): 5000 INJECTION INTRAVENOUS; SUBCUTANEOUS at 05:51

## 2022-01-01 RX ADMIN — HEPARIN SODIUM 5000 UNIT(S): 5000 INJECTION INTRAVENOUS; SUBCUTANEOUS at 06:07

## 2022-01-01 RX ADMIN — ALBUTEROL 2 PUFF(S): 90 AEROSOL, METERED ORAL at 22:40

## 2022-01-01 RX ADMIN — ALBUTEROL 2 PUFF(S): 90 AEROSOL, METERED ORAL at 08:14

## 2022-01-01 RX ADMIN — Medication 500000 UNIT(S): at 13:07

## 2022-01-01 RX ADMIN — OXYCODONE HYDROCHLORIDE 5 MILLIGRAM(S): 5 TABLET ORAL at 14:10

## 2022-01-01 RX ADMIN — OXYCODONE HYDROCHLORIDE 5 MILLIGRAM(S): 5 TABLET ORAL at 21:29

## 2022-01-01 RX ADMIN — LIDOCAINE 1 PATCH: 4 CREAM TOPICAL at 07:47

## 2022-01-01 RX ADMIN — Medication 200 MILLIGRAM(S): at 12:59

## 2022-01-01 RX ADMIN — LIOTHYRONINE SODIUM 5 MICROGRAM(S): 25 TABLET ORAL at 10:23

## 2022-01-01 RX ADMIN — ALBUTEROL 2 PUFF(S): 90 AEROSOL, METERED ORAL at 20:54

## 2022-01-01 RX ADMIN — PEMETREXED 400 MILLIGRAM(S): 500 INJECTION, SOLUTION, CONCENTRATE INTRAVENOUS at 15:32

## 2022-01-01 RX ADMIN — HEPARIN SODIUM 5000 UNIT(S): 5000 INJECTION INTRAVENOUS; SUBCUTANEOUS at 05:41

## 2022-01-01 RX ADMIN — HEPARIN SODIUM 5000 UNIT(S): 5000 INJECTION INTRAVENOUS; SUBCUTANEOUS at 05:03

## 2022-01-01 RX ADMIN — Medication 1 TABLET(S): at 10:19

## 2022-01-01 RX ADMIN — Medication 4 MILLIGRAM(S): at 18:33

## 2022-01-01 RX ADMIN — Medication 200 MILLIGRAM(S): at 17:29

## 2022-01-01 RX ADMIN — FENTANYL CITRATE 50 MICROGRAM(S): 50 INJECTION INTRAVENOUS at 18:33

## 2022-01-01 RX ADMIN — Medication 0.25 MILLIGRAM(S): at 09:34

## 2022-01-01 RX ADMIN — Medication 0.25 MILLIGRAM(S): at 11:07

## 2022-01-01 RX ADMIN — LIOTHYRONINE SODIUM 5 MICROGRAM(S): 25 TABLET ORAL at 06:07

## 2022-01-01 RX ADMIN — Medication 40 MILLIGRAM(S): at 21:30

## 2022-01-01 RX ADMIN — Medication 500000 UNIT(S): at 11:03

## 2022-01-01 RX ADMIN — OXYCODONE HYDROCHLORIDE 5 MILLIGRAM(S): 5 TABLET ORAL at 04:07

## 2022-01-01 RX ADMIN — HEPARIN SODIUM 5000 UNIT(S): 5000 INJECTION INTRAVENOUS; SUBCUTANEOUS at 21:21

## 2022-01-01 RX ADMIN — PANTOPRAZOLE SODIUM 40 MILLIGRAM(S): 20 TABLET, DELAYED RELEASE ORAL at 06:09

## 2022-01-01 RX ADMIN — Medication 200 MILLIGRAM(S): at 11:20

## 2022-01-01 RX ADMIN — Medication 100 MICROGRAM(S): at 05:46

## 2022-01-01 RX ADMIN — Medication 500000 UNIT(S): at 17:00

## 2022-01-01 RX ADMIN — LIOTHYRONINE SODIUM 5 MICROGRAM(S): 25 TABLET ORAL at 05:40

## 2022-01-01 RX ADMIN — HYDROMORPHONE HYDROCHLORIDE 0.5 MILLIGRAM(S): 2 INJECTION INTRAMUSCULAR; INTRAVENOUS; SUBCUTANEOUS at 04:30

## 2022-01-01 RX ADMIN — Medication 30 MILLILITER(S): at 17:14

## 2022-01-01 RX ADMIN — Medication 0.25 MILLIGRAM(S): at 19:55

## 2022-01-01 RX ADMIN — Medication 0.5 MILLIGRAM(S): at 08:59

## 2022-01-01 RX ADMIN — Medication 1 MILLIGRAM(S): at 11:36

## 2022-01-01 RX ADMIN — Medication 500000 UNIT(S): at 23:00

## 2022-01-01 RX ADMIN — Medication 500000 UNIT(S): at 11:49

## 2022-01-01 RX ADMIN — Medication 500000 UNIT(S): at 17:36

## 2022-01-01 RX ADMIN — Medication 500 MILLIGRAM(S): at 16:23

## 2022-01-01 RX ADMIN — OXYCODONE HYDROCHLORIDE 5 MILLIGRAM(S): 5 TABLET ORAL at 06:27

## 2022-01-01 RX ADMIN — Medication 400 MILLIGRAM(S): at 20:34

## 2022-01-01 RX ADMIN — OXYCODONE HYDROCHLORIDE 5 MILLIGRAM(S): 5 TABLET ORAL at 15:45

## 2022-01-01 RX ADMIN — Medication 1 TABLET(S): at 11:36

## 2022-01-01 RX ADMIN — Medication 500 MILLIGRAM(S): at 11:28

## 2022-01-01 RX ADMIN — Medication 1 TABLET(S): at 11:20

## 2022-01-01 RX ADMIN — Medication 500000 UNIT(S): at 05:50

## 2022-01-01 RX ADMIN — Medication 0.5 MILLIGRAM(S): at 21:20

## 2022-01-01 RX ADMIN — MORPHINE SULFATE 1 MG/HR: 50 CAPSULE, EXTENDED RELEASE ORAL at 17:56

## 2022-01-01 RX ADMIN — Medication 30 MILLILITER(S): at 15:49

## 2022-01-01 RX ADMIN — MIRTAZAPINE 7.5 MILLIGRAM(S): 45 TABLET, ORALLY DISINTEGRATING ORAL at 22:34

## 2022-01-01 RX ADMIN — Medication 200 MILLIGRAM(S): at 05:40

## 2022-01-01 RX ADMIN — Medication 294 MILLIGRAM(S): at 14:12

## 2022-01-01 RX ADMIN — ALBUTEROL 2 PUFF(S): 90 AEROSOL, METERED ORAL at 14:07

## 2022-01-01 RX ADMIN — MIRTAZAPINE 7.5 MILLIGRAM(S): 45 TABLET, ORALLY DISINTEGRATING ORAL at 20:53

## 2022-01-01 RX ADMIN — Medication 500000 UNIT(S): at 22:28

## 2022-01-01 RX ADMIN — Medication 1 MILLIGRAM(S): at 08:59

## 2022-01-01 RX ADMIN — Medication 40 MILLIGRAM(S): at 20:10

## 2022-01-01 RX ADMIN — Medication 5 MILLIGRAM(S): at 11:27

## 2022-01-01 RX ADMIN — ALBUTEROL 2 PUFF(S): 90 AEROSOL, METERED ORAL at 02:12

## 2022-01-01 RX ADMIN — Medication 1 TABLET(S): at 08:59

## 2022-01-01 RX ADMIN — TIOTROPIUM BROMIDE 1 CAPSULE(S): 18 CAPSULE ORAL; RESPIRATORY (INHALATION) at 08:32

## 2022-01-01 RX ADMIN — HEPARIN SODIUM 5000 UNIT(S): 5000 INJECTION INTRAVENOUS; SUBCUTANEOUS at 21:11

## 2022-01-01 RX ADMIN — Medication 500000 UNIT(S): at 17:11

## 2022-01-01 RX ADMIN — SODIUM ZIRCONIUM CYCLOSILICATE 5 GRAM(S): 10 POWDER, FOR SUSPENSION ORAL at 11:37

## 2022-01-01 RX ADMIN — Medication 0.5 MILLIGRAM(S): at 15:18

## 2022-01-01 RX ADMIN — ALBUTEROL 2 PUFF(S): 90 AEROSOL, METERED ORAL at 21:22

## 2022-01-01 RX ADMIN — Medication 1 TABLET(S): at 20:10

## 2022-01-01 RX ADMIN — HEPARIN SODIUM 5000 UNIT(S): 5000 INJECTION INTRAVENOUS; SUBCUTANEOUS at 21:54

## 2022-01-01 RX ADMIN — Medication 5 MILLIGRAM(S): at 23:15

## 2022-01-01 RX ADMIN — Medication 500 MILLIGRAM(S): at 01:02

## 2022-01-01 RX ADMIN — Medication 500000 UNIT(S): at 00:22

## 2022-01-01 RX ADMIN — Medication 1 TABLET(S): at 09:35

## 2022-01-01 RX ADMIN — MORPHINE SULFATE 2 MILLIGRAM(S): 50 CAPSULE, EXTENDED RELEASE ORAL at 22:11

## 2022-01-01 RX ADMIN — BUDESONIDE AND FORMOTEROL FUMARATE DIHYDRATE 2 PUFF(S): 160; 4.5 AEROSOL RESPIRATORY (INHALATION) at 08:32

## 2022-01-01 RX ADMIN — Medication 1 TABLET(S): at 11:21

## 2022-01-01 RX ADMIN — HEPARIN SODIUM 5000 UNIT(S): 5000 INJECTION INTRAVENOUS; SUBCUTANEOUS at 11:21

## 2022-01-01 RX ADMIN — Medication 30 MILLILITER(S): at 08:35

## 2022-01-01 RX ADMIN — Medication 1 MILLIGRAM(S): at 10:23

## 2022-01-01 RX ADMIN — BUDESONIDE AND FORMOTEROL FUMARATE DIHYDRATE 2 PUFF(S): 160; 4.5 AEROSOL RESPIRATORY (INHALATION) at 20:55

## 2022-01-01 RX ADMIN — Medication 30 MILLILITER(S): at 13:35

## 2022-01-01 RX ADMIN — MORPHINE SULFATE 2 MILLIGRAM(S): 50 CAPSULE, EXTENDED RELEASE ORAL at 17:49

## 2022-01-01 RX ADMIN — ONDANSETRON 4 MILLIGRAM(S): 8 TABLET, FILM COATED ORAL at 06:24

## 2022-01-01 RX ADMIN — ALBUTEROL 2 PUFF(S): 90 AEROSOL, METERED ORAL at 06:22

## 2022-01-01 RX ADMIN — OXYCODONE HYDROCHLORIDE 5 MILLIGRAM(S): 5 TABLET ORAL at 20:54

## 2022-01-01 RX ADMIN — DEXTROSE MONOHYDRATE, SODIUM CHLORIDE, AND POTASSIUM CHLORIDE 40 MILLILITER(S): 50; .745; 4.5 INJECTION, SOLUTION INTRAVENOUS at 12:16

## 2022-01-01 RX ADMIN — LIDOCAINE 1 PATCH: 4 CREAM TOPICAL at 08:18

## 2022-01-01 RX ADMIN — HYDROMORPHONE HYDROCHLORIDE 0.5 MILLIGRAM(S): 2 INJECTION INTRAMUSCULAR; INTRAVENOUS; SUBCUTANEOUS at 15:57

## 2022-01-01 RX ADMIN — LIOTHYRONINE SODIUM 5 MICROGRAM(S): 25 TABLET ORAL at 10:13

## 2022-01-01 RX ADMIN — ALBUTEROL 2 PUFF(S): 90 AEROSOL, METERED ORAL at 01:11

## 2022-01-01 RX ADMIN — Medication 500000 UNIT(S): at 12:16

## 2022-01-01 RX ADMIN — HEPARIN SODIUM 5000 UNIT(S): 5000 INJECTION INTRAVENOUS; SUBCUTANEOUS at 20:07

## 2022-01-01 RX ADMIN — Medication 500000 UNIT(S): at 05:45

## 2022-01-01 RX ADMIN — OXYCODONE HYDROCHLORIDE 5 MILLIGRAM(S): 5 TABLET ORAL at 03:30

## 2022-01-01 RX ADMIN — HEPARIN SODIUM 5000 UNIT(S): 5000 INJECTION INTRAVENOUS; SUBCUTANEOUS at 21:05

## 2022-01-01 RX ADMIN — TIOTROPIUM BROMIDE 1 CAPSULE(S): 18 CAPSULE ORAL; RESPIRATORY (INHALATION) at 08:40

## 2022-01-01 RX ADMIN — Medication 650 MILLIGRAM(S): at 20:06

## 2022-01-01 RX ADMIN — FENTANYL CITRATE 50 MICROGRAM(S): 50 INJECTION INTRAVENOUS at 19:00

## 2022-01-01 RX ADMIN — Medication 0.5 MILLIGRAM(S): at 19:29

## 2022-01-01 RX ADMIN — Medication 500000 UNIT(S): at 23:12

## 2022-01-01 RX ADMIN — MIRTAZAPINE 7.5 MILLIGRAM(S): 45 TABLET, ORALLY DISINTEGRATING ORAL at 20:06

## 2022-01-01 RX ADMIN — Medication 500000 UNIT(S): at 23:16

## 2022-01-01 RX ADMIN — OXYCODONE HYDROCHLORIDE 5 MILLIGRAM(S): 5 TABLET ORAL at 22:35

## 2022-01-01 RX ADMIN — Medication 200 MILLIGRAM(S): at 23:58

## 2022-01-01 RX ADMIN — Medication 40 MILLIGRAM(S): at 10:16

## 2022-01-01 RX ADMIN — Medication 30 MILLILITER(S): at 13:58

## 2022-01-01 RX ADMIN — Medication 3 MILLIGRAM(S): at 02:14

## 2022-01-01 RX ADMIN — Medication 1 TABLET(S): at 10:33

## 2022-01-01 RX ADMIN — Medication 1 TABLET(S): at 10:17

## 2022-01-01 RX ADMIN — Medication 0.25 MILLIGRAM(S): at 12:08

## 2022-01-01 RX ADMIN — Medication 30 MILLILITER(S): at 18:02

## 2022-01-01 RX ADMIN — HEPARIN SODIUM 5000 UNIT(S): 5000 INJECTION INTRAVENOUS; SUBCUTANEOUS at 14:08

## 2022-01-01 RX ADMIN — HEPARIN SODIUM 5000 UNIT(S): 5000 INJECTION INTRAVENOUS; SUBCUTANEOUS at 13:58

## 2022-01-01 RX ADMIN — Medication 500000 UNIT(S): at 12:28

## 2022-01-01 RX ADMIN — POLYETHYLENE GLYCOL 3350 17 GRAM(S): 17 POWDER, FOR SOLUTION ORAL at 11:28

## 2022-01-01 RX ADMIN — Medication 1 MILLIGRAM(S): at 10:16

## 2022-01-01 RX ADMIN — Medication 200 MILLIGRAM(S): at 12:28

## 2022-01-01 RX ADMIN — APREPITANT 80 MILLIGRAM(S): 80 CAPSULE ORAL at 11:35

## 2022-01-01 RX ADMIN — APREPITANT 125 MILLIGRAM(S): 80 CAPSULE ORAL at 11:53

## 2022-01-01 RX ADMIN — ALBUTEROL 2 PUFF(S): 90 AEROSOL, METERED ORAL at 20:58

## 2022-01-01 RX ADMIN — ALBUTEROL 2 PUFF(S): 90 AEROSOL, METERED ORAL at 08:31

## 2022-01-01 RX ADMIN — MIRTAZAPINE 7.5 MILLIGRAM(S): 45 TABLET, ORALLY DISINTEGRATING ORAL at 21:04

## 2022-01-01 RX ADMIN — Medication 3 MILLIGRAM(S): at 21:11

## 2022-01-01 RX ADMIN — TIOTROPIUM BROMIDE 1 CAPSULE(S): 18 CAPSULE ORAL; RESPIRATORY (INHALATION) at 08:13

## 2022-01-01 RX ADMIN — Medication 650 MILLIGRAM(S): at 21:00

## 2022-01-01 RX ADMIN — HEPARIN SODIUM 5000 UNIT(S): 5000 INJECTION INTRAVENOUS; SUBCUTANEOUS at 14:57

## 2022-01-01 RX ADMIN — Medication 100 MICROGRAM(S): at 05:41

## 2022-01-01 RX ADMIN — Medication 500000 UNIT(S): at 06:08

## 2022-01-01 RX ADMIN — Medication 500000 UNIT(S): at 12:59

## 2022-01-01 RX ADMIN — Medication 5 MILLIGRAM(S): at 20:07

## 2022-01-01 RX ADMIN — Medication 1 TABLET(S): at 21:20

## 2022-01-01 RX ADMIN — Medication 40 MILLIGRAM(S): at 10:04

## 2022-01-01 RX ADMIN — Medication 30 MILLILITER(S): at 09:39

## 2022-01-01 RX ADMIN — HYDROMORPHONE HYDROCHLORIDE 0.5 MILLIGRAM(S): 2 INJECTION INTRAMUSCULAR; INTRAVENOUS; SUBCUTANEOUS at 06:07

## 2022-01-01 RX ADMIN — SODIUM CHLORIDE 50 MILLILITER(S): 9 INJECTION INTRAMUSCULAR; INTRAVENOUS; SUBCUTANEOUS at 19:19

## 2022-01-01 RX ADMIN — Medication 40 MILLIGRAM(S): at 08:59

## 2022-01-01 RX ADMIN — Medication 30 MILLILITER(S): at 13:50

## 2022-01-01 RX ADMIN — MIRTAZAPINE 7.5 MILLIGRAM(S): 45 TABLET, ORALLY DISINTEGRATING ORAL at 21:53

## 2022-01-01 RX ADMIN — TIOTROPIUM BROMIDE 1 CAPSULE(S): 18 CAPSULE ORAL; RESPIRATORY (INHALATION) at 09:25

## 2022-01-01 RX ADMIN — Medication 100 MICROGRAM(S): at 06:00

## 2022-01-01 RX ADMIN — Medication 0.25 MILLIGRAM(S): at 21:04

## 2022-01-01 RX ADMIN — PANTOPRAZOLE SODIUM 40 MILLIGRAM(S): 20 TABLET, DELAYED RELEASE ORAL at 05:42

## 2022-01-01 RX ADMIN — Medication 0.5 MILLIGRAM(S): at 16:21

## 2022-01-01 RX ADMIN — BUDESONIDE AND FORMOTEROL FUMARATE DIHYDRATE 2 PUFF(S): 160; 4.5 AEROSOL RESPIRATORY (INHALATION) at 21:23

## 2022-01-01 RX ADMIN — PANTOPRAZOLE SODIUM 40 MILLIGRAM(S): 20 TABLET, DELAYED RELEASE ORAL at 08:59

## 2022-01-01 RX ADMIN — HYDROMORPHONE HYDROCHLORIDE 0.5 MILLIGRAM(S): 2 INJECTION INTRAMUSCULAR; INTRAVENOUS; SUBCUTANEOUS at 14:20

## 2022-01-01 RX ADMIN — Medication 0.5 MILLIGRAM(S): at 21:53

## 2022-01-01 RX ADMIN — HEPARIN SODIUM 5000 UNIT(S): 5000 INJECTION INTRAVENOUS; SUBCUTANEOUS at 06:09

## 2022-01-01 RX ADMIN — ALBUTEROL 2 PUFF(S): 90 AEROSOL, METERED ORAL at 17:59

## 2022-01-01 RX ADMIN — ALBUTEROL 2 PUFF(S): 90 AEROSOL, METERED ORAL at 21:35

## 2022-01-01 RX ADMIN — HEPARIN SODIUM 5000 UNIT(S): 5000 INJECTION INTRAVENOUS; SUBCUTANEOUS at 13:51

## 2022-01-01 RX ADMIN — LIOTHYRONINE SODIUM 5 MICROGRAM(S): 25 TABLET ORAL at 05:46

## 2022-01-01 RX ADMIN — Medication 40 MILLIGRAM(S): at 21:54

## 2022-01-01 RX ADMIN — LIDOCAINE 1 PATCH: 4 CREAM TOPICAL at 16:31

## 2022-01-01 RX ADMIN — ALBUTEROL 2 PUFF(S): 90 AEROSOL, METERED ORAL at 13:08

## 2022-01-01 RX ADMIN — Medication 1 MILLIGRAM(S): at 10:33

## 2022-01-01 RX ADMIN — Medication 0.25 MILLIGRAM(S): at 13:40

## 2022-01-01 RX ADMIN — TIOTROPIUM BROMIDE 1 CAPSULE(S): 18 CAPSULE ORAL; RESPIRATORY (INHALATION) at 08:22

## 2022-01-01 RX ADMIN — MORPHINE SULFATE 3 MG/HR: 50 CAPSULE, EXTENDED RELEASE ORAL at 19:00

## 2022-01-01 RX ADMIN — Medication 650 MILLIGRAM(S): at 12:41

## 2022-01-01 RX ADMIN — HYDROMORPHONE HYDROCHLORIDE 0.5 MILLIGRAM(S): 2 INJECTION INTRAMUSCULAR; INTRAVENOUS; SUBCUTANEOUS at 05:46

## 2022-01-01 RX ADMIN — Medication 0.5 MILLIGRAM(S): at 06:06

## 2022-01-01 RX ADMIN — Medication 2 MILLIGRAM(S): at 08:45

## 2022-01-01 RX ADMIN — BUDESONIDE AND FORMOTEROL FUMARATE DIHYDRATE 2 PUFF(S): 160; 4.5 AEROSOL RESPIRATORY (INHALATION) at 22:39

## 2022-01-01 RX ADMIN — FENTANYL CITRATE 50 MICROGRAM(S): 50 INJECTION INTRAVENOUS at 18:05

## 2022-01-01 RX ADMIN — PANTOPRAZOLE SODIUM 40 MILLIGRAM(S): 20 TABLET, DELAYED RELEASE ORAL at 05:25

## 2022-01-01 RX ADMIN — Medication 500000 UNIT(S): at 17:02

## 2022-01-01 RX ADMIN — Medication 30 MILLILITER(S): at 13:08

## 2022-01-01 RX ADMIN — ALBUTEROL 2 PUFF(S): 90 AEROSOL, METERED ORAL at 21:51

## 2022-01-01 RX ADMIN — Medication 1 TABLET(S): at 21:28

## 2022-01-01 RX ADMIN — FENTANYL CITRATE 50 MICROGRAM(S): 50 INJECTION INTRAVENOUS at 18:35

## 2022-01-01 RX ADMIN — Medication 1 TABLET(S): at 20:06

## 2022-01-01 RX ADMIN — Medication 500000 UNIT(S): at 18:02

## 2022-01-01 RX ADMIN — HEPARIN SODIUM 5000 UNIT(S): 5000 INJECTION INTRAVENOUS; SUBCUTANEOUS at 10:22

## 2022-01-01 RX ADMIN — Medication 200 MILLIGRAM(S): at 05:47

## 2022-01-01 RX ADMIN — OXYCODONE HYDROCHLORIDE 5 MILLIGRAM(S): 5 TABLET ORAL at 22:08

## 2022-01-01 RX ADMIN — ONDANSETRON 112 MILLIGRAM(S): 8 TABLET, FILM COATED ORAL at 12:45

## 2022-01-01 RX ADMIN — Medication 1 MILLIGRAM(S): at 11:20

## 2022-01-01 RX ADMIN — Medication 100 MICROGRAM(S): at 05:50

## 2022-01-01 RX ADMIN — Medication 1 TABLET(S): at 20:53

## 2022-01-01 RX ADMIN — Medication 30 MILLILITER(S): at 17:44

## 2022-01-01 RX ADMIN — HEPARIN SODIUM 5000 UNIT(S): 5000 INJECTION INTRAVENOUS; SUBCUTANEOUS at 20:22

## 2022-01-01 RX ADMIN — Medication 0.25 MILLIGRAM(S): at 10:22

## 2022-01-01 RX ADMIN — LIDOCAINE 1 PATCH: 4 CREAM TOPICAL at 10:24

## 2022-01-01 RX ADMIN — OXYCODONE HYDROCHLORIDE 5 MILLIGRAM(S): 5 TABLET ORAL at 13:40

## 2022-01-01 RX ADMIN — Medication 200 MILLIGRAM(S): at 22:28

## 2022-01-01 RX ADMIN — Medication 200 MILLIGRAM(S): at 17:11

## 2022-01-01 RX ADMIN — Medication 0.5 MILLIGRAM(S): at 10:10

## 2022-01-01 RX ADMIN — Medication 0.25 MILLIGRAM(S): at 11:43

## 2022-01-01 RX ADMIN — Medication 0.5 MILLIGRAM(S): at 20:09

## 2022-01-01 RX ADMIN — MIRTAZAPINE 7.5 MILLIGRAM(S): 45 TABLET, ORALLY DISINTEGRATING ORAL at 21:20

## 2022-01-01 RX ADMIN — Medication 200 MILLIGRAM(S): at 23:01

## 2022-01-01 RX ADMIN — Medication 100 MICROGRAM(S): at 05:25

## 2022-01-01 RX ADMIN — FENTANYL CITRATE 50 MICROGRAM(S): 50 INJECTION INTRAVENOUS at 19:20

## 2022-01-01 RX ADMIN — Medication 30 MILLILITER(S): at 19:11

## 2022-01-01 RX ADMIN — HEPARIN SODIUM 5000 UNIT(S): 5000 INJECTION INTRAVENOUS; SUBCUTANEOUS at 05:45

## 2022-01-01 RX ADMIN — ROBINUL 0.2 MILLIGRAM(S): 0.2 INJECTION INTRAMUSCULAR; INTRAVENOUS at 17:49

## 2022-01-01 RX ADMIN — HEPARIN SODIUM 5000 UNIT(S): 5000 INJECTION INTRAVENOUS; SUBCUTANEOUS at 14:27

## 2022-01-01 RX ADMIN — LIOTHYRONINE SODIUM 5 MICROGRAM(S): 25 TABLET ORAL at 10:32

## 2022-01-01 RX ADMIN — Medication 500000 UNIT(S): at 23:58

## 2022-01-01 RX ADMIN — PANTOPRAZOLE SODIUM 40 MILLIGRAM(S): 20 TABLET, DELAYED RELEASE ORAL at 06:07

## 2022-01-01 RX ADMIN — ALBUTEROL 2 PUFF(S): 90 AEROSOL, METERED ORAL at 13:49

## 2022-01-01 RX ADMIN — Medication 1 TABLET(S): at 10:23

## 2022-01-01 RX ADMIN — OXYCODONE HYDROCHLORIDE 5 MILLIGRAM(S): 5 TABLET ORAL at 21:05

## 2022-01-01 RX ADMIN — Medication 100 MICROGRAM(S): at 06:04

## 2022-01-01 RX ADMIN — Medication 0.25 MILLIGRAM(S): at 20:54

## 2022-01-01 RX ADMIN — HEPARIN SODIUM 5000 UNIT(S): 5000 INJECTION INTRAVENOUS; SUBCUTANEOUS at 20:54

## 2022-01-01 RX ADMIN — Medication 3 MILLIGRAM(S): at 00:02

## 2022-01-01 RX ADMIN — BUDESONIDE AND FORMOTEROL FUMARATE DIHYDRATE 2 PUFF(S): 160; 4.5 AEROSOL RESPIRATORY (INHALATION) at 20:58

## 2022-01-01 RX ADMIN — LIOTHYRONINE SODIUM 5 MICROGRAM(S): 25 TABLET ORAL at 09:35

## 2022-01-01 RX ADMIN — Medication 500 MILLIGRAM(S): at 00:41

## 2022-01-01 RX ADMIN — LIOTHYRONINE SODIUM 5 MICROGRAM(S): 25 TABLET ORAL at 06:08

## 2022-01-01 RX ADMIN — Medication 200 MILLIGRAM(S): at 17:00

## 2022-01-01 RX ADMIN — BUDESONIDE AND FORMOTEROL FUMARATE DIHYDRATE 2 PUFF(S): 160; 4.5 AEROSOL RESPIRATORY (INHALATION) at 08:14

## 2022-01-01 RX ADMIN — MORPHINE SULFATE 2 MILLIGRAM(S): 50 CAPSULE, EXTENDED RELEASE ORAL at 19:53

## 2022-01-01 RX ADMIN — Medication 200 MILLIGRAM(S): at 23:15

## 2022-01-01 RX ADMIN — PANTOPRAZOLE SODIUM 40 MILLIGRAM(S): 20 TABLET, DELAYED RELEASE ORAL at 05:52

## 2022-01-01 RX ADMIN — BUDESONIDE AND FORMOTEROL FUMARATE DIHYDRATE 2 PUFF(S): 160; 4.5 AEROSOL RESPIRATORY (INHALATION) at 08:10

## 2022-01-01 RX ADMIN — OXYCODONE HYDROCHLORIDE 5 MILLIGRAM(S): 5 TABLET ORAL at 18:08

## 2022-01-01 RX ADMIN — Medication 3 MILLIGRAM(S): at 21:20

## 2022-01-01 RX ADMIN — HEPARIN SODIUM 5000 UNIT(S): 5000 INJECTION INTRAVENOUS; SUBCUTANEOUS at 14:48

## 2022-01-01 RX ADMIN — TIOTROPIUM BROMIDE 1 CAPSULE(S): 18 CAPSULE ORAL; RESPIRATORY (INHALATION) at 08:10

## 2022-01-01 RX ADMIN — PANTOPRAZOLE SODIUM 40 MILLIGRAM(S): 20 TABLET, DELAYED RELEASE ORAL at 06:28

## 2022-01-01 RX ADMIN — Medication 100 MICROGRAM(S): at 05:44

## 2022-01-01 RX ADMIN — OXYCODONE HYDROCHLORIDE 5 MILLIGRAM(S): 5 TABLET ORAL at 10:36

## 2022-01-01 RX ADMIN — HEPARIN SODIUM 5000 UNIT(S): 5000 INJECTION INTRAVENOUS; SUBCUTANEOUS at 10:33

## 2022-01-01 RX ADMIN — Medication 1 MILLIGRAM(S): at 09:34

## 2022-01-01 RX ADMIN — Medication 200 MILLIGRAM(S): at 17:43

## 2022-01-01 RX ADMIN — Medication 1 TABLET(S): at 10:13

## 2022-01-01 RX ADMIN — MIRTAZAPINE 7.5 MILLIGRAM(S): 45 TABLET, ORALLY DISINTEGRATING ORAL at 20:10

## 2022-01-01 RX ADMIN — Medication 200 MILLIGRAM(S): at 11:29

## 2022-01-01 RX ADMIN — OXYCODONE HYDROCHLORIDE 5 MILLIGRAM(S): 5 TABLET ORAL at 22:40

## 2022-01-01 RX ADMIN — Medication 500000 UNIT(S): at 11:20

## 2022-01-01 RX ADMIN — Medication 100 MICROGRAM(S): at 06:07

## 2022-01-01 RX ADMIN — Medication 40 MILLIGRAM(S): at 21:20

## 2022-01-01 RX ADMIN — OXYCODONE HYDROCHLORIDE 5 MILLIGRAM(S): 5 TABLET ORAL at 06:57

## 2022-01-01 RX ADMIN — HEPARIN SODIUM 5000 UNIT(S): 5000 INJECTION INTRAVENOUS; SUBCUTANEOUS at 20:36

## 2022-01-01 RX ADMIN — Medication 1 TABLET(S): at 21:53

## 2022-01-01 RX ADMIN — Medication 500 MILLIGRAM(S): at 10:35

## 2022-01-01 RX ADMIN — HEPARIN SODIUM 5000 UNIT(S): 5000 INJECTION INTRAVENOUS; SUBCUTANEOUS at 21:30

## 2022-01-01 RX ADMIN — Medication 1 MILLIGRAM(S): at 10:10

## 2022-01-01 RX ADMIN — Medication 1 MILLIGRAM(S): at 11:19

## 2022-01-01 RX ADMIN — Medication 0.5 MILLIGRAM(S): at 21:12

## 2022-01-01 RX ADMIN — HEPARIN SODIUM 5000 UNIT(S): 5000 INJECTION INTRAVENOUS; SUBCUTANEOUS at 22:08

## 2022-01-01 RX ADMIN — Medication 3 MILLIGRAM(S): at 22:08

## 2022-01-01 RX ADMIN — Medication 0.25 MILLIGRAM(S): at 05:49

## 2022-01-01 RX ADMIN — Medication 100 MICROGRAM(S): at 06:09

## 2022-01-01 RX ADMIN — LIOTHYRONINE SODIUM 5 MICROGRAM(S): 25 TABLET ORAL at 05:50

## 2022-01-01 RX ADMIN — PANTOPRAZOLE SODIUM 40 MILLIGRAM(S): 20 TABLET, DELAYED RELEASE ORAL at 06:00

## 2022-01-01 RX ADMIN — TIOTROPIUM BROMIDE 1 CAPSULE(S): 18 CAPSULE ORAL; RESPIRATORY (INHALATION) at 08:31

## 2022-01-01 RX ADMIN — Medication 200 MILLIGRAM(S): at 05:50

## 2022-01-01 RX ADMIN — Medication 1 MILLIGRAM(S): at 10:13

## 2022-01-01 RX ADMIN — ALBUTEROL 2 PUFF(S): 90 AEROSOL, METERED ORAL at 08:13

## 2022-01-01 RX ADMIN — Medication 500000 UNIT(S): at 11:29

## 2022-01-01 RX ADMIN — Medication 0.5 MILLIGRAM(S): at 22:26

## 2022-01-01 RX ADMIN — Medication 200 MILLIGRAM(S): at 17:36

## 2022-01-01 RX ADMIN — MIRTAZAPINE 7.5 MILLIGRAM(S): 45 TABLET, ORALLY DISINTEGRATING ORAL at 19:55

## 2022-01-01 RX ADMIN — Medication 500000 UNIT(S): at 05:47

## 2022-01-24 NOTE — H&P ADULT - ASSESSMENT
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 Patient reports cold symptoms 2 weeks ago - cough, nasal congestion , some lose bowel movements which improved but she started to have dyspnea on exertion, which become worse in last 2 days. Patient denies cp, abdominal pain, fever, palpitations, but feels very anxious.     In ED - Vitals reviewed T(F): 97.7 HR: 68  (68 - 86) BP: 113/58 (113/58 - 139/66) RR: 29  (22 - 29) SpO2: 94%  (86% - 97%) CT Angio Chest PE Protocol w/ IV Cont   IMPRESSION: No pulmonary embolism.  Since October 2020: Medial left upper lobe density, corresponding to known lung cancer,  although difficult to compare to prior given new mild left lung volume loss and likely radiotherapy changes. New ill-defined 0.8 cm left upper lobe nodule. A previously described  left upper lobe nodule that was new on prior has resolved. Overall decreased volume of left pleural effusion, however now with thin circumferential components and mild new nodularity. New moderate nonloculated right pleural effusion. As it is difficult to differentiate posttreatment changes versus disease  progression on this exam, follow-up in 8-12 weeks may help clarify.   s/p right chest tube placement     Progressive dyspnea due to New moderate right pleural effusion likely malignant with  underlying Metastatic adenocarcinoma of Lung ( INDU) , ruled out PE   New 0.8 cm INDU nodule   - admit to medical oncology floor  - CT surgery consult for pleural effusion management  - 1/24 s/p Right chest tube placement 800 cc drained   - oncology consult Dr. Westfall - discuss the case , advised to stop tabrecta, start folic acid daily, will need different Rx and possible radiation to the spine  - pain management - dilaudid, oxycodone  T4-T6 new lesion on recent PET scan   - MRI of thoracic spine - once resulted will involve radiation oncology for possible need of rad therapy   OSMAN  with baseline Cr 0.8  - hold lasix, trend cr   - IV fluids 50 cc for 10 hours   Hypothyroidism - c/w home meds  Hypocalcemia - c/w calcium once daily, monitor   GERD - c/w PPI   Anxiety - xanax prn     Advanced directives  - discussed advanced directive for 17 min  - MOLST form discussed   - FULL code    DVT proph -heparin q12h d/w CT surgery team   IMPROVE VTE Individual Risk Assessment  RISK                                                                                             Points  [  ] Previous VTE                                                                                3  [  ] Thrombophilia                                                                             2  [  ] Lower limb paralysis       (unable to hold up >15 seconds)  2   [  ] Current Cancer     (within 6 months)                                       2        [  ] Immobilization > 24 hrs                                                             1  [  ] ICU/CCU stay > 24 hours                                                           1  [  ] Age > 60                                                                                       1  IMPROVE VTE Score ______0___    IMPROVE Score 0-1: Low Risk, No VTE prophylaxis required for most patients, encourage ambulation.   IMPROVE Score 2-3: At risk, pharmacologic VTE prophylaxis is indicated for most patients (in the absence of a contraindication)  IMPROVE Score > or = 4: High Risk, pharmacologic VTE prophylaxis is indicated for most patients (in the absence of a contraindication)    Time spend 72 minutes       77 y/o female with PMH of Metastatic adenocarcinoma of the lung to the spine, liver , brain,   Hypothyroidism s/p thyroid resection, h/o  L2 pathologic compression fracture, GERD presents to  on 1/24/22 with 1 week history of dyspnea. Patient reports cold symptoms 2 weeks ago - cough, nasal congestion , some lose bowel movements which improved but she started to have dyspnea on exertion, which become worse in last 2 days. Patient denies cp, abdominal pain, fever, palpitations, but feels very anxious.     In ED - Vitals reviewed T(F): 97.7 HR: 68  (68 - 86) BP: 113/58 (113/58 - 139/66) RR: 29  (22 - 29) SpO2: 94%  (86% - 97%) CT Angio Chest PE Protocol w/ IV Cont   IMPRESSION: No pulmonary embolism.  Since October 2020: Medial left upper lobe density, corresponding to known lung cancer,  although difficult to compare to prior given new mild left lung volume loss and likely radiotherapy changes. New ill-defined 0.8 cm left upper lobe nodule. A previously described  left upper lobe nodule that was new on prior has resolved. Overall decreased volume of left pleural effusion, however now with thin circumferential components and mild new nodularity. New moderate nonloculated right pleural effusion. As it is difficult to differentiate posttreatment changes versus disease  progression on this exam, follow-up in 8-12 weeks may help clarify.   recent PET scan as o/p - showed T3, T6 lesion with possible cord compression  s/p right chest tube placement     Progressive dyspnea due to New moderate right pleural effusion likely malignant with  underlying Metastatic adenocarcinoma of Lung ( INDU) , ruled out PE   New 0.8 cm INDU nodule   - admit to medical oncology floor  - CT surgery consult for pleural effusion management  - 1/24 s/p Right chest tube placement 800 cc drained   - oncology consult Dr. Westfall - discuss the case , advised to stop tabrecta, start folic acid daily, will need different Rx and possible radiation to the spine  - pain management - dilaudid, oxycodone  - pulmonology consult Dr. Sanchez  T4-T6 new lesion on recent PET scan with possible cord compression  - MRI of thoracic spine w/wo contrast  with pre-medication,  - pt claustrophobic ativan 0.5, may repeat 0.5 mg as  2 nd dose if not effective    - once resulted will involve radiation oncology for possible need of rad therapy   - neuro checks q4h   - ortho spine consult   OSMAN  with baseline Cr 0.8  - hold lasix, trend cr   - IV fluids 50 cc for 10 hours   Hypothyroidism - c/w home meds  Hypocalcemia - c/w calcium once daily, monitor   GERD - c/w PPI   Anxiety - xanax prn     Advanced directives  - discussed advanced directive for 17 min  - MOLST form discussed   - FULL code    DVT proph -heparin q12h d/w CT surgery team   IMPROVE VTE Individual Risk Assessment  RISK                                                                                             Points  [  ] Previous VTE                                                                                3  [  ] Thrombophilia                                                                             2  [  ] Lower limb paralysis       (unable to hold up >15 seconds)  2   [  ] Current Cancer     (within 6 months)                                       2        [  ] Immobilization > 24 hrs                                                             1  [  ] ICU/CCU stay > 24 hours                                                           1  [  ] Age > 60                                                                                       1  IMPROVE VTE Score ______0___    IMPROVE Score 0-1: Low Risk, No VTE prophylaxis required for most patients, encourage ambulation.   IMPROVE Score 2-3: At risk, pharmacologic VTE prophylaxis is indicated for most patients (in the absence of a contraindication)  IMPROVE Score > or = 4: High Risk, pharmacologic VTE prophylaxis is indicated for most patients (in the absence of a contraindication)    Time spend 72 minutes

## 2022-01-24 NOTE — H&P ADULT - NSICDXPASTSURGICALHX_GEN_ALL_CORE_FT
PAST SURGICAL HISTORY:  H/O thyroidectomy     History of lumbar spinal fusion     History of other surgery percutaneous endoscopic left pleural cavity drain    History of other surgery gallbladder drain    History of tonsillectomy     S/P left oophorectomy

## 2022-01-24 NOTE — ED PROVIDER NOTE - NSICDXPASTMEDICALHX_GEN_ALL_CORE_FT
PAST MEDICAL HISTORY:  Cholecystitis     Fracture L2 pathologic fracture    Hypothyroidism     Lung cancer has a drain left lung    Osteoarthritis

## 2022-01-24 NOTE — ED PROVIDER NOTE - CARE PLAN
Principal Discharge DX:	Breath shortness  Secondary Diagnosis:	Pleural effusion  Secondary Diagnosis:	Lung cancer metastatic to bone   1

## 2022-01-24 NOTE — H&P ADULT - NSICDXPASTMEDICALHX_GEN_ALL_CORE_FT
PAST MEDICAL HISTORY:  Cholecystitis     Fracture L2 pathologic fracture    Hypothyroidism     Lung cancer adenocarcinoma    Osteoarthritis

## 2022-01-24 NOTE — ED ADULT NURSE REASSESSMENT NOTE - NS ED NURSE REASSESS COMMENT FT1
chest tube unclamped by CT MELODY Moore.  Patient resting comfortably.  Updated on POC, awaiting room.

## 2022-01-24 NOTE — H&P ADULT - HISTORY OF PRESENT ILLNESS
74 y/o female with PMH of Hypothyroidism and L2 pathologic compression fracture presents to  with 1 month history of dyspnea and 1 week history of the dry cough. Patient was seen by ortho spine surgeon, pulmonologist and oncologist and send for evaluation of left sided pleural effusion with concern for lung mass. Pt reports onset of back pain was in March 2020, received cortisone shots and got an MRI of L2 and blood test x2 weeks ago. Pt went to Dr. Westfall (oncologist) and was sent for imaging with Dr. Sanchez (pulmonologist) who discussed results of CT abdomen and chest this AM with pt and told pt to come to ED for evaluation for SOB.  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 Patient reports cold symptoms 2 weeks ago - cough, nasal congestion , some lose bowel movements which improved but she started to have dyspnea on exertion, which become worse in last 2 days. Patient denies cp, abdominal pain, fever, palpitations, but feels very anxious.     In ED - Vitals reviewed T(F): 97.7 HR: 68  (68 - 86) BP: 113/58 (113/58 - 139/66) RR: 29  (22 - 29) SpO2: 94%  (86% - 97%) < from: CT Angio Chest PE Protocol w/ IV Cont (01.24.22 @ 13:04) >  IMPRESSION:    No pulmonary embolism.    Since October 2020:    Medial left upper lobe density, corresponding to known lung cancer,   although difficult to compare to prior given new mild left lung volume   loss and likely radiotherapy changes.    New ill-defined 0.8 cm left upper lobe nodule. A previously described   left upper lobe nodule that was new on prior has resolved.    Overall decreased volume of left pleural effusion, however now with thin   circumferential components and mild new nodularity.    New moderate nonloculated right pleural effusion.    As it is difficult to differentiate posttreatment changes versus disease   progression on this exam, follow-up in 8-12 weeks may help clarify.    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 Patient reports cold symptoms 2 weeks ago - cough, nasal congestion , some lose bowel movements which improved but she started to have dyspnea on exertion, which become worse in last 2 days. Patient denies cp, abdominal pain, fever, palpitations, but feels very anxious.     In ED - Vitals reviewed T(F): 97.7 HR: 68  (68 - 86) BP: 113/58 (113/58 - 139/66) RR: 29  (22 - 29) SpO2: 94%  (86% - 97%) CT Angio Chest PE Protocol w/ IV Cont   IMPRESSION: No pulmonary embolism.  Since October 2020: Medial left upper lobe density, corresponding to known lung cancer,  although difficult to compare to prior given new mild left lung volume loss and likely radiotherapy changes. New ill-defined 0.8 cm left upper lobe nodule. A previously described  left upper lobe nodule that was new on prior has resolved. Overall decreased volume of left pleural effusion, however now with thin circumferential components and mild new nodularity. New moderate nonloculated right pleural effusion. As it is difficult to differentiate posttreatment changes versus disease  progression on this exam, follow-up in 8-12 weeks may help clarify.   s/p right chest tube placement           77 y/o female with PMH of Metastatic adenocarcinoma of the lung to the spine, liver and brain ,   Hypothyroidism s/p thyroid resection, h/o  L2 pathologic compression fracture, GERD presents to  on 1/24/22 with 1 week history of dyspnea. Patient reports cold symptoms 2 weeks ago - cough, nasal congestion , some lose bowel movements which improved but she started to have dyspnea on exertion, which become worse in last 2 days. Patient denies cp, abdominal pain, fever, palpitations, but feels very anxious.     In ED - Vitals reviewed T(F): 97.7 HR: 68  (68 - 86) BP: 113/58 (113/58 - 139/66) RR: 29  (22 - 29) SpO2: 94%  (86% - 97%) CT Angio Chest PE Protocol w/ IV Cont   IMPRESSION: No pulmonary embolism.  Since October 2020: Medial left upper lobe density, corresponding to known lung cancer,  although difficult to compare to prior given new mild left lung volume loss and likely radiotherapy changes. New ill-defined 0.8 cm left upper lobe nodule. A previously described  left upper lobe nodule that was new on prior has resolved. Overall decreased volume of left pleural effusion, however now with thin circumferential components and mild new nodularity. New moderate nonloculated right pleural effusion. As it is difficult to differentiate posttreatment changes versus disease  progression on this exam, follow-up in 8-12 weeks may help clarify.   Recent PET scan - showed new T3, T6 lesion with possible cord compression  s/p right chest tube placement

## 2022-01-24 NOTE — CONSULT NOTE ADULT - SUBJECTIVE AND OBJECTIVE BOX
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 Patient reports cold symptoms 2 weeks ago - cough, nasal congestion , some lose bowel movements which improved but she started to have dyspnea on exertion, which become worse in last 2 days. Patient denies cp, abdominal pain, fever, palpitations, but feels very anxious.     In ED - Vitals reviewed T(F): 97.7 HR: 68  (68 - 86) BP: 113/58 (113/58 - 139/66) RR: 29  (22 - 29) SpO2: 94%  (86% - 97%) CT Angio Chest PE Protocol w/ IV Cont   IMPRESSION: No pulmonary embolism.  Since October 2020: Medial left upper lobe density, corresponding to known lung cancer,  although difficult to compare to prior given new mild left lung volume loss and likely radiotherapy changes. New ill-defined 0.8 cm left upper lobe nodule. A previously described  left upper lobe nodule that was new on prior has resolved. Overall decreased volume of left pleural effusion, however now with thin circumferential components and mild new nodularity. New moderate nonloculated right pleural effusion. As it is difficult to differentiate posttreatment changes versus disease  progression on this exam, follow-up in 8-12 weeks may help clarify.     Pt seen at bedside. Very anxious. s/p Rt Pigtail placed >1000 cc drained     PAST MEDICAL & SURGICAL HISTORY:  Hypothyroidism    Fracture  L2 pathologic fracture    Osteoarthritis    Cholecystitis    Lung cancer  adenocarcinoma    History of other surgery  percutaneous endoscopic left pleural cavity drain    History of other surgery  gallbladder drain    H/O thyroidectomy    History of lumbar spinal fusion    S/P left oophorectomy    History of tonsillectomy        MEDICATIONS  (STANDING):  calcium carbonate    500 mG (Tums) Chewable 1 Tablet(s) Chew daily  heparin   Injectable 5000 Unit(s) SubCutaneous every 12 hours  levothyroxine 100 MICROGram(s) Oral daily  liothyronine 5 MICROGram(s) Oral daily  multivitamin 1 Tablet(s) Oral daily  pantoprazole    Tablet 40 milliGRAM(s) Oral before breakfast  sodium chloride 0.9%. 1000 milliLiter(s) (50 mL/Hr) IV Continuous <Continuous>    MEDICATIONS  (PRN):  acetaminophen     Tablet .. 650 milliGRAM(s) Oral every 6 hours PRN Temp greater or equal to 38C (100.4F), Mild Pain (1 - 3)  ALBUTerol    90 MICROgram(s) HFA Inhaler 2 Puff(s) Inhalation every 6 hours PRN Shortness of Breath and/or Wheezing  ALPRAZolam 0.25 milliGRAM(s) Oral every 6 hours PRN anxiety  aluminum hydroxide/magnesium hydroxide/simethicone Suspension 30 milliLiter(s) Oral every 6 hours PRN Dyspepsia  HYDROmorphone  Injectable 0.5 milliGRAM(s) IV Push every 4 hours PRN Severe Pain (7 - 10)  melatonin 3 milliGRAM(s) Oral at bedtime PRN Insomnia  ondansetron Injectable 4 milliGRAM(s) IV Push every 8 hours PRN Nausea and/or Vomiting  oxyCODONE    IR 5 milliGRAM(s) Oral every 4 hours PRN Moderate Pain (4 - 6)    Antiplatelet therapy:         HSQ                  Last dose/amt:    Allergies    adhesives (Rash)  amoxicillin (Other; Diarrhea)  statins (Muscle Pain)    Intolerances    morphine (Nausea)      SOCIAL HISTORY:  Smoker: [ ] Yes  [ x] No        PACK YEARS:                         WHEN QUIT?  ETOH use: [ ] Yes  [ x] No              FREQUENCY / QUANTITY:  Ilicit Drug use:  [ ] Yes  [ ] No  Occupation:  Live with:    Assisted device use: cane     FAMILY HISTORY:  FH: thyroid disease (Mother)        Review of Systems  CONSTITUTIONAL:  Fevers[ ] chills[ ] sweats[ ] fatigue[ ] weight loss[x ] weight gain [ ]                                     NEGATIVE [ ]   NEURO:  parathesias[ ] seizures [ ]  syncope [ ]  confusion [ ]                                                                                NEGATIVE[ x]   EYES: glasses[ ]  blurry vision[ ]  discharge[ ] pain[ ] glaucoma [ ]                                                                          NEGATIVEx[ ]   ENMT:  difficulty hearing [ ]  vertigo[ ]  dysphagia[ ] epistaxis[ ] recent dental work [ ]                                    NEGATIVE[ x]   CV:  chest pain[ ] palpitations[ ] CEDILLO [ x] diaphoresis [ ]                                                                                           NEGATIVE[ ]   RESPIRATORY:  wheezing[ ] SOB[ x] cough [ ] sputum[ ] hemoptysis[ ]                                                                  NEGATIVE[ ]   GI:  nausea[ ]  vomiting [ ]  diarrhea[ ] constipation [ ] melena [ ]                                                                      NEGATIVE[x ]   : hematuria[ ]  dysuria[ ] urgency[ ] incontinence[ ]                                                                                            NEGATIVEx[ ]   MUSCULOSKELETAL  arthritis[ ]  joint swelling [ ] muscle weakness [x ]                                                                NEGATIVE[ ]   SKIN/BREAST:  rash[ ] itching [ ]  hair loss[ ] masses[ ]                                                                                              NEGATIVE[ ]   PSYCH:  dementia [ ] depression [ x] anxiety[xx ]                                                                                                               NEGATIVE[ ]   HEME/LYMPH:  bruises easily[ ] enlarged lymph nodes[ ] tender lymph nodes[ ]                                        ENDOCRINE:  cold intolerance[ ] heat intolerance[ ] polydipsia[ ]                                                                          NEGATIVE[x ]     PHYSICAL EXAM  Vital Signs Last 24 Hrs  T(C): 36.5 (24 Jan 2022 10:30), Max: 36.5 (24 Jan 2022 10:30)  T(F): 97.7 (24 Jan 2022 10:30), Max: 97.7 (24 Jan 2022 10:30)  HR: 68 (24 Jan 2022 14:37) (68 - 86)  BP: 113/58 (24 Jan 2022 14:37) (113/58 - 139/66)  BP(mean): --  RR: 29 (24 Jan 2022 11:47) (22 - 29)  SpO2: 94% (24 Jan 2022 14:37) (86% - 97%)    CONSTITUTIONAL:                                                                          WNL[x ]   Neuro: WNL[x ] Normal exam oriented to person/place & time with no focal motor or sensory  deficits. Other       very anxious               Eyes: WNL[ x]   Normal exam of conjunctiva & lids, pupils equally reactive. Other     ENT: WNL[x ]    Normal exam of nasal/oral mucosa with absence of cyanosis. Other  Neck: WNL[ x]  Normal exam of jugular veins, trachea & thyroid. Other  Chest: WNL[ ] Normal lung exam with good air movement absence of wheezes, rales, or rhonchi: Other  decreased RT> LT                                                                              CV:  Auscultation: normal [x ] S3[ ] S4[ ] Irregular [ ] Rub[ ] Clicks[ ]    Murmurs none:[x ]systolic [ ]  diastolic [ ] holosystolic [ ]  Carotids: No Bruits[ x] Other                 Abdominal Aorta: normal [ ] nonpalpable[ ]Other                                                                                      GI:           WNL[ x] Normal exam of abdomen, liver & spleen with no noted masses or tenderness. Other                                                                                                        Extremities: WNL[ ] Normal no evidence of cyanosis or deformity Edema: none[ ]trace[ x]1+[ ]2+[ ]3+[ ]4+[ ]  Lower Extremity Pulses: Right2+[ ] Left[2+ ]Varicosities[ ]  SKIN :WNL[x ] Normal exam to inspection & palation. Other:                                                          LABS:                        14.2   8.41  )-----------( 321      ( 24 Jan 2022 11:03 )             42.6     01-24    136  |  103  |  23  ----------------------------<  131<H>  3.6   |  26  |  1.38<H>    Ca    8.0<L>      24 Jan 2022 11:03    TPro  6.3  /  Alb  2.5<L>  /  TBili  0.7  /  DBili  x   /  AST  21  /  ALT  22  /  AlkPhos  104  01-24    PT/INR - ( 24 Jan 2022 11:03 )   PT: 13.1 sec;   INR: 1.14 ratio         PTT - ( 24 Jan 2022 11:03 )  PTT:29.6 sec            < from: CT Angio Chest PE Protocol w/ IV Cont (01.24.22 @ 13:04) >      IMPRESSION:    No pulmonary embolism.    Since October 2020:    Medial left upper lobe density, corresponding to known lung cancer,   although difficult to compare to prior given new mild left lung volume   loss and likely radiotherapy changes.    New ill-defined 0.8 cm left upper lobe nodule. A previously described   left upper lobe nodule that was new on prior has resolved.    Overall decreased volume of left pleural effusion, however now with thin   circumferential components and mild new nodularity.    New moderate nonloculated right pleural effusion.    As it is difficult to differentiate posttreatment changes versus disease   progression on this exam, follow-up in 8-12 weeks may help clarify.    < end of copied text >

## 2022-01-24 NOTE — CONSULT NOTE ADULT - ATTENDING COMMENTS
Patient seen and examined. Admitted with shortness of breath and thoracic surgery consulted for right likely malignant pleural effusion. She is known to thoracic surgery from previous admissions. She was diagnosed with stage IV adenocarcinoma of the lung in 7/2020. She had a previously placed left pleurx catheter which is now out.  She denies any fevers, chills, cough, sputum production, leg swelling, pleuritic chest pain. She has had shortness of breath with exertion for the last 7-10 days at home. She lives at home with . She also has metastatic lesions to the thoracic spine.     Discussed her care with oncology and patient was recommended to undergo right vats with insertion of pleurX catheter and mediport insertion at the same setting. Risks of the procedure discussed were: infection, bleeding, injury to lung or major blood vessel.     Will plan for OR either tomorrow or Thursday.

## 2022-01-24 NOTE — ED ADULT NURSE NOTE - NSIMPLEMENTINTERV_GEN_ALL_ED
Implemented All Universal Safety Interventions:  [place] to call system. Call bell, personal items and telephone within reach. Instruct patient to call for assistance. Room bathroom lighting operational. Non-slip footwear when patient is off stretcher. Physically safe environment: no spills, clutter or unnecessary equipment. Stretcher in lowest position, wheels locked, appropriate side rails in place.

## 2022-01-24 NOTE — PHARMACOTHERAPY INTERVENTION NOTE - COMMENTS
Medication reconciliation completed.  Reviewed Medication list and confirmed med allergies with patient; confirmed with Dr. First Medhernandez. Medication reconciliation completed.  Reviewed Medication list and confirmed med allergies with patient; confirmed with Dr. First MedHx.  Pt has difficulty swallowing "horse pills."  Pt has Tabrecta with her, confirms that we can use her supply if needed.

## 2022-01-24 NOTE — H&P ADULT - NSHPPHYSICALEXAM_GEN_ALL_CORE
PHYSICAL EXAM:    Constitutional: NAD, awake and alert   HEENT: PERR, EOMI, Normal Hearing, MMM  Neck: Soft and supple, No LAD, No JVD  Respiratory: Breath sounds decreased at bases R> L , + occasional  wheezing, no rales or rhonchi, + right chest tube   Cardiovascular: S1 and S2, regular rate and rhythm, no Murmurs, gallops or rubs  Gastrointestinal: Bowel Sounds present, soft, nontender, nondistended, no guarding, no rebound  Extremities: No peripheral edema  Vascular: 2+ peripheral pulses  Neurological: A/O x 3, no focal deficits  Musculoskeletal: 5/5 strength b/l upper and lower extremities  Skin: No rashes  rectal : deferred, not indicated

## 2022-01-24 NOTE — CONSULT NOTE ADULT - SUBJECTIVE AND OBJECTIVE BOX
Patient seen and examined at bedside. Full note for follow.   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 Patient reports cold symptoms 2 weeks ago - cough, nasal congestion , some lose bowel movements which improved but she started to have dyspnea on exertion, which become worse in last 2 days.    Ortho consulted because of an OP PET scan showing lesion in upper thoracic spine. Denies trauma. Denies back pain, Denies bowel or bladder symptoms, saddle anesthesia. Patient denies cp, fever.     In ED - Vitals reviewed T(F): 97.7 HR: 68  (68 - 86) BP: 113/58 (113/58 - 139/66) RR: 29  (22 - 29) SpO2: 94%  (86% - 97%)     (24 Jan 2022 14:52)      HEALTH ISSUES - PROBLEM Dx:    PAST MEDICAL & SURGICAL HISTORY:  Hypothyroidism    Fracture  L2 pathologic fracture    Osteoarthritis    Cholecystitis    Lung cancer  adenocarcinoma    History of other surgery  percutaneous endoscopic left pleural cavity drain    History of other surgery  gallbladder drain    H/O thyroidectomy    History of lumbar spinal fusion    S/P left oophorectomy    History of tonsillectomy        Allergies: adhesives (Rash)  amoxicillin (Other; Diarrhea)  morphine (Nausea)  statins (Muscle Pain)      Vital Signs Last 24 Hrs  T(C): 36.6 (24 Jan 2022 18:19), Max: 36.6 (24 Jan 2022 18:19)  T(F): 97.8 (24 Jan 2022 18:19), Max: 97.8 (24 Jan 2022 18:19)  HR: 84 (24 Jan 2022 18:19) (68 - 86)  BP: 134/60 (24 Jan 2022 18:19) (113/58 - 139/66)  BP(mean): --  RR: 18 (24 Jan 2022 18:19) (18 - 29)  SpO2: 95% (24 Jan 2022 18:19) (86% - 97%)    Physical Exam:  Appearance: Alert, responsive, in Mild respiratory distress.  Musculoskeletal:      PHYSICAL EXAM  GEN: NAD, AAOx3    SPINE:  Skin intact    NTTP over the bony prominences of the cervical // thoracic // lumbar // sacral spine  - Paraspinal TTP of the cervical // thoracic // lumbar // sacaral spine  No bony step-offs  Grossly moving all extremities  + Radial Pulse  + DP/PT Pulses  No saddle anesthesia    Motor:                          Deltoid       Biceps      Triceps      Wrist Ext      Finger Flex    Finger Abduction   RIGHT             5/5             5/5             5/5             5/5                 5/5                     5/5  LEFT                5/5             5/5             5/5             5/5                 5/5                     5/5                          Hip Flex       Knee Ext        Knee Flex     Dorsiflex      Hallux Ext        PlantarFlex  RIGHT            5/5                5/5                 5/5               5/5                 5/5                    5/5  LEFT               5/5                5/5                 5/5               5/5                 5/5                    5/5      Sensory:                      C5      C6      C7      C8       T1          RIGHT          2         2        2         2         2          (0=absent, 1=impaired, 2=normal, NT=not testable)  LEFT             2         2        2         2         2          (0=absent, 1=impaired, 2=normal, NT=not testable)                        L2        L3       L4      L5       S1          RIGHT        2          2         2        2        2           (0=absent, 1=impaired, 2=normal, NT=not testable)  LEFT           2          2         2        2        2           (0=absent, 1=impaired, 2=normal, NT=not testable)    Negative Burciaga's sign bilaterally  Negative Babinski bilaterally   Negative Myoclonus bilaterally      Labs:                        14.2   8.41  )-----------( 321      ( 24 Jan 2022 11:03 )             42.6     01-24    136  |  103  |  23  ----------------------------<  131<H>  3.6   |  26  |  1.38<H>    Ca    8.0<L>      24 Jan 2022 11:03    TPro  6.3  /  Alb  2.5<L>  /  TBili  0.7  /  DBili  x   /  AST  21  /  ALT  22  /  AlkPhos  104  01-24      Radiology:    CT Angio Chest PE Protocol w/ IV Cont   IMPRESSION: No pulmonary embolism.  Since October 2020: Medial left upper lobe density, corresponding to known lung cancer,  although difficult to compare to prior given new mild left lung volume loss and likely radiotherapy changes. New ill-defined 0.8 cm left upper lobe nodule. A previously described  left upper lobe nodule that was new on prior has resolved. Overall decreased volume of left pleural effusion, however now with thin circumferential components and mild new nodularity. New moderate nonloculated right pleural effusion. As it is difficult to differentiate posttreatment changes versus disease  progression on this exam, follow-up in 8-12 weeks may help clarify.   s/p right chest tube placement         Impression:  Pt is a  76y Female with Suspected metastatic lesion in thoracic spine, pending MR imaging    Plan:  - Patient known to have prior mets to spine at L2  - Clinical exam not indicative of acute cord compression  - Medicine ordering MR w/wo thoracic spine for further workup  - Will update plan based on imaging  - Appreciate medical management  - Pain management  - DVT ppx with venodynes  - Case d/w Dr. George who agrees with above plan

## 2022-01-24 NOTE — ED PROVIDER NOTE - CLINICAL SUMMARY MEDICAL DECISION MAKING FREE TEXT BOX
77 y/o pt p/w SOB, likely from pleural effusion. also with mass on upper T spine. Plan: check labs, CT scan and likely admission.

## 2022-01-24 NOTE — CONSULT NOTE ADULT - TIME BILLING
I attest that I have spent 55 min of time interviewing, reviewing chart, and plan of care with oncology, and arranging time with OR.

## 2022-01-24 NOTE — ED PROVIDER NOTE - NSICDXFAMILYHX_GEN_ALL_CORE_FT
FAMILY HISTORY:  Mother  Still living? Unknown  FH: thyroid disease, Age at diagnosis: Age Unknown

## 2022-01-24 NOTE — ED ADULT TRIAGE NOTE - CHIEF COMPLAINT QUOTE
Patient complains of shortness of breath, reportedly has fluid in right lung.  Isaiah Aragon Mankikar treating patient.

## 2022-01-24 NOTE — ED PROVIDER NOTE - PROGRESS NOTE DETAILS
Saroj Hoyt: Dr. Khoi hampton Saroj Hoyt: Dr. Laura hampton Saroj Hoyt: Discussed with Dr. Sanchez pt with PET scan on 1/20 that showed pleural effusion on L side and concern for mass and possible cord compression T3-T6. Will plan to reconsult Dr. Stein. Pt will likely need MRI to evaluate for spinal cord lesions. Saroj Hoyt: discussed with Dr. Westfall who agrees with plan to re-involve Dr. Stein for pleural effusion and admission for workup of thoracic spine mass. Attending Dr. Don Hoyt paged for spine consult Saroj Hoyt: thoracic surgery called. discussed with the PA. Dr. Stein's team will see pt. Saroj Hoyt: discussed with Dr. Calle  for admission, suggests CTA chest given pt SOB and can admit to her service Attending Hoyt, thoracic surgery at bedside and will place pig tail right side. Attending Lai, pavel/w Dr. George and reviewed ct scan.  Does not see anything account.  Suggest MRI when pt able to.

## 2022-01-24 NOTE — ED PROVIDER NOTE - OBJECTIVE STATEMENT
76 F PMH lung CA, Hypothyroidism, L2 pathologic compression fracture, lung CA, OA, cholecystitis here c/o  worsening SOB x 1 week in setting of known pleural effusion. Pt states that she has had to have prior pleural effusions drained; last one was performed by Dr. Stein. Pt follows with pulmonologist, Dr. Sanchez who told pt to come to [facility] ED today. Pt had PET scan done last week. Pt denies pain, chest discomfort, N/V/D. pt states she is still receiving immunotherapy for lung CA. Heme/Oncologist: Dr. Westfall

## 2022-01-24 NOTE — PROGRESS NOTE ADULT - SUBJECTIVE AND OBJECTIVE BOX
Patient seen and evaluated  Chart reviewed  Patient well known to me    HX of metastatic Lung CA to Spine  Diagnosed in 7/20  Underwent urgent decompression and fusion for pathologic fracture at L2    Did well with this surgery and was stable at last office visit    Presented to ED with new SOB - found to have RIGHT pleural effusion (Previous effusion was left sided)    Had chest tube placed    CT scan reviewed - no obvious lytic lesions noted on thoracic spine    She has no new back complaints, no new neurological findings or complaints in LE's  No back tenderness or masses palpated  No new weakness in LE (Baseline has some left quad weakness from original pathology)     - Stable exam   - No new thoracic/spinal complaints complaints   - New right pleural effusion - as per thoracic surgery and medicine   - Recommend thoracic MRI when stable to assess for additional metastatic disease (no obvious fractures)    GIULIANO George MD

## 2022-01-24 NOTE — ED ADULT NURSE NOTE - CHIEF COMPLAINT QUOTE
Patient complains of shortness of breath, reportedly has fluid in right lung.  Isaiah Aragno Mankikar treating patient.

## 2022-01-25 NOTE — PROGRESS NOTE ADULT - ASSESSMENT
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 Patient reports cold symptoms 2 weeks ago - cough, nasal congestion , some lose bowel movements which improved but she started to have dyspnea on exertion, which become worse in last 2 days.   s/p right chest tube placement in ED     Progressive dyspnea due to New moderate right pleural effusion likely malignant with  underlying Metastatic adenocarcinoma of Lung ( INDU) , ruled out PE   New 0.8 cm INDU nodule   - CT surgery consult for pleural effusion management  - 1/24 s/p Right chest tube placement 800 cc drained   - oncology consult Dr. Westfall - discuss the case , advised to stop tabrecta, start folic acid daily, will need different Rx and possible radiation to the spine   - pain management - dilaudid, oxycodone  - pulmonology consult Dr. Sanchez   - oncology radiology consult  - pleural fluid analysis - by Light's criteria rule consistent with exudate, await cytology   T4-T6 new lesion on recent PET , multiple thorasic spine metastasis ruled out  cord compression  - MRI of thoracic spine w/wo contrast  with pre-medication,  - pt claustrophobic ativan 0.5, may repeat 0.5 mg as  2 nd dose if not effective    - radiation oncology for possible need of rad therapy consult - pending   - neuro checks q4h - will stop   - ortho spine consult  - no surgical intervention,  consider rad onc consult   OSMAN  with baseline Cr 0.8, resolved  - hold lasix, trend cr , s/p IV fluids 50 cc for 10 hours   Hypothyroidism - c/w home meds   Hypocalcemia - c/w calcium once daily, monitor , check vit D   GERD - c/w PPI   Anxiety - xanax prn   Anorexia, depression , insomnia - start remeron 7.5     Advanced directives  - discussed advanced directive for 17 min  - MOLST form discussed   - palliative team consult   - FULL code    DVT proph -heparin q12h d/w CT surgery team       77 y/o female with PMH of 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 Patient reports cold symptoms 2 weeks ago - cough, nasal congestion , some lose bowel movements which improved but she started to have dyspnea on exertion, which become worse in last 2 days.   s/p right chest tube placement in ED     Progressive dyspnea due to New moderate right pleural effusion likely malignant with  underlying Metastatic adenocarcinoma of Lung ( INDU) , ruled out PE   New 0.8 cm INDU nodule   - CT surgery consult for pleural effusion management  - 1/24 s/p Right chest tube placement 800 cc drained   - oncology consult Dr. Westfall - discuss the case , advised to stop tabrecta, start folic acid daily, will need different Rx and possible radiation to the spine   - pain management - dilaudid, oxycodone  - pulmonology consult Dr. Sanchez   - oncology radiology consult  - pleural fluid analysis - by Light's criteria rule consistent with exudate, await cytology   T4-T6 new lesion on recent PET , multiple thorasic spine metastasis ruled out  cord compression  - MRI of thoracic spine w/wo contrast  with pre-medication,  - pt claustrophobic ativan 0.5, may repeat 0.5 mg as  2 nd dose if not effective    - radiation oncology for possible need of rad therapy consult - pending   - neuro checks q4h - will stop   - ortho spine consult  - no surgical intervention,  consider rad onc consult   OSMAN  with baseline Cr 0.8, resolved  - hold lasix, trend cr , s/p IV fluids 50 cc for 10 hours   Leg edema , bilateral pleural effusions r/o CHF   - ekg - NSR, no ischemic changes, low voltage QRS, slightly elevated BNP  - 2 d echo - pending   - hold lasix  Hypothyroidism - c/w home meds   Hypocalcemia - c/w calcium once daily, monitor , check vit D   GERD - c/w PPI   Anxiety - xanax prn   Anorexia, depression , insomnia - start remeron 7.5     Advanced directives  - discussed advanced directive for 17 min  - MOLST form discussed   - palliative team consult   - FULL code    DVT proph -heparin q12h d/w CT surgery team

## 2022-01-25 NOTE — PHYSICAL THERAPY INITIAL EVALUATION ADULT - PERTINENT HX OF CURRENT PROBLEM, REHAB EVAL
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 Pt found to have a moderate RT pleural effusion

## 2022-01-25 NOTE — CONSULT NOTE ADULT - SUBJECTIVE AND OBJECTIVE BOX
HPI: Pt is a 76y old Female with hx of       PAIN: ( )Yes   ( )No  Level:  Location:  Intensity:    /10  Quality:  Aggravating Factors:  Alleviating Factors:  Radiation:  Duration/Timing:  Impact on ADLs:    DYSPNEA: ( ) Yes  ( ) No  Level:    PAST MEDICAL & SURGICAL HISTORY:  Hypothyroidism    Fracture  L2 pathologic fracture    Osteoarthritis    Cholecystitis    Lung cancer  adenocarcinoma    History of other surgery  percutaneous endoscopic left pleural cavity drain    History of other surgery  gallbladder drain    H/O thyroidectomy    History of lumbar spinal fusion    S/P left oophorectomy    History of tonsillectomy        SOCIAL HX:    Hx opiate tolerance ( )YES  ( )NO    Baseline ADLs  (Prior to Admission)  ( ) Independent   ( )Dependent    FAMILY HISTORY:  FH: thyroid disease (Mother)        Review of Systems:    Anxiety-  Depression-  Physical Discomfort-  Dyspnea-  Constipation-  Diarrhea-  Nausea-  Vomiting-  Anorexia-  Weight Loss-   Cough-  Secretions-  Fatigue-  Weakness-  Delirium-    All other systems reviewed and negative  Unable to obtain/Limited due to:      PHYSICAL EXAM:    Vital Signs Last 24 Hrs  T(C): 36.6 (25 Jan 2022 15:12), Max: 36.9 (25 Jan 2022 05:42)  T(F): 97.8 (25 Jan 2022 15:12), Max: 98.5 (25 Jan 2022 05:42)  HR: 75 (25 Jan 2022 15:12) (72 - 84)  BP: 102/49 (25 Jan 2022 15:12) (102/49 - 134/60)  BP(mean): --  RR: 18 (25 Jan 2022 15:12) (18 - 19)  SpO2: 94% (25 Jan 2022 15:12) (94% - 97%)  Daily     Daily     PPSV2:   %  FAST:    General:  Mental Status:  HEENT:  Lungs:  Cardiac:  GI:  :  Ext:  Neuro:      LABS:                        12.4   7.99  )-----------( 285      ( 25 Jan 2022 07:15 )             37.4     01-25    140  |  106  |  22  ----------------------------<  94  4.0   |  27  |  1.08    Ca    7.0<L>      25 Jan 2022 07:15  Phos  5.5     01-25  Mg     2.3     01-25    TPro  5.0<L>  /  Alb  1.9<L>  /  TBili  0.6  /  DBili  x   /  AST  17  /  ALT  18  /  AlkPhos  83  01-25    PT/INR - ( 24 Jan 2022 11:03 )   PT: 13.1 sec;   INR: 1.14 ratio         PTT - ( 24 Jan 2022 11:03 )  PTT:29.6 sec  Albumin: Albumin, Serum: 1.9 g/dL (01-25 @ 07:15)      Allergies    adhesives (Rash)  amoxicillin (Other; Diarrhea)  statins (Muscle Pain)    Intolerances    morphine (Nausea)    MEDICATIONS  (STANDING):  calcium carbonate    500 mG (Tums) Chewable 1 Tablet(s) Chew daily  folic acid 1 milliGRAM(s) Oral daily  heparin   Injectable 5000 Unit(s) SubCutaneous every 12 hours  levothyroxine 100 MICROGram(s) Oral daily  liothyronine 5 MICROGram(s) Oral daily  mirtazapine 7.5 milliGRAM(s) Oral at bedtime  multivitamin 1 Tablet(s) Oral daily  pantoprazole    Tablet 40 milliGRAM(s) Oral before breakfast  sodium chloride 0.9%. 1000 milliLiter(s) (50 mL/Hr) IV Continuous <Continuous>    MEDICATIONS  (PRN):  acetaminophen     Tablet .. 650 milliGRAM(s) Oral every 6 hours PRN Temp greater or equal to 38C (100.4F), Mild Pain (1 - 3)  ALBUTerol    90 MICROgram(s) HFA Inhaler 2 Puff(s) Inhalation every 6 hours PRN Shortness of Breath and/or Wheezing  ALPRAZolam 0.25 milliGRAM(s) Oral every 6 hours PRN anxiety  aluminum hydroxide/magnesium hydroxide/simethicone Suspension 30 milliLiter(s) Oral every 6 hours PRN Dyspepsia  HYDROmorphone  Injectable 0.5 milliGRAM(s) IV Push every 4 hours PRN Severe Pain (7 - 10)  LORazepam   Injectable 0.5 milliGRAM(s) IV Push every 15 minutes PRN Anxiety  melatonin 3 milliGRAM(s) Oral at bedtime PRN Insomnia  ondansetron   Disintegrating Tablet 4 milliGRAM(s) Oral every 6 hours PRN Nausea and/or Vomiting  oxyCODONE    IR 5 milliGRAM(s) Oral every 4 hours PRN Moderate Pain (4 - 6)      RADIOLOGY/ADDITIONAL STUDIES:     HPI: Pt is a 76y old Female with hx of 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 Patient reports cold symptoms 2 weeks ago - cough, nasal congestion , some lose bowel movements which improved but she started to have dyspnea on exertion, which become worse in last 2 days.  Recent PET scan - showed new T3, T6 lesion with possible cord compression s/p right chest tube placement in ED. Palliative medicine consulted to help establish GOC and advance care planning         PAIN: (x )Yes   ( )No  Level: moderate - severe  Location: right side   Intensity:  5-8  /10  Quality: sharp and throbbing   Aggravating Factors: movement   Alleviating Factors: medication   Radiation: at times down the side   Duration/Timing: intermittent   Impact on ADLs: breathing     DYSPNEA: ( ) Yes  ( x) No  Level: not at this time     PAST MEDICAL & SURGICAL HISTORY:  Hypothyroidism  Fracture L2 pathologic fracture  Osteoarthritis  Cholecystitis  Lung cancer adenocarcinoma  History of other surgery percutaneous endoscopic left pleural cavity drain  History of other surgery gallbladder drain  H/O thyroidectomy  History of lumbar spinal fusion  S/P left oophorectomy  History of tonsillectomy    SOCIAL HX: lives at home with      Hx opiate tolerance ( )YES  (x )NO    Baseline ADLs  (Prior to Admission)  (x ) Independent   ( )Dependent    FAMILY HISTORY:  FH: thyroid disease (Mother)    Review of Systems:    Anxiety- severe  Depression- feels sad  Physical Discomfort- yes   Dyspnea- not at this time, breathing better since chest tube placed   Constipation- no   Diarrhea- no   Nausea- no   Vomiting- no   Anorexia- yes   Weight Loss-  yes   Cough- at times   Secretions- no   Fatigue- yes   Weakness- yes   Delirium- no     All other systems reviewed and negative    PHYSICAL EXAM:    Vital Signs Last 24 Hrs  T(C): 36.6 (25 Jan 2022 15:12), Max: 36.9 (25 Jan 2022 05:42)  T(F): 97.8 (25 Jan 2022 15:12), Max: 98.5 (25 Jan 2022 05:42)  HR: 75 (25 Jan 2022 15:12) (72 - 84)  BP: 102/49 (25 Jan 2022 15:12) (102/49 - 134/60)  RR: 18 (25 Jan 2022 15:12) (18 - 19)  SpO2: 94% (25 Jan 2022 15:12) (94% - 97%)    PPSV2: 60  %    General: tearful pleasant female in bed, NAD  Mental Status: alert and oriented, very anxious   HEENT: mmm   Lungs: diminished breath sounds   GI: non tender, nondistended , +BS   : + voding  Ext: mild edema in b/l hands   Neuro: intact     LABS:                        12.4   7.99  )-----------( 285      ( 25 Jan 2022 07:15 )             37.4     01-25    140  |  106  |  22  ----------------------------<  94  4.0   |  27  |  1.08    Ca    7.0<L>      25 Jan 2022 07:15  Phos  5.5     01-25  Mg     2.3     01-25    TPro  5.0<L>  /  Alb  1.9<L>  /  TBili  0.6  /  DBili  x   /  AST  17  /  ALT  18  /  AlkPhos  83  01-25  PT/INR - ( 24 Jan 2022 11:03 )   PT: 13.1 sec;   INR: 1.14 ratio    PTT - ( 24 Jan 2022 11:03 )  PTT:29.6 sec    Albumin: Albumin, Serum: 1.9 g/dL (01-25 @ 07:15)    Allergies    adhesives (Rash)  amoxicillin (Other; Diarrhea)  statins (Muscle Pain)    Intolerances    morphine (Nausea)    MEDICATIONS  (STANDING):  calcium carbonate    500 mG (Tums) Chewable 1 Tablet(s) Chew daily  folic acid 1 milliGRAM(s) Oral daily  heparin   Injectable 5000 Unit(s) SubCutaneous every 12 hours  levothyroxine 100 MICROGram(s) Oral daily  liothyronine 5 MICROGram(s) Oral daily  mirtazapine 7.5 milliGRAM(s) Oral at bedtime  multivitamin 1 Tablet(s) Oral daily  pantoprazole    Tablet 40 milliGRAM(s) Oral before breakfast  sodium chloride 0.9%. 1000 milliLiter(s) (50 mL/Hr) IV Continuous <Continuous>    MEDICATIONS  (PRN):  acetaminophen     Tablet .. 650 milliGRAM(s) Oral every 6 hours PRN Temp greater or equal to 38C (100.4F), Mild Pain (1 - 3)  ALBUTerol    90 MICROgram(s) HFA Inhaler 2 Puff(s) Inhalation every 6 hours PRN Shortness of Breath and/or Wheezing  ALPRAZolam 0.25 milliGRAM(s) Oral every 6 hours PRN anxiety  aluminum hydroxide/magnesium hydroxide/simethicone Suspension 30 milliLiter(s) Oral every 6 hours PRN Dyspepsia  HYDROmorphone  Injectable 0.5 milliGRAM(s) IV Push every 4 hours PRN Severe Pain (7 - 10)  LORazepam   Injectable 0.5 milliGRAM(s) IV Push every 15 minutes PRN Anxiety  melatonin 3 milliGRAM(s) Oral at bedtime PRN Insomnia  ondansetron   Disintegrating Tablet 4 milliGRAM(s) Oral every 6 hours PRN Nausea and/or Vomiting  oxyCODONE    IR 5 milliGRAM(s) Oral every 4 hours PRN Moderate Pain (4 - 6)      RADIOLOGY/ADDITIONAL STUDIES:      ACC: [id number] EXAM:  XR CHEST PORTABLE URGENT 1V                        ACC: [id number] EXAM:  XR CHEST PORTABLE URGENT 1V                        PROCEDURE DATE:  01/24/2022    INTERPRETATION:  Chest and follow-up study. Patient is short of breath.  AP chest on January 24, 2022 at 12:19 PM.  Heart magnified by technique. COPD configuration of the chest noted.  Present film shows a mild right base effusion and dense atelectasis off   the right lower hilum significantly increased fromJuly 24, 2020.  There is also scattered mild infiltrate emanating off the left hilum   proceeding inferiorly where there is a small left base pleural pulmonary   process roughly similar to prior. The present film shows apical   thickening on the left new since prior. Left chest tube on the earlier   study is been removed. Thoracolumbar hardware again noted.  Follow-up AP chest on January 24, 2022 at 3:07 PM.  A catheter right chest tube has been inserted. The prior right effusion   has almost totally drained with some residual atelectasis off the right   lower hilum. No pneumothorax. Stable left chest findings.    IMPRESSION: Mild to moderate right effusion largely drained with catheter   chest tube. Significant left lung findings are increased from July 2020.   COPD.    ACC: [id number] EXAM:  CT ANGIO CHEST PULM [facility]                        PROCEDURE DATE:  01/24/2022    INTERPRETATION:  INDICATION: Shortness of breath, stage IV lung cancer,   radiation to the left upper lobe completed in August 2020  TECHNIQUE: Volumetric images of the chest were obtained after the   administration of 90 mL of Omnipaque 350. Maximum intensity projection   images were generated.  COMPARISON: 10/19/2020.  FINDINGS:  PULMONARY ANGIOGRAM:  No pulmonary embolism.Normal caliber main   pulmonary artery.  LUNGS/AIRWAYS/PLEURA: Patent airways to the segmental bronchi. New mild   general left lung volume loss. Ill-defined medial left apex density   (2-41) at site of prior treated lesion. 0.8 cm irregular left upper lobe   nodule (2-47), without clear correlate on prior. Mild interlobular septal   thickening in the left upper and lower lobes. New middle lobar   atelectasis.  Overall decreased volume of small left pleural effusion, however new thin   component in the anterior and medial pleura and new 1.3 cm nodule along   the mediastinal pleura (2-52). New moderate nonloculated right pleural   effusion. Mild associated passive atelectasis in the lower lobes.  LYMPH NODES: Unremarkable.  HEART/VASCULATURE: Normal heart size. Unremarkable pericardium. Normal   caliber aorta.  UPPER ABDOMEN: Not well evaluated on this exam.  BONES/SOFT TISSUES: Stable multiple sclerotic bone lesions. Posterior   fusion hardware in the lumbar spine.    IMPRESSION:  No pulmonary embolism.  Since October 2020:  Medial left upper lobe density, corresponding to known lung cancer,   although difficult to compare to prior given new mild left lung volume   loss and likely radiotherapy changes.  New ill-defined 0.8 cm left upper lobe nodule. A previously described   left upper lobe nodule that was new on prior has resolved.  Overall decreased volume of left pleural effusion, however now with thin   circumferential components and mild new nodularity.  New moderate nonloculated right pleural effusion.  As it is difficult to differentiate posttreatment changes versus disease   progression on this exam, follow-up in 8-12 weeks may help clarify.      ACC: [id number] EXAM:  XR CHEST PORTABLE URGENT 1V                        ACC: [id number] EXAM:  XR CHEST PORTABLE URGENT 1V                        PROCEDURE DATE:  01/24/2022    INTERPRETATION:  Chest and follow-up study. Patient is short of breath.  AP chest on January 24, 2022 at 12:19 PM.  Heart magnified by technique. COPD configuration of the chest noted.  Present film shows a mild right base effusion and dense atelectasis off   the right lower hilum significantly increased fromJuly 24, 2020.  There is also scattered mild infiltrate emanating off the left hilum   proceeding inferiorly where there is a small left base pleural pulmonary   process roughly similar to prior. The present film shows apical   thickening on the left new since prior. Left chest tube on the earlier   study is been removed. Thoracolumbar hardware again noted.  Follow-up AP chest on January 24, 2022 at 3:07 PM.  A catheter right chest tube has been inserted. The prior right effusion   has almost totally drained with some residual atelectasis off the right   lower hilum. No pneumothorax. Stable left chest findings.    IMPRESSION: Mild to moderate right effusion largely drained with catheter   chest tube. Significant left lung findings are increased from July 2020.   COPD.         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 Patient reports cold symptoms 2 weeks ago - cough, nasal congestion , some lose bowel movements which improved but she started to have dyspnea on exertion, which become worse in last 2 days.  Recent PET scan - showed new T3, T6 lesion with possible cord compression s/p right chest tube placement in ED. Palliative medicine consulted to help establish GOC and advance care planning.     1/25/2022 patient seen and examined with no family at bedside, patient resting comfortably at this time was but was very tearful when I came in because she stated that she was doing so well and now she is back in the hospital.  Patient states that pain medication has been helping with pain, encouraged patient to take PO since oxycodone.  Patient states that she is very anxious and xanax helps usually but has been having more trouble sleeping since being in the hospital.         PAIN: (x )Yes   ( )No  Level: moderate - severe  Location: right side   Intensity:  5-8  /10  Quality: sharp and throbbing   Aggravating Factors: movement   Alleviating Factors: medication   Radiation: at times down the side   Duration/Timing: intermittent   Impact on ADLs: breathing     DYSPNEA: ( ) Yes  ( x) No  Level: not at this time     PAST MEDICAL & SURGICAL HISTORY:  Hypothyroidism  Fracture L2 pathologic fracture  Osteoarthritis  Cholecystitis  Lung cancer adenocarcinoma  History of other surgery percutaneous endoscopic left pleural cavity drain  History of other surgery gallbladder drain  H/O thyroidectomy  History of lumbar spinal fusion  S/P left oophorectomy  History of tonsillectomy    SOCIAL HX: lives at home with      Hx opiate tolerance ( )YES  (x )NO    Baseline ADLs  (Prior to Admission)  (x ) Independent   ( )Dependent    FAMILY HISTORY:  FH: thyroid disease (Mother)    Review of Systems:    Anxiety- severe  Depression- feels sad  Physical Discomfort- yes   Dyspnea- not at this time, breathing better since chest tube placed   Constipation- no   Diarrhea- no   Nausea- no   Vomiting- no   Anorexia- yes   Weight Loss-  yes   Cough- at times   Secretions- no   Fatigue- yes   Weakness- yes   Delirium- no     All other systems reviewed and negative    PHYSICAL EXAM:    Vital Signs Last 24 Hrs  T(C): 36.6 (25 Jan 2022 15:12), Max: 36.9 (25 Jan 2022 05:42)  T(F): 97.8 (25 Jan 2022 15:12), Max: 98.5 (25 Jan 2022 05:42)  HR: 75 (25 Jan 2022 15:12) (72 - 84)  BP: 102/49 (25 Jan 2022 15:12) (102/49 - 134/60)  RR: 18 (25 Jan 2022 15:12) (18 - 19)  SpO2: 94% (25 Jan 2022 15:12) (94% - 97%)    PPSV2: 60  %    General: tearful pleasant female in bed, NAD  Mental Status: alert and oriented, very anxious   HEENT: mmm   Lungs: diminished breath sounds   GI: non tender, nondistended , +BS   : + voding  Ext: mild edema in b/l hands   Neuro: intact     LABS:                        12.4   7.99  )-----------( 285      ( 25 Jan 2022 07:15 )             37.4     01-25    140  |  106  |  22  ----------------------------<  94  4.0   |  27  |  1.08    Ca    7.0<L>      25 Jan 2022 07:15  Phos  5.5     01-25  Mg     2.3     01-25    TPro  5.0<L>  /  Alb  1.9<L>  /  TBili  0.6  /  DBili  x   /  AST  17  /  ALT  18  /  AlkPhos  83  01-25  PT/INR - ( 24 Jan 2022 11:03 )   PT: 13.1 sec;   INR: 1.14 ratio    PTT - ( 24 Jan 2022 11:03 )  PTT:29.6 sec    Albumin: Albumin, Serum: 1.9 g/dL (01-25 @ 07:15)    Allergies    adhesives (Rash)  amoxicillin (Other; Diarrhea)  statins (Muscle Pain)    Intolerances    morphine (Nausea)    MEDICATIONS  (STANDING):  calcium carbonate    500 mG (Tums) Chewable 1 Tablet(s) Chew daily  folic acid 1 milliGRAM(s) Oral daily  heparin   Injectable 5000 Unit(s) SubCutaneous every 12 hours  levothyroxine 100 MICROGram(s) Oral daily  liothyronine 5 MICROGram(s) Oral daily  mirtazapine 7.5 milliGRAM(s) Oral at bedtime  multivitamin 1 Tablet(s) Oral daily  pantoprazole    Tablet 40 milliGRAM(s) Oral before breakfast  sodium chloride 0.9%. 1000 milliLiter(s) (50 mL/Hr) IV Continuous <Continuous>    MEDICATIONS  (PRN):  acetaminophen     Tablet .. 650 milliGRAM(s) Oral every 6 hours PRN Temp greater or equal to 38C (100.4F), Mild Pain (1 - 3)  ALBUTerol    90 MICROgram(s) HFA Inhaler 2 Puff(s) Inhalation every 6 hours PRN Shortness of Breath and/or Wheezing  ALPRAZolam 0.25 milliGRAM(s) Oral every 6 hours PRN anxiety  aluminum hydroxide/magnesium hydroxide/simethicone Suspension 30 milliLiter(s) Oral every 6 hours PRN Dyspepsia  HYDROmorphone  Injectable 0.5 milliGRAM(s) IV Push every 4 hours PRN Severe Pain (7 - 10)  LORazepam   Injectable 0.5 milliGRAM(s) IV Push every 15 minutes PRN Anxiety  melatonin 3 milliGRAM(s) Oral at bedtime PRN Insomnia  ondansetron   Disintegrating Tablet 4 milliGRAM(s) Oral every 6 hours PRN Nausea and/or Vomiting  oxyCODONE    IR 5 milliGRAM(s) Oral every 4 hours PRN Moderate Pain (4 - 6)      RADIOLOGY/ADDITIONAL STUDIES:      ACC: [id number] EXAM:  XR CHEST PORTABLE URGENT 1V                        ACC: [id number] EXAM:  XR CHEST PORTABLE URGENT 1V                        PROCEDURE DATE:  01/24/2022    INTERPRETATION:  Chest and follow-up study. Patient is short of breath.  AP chest on January 24, 2022 at 12:19 PM.  Heart magnified by technique. COPD configuration of the chest noted.  Present film shows a mild right base effusion and dense atelectasis off   the right lower hilum significantly increased fromJuly 24, 2020.  There is also scattered mild infiltrate emanating off the left hilum   proceeding inferiorly where there is a small left base pleural pulmonary   process roughly similar to prior. The present film shows apical   thickening on the left new since prior. Left chest tube on the earlier   study is been removed. Thoracolumbar hardware again noted.  Follow-up AP chest on January 24, 2022 at 3:07 PM.  A catheter right chest tube has been inserted. The prior right effusion   has almost totally drained with some residual atelectasis off the right   lower hilum. No pneumothorax. Stable left chest findings.    IMPRESSION: Mild to moderate right effusion largely drained with catheter   chest tube. Significant left lung findings are increased from July 2020.   COPD.    ACC: [id number] EXAM:  CT ANGIO CHEST PULM [facility]                        PROCEDURE DATE:  01/24/2022    INTERPRETATION:  INDICATION: Shortness of breath, stage IV lung cancer,   radiation to the left upper lobe completed in August 2020  TECHNIQUE: Volumetric images of the chest were obtained after the   administration of 90 mL of Omnipaque 350. Maximum intensity projection   images were generated.  COMPARISON: 10/19/2020.  FINDINGS:  PULMONARY ANGIOGRAM:  No pulmonary embolism.Normal caliber main   pulmonary artery.  LUNGS/AIRWAYS/PLEURA: Patent airways to the segmental bronchi. New mild   general left lung volume loss. Ill-defined medial left apex density   (2-41) at site of prior treated lesion. 0.8 cm irregular left upper lobe   nodule (2-47), without clear correlate on prior. Mild interlobular septal   thickening in the left upper and lower lobes. New middle lobar   atelectasis.  Overall decreased volume of small left pleural effusion, however new thin   component in the anterior and medial pleura and new 1.3 cm nodule along   the mediastinal pleura (2-52). New moderate nonloculated right pleural   effusion. Mild associated passive atelectasis in the lower lobes.  LYMPH NODES: Unremarkable.  HEART/VASCULATURE: Normal heart size. Unremarkable pericardium. Normal   caliber aorta.  UPPER ABDOMEN: Not well evaluated on this exam.  BONES/SOFT TISSUES: Stable multiple sclerotic bone lesions. Posterior   fusion hardware in the lumbar spine.    IMPRESSION:  No pulmonary embolism.  Since October 2020:  Medial left upper lobe density, corresponding to known lung cancer,   although difficult to compare to prior given new mild left lung volume   loss and likely radiotherapy changes.  New ill-defined 0.8 cm left upper lobe nodule. A previously described   left upper lobe nodule that was new on prior has resolved.  Overall decreased volume of left pleural effusion, however now with thin   circumferential components and mild new nodularity.  New moderate nonloculated right pleural effusion.  As it is difficult to differentiate posttreatment changes versus disease   progression on this exam, follow-up in 8-12 weeks may help clarify.      ACC: [id number] EXAM:  XR CHEST PORTABLE URGENT 1V                        ACC: [id number] EXAM:  XR CHEST PORTABLE URGENT 1V                        PROCEDURE DATE:  01/24/2022    INTERPRETATION:  Chest and follow-up study. Patient is short of breath.  AP chest on January 24, 2022 at 12:19 PM.  Heart magnified by technique. COPD configuration of the chest noted.  Present film shows a mild right base effusion and dense atelectasis off   the right lower hilum significantly increased fromJuly 24, 2020.  There is also scattered mild infiltrate emanating off the left hilum   proceeding inferiorly where there is a small left base pleural pulmonary   process roughly similar to prior. The present film shows apical   thickening on the left new since prior. Left chest tube on the earlier   study is been removed. Thoracolumbar hardware again noted.  Follow-up AP chest on January 24, 2022 at 3:07 PM.  A catheter right chest tube has been inserted. The prior right effusion   has almost totally drained with some residual atelectasis off the right   lower hilum. No pneumothorax. Stable left chest findings.    IMPRESSION: Mild to moderate right effusion largely drained with catheter   chest tube. Significant left lung findings are increased from July 2020.   COPD.

## 2022-01-25 NOTE — CONSULT NOTE ADULT - SUBJECTIVE AND OBJECTIVE BOX
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,   Rx Capmitinib (TABRECTA) Pleurx catheter For left pleural effusion   XRT To brain    Now presents with significant clinical disc decline dyspnea with modest activity anorexia  PET CT Scans reveal progressive/recurrent disease RIGHT  pleural effusion, LEFT Pleural thickening, scattered FDG avid metastatic pleural nodules,   abnormal lesions spine T3 T6 ;    progressive liver metastases,   FDG avid mediastinal and left hilar lymph nodes    Past medical history social history as per prior chart    ROS Fatigued anxious, Dyspnea with minimal activity Pain right side of chest where chest tube is in place  ,Some nausea no vomiting  Significant anorexia    PE  Frail debilitated 76-year-old female alert oriented,With nasal cannula O2, dyspnea with speaking  Neck supple  Lungs decreased air movement decreased breath sounds at bases,Chest tube right chest  Heart S1-S2  Abdomen soft nontender bowel sounds present  Extremities without pitting edema      CBC Full  -  ( 25 Jan 2022 07:15 )  WBC Count : 7.99 K/uL  RBC Count : 4.01 M/uL  Hemoglobin : 12.4 g/dL  Hematocrit : 37.4 %  Platelet Count - Automated : 285 K/uL  Mean Cell Volume : 93.3 fl  Mean Cell Hemoglobin : 30.9 pg  Mean Cell Hemoglobin Concentration : 33.2 gm/dL    01-25    140  |  106  |  22  ----------------------------<  94  4.0   |  27  |  1.08    Ca    7.0<L>      25 Jan 2022 07:15  Phos  5.5     01-25  Mg     2.3     01-25    TPro  5.0<L>  /  Alb  1.9<L>  /  TBili  0.6  /  DBili  x   /  AST  17  /  ALT  18  /  AlkPhos  83  01-25      Lactate Dehydrogenase, Serum: 273 U/L (01-25-22 @ 07:15)      PT/INR - ( 24 Jan 2022 11:03 )   PT: 13.1 sec;   INR: 1.14 ratio         PTT - ( 24 Jan 2022 11:03 )  PTT:29.6 sec    Vital Signs Last 24 Hrs  T(C): 37.1 (25 Jan 2022 19:47), Max: 37.1 (25 Jan 2022 19:47)  T(F): 98.7 (25 Jan 2022 19:47), Max: 98.7 (25 Jan 2022 19:47)  HR: 99 (25 Jan 2022 19:47) (72 - 99)  BP: 115/59 (25 Jan 2022 19:47) (102/49 - 115/59)  BP(mean): --  RR: 21 (25 Jan 2022 19:47) (18 - 21)  SpO2: 94% (25 Jan 2022 19:47) (94% - 97%)      [facility]                          PROCEDURE DATE:  01/24/2022          INTERPRETATION:  INDICATION: Shortness of breath, stage IV lung cancer,   radiation to the left upper lobe completed in August 2020    TECHNIQUE: Volumetric images of the chest were obtained after the   administration of 90 mL of Omnipaque 350. Maximum intensity projection   images were generated.    COMPARISON: 10/19/2020.    FINDINGS:    PULMONARY ANGIOGRAM:  No pulmonary embolism.Normal caliber main   pulmonary artery.    LUNGS/AIRWAYS/PLEURA: Patent airways to the segmental bronchi. New mild   general left lung volume loss. Ill-defined medial left apex density   (2-41) at site of prior treated lesion. 0.8 cm irregular left upper lobe   nodule (2-47), without clear correlate on prior. Mild interlobular septal   thickening in the left upper and lower lobes. New middle lobar   atelectasis.    Overall decreased volume of small left pleural effusion, however new thin   component in the anterior and medial pleura and new 1.3 cm nodule along   the mediastinal pleura (2-52). New moderate nonloculated right pleural   effusion. Mild associated passive atelectasis in the lower lobes.    LYMPH NODES: Unremarkable.    HEART/VASCULATURE: Normal heart size. Unremarkable pericardium. Normal   caliber aorta.    UPPER ABDOMEN: Not well evaluated on this exam.    BONES/SOFT TISSUES: Stable multiple sclerotic bone lesions. Posterior   fusion hardware in the lumbar spine.      IMPRESSION:    No pulmonary embolism.    Since October 2020:    Medial left upper lobe density, corresponding to known lung cancer,   although difficult to compare to prior given new mild left lung volume   loss and likely radiotherapy changes.    New ill-defined 0.8 cm left upper lobe nodule. A previously described   left upper lobe nodule that was new on prior has resolved.    Overall decreased volume of left pleural effusion, however now with thin   circumferential components and mild new nodularity.    New moderate nonloculated right pleural effusion.    As it is difficult to differentiate posttreatment changes versus disease   progression on this exam, follow-up in 8-12 weeks may help clarify.

## 2022-01-25 NOTE — CONSULT NOTE ADULT - ATTENDING COMMENTS
pt in NAD  decreased b/s bilaterally   cards no mgr  abdomen nontender nondistended   agree w/ above note  c/w current symptom management. pt in NAD sitting up in bed, attempting to get up   staff entered to help her adjust in her bed  JANELLE COPE AC   General - frail appearing , chronically ill appearing  Pt significant anxiety surrounding her disease process, case discussed at length w/ NP Angel   At this time family wants to c/w current management, not interested in [facility] discussions   agree w/ above note c/w current symptom management.   Team available for support whether emotional or symptomatic as per family wishes and teams discretion.

## 2022-01-25 NOTE — PROGRESS NOTE ADULT - SUBJECTIVE AND OBJECTIVE BOX
74 yo female, h/o metastatic lung adenocarcinoma  s/p L1-5 laminectomy, partial L2 corpectomy, T11-L5 posterior instrumented fusion 7/16/2000 w/Dr. George for pathologic L2 fracture  reported thoracic spine uptake on recent PET scan  attempted to see patient this am, was off floor for test/procedure (?TTE)  thoracic MRI completed - await final report      ******PRELIMINARY REPORT******      ******PRELIMINARY REPORT******       ACC: [id number] EXAM:  MR SPINE THORACIC WAW IC                          PROCEDURE DATE:  01/24/2022    ******PRELIMINARY REPORT******      ******PRELIMINARY REPORT******           INTERPRETATION:  Multifocal osseous metastatic disease in the thoracic   spine.  No gross evidence of epidural tumor extension.  No thoracic cord compression or cord edema.    Incidental findings:  At C6-7. a disc osteophyte complex may impinge on the ventral cord.  Pleural enhancement in the left hemithorax.        ******PRELIMINARY REPORT******      ******PRELIMINARY REPORT******         CLAUDIA HERNANDEZ MD; Attending Radiologist

## 2022-01-25 NOTE — CONSULT NOTE ADULT - SUBJECTIVE AND OBJECTIVE BOX
Patient is a 76y old  Female who presents with a chief complaint of dyspnea , cough (25 Jan 2022 09:54)      HPI:    75 yo F, history of Stage IV NSCLC (bone and brain metastasis) presents for a follow up of a chronic IPC(left sided pleural effusion)Thoracostomy was removed 4/7/2021  Underwent a PET on 1/2022 that revealed  stable left upper lobe spiculated nodule with mild FDG uptake. Stable other scattered pulmonary nodules, as described.  SINCE FDG PET/CT DATED 12/28/2020, NEW FDG AVID HEPATIC AND OSSEOUS LESIONS, HIGHLY SUSPICIOUS FOR METASTATIC DISEASE. RECOMMENDED MRI THORACIC SPINE TO RULE OUT CORD COMPRESSION AT THE LEVEL OF T3 AND T6 VERTEBRAE. Increased FDG avidity in left pleural thickening with scattered FDG avid metastatic pleural nodules. Moderate volume right pleural effusion with few areas within showing mild FDG avidity, new since CT chest 10/7/2021, likely metastatic/malignant. Mildly FDG avid mediastinal and left hilar lymph nodes, suspicious for kory metastases  Oncologically, her electrolytes have been slightly abnormal as well  History of immunotherapy / targeted therapy   Was seen by me via telehealth visit on 1/24 and was noted to be more dyspneic and thereafter sent in    (24 Jan 2022 14:52)      PAST MEDICAL & SURGICAL HISTORY:  Hypothyroidism    Fracture  L2 pathologic fracture    Osteoarthritis    Cholecystitis    Lung cancer  adenocarcinoma    History of other surgery  percutaneous endoscopic left pleural cavity drain    History of other surgery  gallbladder drain    H/O thyroidectomy    History of lumbar spinal fusion    S/P left oophorectomy    History of tonsillectomy        PREVIOUS DIAGNOSTIC TESTING:      MEDICATIONS  (STANDING):  calcium carbonate    500 mG (Tums) Chewable 1 Tablet(s) Chew daily  folic acid 1 milliGRAM(s) Oral daily  heparin   Injectable 5000 Unit(s) SubCutaneous every 12 hours  levothyroxine 100 MICROGram(s) Oral daily  liothyronine 5 MICROGram(s) Oral daily  multivitamin 1 Tablet(s) Oral daily  pantoprazole    Tablet 40 milliGRAM(s) Oral before breakfast  sodium chloride 0.9%. 1000 milliLiter(s) (50 mL/Hr) IV Continuous <Continuous>    MEDICATIONS  (PRN):  acetaminophen     Tablet .. 650 milliGRAM(s) Oral every 6 hours PRN Temp greater or equal to 38C (100.4F), Mild Pain (1 - 3)  ALBUTerol    90 MICROgram(s) HFA Inhaler 2 Puff(s) Inhalation every 6 hours PRN Shortness of Breath and/or Wheezing  ALPRAZolam 0.25 milliGRAM(s) Oral every 6 hours PRN anxiety  aluminum hydroxide/magnesium hydroxide/simethicone Suspension 30 milliLiter(s) Oral every 6 hours PRN Dyspepsia  HYDROmorphone  Injectable 0.5 milliGRAM(s) IV Push every 4 hours PRN Severe Pain (7 - 10)  LORazepam   Injectable 0.5 milliGRAM(s) IV Push every 15 minutes PRN Anxiety  melatonin 3 milliGRAM(s) Oral at bedtime PRN Insomnia  ondansetron   Disintegrating Tablet 4 milliGRAM(s) Oral every 6 hours PRN Nausea and/or Vomiting  oxyCODONE    IR 5 milliGRAM(s) Oral every 4 hours PRN Moderate Pain (4 - 6)      FAMILY HISTORY:  FH: thyroid disease (Mother)        SOCIAL HISTORY:  ***    REVIEW OF SYSTEM:  Pertinent items are noted in HPI.  Constitutional negative for chills, fevers, sweats and weight loss  throat, and face:  negative for epistaxis, nasal congestion, sore throat and   tinnitus  Respiratory: negative for cough, dyspnea on exertion, pleuritic chest pain  and wheezing  Cardiovascular:  negative for chest pain, dyspnea and palpitations  Gastrointestinal: negative for abdominal pain, diarrhea, nausea and vomiting  Genitourinary: negative for dysuria, frequency and urinary incontinence  Skin:  negative for redness, rash, pruritus, swelling, dryness and   fissures  Hematologic/lymphatic: negative for bleeding and easy bruising  Musculoskeletal: negative for arthralgias, back pain and muscle weakness  Neurological: negative for dizziness, headaches, seizures and tremors  Behavioral/Psych:  negative for mood change, depression, suicidal attempts    Allergic/Immunologic: negative for anaphylaxis, angioedema and urticaria    Vital Signs Last 24 Hrs  T(C): 36.8 (25 Jan 2022 08:20), Max: 36.9 (25 Jan 2022 05:42)  T(F): 98.3 (25 Jan 2022 08:20), Max: 98.5 (25 Jan 2022 05:42)  HR: 74 (25 Jan 2022 08:20) (68 - 84)  BP: 111/61 (25 Jan 2022 08:20) (111/61 - 134/60)  BP(mean): --  RR: 18 (25 Jan 2022 08:20) (18 - 19)  SpO2: 97% (25 Jan 2022 08:20) (94% - 97%)    I&O's Summary    24 Jan 2022 07:01  -  25 Jan 2022 07:00  --------------------------------------------------------  IN: 0 mL / OUT: 1175 mL / NET: -1175 mL      PHYSICAL EXAM  General Appearance: cooperative, no acute distress,   HEENT: PERRL, conjunctiva clear, EOM's intact, non injected pharynx, no exudate, TM   normal  Neck: Supple, , no adenopathy, thyroid: not enlarged, no carotid bruit or JVD  Back: Symmetric, no  tenderness,no soft tissue tenderness  Lungs: diminished bilaterally, +Right thoracostomy in place   Heart: Regular rate and rhythm, S1, S2 normal, no murmur, rub or gallop  Abdomen: Soft, non-tender, bowel sounds active , no hepatosplenomegaly  Extremities: no cyanosis or edema, no joint swelling  Skin: Skin color, texture normal, no rashes   Neurologic: Alert and oriented X3 , cranial nerves intact, sensory and motor normal,    ECG:    LABS:                          12.4   7.99  )-----------( 285      ( 25 Jan 2022 07:15 )             37.4     01-25    140  |  106  |  22  ----------------------------<  94  4.0   |  27  |  1.08    Ca    7.0<L>      25 Jan 2022 07:15  Phos  5.5     01-25  Mg     2.3     01-25    TPro  5.0<L>  /  Alb  1.9<L>  /  TBili  0.6  /  DBili  x   /  AST  17  /  ALT  18  /  AlkPhos  83  01-25          Pro BNP  195 01-25 @ 07:15  D Dimer  -- 01-25 @ 07:15  Pro BNP  170 01-24 @ 11:03  D Dimer  -- 01-24 @ 11:03    PT/INR - ( 24 Jan 2022 11:03 )   PT: 13.1 sec;   INR: 1.14 ratio         PTT - ( 24 Jan 2022 11:03 )  PTT:29.6 sec          RADIOLOGY & ADDITIONAL STUDIES:

## 2022-01-25 NOTE — PATIENT PROFILE ADULT - FUNCTIONAL ASSESSMENT - DAILY ACTIVITY 2.
"                           SUBJECTIVE:  Katty Mendoza is a 39 year old female who comes in for f/u on blood in her stools. Feels blood is mixed in with the stool at times. Not always associated with straining. No melena. Always bright red. Denies fevers/chills. Has some fatigue, no MONTENEGRO.     Fam Hx: no colon cancer, no colon polyps, no IBD    Soc Hx: Job at  of [employer] increasing in hrs. 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     Medications and allergies reviewed by me today.     ROS:   Constitutional, HEENT, cardiovascular, pulmonary, gi and gu systems are negative, except as otherwise noted.    OBJECTIVE:    /73 (BP Location: Right arm, Patient Position: Chair)   Pulse 74   Ht 1.778 m (5' 10\")   Breastfeeding? No   BMI 28.07 kg/m     Wt Readings from Last 1 Encounters:   06/27/18 88.7 kg (195 lb 9.6 oz)     Gen: Pleasant female, in NAD  ENT: Oropharynx clear, MMM  Neck: No LAD  Resp: lungs CAB  CV: Heart RRR, no MRG  Abd: Soft, NT, ND, nl bowel sounds  Ext: WWP, no LE edema  Skin: Warm, dry, no rash  Neuro: AOx3, no focal deficits     ASSESSMENT/PLAN:    Katty was seen today for follow up. Given ongoing BRBPR, will get CBC, iron studies, and colonoscopy at this time.     Diagnoses and all orders for this visit:    Rectal bleeding  -     GASTROENTEROLOGY ADULT REF PROCEDURE ONLY  -     CBC with Platelets Differential  -     Ferritin  -     Iron and Iron Binding Capacity    Fatigue, unspecified type  -     TSH - Reflex to FT4    Vitamin D deficiency  -     Vitamin D deficiency screening       Pt should return to clinic for f/u with me in PRN     Cheri Rios MD  03/15/19    " 3 = A little assistance

## 2022-01-25 NOTE — PROGRESS NOTE ADULT - SUBJECTIVE AND OBJECTIVE BOX
Subjective:  Pt seen, breathing improved since drainage. Pigtial to waterseal    Vital Signs:  Vital Signs Last 24 Hrs  T(C): 36.8 (01-25-22 @ 08:20), Max: 36.9 (01-25-22 @ 05:42)  T(F): 98.3 (01-25-22 @ 08:20), Max: 98.5 (01-25-22 @ 05:42)  HR: 74 (01-25-22 @ 08:20) (68 - 84)  BP: 111/61 (01-25-22 @ 08:20) (111/61 - 134/60)  RR: 18 (01-25-22 @ 08:20) (18 - 19)  SpO2: 97% (01-25-22 @ 08:20) (94% - 97%) on (O2)    Telemetry/Alarms:    Relevant labs, radiology and Medications reviewed                        12.4   7.99  )-----------( 285      ( 25 Jan 2022 07:15 )             37.4     01-25    140  |  106  |  22  ----------------------------<  94  4.0   |  27  |  1.08    Ca    7.0<L>      25 Jan 2022 07:15  Phos  5.5     01-25  Mg     2.3     01-25    TPro  5.0<L>  /  Alb  1.9<L>  /  TBili  0.6  /  DBili  x   /  AST  17  /  ALT  18  /  AlkPhos  83  01-25    PT/INR - ( 24 Jan 2022 11:03 )   PT: 13.1 sec;   INR: 1.14 ratio         PTT - ( 24 Jan 2022 11:03 )  PTT:29.6 sec  MEDICATIONS  (STANDING):  calcium carbonate    500 mG (Tums) Chewable 1 Tablet(s) Chew daily  folic acid 1 milliGRAM(s) Oral daily  heparin   Injectable 5000 Unit(s) SubCutaneous every 12 hours  levothyroxine 100 MICROGram(s) Oral daily  liothyronine 5 MICROGram(s) Oral daily  multivitamin 1 Tablet(s) Oral daily  pantoprazole    Tablet 40 milliGRAM(s) Oral before breakfast  sodium chloride 0.9%. 1000 milliLiter(s) (50 mL/Hr) IV Continuous <Continuous>    MEDICATIONS  (PRN):  acetaminophen     Tablet .. 650 milliGRAM(s) Oral every 6 hours PRN Temp greater or equal to 38C (100.4F), Mild Pain (1 - 3)  ALBUTerol    90 MICROgram(s) HFA Inhaler 2 Puff(s) Inhalation every 6 hours PRN Shortness of Breath and/or Wheezing  ALPRAZolam 0.25 milliGRAM(s) Oral every 6 hours PRN anxiety  aluminum hydroxide/magnesium hydroxide/simethicone Suspension 30 milliLiter(s) Oral every 6 hours PRN Dyspepsia  HYDROmorphone  Injectable 0.5 milliGRAM(s) IV Push every 4 hours PRN Severe Pain (7 - 10)  LORazepam   Injectable 0.5 milliGRAM(s) IV Push every 15 minutes PRN Anxiety  melatonin 3 milliGRAM(s) Oral at bedtime PRN Insomnia  ondansetron   Disintegrating Tablet 4 milliGRAM(s) Oral every 6 hours PRN Nausea and/or Vomiting  oxyCODONE    IR 5 milliGRAM(s) Oral every 4 hours PRN Moderate Pain (4 - 6)      Physical exam  Neuro: Normal exam oriented to person/place & time with no focal motor or sensory  deficits.     HEENT NC/AT, no nasal d/c, no cyanosis             Neck: supple  Chest: equal                                                                             CV:  RRR                                                                                     GI:  soft                                                                                                        Extremities: Normal no evidence of cyanosis or deformity edema noted  SKIN no rashes appreciated    I&O's Summary    24 Jan 2022 07:01  -  25 Jan 2022 07:00  --------------------------------------------------------  IN: 0 mL / OUT: 1175 mL / NET: -1175 mL        Assessment  76y Female  w/ PAST MEDICAL & SURGICAL HISTORY:  Hypothyroidism    Fracture  L2 pathologic fracture    Osteoarthritis    Cholecystitis    Lung cancer  adenocarcinoma    History of other surgery  percutaneous endoscopic left pleural cavity drain    History of other surgery  gallbladder drain    H/O thyroidectomy    History of lumbar spinal fusion    S/P left oophorectomy    History of tonsillectomy    admitted with complaints of Patient is a 76y old  Female who presents with a chief complaint of dyspnea , cough (25 Jan 2022 12:03)  .  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 Pt found to have a moderate RT pleural effusion s/p RT pigtail placement 1/24/22    NPO p [facility] for OR 1/26, mediport and RT VATS, pleurx placement  COVID and T and S from 1/24  will need home care arranged for drainage q Tues and Friday up to 1L, record output  Rad onc consult pending      Discussed with Cardiothoracic Team at AM rounds.

## 2022-01-25 NOTE — PROGRESS NOTE ADULT - SUBJECTIVE AND OBJECTIVE BOX
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 Patient reports cold symptoms 2 weeks ago - cough, nasal congestion , some lose bowel movements which improved but she started to have dyspnea on exertion, which become worse in last 2 days.   In ED - Vitals reviewed T(F): 97.7 HR: 68  (68 - 86) BP: 113/58 (113/58 - 139/66) RR: 29  (22 - 29) SpO2: 94%  (86% - 97%) CT Angio Chest PE Protocol w/ IV Cont - no PE ,  Medial left upper lobe density, corresponding to known lung cancer,   New ill-defined 0.8 cm left upper lobe nodule. l decreased volume of left pleural effusion, . New moderate nonloculated right pleural effusion. Recent PET scan - showed new T3, T6 lesion with possible cord compression  s/p right chest tube placement in ED     1/25 - Patient seen and examined at bedside earlier today, denies cp, dyspnea improved , denies abdominal pain, numbness, weakness, + poor sleep , poor appetite      Review of system- Rest of the review of system are negative except mentioned in HPI    Objective: Vital sings reviewed for last 24 h  T(C): 36.6 (01-25-22 @ 15:12), Max: 36.9 (01-25-22 @ 05:42)  HR: 75 (01-25-22 @ 15:12) (72 - 84)  BP: 102/49 (01-25-22 @ 15:12) (102/49 - 134/60)  RR: 18 (01-25-22 @ 15:12) (18 - 19)  SpO2: 94% (01-25-22 @ 15:12) (94% - 97%)  Wt(kg): --  Daily     Daily   CAPILLARY BLOOD GLUCOSE      Physical exam:   General : NAD, appear to be of stated age , well groomed   NERVOUS SYSTEM:  Alert & Oriented X3, non- focal exam, Motor Strength 5/5 B/L upper and lower extremities; DTRs 2+ intact and symmetric  HEAD:  Atraumatic, Normocephalic  EYES: EOMI, PERRLA, conjunctiva and sclera clear  HEENT: Moist mucous membranes, Supple neck , No JVD  CHEST: Clear to auscultation bilaterally; No rales, no rhonchi, no wheezing  HEART: Regular rate and rhythm; No murmurs, no rubs or gallops  ABDOMEN: Soft, Non-tender, Non-distended; Bowel sounds present, no guarding , no peritoneal irritation   GENITOURINARY- Voiding, no suprapubic tenderness  EXTREMITIES:  2+ Peripheral Pulses, No clubbing, cyanosis,   edema  MUSCULOSKELETAL:- No muscle tenderness, Muscle tone normal, No joint tenderness, no Joint swelling,  Joint ROM –normal   SKIN-no rash, no lesion    LABS: all reviewed                         12.4   7.99  )-----------( 285      ( 25 Jan 2022 07:15 )             37.4     01-25    140  |  106  |  22  ----------------------------<  94  4.0   |  27  |  1.08    Ca    7.0<L>      25 Jan 2022 07:15  Phos  5.5     01-25  Mg     2.3     01-25    TPro  5.0<L>  /  Alb  1.9<L>  /  TBili  0.6  /  DBili  x   /  AST  17  /  ALT  18  /  AlkPhos  83  01-25    PT/INR - ( 24 Jan 2022 11:03 )   PT: 13.1 sec;   INR: 1.14 ratio         PTT - ( 24 Jan 2022 11:03 )  PTT:29.6 sec              RECENT CULTURES:    RADIOLOGY & ADDITIONAL TESTS: all reviewed   EKG  reviewed   < from: 12 Lead ECG (01.24.22 @ 11:21) >  Ventricular Rate 75 BPM    Atrial Rate 75 BPM    P-R Interval 120 ms    QRS Duration 82 ms    Q-T Interval 392 ms    QTC Calculation(Bazett) 437 ms    R Axis 207 degrees    T Axis 134 degrees    Diagnosis Line *** Suspect arm lead reversal, interpretation assumes no reversal  Normal sinus rhythm  Low voltage QRS  Lateral infarct (cited on or before 10-JUL-2020)  Abnormal ECG  When compared with ECG of 10-JUL-2020 10:22,  QRS axis Shifted left  Questionable change in initial forces of Lateral leads  Nonspecific T wave abnormality now evident in Lateral leads    < from: MR Thoracic Spine w/wo IV Cont (01.24.22 @ 21:00) >  INTERPRETATION:  Multufocal osseous metastatic disease in the thoracic   spine.  No gross evidence of epidural tumor extension.  No thoracic cord comrpession or cord edema.    Incidental findings:  At C6-7. a disc osteophyte complex may impinge onthe ventral cord.  Pleural enhacement in the left hemithorax.      < from: Xray Chest 1 View- PORTABLE-Urgent (Xray Chest 1 View- PORTABLE-Urgent .) (01.24.22 @ 15:16) >  IMPRESSION: Mild to moderate right effusion largely drained with catheter   chest tube. Significant left lung findings are increased from July 2020.   COPD.    < from: CT Angio Chest PE Protocol w/ IV Cont (01.24.22 @ 13:04) >  IMPRESSION:    No pulmonary embolism.    Since October 2020:    Medial left upper lobe density, corresponding to known lung cancer,   although difficult to compare to prior given new mild left lung volume   loss and likely radiotherapy changes.    New ill-defined 0.8 cm left upper lobe nodule. A previously described   left upper lobe nodule that was new on prior has resolved.    Overall decreased volume of left pleural effusion, however now with thin   circumferential components and mild new nodularity.    New moderate nonloculated right pleural effusion.    As it is difficult to differentiate posttreatment changes versus disease   progression on this exam, follow-up in 8-12 weeks may help clarify.        Current medications:  acetaminophen     Tablet .. 650 milliGRAM(s) Oral every 6 hours PRN  ALBUTerol    90 MICROgram(s) HFA Inhaler 2 Puff(s) Inhalation every 6 hours PRN  ALPRAZolam 0.25 milliGRAM(s) Oral every 6 hours PRN  aluminum hydroxide/magnesium hydroxide/simethicone Suspension 30 milliLiter(s) Oral every 6 hours PRN  calcium carbonate    500 mG (Tums) Chewable 1 Tablet(s) Chew daily  folic acid 1 milliGRAM(s) Oral daily  heparin   Injectable 5000 Unit(s) SubCutaneous every 12 hours  HYDROmorphone  Injectable 0.5 milliGRAM(s) IV Push every 4 hours PRN  levothyroxine 100 MICROGram(s) Oral daily  liothyronine 5 MICROGram(s) Oral daily  LORazepam   Injectable 0.5 milliGRAM(s) IV Push every 15 minutes PRN  melatonin 3 milliGRAM(s) Oral at bedtime PRN  multivitamin 1 Tablet(s) Oral daily  ondansetron   Disintegrating Tablet 4 milliGRAM(s) Oral every 6 hours PRN  oxyCODONE    IR 5 milliGRAM(s) Oral every 4 hours PRN  pantoprazole    Tablet 40 milliGRAM(s) Oral before breakfast  sodium chloride 0.9%. 1000 milliLiter(s) IV Continuous <Continuous>

## 2022-01-25 NOTE — PROGRESS NOTE ADULT - ASSESSMENT
A/P: metastatic lung cancer, r/o thoracic spine pathology/mets   1. thoracic MRI to be viewed remotely w/Dr. George later this am  2. further recommendations pending review

## 2022-01-25 NOTE — PROGRESS NOTE ADULT - SUBJECTIVE AND OBJECTIVE BOX
Patient is S/P pigtail catheter placement  Scheduled for VATS tomorrow    MRI reviewed in detail    Still no back complaints    MRI: no pathological fractures or cord compression. There are signal changes in T1-T6 vertebra - ?metastatic disease vs radiation changes. No discrete lytic lesions on CT scan    IMP: signal changes in thoracic vertebra Not clearly metastatic lesions, but there is no fracture or cord compression  Asymptomatic right now    No urgent surgical indications  Would follow MRI for now  Further evaluation as per ONC and RAD ONC  Thoracic surgery F/U    Can F/U in office with Marquita George MD

## 2022-01-25 NOTE — PATIENT PROFILE ADULT - FALL HARM RISK - HARM RISK INTERVENTIONS

## 2022-01-26 NOTE — DIETITIAN INITIAL EVALUATION ADULT. - HEIGHT FOR BMI (CENTIMETERS)
Subjective:  63 y.o. female who presents to the office today with chief complaint:  Chief Complaint   Patient presents with    Dizziness     Dizziness: Ongoing for a month. Given valium and phenergan in the ER on 4/16 with good improvement. Follows with [facility]- will have further testing on 6/4/21. Other findings were atheromatous changes to the carotid artery bifurcations. Recommendations were made for a carotid ultrasound and/or MRA. Throat Clearing: Had a CT C-spine which demonstrated multilevel cervical spondylosis with ventral osteophytosis at C5-6 which may impress on the cervical esophagus. Patient requests referral to gastroenterology. She has seen Dr. Merlin Klein in the past.    Labs: Results of labs were interpreted and discussed with the patient. Cholesterol remains elevated even though she is already on a statin. Discussion held with the patient about increasing intensity of the statin-patient understood and agreed. Health Maintenance Due   Topic Date Due    Shingles Vaccine (1 of 2) Never done    Cervical cancer screen  05/14/2021    Breast cancer screen  06/25/2021         Review of Systems    Review of Systems: All bolded are positive, all others are negative. General:  Fever, chills, diaphoresis, fatigue, malaise, night sweats, weight loss  Psychological:  Anxiety, disorientation, hallucinations. ENT:  Epistaxis, headaches, vertigo, visual changes. Cardiovascular:  Chest pain, irregular heartbeats, palpitations, paroxysmal nocturnal dyspnea. Respiratory:  Shortness of breath, coughing, sputum production, hemoptysis, wheezing, orthopnea.   Gastrointestinal:  Nausea, vomiting, diarrhea, heartburn, constipation, abdominal pain, hematemesis, hematochezia, melena, acholic stools  Genito-Urinary:  Dysuria, urgency, frequency, hematuria  Musculoskeletal:  Joint pain, joint stiffness, joint swelling, muscle pain  Neurology:  Headache, focal neurological deficits, weakness, numbness, paresthesia  Derm:  Rashes, ulcers, excoriations, bruising  Extremities:  Decreased ROM, peripheral edema, mottling      Objective:  Vitals:    05/26/21 1349   BP: 126/72   Pulse: 110   Resp: 16   Temp: 97 °F (36.1 °C)   SpO2: 97%     Physical Exam  Constitutional:       General: She is not in acute distress. Appearance: She is well-developed. She is not diaphoretic. Comments: Patient clears throat frequently. HENT:      Head: Normocephalic and atraumatic. Right Ear: External ear normal.      Left Ear: External ear normal.      Nose: Nose normal.      Mouth/Throat:      Pharynx: No oropharyngeal exudate. Eyes:      General:         Right eye: No discharge. Left eye: No discharge. Conjunctiva/sclera: Conjunctivae normal.      Pupils: Pupils are equal, round, and reactive to light. Cardiovascular:      Rate and Rhythm: Normal rate and regular rhythm. Heart sounds: Normal heart sounds. No murmur heard. No friction rub. No gallop. Pulmonary:      Effort: Pulmonary effort is normal. No respiratory distress. Breath sounds: Normal breath sounds. No wheezing or rales. Abdominal:      General: Bowel sounds are normal. There is no distension. Palpations: Abdomen is soft. Tenderness: There is no abdominal tenderness. There is no guarding or rebound. Musculoskeletal:      Cervical back: Normal range of motion and neck supple. Lymphadenopathy:      Cervical: No cervical adenopathy. Neurological:      Mental Status: She is alert and oriented to person, place, and time. Comments: Fillmore-Hallpike maneuver not completed due to being negative in the past and patient having future work-up. Psychiatric:         Behavior: Behavior normal.         Thought Content:  Thought content normal.         Judgment: Judgment normal.         Results for orders placed or performed in visit on 05/25/21   Lipid Panel   Result Value Ref Range    Cholesterol, Total 264 (H) 0 - 199 162.56

## 2022-01-26 NOTE — CONSULT NOTE ADULT - ASSESSMENT
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 Pt found to have a moderate RT pleural effusion       Plan  s/p RT pigtail placement   Pleural studies P  maintain to WS  CXR in am  Admit to medicine  CXR in am 
1) Right Pleural Effusion  2) Stage IV NSCLC   3) Dyspnea  4) Abnormal CT chest  5) Pulmonary Hypertension  77 yo F, history of Stage IV NSCLC (bone and brain metastasis) PC(left sided pleural effusion)Thoracostomy was removed 4/7/2021  PFT in 9/2020 revealed Preserved ratio with impaired spirometry. FEV1/FVC 71%. Normal lung volumes. No bronchodilator response. DLCO 62%, mildly reduced.   She endorses significant dyspnea now with exertion   Underwent a PET on 1/2022 that revealed  stable left upper lobe spiculated nodule with mild FDG uptake. Stable other scattered pulmonary nodules, as described.  SINCE FDG PET/CT DATED 12/28/2020, NEW FDG AVID HEPATIC AND OSSEOUS LESIONS, HIGHLY SUSPICIOUS FOR METASTATIC DISEASE. RECOMMENDED MRI THORACIC SPINE TO RULE OUT CORD COMPRESSION AT THE LEVEL OF T3 AND T6 VERTEBRAE. Increased FDG avidity in left pleural thickening with scattered FDG avid metastatic pleural nodules. Moderate volume right pleural effusion with few areas within showing mild FDG avidity, new since CT chest 10/7/2021, likely metastatic/malignant. Mildly FDG avid mediastinal and left hilar lymph nodes, suspicious for kory metastases  Oncologically, her electrolytes have been slightly abnormal as well  History of immunotherapy / targeted therapy   Was seen by me via telehealth visit on 1/24 and was noted to be more dyspneic and thereafter sent in for further evaluation  Now s/p right thoracostomy, potential IPC   She has tried albuterol/spiriva in the past but not helped   Follow up cardiology recommendations, new echocardiogram  MRI did not reveal spinal cord compression  Prognosis is guarded, discussed with hospitalist and oncological service  Follow up palliative care recommendations 
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
75 y/o female with history of NSCLC receiving systemic therapy.  She has previously received palliative RT to the lumbar spine and SRS for brain metastases in 2020.  Patietn found to have new osseous metastases involving T3 and T6 vertebrae and posterior elements, but without evidence of cord compression.  Recommend palliative radiotherapy tot he upper mid thoracic spine.  RT can be delivered as an outpatietn.  Ms. Hyman was given my card and instructed to call my office upon discharge from the hospital.  Will coordinate systemic therapy and RT with Dr. Westfall.
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 Patient reports cold symptoms 2 weeks ago - cough, nasal congestion , some lose bowel movements which improved but she started to have dyspnea on exertion, which become worse in last 2 days.  Recent PET scan - showed new T3, T6 lesion with possible cord compression s/p right chest tube placement in ED. Palliative medicine consulted to help establish GOC and advance care planning.     1) moderate right pleural effusion likely malignant   - dyspnea resolving as per patient   - history underlying Metastatic adenocarcinoma of Lung ( INDU)  - CT angio negative for PE   - 1/24 right chest tube placed   - CT surgery consult for pleural effusion management  - c/w Dilaudid 0.5mg IV q4hrs for severe Pain   - c/w oxycodone 5mg q4hrs for moderate pain   - pleural fluid analysis pending     2) Metastatic adenocarcinoma of Lung   - New 0.8 cm INDU nodule   - oncology consult appreciated - Dr. Westfall is patient primary oncologist   - pulmonology consult appreciated - Dr. Sanchez   - new T4-T6 new lesion on recent PET  - radiation oncology consult pending   - ortho spine consult  - no surgical intervention    3) debility   - PT consult appreciated   - encourage OOB     4) anxiety   - patient takes xanax at bedtime usually   - supportive care     5) severe protein calorie malnutrition   - recommend dietician consult   -  Albumin, Serum: 1.9 g/dL  - Hospitalist starting patient on Remeron 7.5mg hopefully with help stimulate appetite and help with insomnia      6) Advanced directives  - patient has capacity to make decisions but very anxious at this time   - full code will address in GOC meeting

## 2022-01-26 NOTE — PROGRESS NOTE ADULT - PROBLEM SELECTOR PLAN 1
External 14 Mutation  Disease progression on capmatinib  %    Plan for salvage chemotherapy with carboplatinum and pemetrexed along with pembrolizumab as outpatient  Patient started on folic acid and vitamin B12    Mediport insertion Pending As patient's Venous access is poor    MRI brain to complete restaging process

## 2022-01-26 NOTE — PROGRESS NOTE ADULT - ASSESSMENT
75 y/o female with PMH of Metastatic adenocarcinoma of the lung with metastasis to the spine, liver, and brain ,Hypothyroidism s/p thyroid resection, h/o  L2 pathologic compression fracture, GERD, chronic leg edema on lasix  presents to  on 1/24/22 with 1 week history of dyspnea. Patient reports cold symptoms 2 weeks ago - cough, nasal congestion , some lose bowel movements which improved but she started to have dyspnea on exertion, which become worse in last 2 days. s/p right chest tube placement in ED     Progressive dyspnea due to New moderate right pleural effusion likely malignant with  underlying Metastatic adenocarcinoma of Lung ( INDU), ruled out PE. New 0.8 cm INDU nodule   - CT surgery consult for pleural effusion management  - 1/24 s/p Right chest tube placement 800 cc drained   - oncology consult Dr. Westfall - discuss the case , advised to stop tabrecta, start folic acid daily, will need different Rx and possible radiation to the spine   - pain management - dilaudid, oxycodone  - pulmonology consult Dr. Sanchez   - oncology radiology consult  - pleural fluid analysis - by Light's criteria rule consistent with exudate, await cytology   T4-T6 new lesion on recent PET , multiple thorasic spine metastasis ruled out  cord compression  - MRI of thoracic spine w/wo contrast  with pre-medication,  - pt claustrophobic ativan 0.5, may repeat 0.5 mg as  2 nd dose if not effective    - radiation oncology for possible need of rad therapy consult - pending   - neuro checks q4h - will stop   - ortho spine consult  - no surgical intervention,  consider rad onc consult     OSMAN    Baseline SCr 0.8  resolved  - hold lasix, trend ,   - s/p IV fluids 50 cc for 10 hours     Leg edema , bilateral pleural effusions r/o CHF   - ekg - NSR, no ischemic changes, low voltage QRS, slightly elevated BNP  - Echo- Trace [place], MR and TR, Mild (1+) eccentric aortic regurgitation is present,  left ventricular ejection fraction is 70 %, no RWMA  - hold lasix    Hypothyroidism - c/w home meds   Hypocalcemia - c/w calcium once daily, monitor , check vit D   GERD - c/w PPI   Anxiety - xanax prn   Anorexia, depression , insomnia - start remeron 7.5     Advanced directives  - discussed advanced directive for 17 min  - MOLST form discussed   - palliative team consult   - FULL code    DVT proph -heparin q12h d/w CT surgery team    75 y/o female with PMH of Metastatic adenocarcinoma of the lung with metastasis to the spine, liver, and brain ,Hypothyroidism s/p thyroid resection, h/o  L2 pathologic compression fracture, GERD, chronic leg edema on lasix  presents to  on 1/24/22 with 1 week history of dyspnea. Patient reports cold symptoms 2 weeks ago - cough, nasal congestion , some lose bowel movements which improved but she started to have dyspnea on exertion, which become worse in last 2 days. s/p right chest tube placement in ED     Progressive dyspnea due to New moderate right pleural effusion likely malignant with  underlying Metastatic adenocarcinoma of Lung ( INDU), ruled out PE. New 0.8 cm INDU nodule   - CT surgery consult for pleural effusion management  - 1/24 s/p Right chest tube placement 800 cc drained   - oncology consult Dr. Westfall - discussed the case , advised to stop tabrecta, start folic acid daily, will need different Rx and possible radiation to the spine   - pain management - dilaudid, oxycodone  - pulmonology consult Dr. Sanchez for VATS w pleurex placement today 1/26  - oncology radiology consult  - pleural fluid analysis - by Light's criteria rule consistent with exudate, await cytology     T4-T6 new lesion on recent PET , multiple thoracic spine metastasis ruled out cord compression  - MRI of thoracic spine w/wo contrast with pre-medication,  - pt claustrophobic ativan 0.5, may repeat 0.5 mg as 2nd dose if not effective    - radiation oncology for possible need of rad therapy consult - pending   - neuro checks q4h - will stop   - ortho spine consult  - no surgical intervention,  consider rad onc consult     OSMAN    Baseline SCr 0.8  resolved  - hold lasix, trend ,   - s/p IV fluids 50 cc for 10 hours   - continue maintenance fluids with K+    Leg edema , bilateral pleural effusions r/o CHF   - ekg - NSR, no ischemic changes, low voltage QRS, slightly elevated BNP  - Echo- Trace [place], MR and TR, Mild (1+) eccentric aortic regurgitation is present,  left ventricular ejection fraction is 70 %, no RWMA  - hold lasix    Hypothyroidism   - c/w home meds     Hypocalcemia   - c/w calcium once daily, monitor   - vit D wnl     GERD   - c/w PPI     Anxiety   - xanax prn     Anorexia, depression , insomnia   - start remeron 7.5     Advanced directives  - discussed advanced directive for 17 min  - MOLST form discussed   - palliative team following GOC discussed.   - FULL code    DVT proph -heparin q12h d/w CT surgery team     DISPO: npo for VATS today   75 y/o female with PMH of Metastatic adenocarcinoma of the lung with metastasis to the spine, liver, and brain ,Hypothyroidism s/p thyroid resection, h/o  L2 pathologic compression fracture, GERD, chronic leg edema on lasix  presents to  on 1/24/22 with 1 week history of dyspnea. Patient reports cold symptoms 2 weeks ago - cough, nasal congestion , some lose bowel movements which improved but she started to have dyspnea on exertion, which become worse in last 2 days. s/p right chest tube placement in ED     Progressive dyspnea due to New moderate right pleural effusion likely malignant with  underlying Metastatic adenocarcinoma of Lung ( INDU), ruled out PE. New 0.8 cm INDU nodule   - CT surgery consult for pleural effusion management  - 1/24 s/p Right chest tube placement 800 cc drained   - oncology consult Dr. Westfall - discussed the case , advised to stop tabrecta, start folic acid daily, will need different Rx and possible radiation to the spine   - pain management - dilaudid, oxycodone  - pulmonology consult Dr. Sanchez for VATS w pleurex placement today 1/26  - oncology radiology consult  - pleural fluid analysis - by Light's criteria rule consistent with exudate, await cytology     T4-T6 new lesion on recent PET , multiple thoracic spine metastasis ruled out cord compression  - MRI of thoracic spine w/wo contrast with pre-medication,  - pt claustrophobic ativan 0.5, may repeat 0.5 mg as 2nd dose if not effective    - radiation oncology for possible need of rad therapy consult - pending   - neuro checks q4h - will stop   - ortho spine consult  - no surgical intervention,  consider rad onc consult     OSMAN    Baseline SCr 0.8  resolved  - hold lasix, trend ,   - s/p IV fluids 50 cc for 10 hours   - continue maintenance fluids with K+    Leg edema , bilateral pleural effusions r/o CHF   - ekg - NSR, no ischemic changes, low voltage QRS, slightly elevated BNP  - Echo- Trace [place], MR and TR, Mild (1+) eccentric aortic regurgitation is present,  left ventricular ejection fraction is 70 %, no RWMA  - hold lasix    Hypothyroidism   - c/w home meds     Hypocalcemia   - c/w calcium once daily, monitor   - vit D wnl     GERD   - c/w PPI     Anxiety   - xanax prn     Anorexia, depression , insomnia   - start remeron 7.5     Moderate Protein calorie malnutrition  - Nutritionist following  - will add gelatin BID and ensure enlive once daily  - monitor BM; if > 3 days without BM, add bowel regimen  - daily wt     Advanced directives  - discussed advanced directive for 17 min  - MOLST form discussed   - palliative team following GOC discussed.   - FULL code    DVT proph -heparin q12h d/w CT surgery team     DISPO: npo for VATS today   75 y/o female with PMH of Metastatic adenocarcinoma of the lung with metastasis to the spine, liver, and brain ,Hypothyroidism s/p thyroid resection, h/o  L2 pathologic compression fracture, GERD, chronic leg edema on lasix  presents to  on 1/24/22 with 1 week history of dyspnea. Patient reports cold symptoms 2 weeks ago - cough, nasal congestion , some lose bowel movements which improved but she started to have dyspnea on exertion, which become worse in last 2 days. s/p right chest tube placement in ED     Progressive dyspnea due to New moderate right pleural effusion likely malignant with  underlying Metastatic adenocarcinoma of Lung ( INDU), ruled out PE. New 0.8 cm INDU nodule   - CT surgery consult for pleural effusion management  - 1/24 s/p Right chest tube placement 800 cc drained   - oncology consult Dr. Westfall - discussed the case , advised to stop tabrecta, start folic acid daily, will need different Rx and possible radiation to the spine   - pain management - dilaudid, oxycodone  - pulmonology consult Dr. Sanchez for VATS w pleurex placement today 1/26  - oncology radiology consult  - pleural fluid analysis - by Light's criteria rule consistent with exudate, await cytology     T4-T6 new lesion on recent PET , multiple thoracic spine metastasis ruled out cord compression  - MRI of thoracic spine w/wo contrast with pre-medication,  - pt claustrophobic ativan 0.5, may repeat 0.5 mg as 2nd dose if not effective    - radiation oncology for possible need of rad therapy consult - pending   - neuro checks q4h - will stop   - ortho spine consult  - no surgical intervention,  consider rad onc consult     OSMAN    Baseline SCr 0.8, resolved  - hold lasix, trend ,  s/p IV fluids 50 cc for 10 hours   - continue maintenance fluids with K+    Leg edema , bilateral pleural effusions ruled out HF likely due to hypoalbuminemic state   - ekg - NSR, no ischemic changes, low voltage QRS, slightly elevated BNP  - Echo- Trace [place], MR and TR, Mild (1+) eccentric aortic regurgitation is present,  left ventricular ejection fraction is 70 %, no RWMA  - hold lasix, add protein supplements     Hypothyroidism    - c/w home meds     Hypocalcemia   - c/w calcium once daily, monitor   - vit D wnl     GERD   - c/w PPI     Anxiety   - xanax prn     Anorexia, depression , insomnia   - start remeron 7.5     Moderate Protein calorie malnutrition  - Nutritionist following  - will add gelatin BID and ensure enlive once daily  - monitor BM; if > 3 days without BM, add bowel regimen  - daily wt     Advanced directives  - discussed advanced directive for 17 min  - MOLST form discussed   - palliative team following GOC discussed.   - FULL code    DVT proph -heparin q12h d/w CT surgery team     DISPO: npo for VATS today

## 2022-01-26 NOTE — DIETITIAN INITIAL EVALUATION ADULT. - ADD RECOMMEND
1) add gelatein BID and ensure enlive once daily 2) monitor BM; if > 3 days without BM, add bowel regimen 3) daily wt checks to track/trend changes

## 2022-01-26 NOTE — PROGRESS NOTE ADULT - PROBLEM/PLAN-1
Reason for Call: Request for an order or referral:  Order or referral being requested: REFERRAL FOR AN ENT  Date needed: as soon as possible  Has the patient been seen by the PCP for this problem? YES  Additional comments: PLEASE CALL PATIENT WHEN ORDER IS IN  Phone number Patient can be reached at:  Home number on file [phone] (home)  Best Time:  ANY  Can we leave a detailed message on this number?  YES  Call taken on 5/1/2020 at 12:27 PM by JOSELITO SO   DISPLAY PLAN FREE TEXT

## 2022-01-26 NOTE — PROGRESS NOTE ADULT - SUBJECTIVE AND OBJECTIVE BOX
Chief complaint: dyspnea and cough    HPI: 75 y/o female with PMHx of Metastatic adenocarcinoma of the lung with metastasis to the spine, liver, and brain, Hypothyroidism s/p thyroid resection, h/o L2 pathologic compression fracture, GERD, chronic leg edema on Lasix  presents to  on 1/24/22 with 1 week history of dyspnea. Patient reports cold symptoms 2 weeks ago - cough, nasal congestion, some loose bowel movements which improved, but she started to have dyspnea on exertion, which become worse in last 2 days.     In ED - Vitals reviewed T(F): 97.7 HR: 68  (68 - 86) BP: 113/58 (113/58 - 139/66) RR: 29  (22 - 29) SpO2: 94%  (86% - 97%) CT Angio Chest PE Protocol w/ IV Cont - no PE ,  Medial left upper lobe density, corresponding to known lung cancer,   New ill-defined 0.8 cm left upper lobe nodule. l decreased volume of left pleural effusion, . New moderate non-loculated right pleural effusion. Recent PET scan - showed new T3, T6 lesion with possible cord compression, s/p right chest tube placement in ED     Interval history: 1/26/22 - Patient seen and examined at bedside, denies cp, dyspnea improved, has pain at chest tube site, denies abdominal pain, numbness, weakness. Improved sleep with addition of Remeron, continues to have poor appetite.     Review of system- Rest of the review of system are negative except mentioned in HPI    PHYSICAL EXAM:   Vital Signs Last 24 Hrs: all reviewed  T(C): 36.9 (26 Jan 2022 08:59), Max: 37.1 (25 Jan 2022 19:47)  T(F): 98.5 (26 Jan 2022 08:59), Max: 98.7 (25 Jan 2022 19:47)  HR: 73 (26 Jan 2022 08:59) (73 - 99)  BP: 109/49 (26 Jan 2022 08:59) (102/49 - 115/59)  RR: 18 (26 Jan 2022 08:59) (18 - 21)  SpO2: 93% (26 Jan 2022 08:59) (93% - 94%) on room air    General : NAD, appear to be of stated age , well groomed   NERVOUS SYSTEM:  Alert & Oriented X3, non- focal exam, Motor Strength 5/5 B/L upper and lower extremities; DTRs 2+ intact and symmetric  HEAD:  Atraumatic, Normocephalic  EYES: EOMI, conjunctiva and sclera clear  HEENT: Moist mucous membranes, Supple neck , No JVD  CHEST: Diminished bilat, + rales on right, no rhonchi, no wheezing, + right sided pigtail  HEART: Regular rate and rhythm; No murmurs, no rubs or gallops  ABDOMEN: Soft, Non-tender, Non-distended; Bowel sounds present, no guarding , no peritoneal irritation   GENITOURINARY- Voiding, no suprapubic tenderness  EXTREMITIES:  2+ Peripheral Pulses, No clubbing, cyanosis, no edema  MUSCULOSKELETAL:- No muscle tenderness, Muscle tone normal, No joint tenderness, no Joint swelling,  Joint ROM –normal   SKIN-no rash, no lesion    LABS: all reviewed                         12.1   7.24  )-----------( 279      ( 26 Jan 2022 06:09 )             37.0   01-26    138  |  103  |  19  ----------------------------<  95  3.8   |  31  |  1.04    Ca    7.1<L>      26 Jan 2022 06:09  Phos  3.0     01-26  Mg     2.3     01-26    TPro  5.0<L>  /  Alb  1.9<L>  /  TBili  0.6  /  DBili  x   /  AST  17  /  ALT  18  /  AlkPhos  83  01-25    PT/INR - ( 24 Jan 2022 11:03 )   PT: 13.1 sec;   INR: 1.14 ratio    PTT - ( 24 Jan 2022 11:03 )  PTT:29.6 sec  Lactate Dehydrogenase, Serum: 273 U/L (01.25.22 @ 07:15)  Serum Pro-Brain Natriuretic Peptide: 195 pg/mL (01.25.22 @ 07:15)   MICRO:   1/24/22: COVID-19 PCR: Not detected  Culture - Fungal, Pleural Fluid (01.24.22 @ 13:30) : Testing in progress  Culture - Body Fluid with Gram Stain (01.24.22 @ 13:30): No growth   RADIOLOGY & ADDITIONAL TESTS: all reviewed   EKG reviewed  (01.24.22 @ 11:21)  Suspect arm lead reversal, interpretation assumes no reversal  Normal sinus rhythm  Low voltage QRS  Lateral infarct (cited on or before 10-JUL-2020)  Abnormal ECG  When compared with ECG of 10-JUL-2020 10:22,  QRS axis Shifted left  Questionable change in initial forces of Lateral leads  Nonspecific T wave abnormality now evident in Lateral leads    MR Thoracic Spine w/wo IV Cont (01.24.22 @ 21:00)   INTERPRETATION:  Multufocal osseous metastatic disease in the thoracic spine. No gross evidence of epidural tumor extension. No thoracic cord compression or cord edema. Incidental findings: At C6-7. a disc osteophyte complex may impinge on the ventral cord. Pleural enhancement in the left hemithorax.    Xray Chest 1 View- PORTABLE (01.24.22 @ 15:16)   IMPRESSION: Mild to moderate right effusion largely drained with catheter chest tube. Significant left lung findings are increased from July 2020. COPD.    CT Angio Chest PE Protocol w/ IV Cont (01.24.22 @ 13:04) IMPRESSION: No pulmonary embolism. Since October 2020: Medial left upper lobe density, corresponding to known lung cancer, although difficult to compare to prior given new mild left lung volume loss and likely radiotherapy changes. New ill-defined 0.8 cm left upper lobe nodule. A previously described   left upper lobe nodule that was new on prior has resolved. Overall decreased volume of left pleural effusion, however now with thin circumferential components and mild new nodularity.  New moderate nonloculated right pleural effusion. As it is difficult to differentiate posttreatment changes versus disease progression on this exam, follow-up in 8-12 weeks may help clarify.    TTE Echo Complete w/o Contrast w/ Doppler (01.25.22 @ 08:59): Mitral valve prolapse cannot be excluded. Trace mitral regurgitation is present. Mild fibrocalcific changes noted to the Aortic valve leaflets with preserved leaflet excursion. Mild (1+) eccentric aortic regurgitation is present. The tricuspid valve leaflets are thin and pliable; valve motion is normal. Trace tricuspid valve regurgitation is present. Normal appearing pulmonic valve structure. Trace pulmonic valvular regurgitation is present. Normal appearing left atrium. The left ventricle is normal in size, wall thickness, and wall motion. Estimated left ventricular ejection fraction is 70 %. Normal appearing right atrium. Normal appearing right ventricle structure and function.              MEDICATIONS  (STANDING):  calcium carbonate    500 mG (Tums) Chewable 1 Tablet(s) Chew daily  cyanocobalamin Injectable 1000 MICROGram(s) SubCutaneous once  dextrose 5% + sodium chloride 0.45% with potassium chloride 20 mEq/L 1000 milliLiter(s) (40 mL/Hr) IV Continuous <Continuous>  folic acid 1 milliGRAM(s) Oral daily  heparin   Injectable 5000 Unit(s) SubCutaneous every 12 hours  levothyroxine 100 MICROGram(s) Oral daily  liothyronine 5 MICROGram(s) Oral daily  mirtazapine 7.5 milliGRAM(s) Oral at bedtime  multivitamin 1 Tablet(s) Oral daily  pantoprazole    Tablet 40 milliGRAM(s) Oral before breakfast  sodium chloride 0.9%. 1000 milliLiter(s) (50 mL/Hr) IV Continuous <Continuous>    MEDICATIONS  (PRN):  acetaminophen     Tablet .. 650 milliGRAM(s) Oral every 6 hours PRN Temp greater or equal to 38C (100.4F), Mild Pain (1 - 3)  ALBUTerol    90 MICROgram(s) HFA Inhaler 2 Puff(s) Inhalation every 6 hours PRN Shortness of Breath and/or Wheezing  ALPRAZolam 0.25 milliGRAM(s) Oral every 6 hours PRN anxiety  aluminum hydroxide/magnesium hydroxide/simethicone Suspension 30 milliLiter(s) Oral every 6 hours PRN Dyspepsia  HYDROmorphone  Injectable 0.5 milliGRAM(s) IV Push every 4 hours PRN Severe Pain (7 - 10)  LORazepam   Injectable 0.5 milliGRAM(s) IV Push every 15 minutes PRN Anxiety  melatonin 3 milliGRAM(s) Oral at bedtime PRN Insomnia  ondansetron   Disintegrating Tablet 4 milliGRAM(s) Oral every 6 hours PRN Nausea and/or Vomiting  oxyCODONE    IR 5 milliGRAM(s) Oral every 4 hours PRN Moderate Pain (4 - 6)    Home Medications:  Albuterol (Eqv-Proventil HFA) 90 mcg/inh inhalation aerosol: 2 puff(s) inhaled every 6 hours, As Needed (24 Jan 2022 15:08)  ALPRAZolam 0.25 mg oral tablet: 1 tab(s) orally 3 times a day, As Needed (24 Jan 2022 15:08)  biotin 1000 mcg oral tablet: 1 tab(s) orally once a day (24 Jan 2022 15:08)  calcium (as carbonate) 600 mg oral tablet: 1 tab(s) orally once a day (24 Jan 2022 15:08)  ciclopirox 0.77% topical gel: Apply topically to affected area 2 times a day (24 Jan 2022 15:08)  esomeprazole 40 mg oral delayed release capsule: 1 cap(s) orally once a day, As Needed (24 Jan 2022 15:08)  furosemide 20 mg oral tablet: 2 tab(s) orally once a day (24 Jan 2022 15:08)  liothyronine 5 mcg oral tablet: 1 tab(s) orally once a day (24 Jan 2022 15:08)  Multiple Vitamins oral tablet: 1 tab(s) orally once a day (24 Jan 2022 15:08)  ondansetron 8 mg oral tablet: 1 tab(s) orally 2 times a day, As Needed (24 Jan 2022 15:08)  Pfizer-BioNTech COVID-19 Vaccine PF 30 mcg/0.3 mL intramuscular suspension: 0.3 milliliter(s) intramuscular once  ***3rd dose given in August 2021*** (24 Jan 2022 15:08)  potassium chloride 20 mEq oral tablet, extended release: 1 tab(s) orally once a day (in the morning)  (pt has difficutly swallowing large pill) (24 Jan 2022 15:08)  Synthroid 100 mcg (0.1 mg) oral tablet: 1 tab(s) orally once a day (24 Jan 2022 15:08)  Tabrecta 200 mg oral tablet: 2 tab(s) orally 2 times a day (24 Jan 2022 15:08)           Chief complaint: dyspnea and cough    HPI: 75 y/o female with PMHx of Metastatic adenocarcinoma of the lung with metastasis to the spine, liver, and brain, Hypothyroidism s/p thyroid resection, h/o L2 pathologic compression fracture, GERD, chronic leg edema on Lasix  presents to  on 1/24/22 with 1 week history of dyspnea. Patient reports cold symptoms 2 weeks ago - cough, nasal congestion, some loose bowel movements which improved, but she started to have dyspnea on exertion, which become worse in last 2 days.     In ED - Vitals reviewed T(F): 97.7 HR: 68  (68 - 86) BP: 113/58 (113/58 - 139/66) RR: 29  (22 - 29) SpO2: 94%  (86% - 97%) CT Angio Chest PE Protocol w/ IV Cont - no PE ,  Medial left upper lobe density, corresponding to known lung cancer,   New ill-defined 0.8 cm left upper lobe nodule. l decreased volume of left pleural effusion, . New moderate non-loculated right pleural effusion. Recent PET scan - showed new T3, T6 lesion with possible cord compression, s/p right chest tube placement in ED     Interval history: 1/26/22 - Patient seen and examined at bedside, denies cp, dyspnea improved, has pain at chest tube site, denies abdominal pain, numbness, weakness. Improved sleep with addition of Remeron, continues to have poor appetite. NPO for VATS today.     Review of system- Rest of the review of system are negative except mentioned in HPI    PHYSICAL EXAM:   Vital Signs Last 24 Hrs: all reviewed  T(C): 36.9 (26 Jan 2022 08:59), Max: 37.1 (25 Jan 2022 19:47)  T(F): 98.5 (26 Jan 2022 08:59), Max: 98.7 (25 Jan 2022 19:47)  HR: 73 (26 Jan 2022 08:59) (73 - 99)  BP: 109/49 (26 Jan 2022 08:59) (102/49 - 115/59)  RR: 18 (26 Jan 2022 08:59) (18 - 21)  SpO2: 93% (26 Jan 2022 08:59) (93% - 94%) on room air    General : NAD, appear to be of stated age , well groomed   NERVOUS SYSTEM:  Alert & Oriented X3, non- focal exam, Motor Strength 5/5 B/L upper and lower extremities; DTRs 2+ intact and symmetric  HEAD:  Atraumatic, Normocephalic  EYES: EOMI, conjunctiva and sclera clear  HEENT: Moist mucous membranes, Supple neck , No JVD  CHEST: Diminished bilat, + rales on right, no rhonchi, no wheezing, + right sided pigtail ct  HEART: Regular rate and rhythm; No murmurs, no rubs or gallops  ABDOMEN: Soft, Non-tender, Non-distended; Bowel sounds present, no guarding , no peritoneal irritation   GENITOURINARY- Voiding, no suprapubic tenderness  EXTREMITIES:  2+ Peripheral Pulses, No clubbing, cyanosis, no edema  MUSCULOSKELETAL:- No muscle tenderness, Muscle tone normal, No joint tenderness, no Joint swelling,  Joint ROM –normal   SKIN-no rash, no lesion    LABS: all reviewed                         12.1   7.24  )-----------( 279      ( 26 Jan 2022 06:09 )             37.0   01-26    138  |  103  |  19  ----------------------------<  95  3.8   |  31  |  1.04    Ca    7.1<L>      26 Jan 2022 06:09  Phos  3.0     01-26  Mg     2.3     01-26    TPro  5.0<L>  /  Alb  1.9<L>  /  TBili  0.6  /  DBili  x   /  AST  17  /  ALT  18  /  AlkPhos  83  01-25    PT/INR - ( 24 Jan 2022 11:03 )   PT: 13.1 sec;   INR: 1.14 ratio    PTT - ( 24 Jan 2022 11:03 )  PTT:29.6 sec  Lactate Dehydrogenase, Serum: 273 U/L (01.25.22 @ 07:15)  Serum Pro-Brain Natriuretic Peptide: 195 pg/mL (01.25.22 @ 07:15)   MICRO:   1/24/22: COVID-19 PCR: Not detected  Culture - Fungal, Pleural Fluid (01.24.22 @ 13:30) : Testing in progress  Culture - Body Fluid with Gram Stain (01.24.22 @ 13:30): No growth   RADIOLOGY & ADDITIONAL TESTS: all reviewed     EKG reviewed  (01.24.22 @ 11:21)  Suspect arm lead reversal, interpretation assumes no reversal  Normal sinus rhythm  Low voltage QRS  Lateral infarct (cited on or before 10-JUL-2020)  Abnormal ECG  When compared with ECG of 10-JUL-2020 10:22,  QRS axis Shifted left  Questionable change in initial forces of Lateral leads  Nonspecific T wave abnormality now evident in Lateral leads    MR Thoracic Spine w/wo IV Cont (01.24.22 @ 21:00)   INTERPRETATION:  Multufocal osseous metastatic disease in the thoracic spine. No gross evidence of epidural tumor extension. No thoracic cord compression or cord edema. Incidental findings: At C6-7. a disc osteophyte complex may impinge on the ventral cord. Pleural enhancement in the left hemithorax.    Xray Chest 1 View- PORTABLE (01.24.22 @ 15:16)   IMPRESSION: Mild to moderate right effusion largely drained with catheter chest tube. Significant left lung findings are increased from July 2020. COPD.    CT Angio Chest PE Protocol w/ IV Cont (01.24.22 @ 13:04) IMPRESSION: No pulmonary embolism. Since October 2020: Medial left upper lobe density, corresponding to known lung cancer, although difficult to compare to prior given new mild left lung volume loss and likely radiotherapy changes. New ill-defined 0.8 cm left upper lobe nodule. A previously described   left upper lobe nodule that was new on prior has resolved. Overall decreased volume of left pleural effusion, however now with thin circumferential components and mild new nodularity.  New moderate nonloculated right pleural effusion. As it is difficult to differentiate posttreatment changes versus disease progression on this exam, follow-up in 8-12 weeks may help clarify.    TTE Echo Complete w/o Contrast w/ Doppler (01.25.22 @ 08:59): Mitral valve prolapse cannot be excluded. Trace mitral regurgitation is present. Mild fibrocalcific changes noted to the Aortic valve leaflets with preserved leaflet excursion. Mild (1+) eccentric aortic regurgitation is present. The tricuspid valve leaflets are thin and pliable; valve motion is normal. Trace tricuspid valve regurgitation is present. Normal appearing pulmonic valve structure. Trace pulmonic valvular regurgitation is present. Normal appearing left atrium. The left ventricle is normal in size, wall thickness, and wall motion. Estimated left ventricular ejection fraction is 70 %. Normal appearing right atrium. Normal appearing right ventricle structure and function.    MEDICATIONS  (STANDING):  calcium carbonate    500 mG (Tums) Chewable 1 Tablet(s) Chew daily  cyanocobalamin Injectable 1000 MICROGram(s) SubCutaneous once  dextrose 5% + sodium chloride 0.45% with potassium chloride 20 mEq/L 1000 milliLiter(s) (40 mL/Hr) IV Continuous <Continuous>  folic acid 1 milliGRAM(s) Oral daily  heparin   Injectable 5000 Unit(s) SubCutaneous every 12 hours  levothyroxine 100 MICROGram(s) Oral daily  liothyronine 5 MICROGram(s) Oral daily  mirtazapine 7.5 milliGRAM(s) Oral at bedtime  multivitamin 1 Tablet(s) Oral daily  pantoprazole    Tablet 40 milliGRAM(s) Oral before breakfast  sodium chloride 0.9%. 1000 milliLiter(s) (50 mL/Hr) IV Continuous <Continuous>    MEDICATIONS  (PRN):  acetaminophen     Tablet .. 650 milliGRAM(s) Oral every 6 hours PRN Temp greater or equal to 38C (100.4F), Mild Pain (1 - 3)  ALBUTerol    90 MICROgram(s) HFA Inhaler 2 Puff(s) Inhalation every 6 hours PRN Shortness of Breath and/or Wheezing  ALPRAZolam 0.25 milliGRAM(s) Oral every 6 hours PRN anxiety  aluminum hydroxide/magnesium hydroxide/simethicone Suspension 30 milliLiter(s) Oral every 6 hours PRN Dyspepsia  HYDROmorphone  Injectable 0.5 milliGRAM(s) IV Push every 4 hours PRN Severe Pain (7 - 10)  LORazepam   Injectable 0.5 milliGRAM(s) IV Push every 15 minutes PRN Anxiety  melatonin 3 milliGRAM(s) Oral at bedtime PRN Insomnia  ondansetron   Disintegrating Tablet 4 milliGRAM(s) Oral every 6 hours PRN Nausea and/or Vomiting  oxyCODONE    IR 5 milliGRAM(s) Oral every 4 hours PRN Moderate Pain (4 - 6)    Home Medications:  Albuterol (Eqv-Proventil HFA) 90 mcg/inh inhalation aerosol: 2 puff(s) inhaled every 6 hours, As Needed (24 Jan 2022 15:08)  ALPRAZolam 0.25 mg oral tablet: 1 tab(s) orally 3 times a day, As Needed (24 Jan 2022 15:08)  biotin 1000 mcg oral tablet: 1 tab(s) orally once a day (24 Jan 2022 15:08)  calcium (as carbonate) 600 mg oral tablet: 1 tab(s) orally once a day (24 Jan 2022 15:08)  ciclopirox 0.77% topical gel: Apply topically to affected area 2 times a day (24 Jan 2022 15:08)  esomeprazole 40 mg oral delayed release capsule: 1 cap(s) orally once a day, As Needed (24 Jan 2022 15:08)  furosemide 20 mg oral tablet: 2 tab(s) orally once a day (24 Jan 2022 15:08)  liothyronine 5 mcg oral tablet: 1 tab(s) orally once a day (24 Jan 2022 15:08)  Multiple Vitamins oral tablet: 1 tab(s) orally once a day (24 Jan 2022 15:08)  ondansetron 8 mg oral tablet: 1 tab(s) orally 2 times a day, As Needed (24 Jan 2022 15:08)  Pfizer-BioNTech COVID-19 Vaccine PF 30 mcg/0.3 mL intramuscular suspension: 0.3 milliliter(s) intramuscular once  ***3rd dose given in August 2021*** (24 Jan 2022 15:08)  potassium chloride 20 mEq oral tablet, extended release: 1 tab(s) orally once a day (in the morning)  (pt has difficutly swallowing large pill) (24 Jan 2022 15:08)  Synthroid 100 mcg (0.1 mg) oral tablet: 1 tab(s) orally once a day (24 Jan 2022 15:08)  Tabrecta 200 mg oral tablet: 2 tab(s) orally 2 times a day (24 Jan 2022 15:08)

## 2022-01-26 NOTE — PROGRESS NOTE ADULT - ATTENDING COMMENTS
Patient seen and examined  on rounds with NP Danii Bah .  I was physically present for the key portion of the evaluation and management service provided, I  examined the patient myself and reviewed the plan of care with the patient and  NP Danii Bah , which I have reviewed and edited .  Medical records reviewed. History, review of systems, physical findings and lab results as documented confirmed , except as modified by me.  Agree with the assessment and plan of as stated and discussed, except as modified below.     77 y/o female with PMH of Metastatic adenocarcinoma of the lung with metastasis to the spine, liver, and brain ,Hypothyroidism s/p thyroid resection, h/o  L2 pathologic compression fracture, GERD, chronic leg edema on lasix  presents to  on 1/24/22 with 1 week history of dyspnea.     A/P   1. New moderate right pleural effusions likely malignant s/p chest tube - CT consult, plan for VATS and pleurex placement   2. Metastatic adenocarcinoma of the lung - oncology consult, will need o/p Rx and radiation rx  3 . New 0.8 mm INDU nodule - as per oncology   4. New T4, T6 spine metastasis - o/p radiation therapy by Dr. Ryan   5. OSMAN - resolved, hold lasix  6. Leg edema due to hypoalbuminemic state - echo EF wnl   7 . Hypocalcemia - calcium supplements   8. malnutrition - add supplements     Dispo - VATS procedure, pleurx placement, o/p radiation rx , PT evaluation after surgery , d/c planning once pain controlled and dyspnea under control, check O2 on ambulation prior discharge

## 2022-01-26 NOTE — DIETITIAN INITIAL EVALUATION ADULT. - PERTINENT LABORATORY DATA
01-26    138  |  103  |  19  ----------------------------<  95  3.8   |  31  |  1.04    Ca    7.1<L>      26 Jan 2022 06:09  Phos  3.0     01-26  Mg     2.3     01-26    TPro  5.0<L>  /  Alb  1.9<L>  /  TBili  0.6  /  DBili  x   /  AST  17  /  ALT  18  /  AlkPhos  83  01-25  Vitamin D, 25-Hydroxy: 44.9 ng/mL (07-14-20 @ 08:16)

## 2022-01-26 NOTE — BRIEF OPERATIVE NOTE - NSICDXBRIEFPROCEDURE_GEN_ALL_CORE_FT
PROCEDURES:  VATS, with PleurX catheter system insertion, pleural cavity 26-Jan-2022 17:25:32 Right Selena Palm  Removal of tunneled catheter with port 26-Jan-2022 17:26:30 Right Selena Palm

## 2022-01-26 NOTE — DIETITIAN INITIAL EVALUATION ADULT. - PERTINENT MEDS FT
MEDICATIONS  (STANDING):  calcium carbonate    500 mG (Tums) Chewable 1 Tablet(s) Chew daily  cyanocobalamin Injectable 1000 MICROGram(s) SubCutaneous once  dextrose 5% + sodium chloride 0.45% with potassium chloride 20 mEq/L 1000 milliLiter(s) (40 mL/Hr) IV Continuous <Continuous>  folic acid 1 milliGRAM(s) Oral daily  heparin   Injectable 5000 Unit(s) SubCutaneous every 12 hours  levothyroxine 100 MICROGram(s) Oral daily  liothyronine 5 MICROGram(s) Oral daily  mirtazapine 7.5 milliGRAM(s) Oral at bedtime  multivitamin 1 Tablet(s) Oral daily  pantoprazole    Tablet 40 milliGRAM(s) Oral before breakfast  sodium chloride 0.9%. 1000 milliLiter(s) (50 mL/Hr) IV Continuous <Continuous>    MEDICATIONS  (PRN):  acetaminophen     Tablet .. 650 milliGRAM(s) Oral every 6 hours PRN Temp greater or equal to 38C (100.4F), Mild Pain (1 - 3)  ALBUTerol    90 MICROgram(s) HFA Inhaler 2 Puff(s) Inhalation every 6 hours PRN Shortness of Breath and/or Wheezing  ALPRAZolam 0.25 milliGRAM(s) Oral every 6 hours PRN anxiety  aluminum hydroxide/magnesium hydroxide/simethicone Suspension 30 milliLiter(s) Oral every 6 hours PRN Dyspepsia  HYDROmorphone  Injectable 0.5 milliGRAM(s) IV Push every 4 hours PRN Severe Pain (7 - 10)  LORazepam   Injectable 0.5 milliGRAM(s) IV Push every 15 minutes PRN Anxiety  melatonin 3 milliGRAM(s) Oral at bedtime PRN Insomnia  ondansetron   Disintegrating Tablet 4 milliGRAM(s) Oral every 6 hours PRN Nausea and/or Vomiting  oxyCODONE    IR 5 milliGRAM(s) Oral every 4 hours PRN Moderate Pain (4 - 6)

## 2022-01-26 NOTE — CONSULT NOTE ADULT - CONSULT REASON
GOC
Outpatient imaging concerning for new TSp Mets
Radiation therapy
Pleural Effusion
SOB + Rt pleural effusion
Recurrent/progressive adenocarcinoma of lung

## 2022-01-26 NOTE — PROGRESS NOTE ADULT - PROBLEM SELECTOR PLAN 3
Upper thoracic spine  Seen by radiation oncology  For radiation therapy due to Risk of cord compression in higher thoracic spine Although patient is not symptomatic at present

## 2022-01-26 NOTE — DIETITIAN NUTRITION RISK NOTIFICATION - ADDITIONAL COMMENTS/DIETITIAN RECOMMENDATIONS
Patient resting,  remains at bedside. Awaiting 15 minutes after antibiotic prior to discharge.   
Patient resting, call light within reach.  at bedside.   
1) add gelatein BID and ensure enlive once daily 2) monitor BM; if > 3 days without BM, add bowel regimen 3) daily wt checks to track/trend changes

## 2022-01-26 NOTE — CONSULT NOTE ADULT - SUBJECTIVE AND OBJECTIVE BOX
77 y/o female with PMHx of Metastatic adenocarcinoma of the lung with metastasis to the spine, liver, and brain, Hypothyroidism s/p thyroid resection, h/o L2 pathologic compression fracture, GERD, chronic leg edema on Lasix  presents to  on 1/24/22 with 1 week history of dyspnea.  In the  ED a CT chest was done which showed new right pleural effusion; chest tube was placed.  Recent PET/CT on 1-20-22 revealed new osseous metastases at T3 and T6 involving the vertebral bodies and the posterior elements.  F/u MRI thoracic spine revealed no evidence of cord compression.  Patient denies any back pain at present.  She also denies any sensory changes, leg weakness, or other neurologic symptoms.    MEDICATIONS  (STANDING):  calcium carbonate    500 mG (Tums) Chewable 1 Tablet(s) Chew daily  cyanocobalamin Injectable 1000 MICROGram(s) SubCutaneous once  dextrose 5% + sodium chloride 0.45% with potassium chloride 20 mEq/L 1000 milliLiter(s) (40 mL/Hr) IV Continuous <Continuous>  folic acid 1 milliGRAM(s) Oral daily  heparin   Injectable 5000 Unit(s) SubCutaneous every 12 hours  levothyroxine 100 MICROGram(s) Oral daily  liothyronine 5 MICROGram(s) Oral daily  mirtazapine 7.5 milliGRAM(s) Oral at bedtime  multivitamin 1 Tablet(s) Oral daily  pantoprazole    Tablet 40 milliGRAM(s) Oral before breakfast  sodium chloride 0.9%. 1000 milliLiter(s) (50 mL/Hr) IV Continuous <Continuous>    MEDICATIONS  (PRN):  acetaminophen     Tablet .. 650 milliGRAM(s) Oral every 6 hours PRN Temp greater or equal to 38C (100.4F), Mild Pain (1 - 3)  ALBUTerol    90 MICROgram(s) HFA Inhaler 2 Puff(s) Inhalation every 6 hours PRN Shortness of Breath and/or Wheezing  ALPRAZolam 0.25 milliGRAM(s) Oral every 6 hours PRN anxiety  aluminum hydroxide/magnesium hydroxide/simethicone Suspension 30 milliLiter(s) Oral every 6 hours PRN Dyspepsia  HYDROmorphone  Injectable 0.5 milliGRAM(s) IV Push every 4 hours PRN Severe Pain (7 - 10)  LORazepam   Injectable 0.5 milliGRAM(s) IV Push every 15 minutes PRN Anxiety  melatonin 3 milliGRAM(s) Oral at bedtime PRN Insomnia  ondansetron   Disintegrating Tablet 4 milliGRAM(s) Oral every 6 hours PRN Nausea and/or Vomiting  oxyCODONE    IR 5 milliGRAM(s) Oral every 4 hours PRN Moderate Pain (4 - 6)    PHYSICAL EXAM:   Vital Signs Last 24 Hrs: all reviewed  T(C): 36.9 (26 Jan 2022 08:59), Max: 37.1 (25 Jan 2022 19:47)  T(F): 98.5 (26 Jan 2022 08:59), Max: 98.7 (25 Jan 2022 19:47)  HR: 73 (26 Jan 2022 08:59) (73 - 99)  BP: 109/49 (26 Jan 2022 08:59) (102/49 - 115/59)  RR: 18 (26 Jan 2022 08:59) (18 - 21)  SpO2: 93% (26 Jan 2022 08:59) (93% - 94%) on room air    General : NAD, well groomed.  WDWN.  KS is 80.   NERVOUS SYSTEM:  Alert & Oriented X3, non- focal exam, Motor Strength 5/5 B/L upper and lower extremities; DTRs 2+ intact and symmetric  HEAD:  Atraumatic, Normocephalic  EYES: EOMI, conjunctiva and sclera clear  HEENT: Moist mucous membranes, Supple neck , No JVD  CHEST: Diminished bilat, + rales on right, no rhonchi, no wheezing, + right sided pigtail ct  HEART: Regular rate and rhythm; No murmurs, no rubs or gallops  ABDOMEN: Soft, Non-tender, Non-distended; Bowel sounds present, no guarding , no peritoneal irritation   GENITOURINARY- Voiding, no suprapubic tenderness  EXTREMITIES:  2+ Peripheral Pulses, No clubbing, cyanosis, no edema  MUSCULOSKELETAL:- No muscle tenderness, Muscle tone normal, No joint tenderness, no Joint swelling,  Joint ROM –normal   SKIN-no rash, no lesion    LABS:                      12.1   7.24  )-----------( 279      ( 26 Jan 2022 06:09 )             37.0     138  |  103  |  19  ----------------------------<  95  3.8   |  31  |  1.04    Ca    7.1<L>    Phos  3.0       Mg     2.3       TPro  5.0<L>  /  Alb  1.9<L>  /  TBili  0.6  /  DBili  x   /  AST  17  /  ALT  18  /  AlkPhos  83   MICRO:   1/24/22: COVID-19 PCR: Not detected  Culture - Fungal, Pleural Fluid (01.24.22 @ 13:30) : Testing in progress  Culture - Body Fluid with Gram Stain (01.24.22 @ 13:30): No growth     RADIOLOGY:  MR Thoracic Spine w/wo IV Cont (01.24.22 @ 21:00)   INTERPRETATION:  Multufocal osseous metastatic disease in the thoracic spine. No gross evidence of epidural tumor extension. No thoracic cord compression or cord edema. Incidental findings: At C6-7. a disc osteophyte complex may impinge on the ventral cord. Pleural enhancement in the left hemithorax.    CT Angio Chest PE Protocol w/ IV Cont (01.24.22 @ 13:04) IMPRESSION: No pulmonary embolism. Since October 2020: Medial left upper lobe density, corresponding to known lung cancer, although difficult to compare to prior given new mild left lung volume loss and likely radiotherapy changes. New ill-defined 0.8 cm left upper lobe nodule. A previously described   left upper lobe nodule that was new on prior has resolved. Overall decreased volume of left pleural effusion, however now with thin circumferential components and mild new nodularity.  New moderate nonloculated right pleural effusion. As it is difficult to differentiate posttreatment changes versus disease progression on this exam, follow-up in 8-12 weeks may help clarify

## 2022-01-26 NOTE — PROGRESS NOTE ADULT - SUBJECTIVE AND OBJECTIVE BOX
The patient was seen and examined.      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    Patient has disease progression in the right pleura with a right pleural effusion, liver metastasis and bone metastasis involving the upper thoracic spine    Status post tail catheter drainage of the pleural effusion    Shortness of breath has improved    Due for Pleurx catheter insertion today    MRI scan of the thoracic spine shows metastasis And thoracic spine          MEDICATIONS  (STANDING):  calcium carbonate    500 mG (Tums) Chewable 1 Tablet(s) Chew daily  cyanocobalamin Injectable 1000 MICROGram(s) SubCutaneous once  folic acid 1 milliGRAM(s) Oral daily  heparin   Injectable 5000 Unit(s) SubCutaneous every 12 hours  levothyroxine 100 MICROGram(s) Oral daily  liothyronine 5 MICROGram(s) Oral daily  mirtazapine 7.5 milliGRAM(s) Oral at bedtime  multivitamin 1 Tablet(s) Oral daily  pantoprazole    Tablet 40 milliGRAM(s) Oral before breakfast  sodium chloride 0.9%. 1000 milliLiter(s) (50 mL/Hr) IV Continuous <Continuous>    MEDICATIONS  (PRN):  acetaminophen     Tablet .. 650 milliGRAM(s) Oral every 6 hours PRN Temp greater or equal to 38C (100.4F), Mild Pain (1 - 3)  ALBUTerol    90 MICROgram(s) HFA Inhaler 2 Puff(s) Inhalation every 6 hours PRN Shortness of Breath and/or Wheezing  ALPRAZolam 0.25 milliGRAM(s) Oral every 6 hours PRN anxiety  aluminum hydroxide/magnesium hydroxide/simethicone Suspension 30 milliLiter(s) Oral every 6 hours PRN Dyspepsia  fentaNYL    Injectable 50 MICROGram(s) IV Push every 10 minutes PRN Severe Pain (7 - 10)  HYDROmorphone  Injectable 0.5 milliGRAM(s) IV Push every 4 hours PRN Severe Pain (7 - 10)  LORazepam   Injectable 0.5 milliGRAM(s) IV Push every 15 minutes PRN Anxiety  melatonin 3 milliGRAM(s) Oral at bedtime PRN Insomnia  ondansetron   Disintegrating Tablet 4 milliGRAM(s) Oral every 6 hours PRN Nausea and/or Vomiting  ondansetron Injectable 4 milliGRAM(s) IV Push once PRN Nausea and/or Vomiting  oxyCODONE    IR 5 milliGRAM(s) Oral every 4 hours PRN Moderate Pain (4 - 6)      Vital Signs Last 24 Hrs  T(C): 36.8 (26 Jan 2022 17:58), Max: 37.1 (25 Jan 2022 19:47)  T(F): 98.2 (26 Jan 2022 17:58), Max: 98.7 (25 Jan 2022 19:47)  HR: 74 (26 Jan 2022 18:30) (73 - 99)  BP: 118/58 (26 Jan 2022 18:08) (109/49 - 129/65)  BP(mean): --  RR: 14 (26 Jan 2022 18:30) (14 - 21)  SpO2: 100% (26 Jan 2022 18:15) (93% - 100%)      Patient appears frail and anxious  Right pigtail catheter  Decreased air entry in the right chest  Edema  No liver Enlargement evident      Labs:                        12.1   7.24  )-----------( 279      ( 26 Jan 2022 06:09 )             37.0     01-26    138  |  103  |  19  ----------------------------<  95  3.8   |  31  |  1.04    Ca    7.1<L>      26 Jan 2022 06:09  Phos  3.0     01-26  Mg     2.3     01-26    TPro  5.0<L>  /  Alb  1.9<L>  /  TBili  0.6  /  DBili  x   /  AST  17  /  ALT  18  /  AlkPhos  83  01-25      LIVER FUNCTIONS - ( 25 Jan 2022 07:15 )  Alb: 1.9 g/dL / Pro: 5.0 gm/dL / ALK PHOS: 83 U/L / ALT: 18 U/L / AST: 17 U/L / GGT: x           Imaging studies"    Right pleural effusion Which is new  Left-sided pleural thickening  New liver metastasis  New thoracic vertebral metastasis      ACC: [id number] EXAM:  MR SPINE THORACIC [facility]                          PROCEDURE DATE:  01/24/2022          INTERPRETATION:  Exam Date: 1/24/2022 9:00 PM    MR thoracic with and without gadolinium    CLINICAL INDICATION: Stage IV lung cancer. new T3-T6 spine lesions on PET   for evaluation    TECHNIQUE:   Sagittal and axial T1-weighted and T2-weighted images, and   sagittal inversion recovery images, were obtained.   Following gadolinium   administration sagittal and axial T1-weighted fat-saturated images were   obtained. 7 cc of Gadavist was administered. 0.5 cc was discarded.    FINDINGS: Comparison is made to prior MRI thoracic of 7/11/2020    There is complete marrow infiltration within the posterior aspect of the   T1 and T2 vertebral bodies and throughout the T3-T6 vertebral bodies and   extending into the posterior elements, compatible with osseous   metastases. The abnormal signal extends into the neural foramen   bilaterally at T3/T4, and T4/T5, suggestive of epidural infiltration.   There is thin enhancement of the dura both ventrally and dorsally at the   T3-T6 levels, most compatible with neoplastic infiltration. This results   in mild stenosis of the thecal sac.   There is no associated pathologic   fracture or bony retropulsion. There is posterior surgical fusion at   T11-L1 with associated kyphoplasty material within the T11-L1 vertebral   bodies. There is additional abnormal signal within the T10-L1 vertebral   bodies that likely reflects sequelae of degenerative change, however   superimposed neoplasm cannot be entirely excluded.    T10/T11, there is a small central disc herniation resulting in mild   impingement on the right ventral cord without associated spinal canal or   neural foraminal stenosis. Rest of the discs are intact. The thoracic   cord maintains intact morphology.  Thoracic cord signal intensity is   intact.      IMPRESSION:    There is complete marrow infiltration within the posterior aspect of the   T1 and T2 vertebral bodies and throughout the T3-T6 vertebral bodies and   extending into the posterior elements, compatible with osseous   metastases. The abnormal signal extends into the neural foramen   bilaterally at T3/T4, and T4/T5, suggestive of epidural infiltration.   There is thin enhancement of the dura both ventrally and dorsally at the   T3-T6 levels, most compatible with neoplastic infiltration. This results   in mild stenosis of the thecal sac.   There is no associated pathologic   fracture or bony retropulsion. There is posterior surgical fusion at   T11-L1 with associated kyphoplasty material within the T11-L1 vertebral   bodies. There is additional abnormal signal within the T10-L1 vertebral   bodies that likely reflects sequelae of degenerative change, however   superimposed neoplasm cannot be entirely excluded.    --- End of Report ---            ADENIKE SOL MD; Attending Radiologist      CTA:      ACC: [id number] EXAM:  [facility]                          PROCEDURE DATE:  01/24/2022          INTERPRETATION:  INDICATION: Shortness of breath, stage IV lung cancer,   radiation to the left upper lobe completed in August 2020    TECHNIQUE: Volumetric images of the chest were obtained after the   administration of 90 mL of Omnipaque 350. Maximum intensity projection   images were generated.    COMPARISON: 10/19/2020.    FINDINGS:    PULMONARY ANGIOGRAM:  No pulmonary embolism. Normal caliber main   pulmonary artery.    LUNGS/AIRWAYS/PLEURA: Patent airways to the segmental bronchi. New mild   general left lung volume loss. Ill-defined medial left apex density   (2-41) at site of prior treated lesion. 0.8 cm irregular left upper lobe   nodule (2-47), without clear correlate on prior. Mild interlobular septal   thickening in the left upper and lower lobes. New middle lobar   atelectasis.    Overall decreased volume of small left pleural effusion, however new thin   component in the anterior and medial pleura and new 1.3 cm nodule along   the mediastinal pleura (2-52). New moderate nonloculated right pleural   effusion. Mild associated passive atelectasis in the lower lobes.    LYMPH NODES: Unremarkable.    HEART/VASCULATURE: Normal heart size. Unremarkable pericardium. Normal   caliber aorta.    UPPER ABDOMEN: Not well evaluated on this exam.    BONES/SOFT TISSUES: Stable multiple sclerotic bone lesions. Posterior   fusion hardware in the lumbar spine.      IMPRESSION:    No pulmonary embolism.    Since October 2020:    Medial left upper lobe density, corresponding to known lung cancer,   although difficult to compare to prior given new mild left lung volume   loss and likely radiotherapy changes.    New ill-defined 0.8 cm left upper lobe nodule. A previously described   left upper lobe nodule that was new on prior has resolved.    Overall decreased volume of left pleural effusion, however now with thin   circumferential components and mild new nodularity.    New moderate nonloculated right pleural effusion.    As it is difficult to differentiate posttreatment changes versus disease   progression on this exam, follow-up in 8-12 weeks may help clarify.    --- End of Report ---            MORENO GAN M.D., ATTENDING RADIOGIST

## 2022-01-26 NOTE — CHART NOTE - NSCHARTNOTEFT_GEN_A_CORE
Patients goals are clear at this time about continuing treatment, and pain managed will Sign off, please reconsult if needed, Spoke with Dr. Westfall, Dr. Calle and family

## 2022-01-26 NOTE — DIETITIAN INITIAL EVALUATION ADULT. - MALNUTRITION
moderate malnutrition in chronic illness r/t decreased PO intake 2/2 CA AEB moderate muscle/fat wasting; meeting <75% of ENN chronically

## 2022-01-26 NOTE — PROGRESS NOTE ADULT - SUBJECTIVE AND OBJECTIVE BOX
Subjective:  Pt seen, NPO for OR. C/o dry mouth    Vital Signs:  Vital Signs Last 24 Hrs  T(C): 36.9 (01-26-22 @ 08:59), Max: 37.1 (01-25-22 @ 19:47)  T(F): 98.5 (01-26-22 @ 08:59), Max: 98.7 (01-25-22 @ 19:47)  HR: 73 (01-26-22 @ 08:59) (73 - 99)  BP: 109/49 (01-26-22 @ 08:59) (102/49 - 115/59)  RR: 18 (01-26-22 @ 08:59) (18 - 21)  SpO2: 93% (01-26-22 @ 08:59) (93% - 94%) on (O2)    Telemetry/Alarms:    Relevant labs, radiology and Medications reviewed                        12.1   7.24  )-----------( 279      ( 26 Jan 2022 06:09 )             37.0     01-26    138  |  103  |  19  ----------------------------<  95  3.8   |  31  |  1.04    Ca    7.1<L>      26 Jan 2022 06:09  Phos  3.0     01-26  Mg     2.3     01-26    TPro  5.0<L>  /  Alb  1.9<L>  /  TBili  0.6  /  DBili  x   /  AST  17  /  ALT  18  /  AlkPhos  83  01-25    PT/INR - ( 24 Jan 2022 11:03 )   PT: 13.1 sec;   INR: 1.14 ratio         PTT - ( 24 Jan 2022 11:03 )  PTT:29.6 sec  MEDICATIONS  (STANDING):  calcium carbonate    500 mG (Tums) Chewable 1 Tablet(s) Chew daily  cyanocobalamin Injectable 1000 MICROGram(s) SubCutaneous once  dextrose 5% + sodium chloride 0.45% with potassium chloride 20 mEq/L 1000 milliLiter(s) (40 mL/Hr) IV Continuous <Continuous>  folic acid 1 milliGRAM(s) Oral daily  heparin   Injectable 5000 Unit(s) SubCutaneous every 12 hours  levothyroxine 100 MICROGram(s) Oral daily  liothyronine 5 MICROGram(s) Oral daily  mirtazapine 7.5 milliGRAM(s) Oral at bedtime  multivitamin 1 Tablet(s) Oral daily  pantoprazole    Tablet 40 milliGRAM(s) Oral before breakfast  sodium chloride 0.9%. 1000 milliLiter(s) (50 mL/Hr) IV Continuous <Continuous>    MEDICATIONS  (PRN):  acetaminophen     Tablet .. 650 milliGRAM(s) Oral every 6 hours PRN Temp greater or equal to 38C (100.4F), Mild Pain (1 - 3)  ALBUTerol    90 MICROgram(s) HFA Inhaler 2 Puff(s) Inhalation every 6 hours PRN Shortness of Breath and/or Wheezing  ALPRAZolam 0.25 milliGRAM(s) Oral every 6 hours PRN anxiety  aluminum hydroxide/magnesium hydroxide/simethicone Suspension 30 milliLiter(s) Oral every 6 hours PRN Dyspepsia  HYDROmorphone  Injectable 0.5 milliGRAM(s) IV Push every 4 hours PRN Severe Pain (7 - 10)  LORazepam   Injectable 0.5 milliGRAM(s) IV Push every 15 minutes PRN Anxiety  melatonin 3 milliGRAM(s) Oral at bedtime PRN Insomnia  ondansetron   Disintegrating Tablet 4 milliGRAM(s) Oral every 6 hours PRN Nausea and/or Vomiting  oxyCODONE    IR 5 milliGRAM(s) Oral every 4 hours PRN Moderate Pain (4 - 6)      Physical exam  Neuro: Normal exam oriented to person/place & time with no focal motor or sensory  deficits.     HEENT NC/AT, no nasal d/c, no cyanosis             Neck: supple  Chest: equal, right pigtail clamped                                                                            CV:  RRR                                                                                     GI:  soft                                                                                                        Extremities: Normal no evidence of cyanosis or deformity edema noted  SKIN no rashes appreciated    I&O's Summary    25 Jan 2022 07:01  -  26 Jan 2022 07:00  --------------------------------------------------------  IN: 0 mL / OUT: 110 mL / NET: -110 mL        Assessment  76y Female  w/ PAST MEDICAL & SURGICAL HISTORY:  Hypothyroidism    Fracture  L2 pathologic fracture    Osteoarthritis    Cholecystitis    Lung cancer  adenocarcinoma    History of other surgery  percutaneous endoscopic left pleural cavity drain    History of other surgery  gallbladder drain    H/O thyroidectomy    History of lumbar spinal fusion    S/P left oophorectomy    History of tonsillectomy    admitted with complaints of Patient is a 76y old  Female who presents with a chief complaint of dyspnea , cough (25 Jan 2022 19:41)  .  77 y/o female with PMH of Metastatic adenocarcinoma of the lung to the spine, liver mets,  Hypothyroidism s/p thyroid resection, h/o  L2 pathologic compression fracture, GERD presents to  on 1/24/22 with 1 week history of dyspnea. Pt found to have a moderate RT pleural effusion s/p RT pigtail placement 1/24/22    NPO  IVF started, may wet mouth  mediport and RT VATS, pleurx placement  COVID and T and S from 1/24  will need home care arranged for drainage q Tues and Friday up to 1L, record output  Rad onc consult    Discussed with Cardiothoracic Team at AM rounds.

## 2022-01-26 NOTE — DIETITIAN NUTRITION RISK NOTIFICATION - TREATMENT: THE FOLLOWING DIET HAS BEEN RECOMMENDED
Diet, NPO after Midnight:      NPO Start Date: 25-Jan-2022,   NPO Start Time: 23:59 (01-25-22 @ 13:49) [Active]  Diet, NPO after Midnight:      NPO Start Date: 25-Jan-2022,   NPO Start Time: 23:59 (01-25-22 @ 13:27) [Active]  Diet, Regular (01-24-22 @ 14:46) [Active]

## 2022-01-26 NOTE — CHART NOTE - NSCHARTNOTEFT_GEN_A_CORE
HPI:     75 y/o female with PMH of Metastatic adenocarcinoma of the lung to the spine, liver and brain ,   Hypothyroidism s/p thyroid resection, h/o  L2 pathologic compression fracture, GERD presents to  on 1/24/22 with 1 week history of dyspnea.     (24 Jan 2022 14:52)      PERTINENT PMH REVIEWED:  [ X ] YES [ ] NO           Primary Contact:        Pts son Eric HCP     HCP [ X ] Surrogate [   ] Guardian [   ]    Mental Status: [ X ] Alert  [ X ] Oriented [  ] Confused [  ] Lethargic [  ]  Concerns of Depression [  ]- situational   Anxiety [   ]- anxious at times   Baseline ADLs (prior to admission):  Independent [ X ] moderately [ ] fully   Dependent   [ ] moderately [ ]fully    Anticipated Grief: Patient [ X ] Family [  X]    Caregiver Boles Assessed: Yes [  X  ] No [  ]    Jew: Unknown    Spiritual Concerns: Not identified     Goals of Care: Comfort [  ] Rehabilitation [  ] Curative [  ] Life Prolonging [  ]    ADVANCE DIRECTIVES: Pt. is full code                    Summary:    This SW met with pt. and her  at bedside to follow up and offer support. Role of Palliative  explained. This SW also discussed the difference between hospice and palliative. Pt. and her  shared that they have a plan to start Chemo here and follow up with Dr. Westfall their Oncologist. Pt. discussed how emotionally she has been a "wreck". Pt.  reports that pt. has been through this before with cancer and managed and that she will again, which pt. responded that she hopes she will be able to. Feelings explored and support provided. Pt. and her  are clear that the goal is to pursue treatment at this time. No limits are currently set. Emotional support provided. Our team will sign off as goals of care are clear. Please reconsult if needed.

## 2022-01-27 NOTE — PROGRESS NOTE ADULT - SUBJECTIVE AND OBJECTIVE BOX
Subjective:  pt in bed NAD No issues overnight     T(C): 37.2 (01-27-22 @ 08:09), Max: 37.2 (01-27-22 @ 08:09)  HR: 78 (01-27-22 @ 08:09) (71 - 78)  BP: 113/54 (01-27-22 @ 08:09) (106/51 - 129/65)  ABP: --  ABP(mean): --  RR: 17 (01-27-22 @ 08:09) (14 - 18)  SpO2: 98% (01-27-22 @ 08:09) (94% - 100%)  Wt(kg): --  CVP(mm Hg): --  CO: --  CI: --  PA: --                                              Tele: SR     CHEST TUBE:     460cc                          OUTPUT:     per 24 hours    AIR LEAKS:  [ ] YES [ x ] NO          01-27    139  |  105  |  18  ----------------------------<  112<H>  4.1   |  28  |  0.82    Ca    7.0<L>      27 Jan 2022 07:00  Phos  4.1     01-27  Mg     2.2     01-27    TPro  5.2<L>  /  Alb  1.8<L>  /  TBili  0.6  /  DBili  x   /  AST  21  /  ALT  16  /  AlkPhos  85  01-27                               13.8   9.94  )-----------( 274      ( 27 Jan 2022 07:00 )             42.5                 CAPILLARY BLOOD GLUCOSE               CXR: < from: Xray Chest 1 View- PORTABLE-Routine (Xray Chest 1 View- PORTABLE-Routine in AM.) (01.26.22 @ 08:12) >  FINDINGS:  CATHETERS AND TUBES: RIGHT multi-sidehole pigtail catheter overlies RIGHT   lower hemithorax.    PULMONARY: RIGHT lung clear of gross airspace consolidations. Minimal    fluid within dependent major fissure..  LEFT  diffuse airspace disease and/or effusion layering along posterior   thoracic wall. LEFT retrocardiac airspace consolidation with air   bronchograms obscuring diaphragm contour..   No pneumothorax.    HEART/VASCULAR: The heart and mediastinum size and configuration are   within normal limits.    BONES: Visualized osseous structures are intact.    IMPRESSION: RIGHT pigtail chest tube catheter in place. RIGHT lung clear  LEFT  diffuse airspace disease and/or effusion layering along posterior   thoracic wall. LEFT retrocardiac airspace consolidation with air   bronchograms obscuring diaphragm contour.    FOLLOW-UP AP PORTABLE CHEST RADIOGRAPH 1/26/2022 AT 6:43 AM:  No significant interval change.    < end of copied text >          Exam  Neuro:  Alert Awake NAD  Pulm: decreased b/l + Rt Pleurx + RT IJ mediport C/D/I  CV: RRR S1 S2   Abd:  Soft NT ND   Extremities: no edema          Assessment:  76yFemale    with PAST MEDICAL & SURGICAL HISTORY:  Hypothyroidism    Fracture  L2 pathologic fracture    Osteoarthritis    Cholecystitis    Lung cancer  adenocarcinoma    History of other surgery  percutaneous endoscopic left pleural cavity drain    History of other surgery  gallbladder drain    H/O thyroidectomy    History of lumbar spinal fusion    S/P left oophorectomy    History of tonsillectomy          Plan:

## 2022-01-27 NOTE — PROGRESS NOTE ADULT - ASSESSMENT
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 FEV1/FVC 71%. Normal lung volumes. No bronchodilator response. DLCO 62%, mildly reduced.   She endorses significant dyspnea now with exertion   Underwent a PET on 1/2022 that revealed  stable left upper lobe spiculated nodule with mild FDG uptake. Stable other scattered pulmonary nodules, as described.  SINCE FDG PET/CT DATED 12/28/2020, NEW FDG AVID HEPATIC AND OSSEOUS LESIONS, HIGHLY SUSPICIOUS FOR METASTATIC DISEASE. RECOMMENDED MRI THORACIC SPINE TO RULE OUT CORD COMPRESSION AT THE LEVEL OF T3 AND T6 VERTEBRAE. Increased FDG avidity in left pleural thickening with scattered FDG avid metastatic pleural nodules. Moderate volume right pleural effusion with few areas within showing mild FDG avidity, new since CT chest 10/7/2021, likely metastatic/malignant. Mildly FDG avid mediastinal and left hilar lymph nodes, suspicious for kory metastases  Oncologically, her electrolytes have been slightly abnormal as well  History of immunotherapy / targeted therapy   Was seen by me via telehealth visit on 1/24 and was noted to be more dyspneic and thereafter sent in for further evaluation  Now s/p Right IPC  She has tried albuterol/spiriva in the past but not helped   Echocardiogram did not reveal PH and small pericardial effusion improved   MRI did not reveal spinal cord compression  Prognosis is guarded, discussed with Oncology  Appreciate CTS recommendations  Follow up palliative care recommendations

## 2022-01-27 NOTE — PROGRESS NOTE ADULT - SUBJECTIVE AND OBJECTIVE BOX
Chief complaint: dyspnea and cough    HPI: 77 y/o female with PMHx of Metastatic adenocarcinoma of the lung with metastasis to the spine, liver, and brain, Hypothyroidism s/p thyroid resection, h/o L2 pathologic compression fracture, GERD, chronic leg edema on Lasix  presents to  on 1/24/22 with 1 week history of dyspnea. Patient reports cold symptoms 2 weeks ago - cough, nasal congestion, some loose bowel movements which improved, but she started to have dyspnea on exertion, which become worse in last 2 days.     In ED - Vitals reviewed T(F): 97.7 HR: 68  (68 - 86) BP: 113/58 (113/58 - 139/66) RR: 29  (22 - 29) SpO2: 94%  (86% - 97%) CT Angio Chest PE Protocol w/ IV Cont - no PE ,  Medial left upper lobe density, corresponding to known lung cancer,   New ill-defined 0.8 cm left upper lobe nodule. l decreased volume of left pleural effusion, . New moderate non-loculated right pleural effusion. Recent PET scan - showed new T3, T6 lesion with possible cord compression, s/p right chest tube placement in ED     Interval history: 1/27/22 - Patient seen and examined at bedside, denies cp, dyspnea improved, s/p VATS/PleurX placement. Denies abdominal pain, numbness, weakness. Improved sleep with addition of Remeron, continues to have poor appetite.     Review of system- Rest of the review of system are negative except mentioned in HPI    PHYSICAL EXAM:   Vital Signs Last 24 Hrs: all reviewed  T(C): 37.2 (27 Jan 2022 08:09), Max: 37.2 (27 Jan 2022 08:09)  T(F): 98.9 (27 Jan 2022 08:09), Max: 98.9 (27 Jan 2022 08:09)  HR: 78 (27 Jan 2022 08:09) (71 - 78)  BP: 113/54 (27 Jan 2022 08:09) (106/51 - 129/65)  BP(mean): 71 (27 Jan 2022 04:10) (63 - 71)  RR: 17 (27 Jan 2022 08:09) (14 - 18)  SpO2: 98% (27 Jan 2022 08:09) (94% - 100%) on 2L    General : NAD, appear to be of stated age   NERVOUS SYSTEM:  Alert & Oriented X3, non- focal exam, Motor Strength 5/5 B/L upper and lower extremities; DTRs 2+ intact and symmetric  HEAD:  Atraumatic, Normocephalic  EYES: EOMI, conjunctiva and sclera clear  HEENT: Moist mucous membranes, Supple neck , No JVD  CHEST: Diminished bilat, + rales on right, no rhonchi, no wheezing, + right sided pigtail ct  HEART: Regular rate and rhythm; No murmurs, no rubs or gallops  ABDOMEN: Soft, Non-tender, Non-distended; Bowel sounds present, no guarding , no peritoneal irritation   GENITOURINARY- Voiding, no suprapubic tenderness  EXTREMITIES:  2+ Peripheral Pulses, No clubbing, cyanosis, no edema  MUSCULOSKELETAL:- No muscle tenderness, Muscle tone normal, No joint tenderness, no Joint swelling,  Joint ROM –normal   SKIN-no rash, no lesion    LABS: all reviewed                         13.8   9.94  )-----------( 274      ( 27 Jan 2022 07:00 )             42.5     01-27    139  |  105  |  18  ----------------------------<  112<H>  4.1   |  28  |  0.82    Ca    7.0<L>      27 Jan 2022 07:00  Phos  4.1     01-27  Mg     2.2     01-27    TPro  5.2<L>  /  Alb  1.8<L>  /  TBili  0.6  /  DBili  x   /  AST  21  /  ALT  16  /  AlkPhos  85  01-27    PT/INR - ( 24 Jan 2022 11:03 )   PT: 13.1 sec;   INR: 1.14 ratio    PTT - ( 24 Jan 2022 11:03 )  PTT:29.6 sec  Lactate Dehydrogenase, Serum: 273 U/L (01.25.22 @ 07:15)  Serum Pro-Brain Natriuretic Peptide: 195 pg/mL (01.25.22 @ 07:15)   1/26/22: Vitamin D, 25-Hydroxy: 58.7    Folate, Serum: >20.0 ng/mL (01.26.22 @ 06:09)     Vitamin B12, Serum: >2000 pg/mL (01.26.22 @ 06:09)     MICRO:   1/24/22: COVID-19 PCR: Not detected  Culture - Fungal, Pleural Fluid (01.24.22 @ 13:30) : Testing in progress  Culture - Body Fluid with Gram Stain (01.24.22 @ 13:30): No growth   RADIOLOGY & ADDITIONAL TESTS: all reviewed     EKG reviewed  (01.24.22 @ 11:21)  Suspect arm lead reversal, interpretation assumes no reversal  Normal sinus rhythm  Low voltage QRS  Lateral infarct (cited on or before 10-JUL-2020)  Abnormal ECG  When compared with ECG of 10-JUL-2020 10:22,  QRS axis Shifted left  Questionable change in initial forces of Lateral leads  Nonspecific T wave abnormality now evident in Lateral leads    MR Thoracic Spine w/wo IV Cont (01.24.22 @ 21:00)   INTERPRETATION:  Multufocal osseous metastatic disease in the thoracic spine. No gross evidence of epidural tumor extension. No thoracic cord compression or cord edema. Incidental findings: At C6-7. a disc osteophyte complex may impinge on the ventral cord. Pleural enhancement in the left hemithorax.    Xray Chest 1 View- PORTABLE (01.24.22 @ 15:16)   IMPRESSION: Mild to moderate right effusion largely drained with catheter chest tube. Significant left lung findings are increased from July 2020. COPD.    CT Angio Chest PE Protocol w/ IV Cont (01.24.22 @ 13:04) IMPRESSION: No pulmonary embolism. Since October 2020: Medial left upper lobe density, corresponding to known lung cancer, although difficult to compare to prior given new mild left lung volume loss and likely radiotherapy changes. New ill-defined 0.8 cm left upper lobe nodule. A previously described   left upper lobe nodule that was new on prior has resolved. Overall decreased volume of left pleural effusion, however now with thin circumferential components and mild new nodularity.  New moderate nonloculated right pleural effusion. As it is difficult to differentiate posttreatment changes versus disease progression on this exam, follow-up in 8-12 weeks may help clarify.    TTE Echo Complete w/o Contrast w/ Doppler (01.25.22 @ 08:59): Mitral valve prolapse cannot be excluded. Trace mitral regurgitation is present. Mild fibrocalcific changes noted to the Aortic valve leaflets with preserved leaflet excursion. Mild (1+) eccentric aortic regurgitation is present. The tricuspid valve leaflets are thin and pliable; valve motion is normal. Trace tricuspid valve regurgitation is present. Normal appearing pulmonic valve structure. Trace pulmonic valvular regurgitation is present. Normal appearing left atrium. The left ventricle is normal in size, wall thickness, and wall motion. Estimated left ventricular ejection fraction is 70 %. Normal appearing right atrium. Normal appearing right ventricle structure and function.    MEDICATIONS:  MEDICATIONS  (STANDING):  calcium carbonate    500 mG (Tums) Chewable 1 Tablet(s) Chew daily  cephalexin 500 milliGRAM(s) Oral four times a day  cyanocobalamin Injectable 1000 MICROGram(s) SubCutaneous once  folic acid 1 milliGRAM(s) Oral daily  heparin   Injectable 5000 Unit(s) SubCutaneous every 12 hours  levothyroxine 100 MICROGram(s) Oral daily  lidocaine   4% Patch 1 Patch Transdermal daily  liothyronine 5 MICROGram(s) Oral daily  mirtazapine 7.5 milliGRAM(s) Oral at bedtime  multivitamin 1 Tablet(s) Oral daily  pantoprazole    Tablet 40 milliGRAM(s) Oral before breakfast  sodium chloride 0.9%. 1000 milliLiter(s) (50 mL/Hr) IV Continuous <Continuous>    MEDICATIONS  (PRN):  acetaminophen     Tablet .. 650 milliGRAM(s) Oral every 6 hours PRN Temp greater or equal to 38C (100.4F), Mild Pain (1 - 3)  ALBUTerol    90 MICROgram(s) HFA Inhaler 2 Puff(s) Inhalation every 6 hours PRN Shortness of Breath and/or Wheezing  ALPRAZolam 0.25 milliGRAM(s) Oral every 6 hours PRN anxiety  aluminum hydroxide/magnesium hydroxide/simethicone Suspension 30 milliLiter(s) Oral every 6 hours PRN Dyspepsia  HYDROmorphone  Injectable 0.5 milliGRAM(s) IV Push every 4 hours PRN Severe Pain (7 - 10)  LORazepam   Injectable 0.5 milliGRAM(s) IV Push every 15 minutes PRN Anxiety  LORazepam   Injectable 0.5 milliGRAM(s) IV Push once PRN 15 min prior to MRI  melatonin 3 milliGRAM(s) Oral at bedtime PRN Insomnia  ondansetron   Disintegrating Tablet 4 milliGRAM(s) Oral every 6 hours PRN Nausea and/or Vomiting  oxyCODONE    IR 5 milliGRAM(s) Oral every 4 hours PRN Moderate Pain (4 - 6)  oxyCODONE    IR 7.5 milliGRAM(s) Oral every 6 hours PRN Severe Pain (7 - 10)    Home Medications:  Albuterol (Eqv-Proventil HFA) 90 mcg/inh inhalation aerosol: 2 puff(s) inhaled every 6 hours, As Needed (24 Jan 2022 15:08)  ALPRAZolam 0.25 mg oral tablet: 1 tab(s) orally 3 times a day, As Needed (24 Jan 2022 15:08)  biotin 1000 mcg oral tablet: 1 tab(s) orally once a day (24 Jan 2022 15:08)  calcium (as carbonate) 600 mg oral tablet: 1 tab(s) orally once a day (24 Jan 2022 15:08)  ciclopirox 0.77% topical gel: Apply topically to affected area 2 times a day (24 Jan 2022 15:08)  esomeprazole 40 mg oral delayed release capsule: 1 cap(s) orally once a day, As Needed (24 Jan 2022 15:08)  furosemide 20 mg oral tablet: 2 tab(s) orally once a day (24 Jan 2022 15:08)  liothyronine 5 mcg oral tablet: 1 tab(s) orally once a day (24 Jan 2022 15:08)  Multiple Vitamins oral tablet: 1 tab(s) orally once a day (24 Jan 2022 15:08)  ondansetron 8 mg oral tablet: 1 tab(s) orally 2 times a day, As Needed (24 Jan 2022 15:08)  Pfizer-BioNTech COVID-19 Vaccine PF 30 mcg/0.3 mL intramuscular suspension: 0.3 milliliter(s) intramuscular once  ***3rd dose given in August 2021*** (24 Jan 2022 15:08)  potassium chloride 20 mEq oral tablet, extended release: 1 tab(s) orally once a day (in the morning)  (pt has difficutly swallowing large pill) (24 Jan 2022 15:08)  Synthroid 100 mcg (0.1 mg) oral tablet: 1 tab(s) orally once a day (24 Jan 2022 15:08)  Tabrecta 200 mg oral tablet: 2 tab(s) orally 2 times a day (24 Jan 2022 15:08)

## 2022-01-27 NOTE — DISCHARGE NOTE NURSING/CASE MANAGEMENT/SOCIAL WORK - DATE OF FIRST COVID-19 BOOSTER
Patient has been scheduled for a Bilateral S1 Transforaminal Epidural Steroid Injection under fluoroscopy guidance under local anesthesia on 02/15/21 at the [facility]. Medications and allergies reviewed.  Patient informed we will need verbal or written Jody Catching 31-Aug-2021

## 2022-01-27 NOTE — DISCHARGE NOTE NURSING/CASE MANAGEMENT/SOCIAL WORK - PATIENT PORTAL LINK FT
You can access the FollowMyHealth Patient Portal offered by [facility] by registering at the following website: [email or web address]. By joining [employer]’s FollowMyHealth portal, you will also be able to view your health information using other applications (apps) compatible with our system.

## 2022-01-27 NOTE — DISCHARGE NOTE NURSING/CASE MANAGEMENT/SOCIAL WORK - NSDCPEFALRISK_GEN_ALL_CORE
For information on Fall & Injury Prevention, visit: [email or web address] OR  [email or web address] OR  https://www.cdc.gov/steadi/patient.html

## 2022-01-27 NOTE — PROGRESS NOTE ADULT - SUBJECTIVE AND OBJECTIVE BOX
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    Now with progressive disease liver spine lungs    s/p Thoracic surgery consultation and procedures  Right chest tube/Pleurx catheter  Subcutaneous port placement    Seen earlier this morning with  at bedside    Remains anxious, dyspneic with activity, Weak  No vomiting,  Pain controlled with present medication      PE  Fatigue 76-year-old female In bed  Neck supple  Lungs decreased air movement bilaterally  Decreased breath sounds on left  Decreased breath sounds with rales on right, chest tube right side draining serosanguineous fluid  Abdomen soft nontender  Extremities trace edema      CBC Full  -  ( 27 Jan 2022 07:00 )  WBC Count : 9.94 K/uL  RBC Count : 4.48 M/uL  Hemoglobin : 13.8 g/dL  Hematocrit : 42.5 %  Platelet Count - Automated : 274 K/uL  Mean Cell Volume : 94.9 fl  Mean Cell Hemoglobin : 30.8 pg  Mean Cell Hemoglobin Concentration : 32.5 gm/dL  01-27    139  |  105  |  18  ----------------------------<  112<H>  4.1   |  28  |  0.82    Ca    7.0<L>      27 Jan 2022 07:00  Phos  4.1     01-27  Mg     2.2     01-27    TPro  5.2<L>  /  Alb  1.8<L>  /  TBili  0.6  /  DBili  x   /  AST  21  /  ALT  16  /  AlkPhos  85  01-27              Vital Signs Last 24 Hrs  T(C): 37.6 (27 Jan 2022 15:15), Max: 37.6 (27 Jan 2022 15:15)  T(F): 99.6 (27 Jan 2022 15:15), Max: 99.6 (27 Jan 2022 15:15)  HR: 95 (27 Jan 2022 15:15) (71 - 95)  BP: 107/57 (27 Jan 2022 15:15) (106/51 - 123/53)  BP(mean): 71 (27 Jan 2022 04:10) (63 - 71)  RR: 18 (27 Jan 2022 15:15) (14 - 18)  SpO2: 93% (27 Jan 2022 15:15) (93% - 98%)

## 2022-01-27 NOTE — PHYSICAL THERAPY INITIAL EVALUATION ADULT - PERTINENT HX OF CURRENT PROBLEM, REHAB EVAL
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 Pt found to have a moderate RT pleural effusion. Pt with OR on 1/26/2022 and was D/C from PT. Received new orders for PT 1/27. 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 Pt found to have a moderate RT pleural effusion. Pt with OR on 1/26/2022 and was D/C from PT. Received new orders for PT 1/27. CT T-spine: There is marrow infiltration post aspect of T1 and T2 and T3-T6. Compatible with osseous metastases.

## 2022-01-27 NOTE — PROGRESS NOTE ADULT - ATTENDING COMMENTS
Patient seen and examined  on rounds with NP Danii Bah .  I was physically present for the key portion of the evaluation and management service provided, I  examined the patient myself and reviewed the plan of care with the patient and  NP Danii Bah , which I have reviewed and edited .  Medical records reviewed. History, review of systems, physical findings and lab results as documented confirmed , except as modified by me.  Agree with the assessment and plan of as stated and discussed, except as modified below.     77 y/o female with PMH of Metastatic adenocarcinoma of the lung with metastasis to the spine, liver, and brain ,Hypothyroidism s/p thyroid resection, h/o  L2 pathologic compression fracture, GERD, chronic leg edema on lasix  presents to  on 1/24/22 with 1 week history of dyspnea.     A/P   1. New moderate right pleural effusions likely malignant s/p chest tube - CT consult, plan for VATS and pleurex placement   2. Metastatic adenocarcinoma of the lung - oncology consult, will need o/p Rx and radiation rx  3 . New 0.8 mm INDU nodule - as per oncology   4. New T4, T6 spine metastasis - o/p radiation therapy by Dr. Ryan   5. OSMAN - resolved, hold lasix  6. Leg edema due to hypoalbuminemic state - echo EF wnl   7 . Hypocalcemia - calcium supplements   8. malnutrition - add supplements     Dispo - VATS procedure, pleurx placement, o/p radiation rx , PT evaluation after surgery , d/c planning once pain controlled and dyspnea under control, check O2 on ambulation prior discharge Patient seen and examined  on rounds with NP Danii Bah .  I was physically present for the key portion of the evaluation and management service provided, I  examined the patient myself and reviewed the plan of care with the patient and  NP Danii Bah , which I have reviewed and edited .  Medical records reviewed. History, review of systems, physical findings and lab results as documented confirmed , except as modified by me.  Agree with the assessment and plan of as stated and discussed, except as modified below.     75 y/o female with PMH of Metastatic adenocarcinoma of the lung with metastasis to the spine, liver, and brain ,Hypothyroidism s/p thyroid resection, h/o  L2 pathologic compression fracture, GERD, chronic leg edema on lasix  presents to  on 1/24/22 with 1 week history of dyspnea.     A/P   1. New moderate right pleural effusions  s/p chest tube - CT consult, plan for VATS and pleurex placement , cytology neg for malignant cells  2. Metastatic adenocarcinoma of the lung - oncology consult, will need o/p Rx and radiation rx  3 . New 0.8 mm INDU nodule - as per oncology   4. New T4, T6 spine metastasis - o/p radiation therapy by Dr. Ryan   5. OSMAN - resolved, hold lasix  6. Leg edema due to hypoalbuminemic state - echo EF wnl   7 . Hypocalcemia - calcium supplements   8. malnutrition - add supplements     Dispo - s/p VATS procedure, pleurx placement, o/p radiation rx , PT evaluation after surgery , d/c planning for oskar

## 2022-01-27 NOTE — PHYSICAL THERAPY INITIAL EVALUATION ADULT - GENERAL OBSERVATIONS, REHAB EVAL
Pt rec'd supine in bed, pleasant and cooperative with PT, no c/o pain, but endorses fatigue.
Pt found supine in bed with CT, and bed alarm activated. Pt c/o pain by CT site 5/10.

## 2022-01-27 NOTE — PROGRESS NOTE ADULT - SUBJECTIVE AND OBJECTIVE BOX
Patient is a 76y old  Female who presents with a chief complaint of dyspnea , cough (25 Jan 2022 09:54)      HPI:    77 yo F, history of Stage IV NSCLC (bone and brain metastasis) presents for a follow up of a chronic IPC(left sided pleural effusion)Thoracostomy was removed 4/7/2021  Underwent a PET on 1/2022 that revealed  stable left upper lobe spiculated nodule with mild FDG uptake. Stable other scattered pulmonary nodules, as described.  SINCE FDG PET/CT DATED 12/28/2020, NEW FDG AVID HEPATIC AND OSSEOUS LESIONS, HIGHLY SUSPICIOUS FOR METASTATIC DISEASE. RECOMMENDED MRI THORACIC SPINE TO RULE OUT CORD COMPRESSION AT THE LEVEL OF T3 AND T6 VERTEBRAE. Increased FDG avidity in left pleural thickening with scattered FDG avid metastatic pleural nodules. Moderate volume right pleural effusion with few areas within showing mild FDG avidity, new since CT chest 10/7/2021, likely metastatic/malignant. Mildly FDG avid mediastinal and left hilar lymph nodes, suspicious for kory metastases  Oncologically, her electrolytes have been slightly abnormal as well  History of immunotherapy / targeted therapy   Was seen by me via telehealth visit on 1/24 and was noted to be more dyspneic and thereafter sent in    (24 Jan 2022 14:52)    1/27  Patient is s/p port and right IPC    PAST MEDICAL & SURGICAL HISTORY:  Hypothyroidism    Fracture  L2 pathologic fracture    Osteoarthritis    Cholecystitis    Lung cancer  adenocarcinoma    History of other surgery  percutaneous endoscopic left pleural cavity drain    History of other surgery  gallbladder drain    H/O thyroidectomy    History of lumbar spinal fusion    S/P left oophorectomy    History of tonsillectomy      MEDICATIONS  (STANDING):  calcium carbonate    500 mG (Tums) Chewable 1 Tablet(s) Chew daily  cephalexin 500 milliGRAM(s) Oral four times a day  cyanocobalamin Injectable 1000 MICROGram(s) SubCutaneous once  folic acid 1 milliGRAM(s) Oral daily  heparin   Injectable 5000 Unit(s) SubCutaneous every 12 hours  levothyroxine 100 MICROGram(s) Oral daily  lidocaine   4% Patch 1 Patch Transdermal daily  liothyronine 5 MICROGram(s) Oral daily  mirtazapine 7.5 milliGRAM(s) Oral at bedtime  multivitamin 1 Tablet(s) Oral daily  pantoprazole    Tablet 40 milliGRAM(s) Oral before breakfast  sodium chloride 0.9%. 1000 milliLiter(s) (50 mL/Hr) IV Continuous <Continuous>    MEDICATIONS  (PRN):  acetaminophen     Tablet .. 650 milliGRAM(s) Oral every 6 hours PRN Temp greater or equal to 38C (100.4F), Mild Pain (1 - 3)  ALBUTerol    90 MICROgram(s) HFA Inhaler 2 Puff(s) Inhalation every 6 hours PRN Shortness of Breath and/or Wheezing  ALPRAZolam 0.25 milliGRAM(s) Oral every 6 hours PRN anxiety  aluminum hydroxide/magnesium hydroxide/simethicone Suspension 30 milliLiter(s) Oral every 6 hours PRN Dyspepsia  HYDROmorphone  Injectable 0.5 milliGRAM(s) IV Push every 4 hours PRN Severe Pain (7 - 10)  LORazepam   Injectable 0.5 milliGRAM(s) IV Push every 15 minutes PRN Anxiety  melatonin 3 milliGRAM(s) Oral at bedtime PRN Insomnia  ondansetron   Disintegrating Tablet 4 milliGRAM(s) Oral every 6 hours PRN Nausea and/or Vomiting  oxyCODONE    IR 5 milliGRAM(s) Oral every 4 hours PRN Moderate Pain (4 - 6)  oxyCODONE    IR 7.5 milliGRAM(s) Oral every 6 hours PRN Severe Pain (7 - 10)      Vital Signs Last 24 Hrs  T(C): 37.2 (27 Jan 2022 08:09), Max: 37.2 (27 Jan 2022 08:09)  T(F): 98.9 (27 Jan 2022 08:09), Max: 98.9 (27 Jan 2022 08:09)  HR: 78 (27 Jan 2022 08:09) (71 - 78)  BP: 113/54 (27 Jan 2022 08:09) (106/51 - 129/65)  BP(mean): 71 (27 Jan 2022 04:10) (63 - 71)  RR: 17 (27 Jan 2022 08:09) (14 - 18)  SpO2: 98% (27 Jan 2022 08:09) (94% - 100%)  I&O's Summary    24 Jan 2022 07:01  -  25 Jan 2022 07:00  --------------------------------------------------------  IN: 0 mL / OUT: 1175 mL / NET: -1175 mL      PHYSICAL EXAM  General Appearance: cooperative, no acute distress,   HEENT: PERRL, conjunctiva clear, EOM's intact, non injected pharynx, no exudate, TM   normal  Neck: Supple, , no adenopathy, thyroid: not enlarged, no carotid bruit or JVD  Back: Symmetric, no  tenderness,no soft tissue tenderness  Lungs: diminished bilaterally, +Right thoracostomy in place   Heart: Regular rate and rhythm, S1, S2 normal, no murmur, rub or gallop  Abdomen: Soft, non-tender, bowel sounds active , no hepatosplenomegaly  Extremities: no cyanosis or edema, no joint swelling  Skin: Skin color, texture normal, no rashes   Neurologic: Alert and oriented X3 , cranial nerves intact, sensory and motor normal,    ECG:    LABS:                          12.4   7.99  )-----------( 285      ( 25 Jan 2022 07:15 )             37.4     01-25    140  |  106  |  22  ----------------------------<  94  4.0   |  27  |  1.08    Ca    7.0<L>      25 Jan 2022 07:15  Phos  5.5     01-25  Mg     2.3     01-25    TPro  5.0<L>  /  Alb  1.9<L>  /  TBili  0.6  /  DBili  x   /  AST  17  /  ALT  18  /  AlkPhos  83  01-25          Pro BNP  195 01-25 @ 07:15  D Dimer  -- 01-25 @ 07:15  Pro BNP  170 01-24 @ 11:03  D Dimer  -- 01-24 @ 11:03    PT/INR - ( 24 Jan 2022 11:03 )   PT: 13.1 sec;   INR: 1.14 ratio         PTT - ( 24 Jan 2022 11:03 )  PTT:29.6 sec          RADIOLOGY & ADDITIONAL STUDIES:

## 2022-01-27 NOTE — PROGRESS NOTE ADULT - ASSESSMENT
Plan  Tomorrow chest tube will be adjusted to outpatient draining Pleurx catheter  Can then be discharged home    Extensive discussion with patient and her   They should review with social work possible assistance at home    Will need aggressive nutritional supports and I reviewed possible suggestions with protein rich diet, needs to follow-up with dietary both in hospital and in our office    Needs to be aggressive with out of bed and increased activity as tolerated    I suggested incentive spirometer to help with breathing    Patient has been given vitamin B12 and is on folic acid and  will contact us upon discharge so that she can initiate pemetrexed and carboplatin In a timely fashion    Above reviewed in detail with patient and her  in a comfortable with the strategy as outlined above

## 2022-01-27 NOTE — PROGRESS NOTE ADULT - ASSESSMENT
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 Pt found to have a moderate RT pleural effusion s/p RT pigtail placement 1/24/22 s/p RT VATS pleurx insertion and IJ mediport     NPO    mediport and RT VATS, pleurx placement  COVID and T and S from 1/24  will need home care arranged for drainage q Tues and Friday up to 500cc, record output  Rad onc consult    Discussed with Cardiothoracic Team at AM rounds.

## 2022-01-27 NOTE — PROGRESS NOTE ADULT - ASSESSMENT
75 y/o female with PMH of Metastatic adenocarcinoma of the lung with metastasis to the spine, liver, and brain ,Hypothyroidism s/p thyroid resection, h/o  L2 pathologic compression fracture, GERD, chronic leg edema on lasix  presents to  on 1/24/22 with 1 week history of dyspnea. Patient reports cold symptoms 2 weeks ago - cough, nasal congestion , some lose bowel movements which improved but she started to have dyspnea on exertion, which become worse in last 2 days. s/p right chest tube placement in ED     Progressive dyspnea due to New moderate right pleural effusion likely malignant with  underlying Metastatic adenocarcinoma of Lung ( INDU), ruled out PE. New 0.8 cm INDU nodule   - CT surgery consult for pleural effusion management  - 1/24 s/p Right chest tube placement 800 cc drained   - oncology consult Dr. Westfall - discussed the case , advised to stop tabrecta, start folic acid daily, will need different Rx and possible radiation to the spine   - pain management - dilaudid, oxycodone  - pulmonology consult Dr. Sanchez       -POD #1 VATS w PleureX placement   - SpO2 90% on RA while ambulating, 95% on RA while at rest  - oncology radiology consult  - pleural fluid analysis - by Light's criteria rule consistent with exudate, cytology negative for malignant cells  - Started on Keflex per sx    T4-T6 new lesion on recent PET , multiple thoracic spine metastasis ruled out cord compression  - MRI of thoracic spine w/wo contrast with pre-medication  - pt claustrophobic ativan 0.5, may repeat 0.5 mg as 2nd dose if not effective    - radiation oncology for possible need of rad therapy consult - pending   - neuro checks q4h - will stop   - ortho spine consult  - no surgical intervention,  consider rad onc consult     OSMAN    Baseline SCr 0.8, resolved  - hold lasix, trend ,  s/p IV fluids 50 cc for 10 hours     Leg edema , bilateral pleural effusions ruled out HF likely due to hypoalbuminemic state   - ekg - NSR, no ischemic changes, low voltage QRS, slightly elevated BNP  - Echo- Trace NC, MR and TR, Mild (1+) eccentric aortic regurgitation is present,  left ventricular ejection fraction is 70 %, no RWMA  - hold lasix, add protein supplements     Hypothyroidism    - c/w home meds     Hypocalcemia   - c/w calcium once daily, monitor   - vit D wnl     GERD   - c/w PPI     Anxiety   - xanax prn     Anorexia, depression , insomnia   - remeron 7.5     Moderate Protein calorie malnutrition  - Nutritionist following  - gelatein BID and ensure enlive once daily  - monitor BM; if > 3 days without BM, add bowel regimen  - daily wt     Advanced directives  - discussed advanced directive for 17 min  - MOLST form discussed   - palliative team following GOC discussed.   - FULL code    DVT proph -heparin q12h d/w CT surgery team     DISPO: s/p VATS, ambulation, pain control

## 2022-01-28 NOTE — DISCHARGE NOTE PROVIDER - CARE PROVIDER_API CALL
Abdullahi Sanchez  INTERNAL MEDICINE  [address] [place] [address]  Phone: [phone]  Fax: [phone]  Follow Up Time: 1 week    Richie Westfall  HEMATOLOGY  [address] [facility], [address]nd [place] [address]  Phone: [phone]  Fax: [phone]  Follow Up Time: 1 week    Sharyn Ryan)  Radiation Oncology  [address], [place] [address]  Phone: [phone]  Fax: [phone]  Follow Up Time: Routine    DEEJAY SIMMS  Internal Medicine  [address] [facility], [address]  [place] [address]  Phone: [phone]  Fax: [phone]  Follow Up Time: 1 week   Abdullahi Sanchez  INTERNAL MEDICINE  [address] [place] [address]  Phone: [phone]  Fax: [phone]  Follow Up Time: 1 week    Richie Westfall  HEMATOLOGY  [address] [facility], [address] [place] [address]  Phone: [phone]  Fax: [phone]  Follow Up Time: 1 week    Sharyn Ryan)  Radiation Oncology  [address], [place] [address]  Phone: [phone]  Fax: [phone]  Scheduled Appointment: 01/31/2022 10:00 AM    DEEJAY SIMMS  Internal Medicine  [address] [facility], [address]  [place] [address]  Phone: [phone]  Fax: [phone]  Follow Up Time: 1 week    Jania Stein)  Thoracic and Cardiac Surgery  [address] [place] [address]  Phone: [phone]  Fax: [phone]  Follow Up Time: 1 week

## 2022-01-28 NOTE — PROGRESS NOTE ADULT - PROVIDER SPECIALTY LIST ADULT
Hospitalist
Orthopedics
Thoracic Surgery
Heme/Onc
Hospitalist
Orthopedics
Orthopedics
Pulmonology
Thoracic Surgery
Hospitalist
Heme/Onc

## 2022-01-28 NOTE — DISCHARGE NOTE PROVIDER - NSDCCPTREATMENT_GEN_ALL_CORE_FT
PRINCIPAL PROCEDURE  Procedure: VATS, with PleurX catheter system insertion, pleural cavity  Findings and Treatment: Right      SECONDARY PROCEDURE  Procedure: VATS, with PleurX catheter system insertion, pleural cavity  Findings and Treatment: Right

## 2022-01-28 NOTE — PROGRESS NOTE ADULT - SUBJECTIVE AND OBJECTIVE BOX
Subjective:  Pt in bed NAD No issues overnight     T(C): 36.8 (01-28-22 @ 08:18), Max: 37.6 (01-27-22 @ 15:15)  HR: 84 (01-28-22 @ 08:18) (84 - 95)  BP: 107/60 (01-28-22 @ 08:18) (107/57 - 112/60)  ABP: --  ABP(mean): --  RR: 17 (01-28-22 @ 08:18) (17 - 18)  SpO2: 93% (01-28-22 @ 08:18) (93% - 94%) RA   Wt(kg): --  CVP(mm Hg): --  CO: --  CI: --  PA: --                                              Tele: SR     Pleurx  110cc                            OUTPUT:     per 24 hours    AIR LEAKS:  [ ] YES [x ] NO          01-28    139  |  104  |  23  ----------------------------<  102<H>  3.9   |  29  |  0.76    Ca    6.9<L>      28 Jan 2022 07:32  Phos  4.1     01-27  Mg     2.2     01-27    TPro  5.2<L>  /  Alb  1.8<L>  /  TBili  0.6  /  DBili  x   /  AST  21  /  ALT  16  /  AlkPhos  85  01-27                               12.8   8.68  )-----------( 243      ( 28 Jan 2022 07:32 )             38.3                 CAPILLARY BLOOD GLUCOSE               CXR: < from: Xray Chest 1 View- PORTABLE-Routine (Xray Chest 1 View- PORTABLE-Routine in AM.) (01.27.22 @ 08:09) >    PULMONARY: RIGHT lung clear. No pneumothorax.  Diffuse haziness overlying the LEFT hemithorax with volume loss indicates   a combination of diffuse pleural thickening and basilar on airspace   disease/consolidation likely from prior radiation therapy.    HEART/VASCULAR: The heart and mediastinum size and configuration are   within normal limits.    BONES: Visualized osseous structures are intact.    IMPRESSION:   Catheters and tubes in place.  RIGHT lung clear.  Diffuse haziness LEFT hemithorax with volume loss indicating combination   of chronic pleural thickening and interstitial lung infiltrates/fibrosis   likely from prior radiation therapy..  Please see CT report 1/24/2022.    A FOLLOW-UP AP PORTABLE CHEST RADIOGRAPH 1/27/2022 AT 7:41 AM:  No significant interval change.    < end of copied text >          Exam  Neuro:  Alert Awake NAD  Pulm: decreased b/l + Rt Pleurx   CV: RRR S1 S2   Abd: soft NT ND  Extremities: warm         Assessment:  76yFemale    with PAST MEDICAL & SURGICAL HISTORY:  Hypothyroidism    Fracture  L2 pathologic fracture    Osteoarthritis    Cholecystitis    Lung cancer  adenocarcinoma    History of other surgery  percutaneous endoscopic left pleural cavity drain    History of other surgery  gallbladder drain    H/O thyroidectomy    History of lumbar spinal fusion    S/P left oophorectomy    History of tonsillectomy          Plan:

## 2022-01-28 NOTE — DISCHARGE NOTE PROVIDER - DISCHARGE DATE
History     Chief Complaint:  Altered Mental Status    History obtained from the patient's daughters secondary to the patient's altered mental status.    HPI    Josy Thomson is a 89 year old female with a history of myelodysplastic syndrome transfusion dependent, pancytopenia, and hypertension, among others who presents to the emergency department today for evaluation of altered mental status. The patient's daughter reports that the patient was recently admitted from 02/16/2018 to 02/24/2018 for treatment of generalized weakness, volume depletion, and confusion. She states that the patient receives blood transfusions and deferoxamine every two weeks, most recently on 02/23/2018. She reports that they moved the patient to a [facility] center on 02/24/2018 and that on 02/25/2018 she developed a fever as high as 104. She states that a chest-xray was completed as well as a flu swab and urinalysis. She reports that the patient was positive for influenza and was started on Tamiflu. She notes that today is day 5 of the patient's Tamiflu. She reports that the patient coughed up a thick milky substance from her lungs three days ago but since that time has been too weak to cough any mucous up. She states that the patient has been on oxygen and received a nebulizer treatment yesterday without improvement in her symptoms. The patient's daughter reports that the patient would not take her medications this afternoon because she became agitated, uncooperative, and confused. She states that the patient is usually very sharp at baseline. She reports that the patient has continued to have a low-grade temperature, and that she had tylenol most recently this morning. She denies vomiting or diarrhea. She reports that the patient fell twice at the end of January 2018 and had a normal head CT and has not fallen since. She notes that the patient has history of ORIF of a right shoulder fracture 12 years ago. She voices no further  concerns.    Allergies:  No Known Drug Allergies     Medications:    Darbepoetin ang-polysorbate  Tylenol  Venlafaxine  Prilosec  Metoprolol    Past Medical History:    Anemia  Arthritis  History of blood transfusion  Pneumonia  Hypertension  Macular degeneration  Tachycardia  Pancytopenia  Myelodysplastic syndrome  GI bleed  Hematochezia    Past Surgical History:    Bone marrow biopsy, bone specimen, needle trocar, right  ENT surgery  Right shoulder orthopedic surgery, pinning    Family History:    History reviewed. No pertinent family history.    Social History:  The patient was accompanied to the ED by her two daughters.  Smoking Status: Never Smoker  Smokeless Tobacco: Never Used  Alcohol Use: Negative  Marital Status:       Review of Systems   Constitutional: Positive for fever.   Respiratory: Positive for cough (productive).    Gastrointestinal: Negative for diarrhea and vomiting.   Neurological: Positive for weakness.   Psychiatric/Behavioral: Positive for agitation, behavioral problems and confusion.        Positive for altered mental status.   All other systems reviewed and are negative.    Physical Exam     Patient Vitals for the past 24 hrs:   BP Temp Temp src Heart Rate Resp SpO2   03/02/18 2219 - - - 101 - 90 %   03/02/18 2218 - - - 101 - 91 %   03/02/18 2215 106/58 - - 103 - -   03/02/18 2208 - - - 101 - 95 %   03/02/18 2207 - - - 99 - 94 %   03/02/18 2206 - - - 101 - 93 %   03/02/18 2205 - - - 101 - 93 %   03/02/18 2204 - - - 101 - 93 %   03/02/18 2203 - - - 101 - 94 %   03/02/18 2202 - - - 102 - 94 %   03/02/18 2201 - - - 103 - 95 %   03/02/18 2200 107/60 - - 102 - -   03/02/18 2145 100/59 - - 102 - -   03/02/18 2130 106/59 - - 105 - -   03/02/18 2115 (!) 84/54 - - 106 - -   03/02/18 2100 (!) 85/54 - - 106 - -   03/02/18 2047 100/65 98.4  F (36.9  C) Axillary 111 20 93 %     Physical Exam   Constitutional:   Ill and frail-appearing.  Tachypneic.  Agitated   HENT:   Head: Atraumatic.   Right  Ear: External ear normal.   Left Ear: External ear normal.   Nose: Nose normal.   Dry mucous membranes   Eyes: Conjunctivae and lids are normal. No scleral icterus.   Neck: Neck supple. No tracheal deviation present.   Cardiovascular: Regular rhythm and intact distal pulses.    Tachycardic   Pulmonary/Chest:   Tachypnea diffuse coarse breath sounds   Abdominal: Soft. She exhibits distension (Mild). There is tenderness (Generalized tenderness to palpation no rigidity). There is no rebound and no guarding.   Musculoskeletal:   No peripheral edema   Neurological:   Arouses to voice is easily agitated with attempted cares somewhat redirectable by family.  She will eventually follow simple commands such as grasping hand and wiggling toes with repeated verbal prompts   Skin: Skin is warm and dry. She is not diaphoretic.   Psychiatric:   Agitated   Nursing note and vitals reviewed.          Emergency Department Course     ECG:  ECG taken at 2245, ECG read at 2249  Sinus tachycardia  Right bundle branch block  Cannot rule out Inferior infarct, age undetermined  Abnormal ECG  Rate 103 bpm. VA interval 146 ms. QRS duration 134 ms. QT/QTc 348/455 ms. P-R-T axes 61 47 43.    Imaging:  Radiology findings were communicated with the patient who voiced understanding of the findings.    Abd/pelvis CT no contrast - Stone Protocol  1. Bibasilar consolidation suspicious for pneumonia. There is likely  bilateral hilar adenopathy as well seen on the images that include a  portion of the lower chest.  2. There is a peripherally calcified porcelain type gallbladder with  mixed density material within it. Two low density round areas are more  prominent than on the comparison study and could represent cholesterol  gallstones. A polyp of some sort would be difficult to exclude. On  this study, there are two low-density areas in the gallbladder and  previously there was one.  3. No evidence of diverticulitis or appendicitis.  ANURADHA SERRANO,  MD  Reading per radiology    Head CT w/o contrast  1. No evidence of acute intracranial hemorrhage, mass, or herniation.  2. There is generalized atrophy of the brain. White matter changes are  present in the cerebral hemispheres that are consistent with small  vessel ischemic disease in this age patient.  BETHANY HUANG MD  Reading per radiology    XR Chest Port 1 View  Moderate stable cardiomegaly. Mildly prominent central  pulmonary vasculature. Increased density in the perihilar regions  bilaterally and left lower lobe. These opacities could be caused by  congestive heart failure. Cannot exclude pneumonia in the right  perihilar region or left lower lobe.  Advanced bilateral shoulder degenerative changes.  ANURADHA SERRANO MD  Reading per radiology    POC US Abdomen Limited  PROCEDURE NOTE --> Emergency Bedside Ultrasound    Procedure Name: Modified RUS exam    Preformed by: Wilber Qureshi MD    Indication - Hypotension    Probe: low frequency curvilinear probe    Windows - Hepatorenal, Splenorenal, Suprapubic, Subxyphoid, Abdominal Aortic Views, IVC, bilateral lung windows    Findings - No intraperitoneal free fluid, No pericardial effusion, No Pneumothorax, No Abdominal aortic aneurysm, <18mm, >30% collapsibility IVC, grossly normal contractility,     Impression  -hypovolemia preserved contractility no sign of obstructive shock    Images saved on ultrasound internal hard drive per protocol      Laboratory:  Laboratory findings were communicated with the patient's daughters who voiced understanding of the findings.    Blood culture: pending x2  ISTAT Basic Met ICa HCT (Collected at 2114): BUN 75 (H), Creatinine 3.4 (H), GFR Estimate 13 (L), HGB 18.7 (H), Hematocrit 55 (H)  ISTAT gases lactate venous (Collected at 2111): pH 7.34, pCO2 47, pO2 43, bicarbonate 25, O2 Sat 76, Lactic acid 1.1  CBC: WBC 0.3 (LL), HGB 7.0 (L), PLT 37 (LL)  CMP: BUN 86 (H), Creatinine 2.67 (H), GFR Estimate 17 (L), Albumin 1.7 (L), Protein  Total 5.8 (L), Alk Phos 152 (H),  (H),  (H), o/w WNL  BNP: 17932 (H)    Interventions:  2100  mL IV  2145 LR 1000 mL IV  2240 Maxipime 2 g IV    Medications   vancomycin 1250 mg in 0.9% NaCl 250 mL PREMIX (not administered)   lactated ringers BOLUS 1,000 mL (0 mLs Intravenous Stopped 3/2/18 2212)   0.9% sodium chloride BOLUS (0 mLs Intravenous Stopped 3/2/18 2211)   ceFEPIme (MAXIPIME) 2 g vial to attach to  ml bag for ADULTS or 50 ml bag for PEDS (2 g Intravenous New Bag 3/2/18 2240)     Emergency Department Course:  Nursing notes and vitals reviewed.  I performed an exam of the patient as documented above.   IV was inserted and blood was drawn for laboratory testing, results above.  The patient was sent for a XR Chest Port 1 View, Head CT w/o Contrast, Abd/pelvis CT no contrast, and POC US Abdomen Limited while in the emergency department, results above.   2109 A second IV was inserted using ultrasound guidance into the patient's right arm.  2226 I rechecked the patient and her family and updated them regarding her results.  2250 I discussed the treatment plan with the patient. They expressed understanding of this plan and consented to admission.  2302 I discussed the patient with Dr. Garcia, who will admit the patient to a monitored bed for further evaluation and treatment.  I personally reviewed the ECG, imaging, and laboratory results with the patient's daughters and answered all related questions prior to admission.    Impression & Plan      CMS Diagnoses:             The patient has signs of Severe Sepsis as evidenced by:    1. 2 SIRS criteria, AND  2. Suspected infection, AND   3. Organ dysfunction: 2 consecutive blood pressures less than 90 and new renal failure    Time severe sepsis diagnosis confirmed = 2221 as this was the time when Lactate resulted, and the level was >2 and Chest x-ray resulted confirming source of infection      3 Hour Severe Sepsis Bundle Completion:  1.  Initial Lactic Acid Result:   Recent Labs   Lab Test  03/02/18   2111  02/22/18   1009  02/20/18   1710   LACT  1.1  1.2  1.8     2. Blood Cultures before Antibiotics: Yes  3. Broad Spectrum Antibiotics Administered: Yes     Anti-infectives (Future)    Start     Dose/Rate Route Frequency Ordered Stop    03/02/18 2205  vancomycin 1250 mg in 0.9% NaCl 250 mL PREMIX      1,250 mg  166.7 mL/hr over 90 Minutes Intravenous ONCE 03/02/18 2205          4. 1500 milliliters of fluid      6 Hour Severe Sepsis Bundle Completion:  1. Repeat Lactic Acid Level: Not needed due to the first pain normal  2. MAP>65 after initial IVF bolus, will continue to monitor fluid status and vital signs  I attest to having performed a repeat sepsis exam and assessment of perfusion at 2345 and the results demonstrate improved perfusion.                     Medical Decision Making:  Josy Thomson is a 89 year old female here with her daughter for complaints of fever, acute onset of delirium. She is on day 5/5 of Tamiflu. Concern here is for delirium secondary to Tamiflu versus another underlying infection, pneumonia versus a urinary tract infection. Also likely quite volume depleted. Initially tachycardic, hypotensive, hypoxic. Immediately assessed. IV access obtained. Fluid rehydration begun. Portable chest x-ray done. Bedside ultrasound done. Initial blood work showing acute on chronic renal dysfunction, likely pre-renal. Blood gas surprisingly normal. CBC showing chronic pancytopenia, worse than previous, falls into the neutropenic range. Rectal examination with no gross melena or hematochezia. She was given empiric antibiotics on arrival. She follows simple commands. Will do CT scan to rule out CNS pathology such as hemorrhage. Low suspicion for CNS  Infection. Will do a CT of the abdomen as she appears distended and is tender consistently on repeat examination. Chest x-ray shows congestion versus pneumonia. Her volume status will be  difficult as she is volume depleted by mucous membrane examination and IVC but has an elevated BNP and likely some degree of underlying cardiomyopathy.    Update: Vital signs improved with crystalloid fluid resuscitation here this was done with small boluses and evaluation for response to therapy.  Bedside ultrasound showing decent contractility no significant ventricular chamber dilation.  CT scan of the head was unremarkable CT scan of the abdomen and pelvis shows chronic findings portion gallbladder there is no acute surgical emergencies or vascular catastrophes.  The portion of the lungs are visualized show bilateral pneumonia.  She was treating empirically with broad-spectrum antibiotics for neutropenia and suspected pneumonia.  Her blood tests showed pancytopenia and neutropenia as well as acute renal failure, blood gas was surprisingly normal as was her lactate.  She will have a Farmer placed to monitor her I's and O's.  Troponin was negative.    I discussed CODE STATUS with her daughters and she is a DNR DNI.  We discussed the next specific interventions of central line which they declined and would like to do the best we can with the peripheral lines that we have they would consider a PICC line potentially to be placed tomorrow.  Currently does not need pressors as her map is greater than 65.  There is no persistent hypotension.  She received 30 cc/kg over the course of her ED stay.  She will meet criteria for severe sepsis and her time 0 would be whenThe x-ray resulted showing pneumonia which is 2221.        Critical Care time:  was 45 minutes for this patient excluding procedures.    Diagnosis:    ICD-10-CM    1. Pancytopenia (H) D61.818    2. MDS (myelodysplastic syndrome) (H) D46.9    3. Pneumonia of both lower lobes due to infectious organism J18.9    4. Acute delirium R41.0    5. Acute renal failure, unspecified acute renal failure type (H) N17.9        Disposition:  The patient is admitted into the  care of Dr. Garcia.    Scribe Disclosure:  I, Syed Adolfo, am serving as a scribe at 9:11 PM on 3/2/2018 to document services personally performed by Wilber Qureshi MD, based on my observations and the provider's statements to me.  [facility] EMERGENCY DEPARTMENT       Wilber Qureshi MD  03/03/18 0103     28-Jan-2022

## 2022-01-28 NOTE — PROGRESS NOTE ADULT - NUTRITIONAL ASSESSMENT
This patient has been assessed with a concern for Malnutrition and has been determined to have a diagnosis/diagnoses of Moderate protein-calorie malnutrition.    This patient is being managed with:   Diet Regular-  Prosource Gelatein Plus     Qty per Day:  2  Supplement Feeding Modality:  Oral  Ensure Enlive Cans or Servings Per Day:  1       Frequency:  Daily  Entered: Jan 26 2022  2:56PM    Diet NPO after Midnight-     NPO Start Date: 25-Jan-2022   NPO Start Time: 23:59  Entered: Jan 25 2022  1:49PM    Diet NPO after Midnight-     NPO Start Date: 25-Jan-2022   NPO Start Time: 23:59  Entered: Jan 25 2022  1:27PM    

## 2022-01-28 NOTE — DISCHARGE NOTE PROVIDER - NPI NUMBER (FOR SYSADMIN USE ONLY) :
[[id number]],[[id number]],[[id number]] [[id number]],[[id number]]

## 2022-01-28 NOTE — DISCHARGE NOTE PROVIDER - NSDCFUADDINST_GEN_ALL_CORE_FT
< from: MR Head w/wo IV Cont (01.27.22 @ 17:04) >    IMPRESSION:  PREVIOUSLY DESCRIBED ENHANCING NODULES/FOCI HAVE IMPROVED. RESIDUAL   LATERAL RIGHT FRONTAL FOCUS DECREASED IN SIZE CURRENTLY MEASURING 5 MM   PREVIOUSLY MEASURED 11 MM.    NEW 2 TO 3 MM FOCUS OF ENHANCEMENT LEFT OCCIPITAL REGION. NEW 2 MM FOCUS   OF ENHANCEMENT RIGHT OCCIPITAL LOBE. LIKELY NEW METASTATIC DEPOSITS.    < end of copied text >

## 2022-01-28 NOTE — DISCHARGE NOTE PROVIDER - HOSPITAL COURSE
77 y/o female with PMHx of Metastatic adenocarcinoma of the lung with metastasis to the spine, liver, and brain, Hypothyroidism s/p thyroid resection, h/o L2 pathologic compression fracture, GERD, chronic leg edema on Lasix  presents to  on 1/24/22 with 1 week history of dyspnea. Patient reports cold symptoms 2 weeks ago - cough, nasal congestion, some loose bowel movements which improved, but she started to have dyspnea on exertion, which become worse in last 2 days.     1/26/22 underwent Right VATS with PleureX placement.       PHYSICAL EXAM:  Vital Signs Last 24 Hrs  T(C): 36.8 (27 Jan 2022 21:25), Max: 37.6 (27 Jan 2022 15:15)  T(F): 98.3 (27 Jan 2022 21:25), Max: 99.6 (27 Jan 2022 15:15)  HR: 88 (27 Jan 2022 21:25) (88 - 95)  BP: 112/60 (27 Jan 2022 21:25) (107/57 - 112/60)  RR: 18 (27 Jan 2022 21:25) (18 - 18)  SpO2: 94% (27 Jan 2022 21:25) (93% - 94%) on room air    GENERAL: NAD  HEAD:  Atraumatic, Normocephalic  EYES: EOMI, PERRLA, normal sclera  ENT: Moist mucous membranes  NECK: Supple, No JVD, no nuchal rigidity  CHEST/LUNG: Diminished right base, clear on the left; No rales, rhonchi, wheezing, or rubs. Unlabored respirations, + PleurX on right  HEART: Regular rate and rhythm; No murmurs, rubs, or gallops  ABDOMEN: Bowel sounds present; Soft, Nontender, Nondistended. No hepatomegaly  EXTREMITIES:  no pitting bilaterally  NERVOUS SYSTEM:  Alert & Oriented X3, speech clear. No focal motor or sensory deficits  MSK: FROM all 4 extremities, full and equal strength  SKIN: No rashes or lesions      Progressive dyspnea due to New moderate right pleural effusion likely malignant with  underlying Metastatic adenocarcinoma of Lung ( INDU), ruled out PE. New 0.8 cm INDU nodule   - CT surgery consult for pleural effusion management  - 1/24 s/p Right chest tube placement 800 cc drained   - oncology consult Dr. Westfall - discussed the case , advised to stop tabrecta, start folic acid daily, will need different Rx and possible radiation to the spine   - pain management - dilaudid, oxycodone  - pulmonology consult Dr. Sanchez    -POD # 2 VATS w PleureX placement   - SpO2 90% on RA while ambulating, 95% on RA while at rest  - oncology radiology consult  - pleural fluid analysis - by Light's criteria rule consistent with exudate, cytology negative for malignant cells  - Started on Keflex per sx for 3 day course  -At home pt to drain PleureX twice weekly on Tuesday and Friday for 500 ml.     T4-T6 new lesion on recent PET , multiple thoracic spine metastasis ruled out cord compression  - MRI of thoracic spine w/wo contrast with pre-medication  - pt claustrophobic ativan 0.5, may repeat 0.5 mg as 2nd dose if not effective    - radiation oncology for possible need of rad therapy consult - pending   - neuro checks q4h - will stop   - ortho spine consult  - no surgical intervention,  consider rad onc consult     OSMAN    Baseline SCr 0.8, resolved  - hold lasix, trend ,  s/p IV fluids 50 cc for 10 hours     Leg edema , bilateral pleural effusions ruled out HF likely due to hypoalbuminemic state   - ekg - NSR, no ischemic changes, low voltage QRS, slightly elevated BNP  - Echo- Trace [place], MR and TR, Mild (1+) eccentric aortic regurgitation is present,  left ventricular ejection fraction is 70 %, no RWMA  - hold lasix, add protein supplements     Hypothyroidism    - c/w home meds     Hypocalcemia   - c/w calcium once daily, monitor   - vit D wnl     GERD   - c/w PPI     Anxiety   - xanax prn     Anorexia, depression , insomnia   - remeron 7.5     Moderate Protein calorie malnutrition  - Nutritionist following  - gelatein BID and ensure enlive once daily  - monitor BM; if > 3 days without BM, add bowel regimen  - daily wt      cc - weakness, chest wall pain      77 y/o female with PMHx of Metastatic adenocarcinoma of the lung with metastasis to the spine, liver, and brain, Hypothyroidism s/p thyroid resection, h/o L2 pathologic compression fracture, GERD, chronic leg edema on Lasix  presents to  on 1/24/22 with 1 week history of dyspnea. Patient reports cold symptoms 2 weeks ago - cough, nasal congestion, some loose bowel movements which improved, but she started to have dyspnea on exertion, which become worse in last 2 days.     1/26/22 underwent Right VATS with PleureX placement.   1/28 - pt seen and examined at bedside ,  no events, chest pain controlled , denies abdominal pain, + constipation, eating better, sleep improved     Review of system- Rest of the review of system are negative except mentioned in HPI      PHYSICAL EXAM:  Vital Signs Last 24 Hrs  T(C): 36.8 (27 Jan 2022 21:25), Max: 37.6 (27 Jan 2022 15:15)  T(F): 98.3 (27 Jan 2022 21:25), Max: 99.6 (27 Jan 2022 15:15)  HR: 88 (27 Jan 2022 21:25) (88 - 95)  BP: 112/60 (27 Jan 2022 21:25) (107/57 - 112/60)  RR: 18 (27 Jan 2022 21:25) (18 - 18)  SpO2: 94% (27 Jan 2022 21:25) (93% - 94%) on room air    GENERAL: NAD  HEAD:  Atraumatic, Normocephalic  EYES: EOMI, PERRLA, normal sclera  ENT: Moist mucous membranes  NECK: Supple, No JVD, no nuchal rigidity  CHEST/LUNG: Diminished right base, clear on the left; No rales, rhonchi, wheezing, or rubs. Unlabored respirations, + PleurX on right+ right mediport  HEART: Regular rate and rhythm; No murmurs, rubs, or gallops  ABDOMEN: Bowel sounds present; Soft, Nontender, Nondistended. No hepatomegaly  EXTREMITIES:  no pitting bilaterally  NERVOUS SYSTEM:  Alert & Oriented X3, speech clear. No focal motor or sensory deficits  MSK: FROM all 4 extremities, full and equal strength  SKIN: No rashes or lesions      Progressive dyspnea due to New moderate right pleural effusion likely malignant with  underlying Metastatic adenocarcinoma of Lung ( INDU), ruled out PE. New 0.8 cm INDU nodule   - CT surgery consult for pleural effusion management  - 1/24 s/p Right chest tube placement 800 cc drained   - oncology consult Dr. Westfall - discussed the case , advised to stop tabrecta, start folic acid daily, will need different Rx and possible radiation to the spine   - pain management - dilaudid, oxycodone  - pulmonology consult Dr. Sanchez    -POD # 2 VATS w PleureX placement   - SpO2 90% on RA while ambulating, 95% on RA while at rest  - oncology radiology consult  - pleural fluid analysis - by Light's criteria rule consistent with exudate, cytology negative for malignant cells  - Started on Keflex per sx for 3 day course  -At home pt to drain PleureX twice weekly on Tuesday and Friday for 500 ml.     T4-T6 new lesion on recent PET , multiple thoracic spine metastasis ruled out cord compression  - MRI of thoracic spine w/wo contrast with pre-medication  - pt claustrophobic ativan 0.5, may repeat 0.5 mg as 2nd dose if not effective    - radiation oncology for possible need of rad therapy consult - pending   - neuro checks q4h - will stop   - ortho spine consult  - no surgical intervention,  consider rad onc consult     New brain lesions in Left occipital and right occipital lobe 2-3 mm   - MRI of the brain - previous nodules improved - right frontal focus 11mm --> 5 mm   - radiologist oncologist Dr. Ryan as o/p radiation Rx     OSMAN    Baseline SCr 0.8, resolved  - hold lasix, trend ,  s/p IV fluids 50 cc for 10 hours     Leg edema , bilateral pleural effusions ruled out HF likely due to hypoalbuminemic state   - ekg - NSR, no ischemic changes, low voltage QRS, slightly elevated BNP  - Echo- Trace [place], MR and TR, Mild (1+) eccentric aortic regurgitation is present,  left ventricular ejection fraction is 70 %, no RWMA  - hold lasix, add protein supplements     Hypothyroidism  - c/w home meds     Hypocalcemia - c/w calcium once daily, monitor ,  vit D wnl     GERD  - c/w PPI     Anxiety - xanax prn     Anorexia, depression , insomnia  - remeron 7.5     Moderate Protein calorie malnutrition - Nutritionist following, - gelatein BID and ensure enlive once daily,  monitor BM; if > 3 days without BM, add bowel regimen,  daily wt      updated over the phone   Disposition - medically optimized to be discharged home with home PT and VNS close follow up with PCP, oncology, CT surgery  complete antibiotics  return to ED if fever, abdominal pain, nausea, vomiting, chest pain, dyspnea  Discharge plan discussed with patient, RN  Patient advised to follow up with PCP within 3-7 days  time spend 40 min  Discharge note faxed to PCP with my contact information to call me back   PCP  Dr. Silva

## 2022-01-28 NOTE — DISCHARGE NOTE PROVIDER - ATTENDING DISCHARGE PHYSICAL EXAMINATION:
Patient seen and examined  on rounds with NP Danii Bah .  I was physically present for the key portion of the evaluation and management service provided, I  examined the patient myself and reviewed the plan of care with the patient and  NP Danii Bah , which I have reviewed and edited .  Medical records reviewed. History, review of systems, physical findings and lab results as documented confirmed , except as modified by me.  Agree with the assessment and plan of as stated and discussed, except as modified below.   GENERAL: NAD  HEAD:  Atraumatic, Normocephalic  EYES: EOMI, PERRLA, normal sclera  ENT: Moist mucous membranes  NECK: Supple, No JVD, no nuchal rigidity  CHEST/LUNG: Diminished right base, clear on the left; No rales, rhonchi, wheezing, or rubs. Unlabored respirations, + PleurX on right+ right mediport  HEART: Regular rate and rhythm; No murmurs, rubs, or gallops  ABDOMEN: Bowel sounds present; Soft, Nontender, Nondistended. No hepatomegaly  EXTREMITIES:  no pitting bilaterally  NERVOUS SYSTEM:  Alert & Oriented X3, speech clear. No focal motor or sensory deficits  MSK: FROM all 4 extremities, full and equal strength  SKIN: No rashes or lesions

## 2022-01-28 NOTE — DISCHARGE NOTE PROVIDER - NSDCCPCAREPLAN_GEN_ALL_CORE_FT
PRINCIPAL DISCHARGE DIAGNOSIS  Diagnosis: Lung cancer metastatic to bone  Assessment and Plan of Treatment: You were admitted for a pleural effusion on the right side, you had a PleureX catheter placed that will be drained at home on Tuesdays and Fridays for 500 ml. Visiting nurses will monitor this.      -Imaging shows a new 0.8 cm nodule to your left upper lobe.   - Pleural fluid analysis - by Light's criteria rule consistent with exudate,  -Cytology negative for malignant cells  - you were started on Keflex an antibiotic for a total of 3 days following your VATS procedure.  T4-T6 new lesion on recent PET , multiple thoracic spine metastasis ruled out cord compression   -you were seen by an orthopedic surgeon, it is recommended that you follow up with a radiation oncologist out patient for radiation therapy to your spine.         SECONDARY DISCHARGE DIAGNOSES  Diagnosis: Hypothyroidism  Assessment and Plan of Treatment: continue current home meds    Diagnosis: Insomnia secondary to anxiety  Assessment and Plan of Treatment: You were started on Remeron for depression, anxity, insomnia and poor appetite, continue to take at home.    Diagnosis: Moderate protein-calorie malnutrition  Assessment and Plan of Treatment: You were seen by a nutritionist while in the hospital  Continue to eat protein at every meal. You should consume  grams of protein daily.       Diagnosis: Edema due to hypoalbuminemia  Assessment and Plan of Treatment: - hold lasix and continue to take protein supplements    Diagnosis: Hypocalcemia  Assessment and Plan of Treatment: continue to take calcium and vitamin D supplements    Diagnosis: OSMAN (acute kidney injury)  Assessment and Plan of Treatment: Your Lasix was stopped while hospitalized, you received IV fluids.   Follow up with your PCP to determine if you should stay off the Lasix    Diagnosis: Patient requested diagnostic testing  Assessment and Plan of Treatment:      PRINCIPAL DISCHARGE DIAGNOSIS  Diagnosis: Lung cancer metastatic to bone  Assessment and Plan of Treatment: You were admitted for a pleural effusion on the right side, you had a PleureX catheter placed that will be drained at home on Tuesdays and Fridays for 500 ml. Visiting nurses will monitor this.    you were started on Keflex an antibiotic for a total of 3 days following your VATS procedure. please complete 2 more days of antibiotic     -Imaging shows a new 0.8 cm nodule to your left upper lobe.   - follow up with oncologist within 1 week for furthe monitoring   .  T4-T6 new lesion on recent PET , multiple thoracic spine metastasis ruled out cord compression   -you were seen by an orthopedic surgeon, it is recommended that you follow up with a radiation oncologist Dr. Ryan out patient for radiation therapy to your spine.         SECONDARY DISCHARGE DIAGNOSES  Diagnosis: OSMAN (acute kidney injury)  Assessment and Plan of Treatment: Your Lasix was stopped while hospitalized, you received IV fluids.   Follow up with your PCP to determine if you should stay off the Lasix    Diagnosis: Moderate protein-calorie malnutrition  Assessment and Plan of Treatment: You were seen by a nutritionist while in the hospital  Continue to eat protein at every meal. You should consume  grams of protein daily.       Diagnosis: Hypothyroidism  Assessment and Plan of Treatment: continue current home meds    Diagnosis: Edema due to hypoalbuminemia  Assessment and Plan of Treatment: - hold lasix and continue to take protein supplements    Diagnosis: Insomnia secondary to anxiety  Assessment and Plan of Treatment: You were started on Remeron for depression, anxity, insomnia and poor appetite, continue to take at home.    Diagnosis: Hypocalcemia  Assessment and Plan of Treatment: continue to take calcium and vitamin D supplements    Diagnosis: Patient requested diagnostic testing  Assessment and Plan of Treatment:     Diagnosis: Brain lesion  Assessment and Plan of Treatment: new small lesion in the brain, follow up with radiologist oncologist within 1 week for possible radiation    Diagnosis: Constipation  Assessment and Plan of Treatment: take miralax  and dulcolax until you have bowel movements , than take as needed     PRINCIPAL DISCHARGE DIAGNOSIS  Diagnosis: Lung cancer metastatic to bone  Assessment and Plan of Treatment: You were admitted for a pleural effusion on the right side, you had a PleureX catheter placed that will be drained at home on Tuesdays and Thursday  for 500 ml. Visiting nurses will monitor this.    you were started on Keflex an antibiotic for a total of 3 days following your VATS procedure. please complete 2 more days of antibiotic     -Imaging shows a new 0.8 cm nodule to your left upper lobe.   - follow up with oncologist within 1 week for furthe monitoring   .  T4-T6 new lesion on recent PET , multiple thoracic spine metastasis ruled out cord compression   -you were seen by an orthopedic surgeon, it is recommended that you follow up with a radiation oncologist Dr. Ryan out patient for radiation therapy to your spine.         SECONDARY DISCHARGE DIAGNOSES  Diagnosis: OSMAN (acute kidney injury)  Assessment and Plan of Treatment: Your Lasix was stopped while hospitalized, you received IV fluids.   Follow up with your PCP to determine if you should stay off the Lasix    Diagnosis: Moderate protein-calorie malnutrition  Assessment and Plan of Treatment: You were seen by a nutritionist while in the hospital  Continue to eat protein at every meal. You should consume  grams of protein daily.       Diagnosis: Hypothyroidism  Assessment and Plan of Treatment: continue current home meds    Diagnosis: Edema due to hypoalbuminemia  Assessment and Plan of Treatment: - hold lasix and continue to take protein supplements    Diagnosis: Insomnia secondary to anxiety  Assessment and Plan of Treatment: You were started on Remeron for depression, anxity, insomnia and poor appetite, continue to take at home.    Diagnosis: Hypocalcemia  Assessment and Plan of Treatment: continue to take calcium and vitamin D supplements    Diagnosis: Patient requested diagnostic testing  Assessment and Plan of Treatment:     Diagnosis: Brain lesion  Assessment and Plan of Treatment: new small lesion in the brain, follow up with radiologist oncologist within 1 week for possible radiation    Diagnosis: Constipation  Assessment and Plan of Treatment: take miralax  and dulcolax until you have bowel movements , than take as needed

## 2022-01-28 NOTE — DISCHARGE NOTE PROVIDER - PROVIDER TOKENS
PROVIDER:[TOKEN:[[id number]],FOLLOWUP:[1 week]],PROVIDER:[TOKEN:[[id number]],FOLLOWUP:[1 week]],PROVIDER:[TOKEN:[[id number]],FOLLOWUP:[Routine]],PROVIDER:[TOKEN:[[id number]],FOLLOWUP:[1 week]] PROVIDER:[TOKEN:[[id number]],FOLLOWUP:[1 week]],PROVIDER:[TOKEN:[[id number]],FOLLOWUP:[1 week]],PROVIDER:[TOKEN:[[id number]],SCHEDULEDAPPT:[01/31/2022],SCHEDULEDAPPTTIME:[10:00 AM]],PROVIDER:[TOKEN:[[id number]],FOLLOWUP:[1 week]],PROVIDER:[TOKEN:[[id number]],FOLLOWUP:[1 week]]

## 2022-01-28 NOTE — DISCHARGE NOTE PROVIDER - NSDCMRMEDTOKEN_GEN_ALL_CORE_FT
Albuterol (Eqv-Proventil HFA) 90 mcg/inh inhalation aerosol: 2 puff(s) inhaled every 6 hours, As Needed  ALPRAZolam 0.25 mg oral tablet: 1 tab(s) orally 3 times a day, As Needed  biotin 1000 mcg oral tablet: 1 tab(s) orally once a day  calcium (as carbonate) 600 mg oral tablet: 1 tab(s) orally once a day  ciclopirox 0.77% topical gel: Apply topically to affected area 2 times a day  esomeprazole 40 mg oral delayed release capsule: 1 cap(s) orally once a day, As Needed  furosemide 20 mg oral tablet: 2 tab(s) orally once a day  liothyronine 5 mcg oral tablet: 1 tab(s) orally once a day  Multiple Vitamins oral tablet: 1 tab(s) orally once a day  ondansetron 8 mg oral tablet: 1 tab(s) orally 2 times a day, As Needed  Pfizer-BioNTech COVID-19 Vaccine PF 30 mcg/0.3 mL intramuscular suspension: 0.3 milliliter(s) intramuscular once  ***3rd dose given in August 2021***  potassium chloride 20 mEq oral tablet, extended release: 1 tab(s) orally once a day (in the morning)  (pt has difficutly swallowing large pill)  Synthroid 100 mcg (0.1 mg) oral tablet: 1 tab(s) orally once a day  Tabrecta 200 mg oral tablet: 2 tab(s) orally 2 times a day   Albuterol (Eqv-Proventil HFA) 90 mcg/inh inhalation aerosol: 2 puff(s) inhaled every 6 hours, As Needed  ALPRAZolam 0.25 mg oral tablet: 1 tab(s) orally 3 times a day MDD:0.75  biotin 1000 mcg oral tablet: 1 tab(s) orally once a day  bisacodyl 5 mg oral delayed release tablet: 1 tab(s) orally every 12 hours, As Needed -for constipation    calcium (as carbonate) 600 mg oral tablet: 1 tab(s) orally 2 times a day  cephalexin 500 mg oral capsule: 1 cap(s) orally 4 times a day  ciclopirox 0.77% topical gel: Apply topically to affected area 2 times a day  esomeprazole 40 mg oral delayed release capsule: 1 cap(s) orally once a day, As Needed  folic acid 1 mg oral tablet: 1 tab(s) orally once a day  liothyronine 5 mcg oral tablet: 1 tab(s) orally once a day  mirtazapine 7.5 mg oral tablet: 1 tab(s) orally once a day (at bedtime)  Multiple Vitamins oral tablet: 1 tab(s) orally once a day  ondansetron 8 mg oral tablet: 1 tab(s) orally 2 times a day, As Needed  oxyCODONE 5 mg oral tablet: 1 tab(s) orally every 4 hours, As needed, Moderate Pain (4 - 6) MDD:30 mg  polyethylene glycol 3350 oral powder for reconstitution: 17 gram(s) orally once a day, As Needed -for constipation   Synthroid 100 mcg (0.1 mg) oral tablet: 1 tab(s) orally once a day  Tylenol 8 Hour 650 mg oral tablet, extended release: 1 tab(s) orally every 8 hours, As Needed -for mild pain

## 2022-01-28 NOTE — PROGRESS NOTE ADULT - ASSESSMENT
77 y/o female with PMH of Metastatic adenocarcinoma of the lung to the spine, liver mets,  Hypothyroidism s/p thyroid resection, h/o  L2 pathologic compression fracture, GERD presents to  on 1/24/22 with 1 week history of dyspnea. Pt found to have a moderate RT pleural effusion s/p RT pigtail placement 1/24/22 s/p RT VATS pleurx insertion and IJ mediport     NPO  Rt Pleurx Capped   will need home care arranged for drainage q Tues and Friday up to 500cc, record output  Plan for discharge home today   Will Sign off case   please reconsult as needed     Discussed with Cardiothoracic Team at AM rounds.

## 2022-01-28 NOTE — DISCHARGE NOTE PROVIDER - NSDCCAREPROVSEEN_GEN_ALL_CORE_FT
Livia Calle (Hospitalist MD)  Danii Bah (Hospitalist NP)  Abdullahi Sanchez (Pulmonologist)  Sharyn Ryan (Rad/onc)  Chapo Sy (heme/onc)

## 2022-01-28 NOTE — DISCHARGE NOTE PROVIDER - DETAILS OF MALNUTRITION DIAGNOSIS/DIAGNOSES
This patient has been assessed with a concern for Malnutrition and was treated during this hospitalization for the following Nutrition diagnosis/diagnoses:     -  01/26/2022: Moderate protein-calorie malnutrition

## 2022-01-28 NOTE — GOALS OF CARE CONVERSATION - ADVANCED CARE PLANNING - CONVERSATION DETAILS
EDD completed  DNR/DNI, no feeding tube, ok with IV fluids, IV abx, send to the hospital, no limitation in interventions

## 2022-01-28 NOTE — DISCHARGE NOTE PROVIDER - CARE PROVIDERS DIRECT ADDRESSES
,DirectAddress_Unknown,DirectAddress_Unknown,[email or web address] ,DirectAddress_Unknown,DirectAddress_Unknown,[email or web address]

## 2022-01-28 NOTE — PROGRESS NOTE ADULT - REASON FOR ADMISSION
dyspnea , cough
dyspnea , cough  Recurrent metastatic adenocarcinoma of the lung
dyspnea , cough

## 2022-01-31 NOTE — CDI QUERY NOTE - NSCDIOTHERTXTBX_GEN_ALL_CORE_HH
Conflicting documentation Malnutrition Palliative consult documents Severe and Dietitian documents Moderate    The patient received a nutrition assessment by a Registered Dietician on 1/26  The documentation of this assessment states: (insert nutritional assessment)Moderate Protein Calorie Malnutrition       Please specify the clinical significance of these findings based on your clinical judgement such as:    -	Moderate Protein Calorie Malnutrition  -	Other (please specify)  -	Clinically unable to be determined      Please document your response in your progress notes and/or discharge summary as appropriate.      Chart Documentation:      Palliative  Care Nurse Note 5) severe protein calorie malnutrition - recommend dietician consult -  Albumin, Serum: 1.9 g/dL- Hospitalist starting patient on Remeron 7.5mg hopefully with help stimulate appetite and help with insomnia        Dietitian consult Dietitian Nutritition Risk Notification:   Nutrition Diagnosis & Parameters:  Findings Based on Comprehensive Nutrition Assessment, Consultation Performed on  26-Jan-2022  Upon Nutritional Assessment by the Registered Dietitian Your Patient was Determined to Meet Criteria/Has Evidence of the Following Diagnosis:  Moderate protein-calorie malnutrition  Other Risk Factors  Not applicable  Etiology-Basis  Chronic illness  Malnutrition Evaluation as Demonstrated by (Adults > 20 years of age)  Inadequate energy intake...  Loss of subcutaneous fat...  Loss of muscle...  Inadequate Energy Intake  < 75% for >/= 3 months  Body Fat (loss of subcutaneous fat)  Orbital...  Triceps...  Orbital Depletion is  moderate  Triceps Depletion is  mild  Muscle Mass (Loss of Muscle)  Temples...  Clavicles...  Shoulders...  Temples Depletion is  moderate  Clavicles Depletion is  moderate  Shoulders Depletion is  moderate

## 2022-02-03 PROBLEM — C34.90 MALIGNANT NEOPLASM OF UNSPECIFIED PART OF UNSPECIFIED BRONCHUS OR LUNG: Chronic | Status: ACTIVE | Noted: 2020-09-17

## 2022-02-03 NOTE — ED PROVIDER NOTE - CLINICAL SUMMARY MEDICAL DECISION MAKING FREE TEXT BOX
76 F PMH PMHx of Metastatic adenocarcinoma of the lung with metastasis to the spine, liver, and brain, Hypothyroidism s/p thyroid resection, h/o L2 pathologic compression fracture, GERD, chronic leg edema on Lasix, pulmonary fibrosis. pt  hospitalized at  1/24-1/28 for worsening SOB; (+) worsening R pleural effusion treated with VATS with Pleurx placement (pleural fluid (+) exudate cytology negative for malignant cells) dc on 3 days PO Keflex and to hold Lasix, BIBA from home c/o worsening  gradual onset of SOB x few days ago. pt's pulmonologist sent pt in for readmission. on exam, mild respiratory distress ill appearing, diffuse mild R crackles dry and decreased breath sounds L lower lung field, RA pulse ox 90-91%. plan: CXR, EKG, labs including VBG, O2 supplementation, blood cultures, COVID, thoracic consult and med admission

## 2022-02-03 NOTE — ED PROVIDER NOTE - RESPIRATORY, MLM
decreased breath sounds L lower half, (+) tachypnea; R Pleurx catheter in place dressing clean dry and intact, RA pulse ox 90=91% decreased breath sounds L lower half, (+) tachypnea; R Pleurx catheter in place dressing clean dry and intact, R/A pulse ox 90-91%

## 2022-02-03 NOTE — ED ADULT NURSE NOTE - ED STAT RN HANDOFF DETAILS
Report given to Carmella on 2SW, patient aware of transfer, VSS, no c/o pain offered at this time.  Awaiting transport.

## 2022-02-03 NOTE — ED PROVIDER NOTE - CARE PLAN
Principal Discharge DX:	Acute respiratory failure with hypoxia  Secondary Diagnosis:	Pleural effusion on right   1

## 2022-02-03 NOTE — H&P ADULT - NSHPOUTPATIENTPROVIDERS_GEN_ALL_CORE
PCP; Dr. Jerrica Marmolejo  Pulmonologist: Dr. Sanchez PCP; Dr. Jerrica Marmolejo  Pulmonologist: Dr. Sanchez  Heme/Onc => Dr. Liao  CT surgery; Dr. Jania Stein

## 2022-02-03 NOTE — H&P ADULT - HISTORY OF PRESENT ILLNESS
77 y/o F PMHx significant for Metastatic adenocarcinoma of the lung with metastasis to the spine, liver, and brain, Hypothyroidism s/p thyroid resection, h/o L2 pathologic compression fracture, GERD, chronic leg edema on Lasix, pulmonary fibrosis who was hospitalized at  1/24-1/28 for worsening SOB; (+) worsening R pleural effusion treated with VATS with Pleurx placement (pleural fluid (+) exudate cytology negative for malignant cells). The patient was discharged on a 3 day course of PO Keflex and instructed to hold her Lasix. Of note the patient was planned to start chemotherapy/RT tomorrow (2/4). The patient denies any associated cough, chest congestion, subjective fevers. The patient notes that her Pleurx catheter has not drained properly over the past 2 days and has only had a total of 100cc output drained today. The patient was referred to the ED by her Pulmonologist for further management. In the ED the patient was in moderate respiratory distress found to be hypoxic on room air to 90-91%.

## 2022-02-03 NOTE — H&P ADULT - NSHPPHYSICALEXAM_GEN_ALL_CORE
Vital Signs Last 24 Hrs  T(C): 36.9 (03 Feb 2022 22:20), Max: 36.9 (03 Feb 2022 22:20)  T(F): 98.5 (03 Feb 2022 22:20), Max: 98.5 (03 Feb 2022 22:20)  HR: 91 (03 Feb 2022 22:20) (74 - 91)  BP: 133/79 (03 Feb 2022 22:20) (125/70 - 143/74)  BP(mean): 87 (03 Feb 2022 20:23) (77 - 888)  RR: 22 (03 Feb 2022 22:20) (14 - 22)  SpO2: 97% (03 Feb 2022 22:20) (97% - 100%)

## 2022-02-03 NOTE — ED ADULT TRIAGE NOTE - CHIEF COMPLAINT QUOTE
pt presents to ED due to SOB pt was dx with Lung Ca was due to start Chemo and radiation this week pt had a port and Plurex cath placed on right CW,

## 2022-02-03 NOTE — ED PROVIDER NOTE - ENMT, MLM
oropharynx clear. mucus membranes mildly dry, (+) laryngitis hoarse scratchy throat.  ?slight thrush

## 2022-02-03 NOTE — ED PROVIDER NOTE - ATTENDING CONTRIBUTION TO CARE
Dr. Gallagher: I have personally performed a face to face bedside history and physical examination of this patient. I have discussed the history, examination, review of systems, assessment and plan of management with the resident. I have reviewed the electronic medical record and amended it to reflect my history, review of systems, physical exam, assessment and plan.

## 2022-02-03 NOTE — PATIENT PROFILE ADULT - FALL HARM RISK - HARM RISK INTERVENTIONS

## 2022-02-03 NOTE — ED PROVIDER NOTE - OBJECTIVE STATEMENT
76 F PMH PMHx of Metastatic adenocarcinoma of the lung with metastasis to the spine, liver, and brain, Hypothyroidism s/p thyroid resection, h/o L2 pathologic compression fracture, GERD, chronic leg edema on Lasix, pulmonary fibrosis. pt  hospitalized at  1/24-1/28 for worsening SOB; (+) worsening R pleural effusion treated with VATS with Pleurx placement (pleural fluid (+) exudate cytology negative for malignant cells) dc on 3 days PO Keflex and to hold Lasix, BIBA from home c/o worsening  gradual onset of SOB x few days ago. pt was to start chemo RT tomorrow.  reports mild coughing, chest congestion. no fever.  states the catheter is draining properly and since 2 days ago 100cc output drained today. no CP. endorses loss of energy and loss of appetite. pt not on home O2. pt's pulmonologist sent pt in for readmission. COVID vaccinated. PMD: Dr. Jerrica Silva; Pulmonologist: Dr. Sanchez 76 F PMH PMHx of Metastatic adenocarcinoma of the lung with metastasis to the spine, liver, and brain, Hypothyroidism s/p thyroid resection, h/o L2 pathologic compression fracture, GERD, chronic leg edema on Lasix, pulmonary fibrosis. pt  hospitalized at  1/24-1/28 for worsening SOB; (+) worsening R pleural effusion treated with VATS with Pleurx placement (pleural fluid (+) exudate cytology negative for malignant cells) dc on 3 days PO Keflex and to hold Lasix, BIBA from home c/o worsening  gradual onset of SOB x few days ago. pt was to start chemo RT tomorrow.  reports mild coughing, chest congestion. no fever.  states the catheter is ? draining properly and since last drained 2 days ago, 100cc output drained today. no CP. endorses loss of energy and loss of appetite. pt not on home O2. pt's pulmonologist sent pt in for readmission for respiratory difficulty. +COVID vaccinated. PMD: Dr. Jerrica Silva; Pulmonologist: Dr. Sanchez

## 2022-02-03 NOTE — ED PROVIDER NOTE - MUSCULOSKELETAL, MLM
Spine appears normal, range of motion is not limited, no muscle or joint tenderness. 1-2+ edema pretibia up to knees, MARTINEZ x 4, non-tender

## 2022-02-03 NOTE — H&P ADULT - NSHPLABSRESULTS_GEN_ALL_CORE
EKG; Heart Rate: 83/min, normal sinus rhythm, Left Axis Deviation, low voltage, LAFB, no ST/T wave changes

## 2022-02-03 NOTE — H&P ADULT - ASSESSMENT
77 y/o F PMHx significant for Metastatic adenocarcinoma of the lung with metastasis to the spine, liver, and brain, Hypothyroidism s/p thyroid resection, h/o L2 pathologic compression fracture, GERD, chronic leg edema on Lasix, pulmonary fibrosis who was hospitalized at  1/24-1/28 for worsening SOB; (+) worsening R pleural effusion treated with VATS with Pleurx placement (pleural fluid (+) exudate cytology negative for malignant cells). The patient was discharged on a 3 day course of PO Keflex and instructed to hold her Lasix. Of note the patient was planned to start chemotherapy/RT tomorrow (2/4). The patient denies any associated cough, chest congestion, subjective fevers. The patient notes that her Pleurx catheter has not drained properly over the past 2 days and has only had a total of 100cc output drained today. The patient was referred to the ED by her Pulmonologist for further management. In the ED the patient was in moderate respiratory distress found to be hypoxic on room air to 90-91%. 77 y/o F PMHx significant for Metastatic adenocarcinoma of the lung with metastasis to the spine, liver, and brain, Hypothyroidism s/p thyroid resection, h/o L2 pathologic compression fracture, GERD, chronic leg edema on Lasix, pulmonary fibrosis who was hospitalized at  1/24-1/28 for worsening SOB; (+) worsening R pleural effusion treated with VATS with Pleurx placement (pleural fluid (+) exudate cytology negative for malignant cells). The patient was discharged on a 3 day course of PO Keflex and instructed to hold her Lasix. Of note the patient was planned to start chemotherapy/RT tomorrow (2/4). The patient denies any associated cough, chest congestion, subjective fevers. The patient notes that her Pleurx catheter has not drained properly over the past 2 days and has only had a total of 100cc output drained today. The patient was referred to the ED by her Pulmonologist for further management. In the ED the patient was in moderate respiratory distress found to be hypoxic on room air to 90-91%.    #Acute Hypoxic Respiratory Failure due to Right Pleural Effusion as a consequence of a Pleurx Catheter Malfunction  ~admit to Medicine  ~cont. supplemental O2  ~f/u w/ Pulmonology in the am  ~f/u w/ CT Surgery in the am  ~f/u w/ Oncology consult with  ~cont. Folic acid 1mg po daily    #GERD  ~cont. Omeprazole 40mg po daily    #Hypothyroidism  ~cont. Liothyronine 5mcg po daily  ~cont. Levothyroxine 100mcg po qam    #Protein Calorie Malnutrition  ~f/u w/ Nutrition evaluation in the am    #Constipation  ~cont. Bisacodyl prn  ~cont. Miralax prn    #Advance Directives   ~per prior MOLST completed patient is DNR/DNI, no feeding tube, ok with IV fluids, IV abx, send to the hospital, no limitation in interventions    #Vte ppx  ~IMPROVE Vte Risk Score is 3  ~cont. Heparin sq

## 2022-02-03 NOTE — ED PROVIDER NOTE - PROGRESS NOTE DETAILS
DO Zaria PGY-3: pt found to have worsening R pleural effusion, stable on NC supplemental O2. TBA for thoracic eval of effusion and new O2 requirements

## 2022-02-03 NOTE — ED ADULT NURSE NOTE - OBJECTIVE STATEMENT
PT to ED for c/o worsening SOB. PT recently in hospital with right pleural effusion, Pleurx catheter placed.  PT states breathing became worse in the last couple of days. Reports mild cough. Denies fever and chills. 02 sat 01% on room air, pt placed on 2 lnc, 02 98%. Denies chest pain. Reports hx of lung ca, chemo tx to begin tomorrow. Non accessed right chest med port in place. Vss. Pending lab specimens. Will monitor.

## 2022-02-03 NOTE — ED ADULT NURSE REASSESSMENT NOTE - NS ED NURSE REASSESS COMMENT FT1
Received pt from prior RN Sumi, pt is a&0x4, airway patent, does not show any s/s of respiratory distress, breathing is unlabored, color is culturally appropriate, GCS is 15, vs are WNL, cardiac monitor reads NS 88bpm. place has Pleurx drain on right lung, dressing is cl3ean/dry/intact, chemo port reaccessed on right anterior chest wall, pt is pending covid swab for admission to the floor

## 2022-02-03 NOTE — PHARMACOTHERAPY INTERVENTION NOTE - COMMENTS
Medication History Complete. Medication and allergies reviewed with patient and compared to DrFirst. All medications related questions answered.

## 2022-02-04 NOTE — CONSULT NOTE ADULT - ATTENDING COMMENTS
Patient seen and examined. She is well known to the thoracic surgery service. She had placement of pleurX catheter placed recently. She returns to the hospital with shortness of breath. CXR shows a residual pleural effusion. She denies any fevers, chills, aches, productive cough. She had about 180cc and 100cc drain from the catheter.  She is also having a lot of pain during drainage of the catheter.     Her vitals are otherwise stable. Heart rate is normal. I think the shortness of breath is likely from the pleural effusion and not any other cause.  We will hook up to the pleuravac device and allow for drainage. Will place to waterseal and not suction due to pain reasons. If catheter is clogged, will place low dose tPA thru the catheter if needed.

## 2022-02-04 NOTE — CONSULT NOTE ADULT - TIME BILLING
I have seen and examined the patient. 40 min of time were spent examining the patient and discussing plan of care.

## 2022-02-04 NOTE — CONSULT NOTE ADULT - PROBLEM SELECTOR RECOMMENDATION 2
migration of pleurex  increased right effusion  pleurex repositioned   as per thoracic surgery and pulmonary

## 2022-02-04 NOTE — PROGRESS NOTE ADULT - SUBJECTIVE AND OBJECTIVE BOX
Cheif complaints and Diagnosis: resp failure/ pleural effusion / pleurex catheter malfunction    Subjective:       REVIEW OF SYSTEMS:    CONSTITUTIONAL: No weakness, fevers or chills  EYES/ENT: No visual changes;  No vertigo or throat pain   NECK: No pain or stiffness  RESPIRATORY: No cough, wheezing, hemoptysis; No shortness of breath  CARDIOVASCULAR: No chest pain or palpitations  GASTROINTESTINAL: No abdominal or epigastric pain. No nausea, vomiting, or hematemesis; No diarrhea or constipation. No melena or hematochezia.  GENITOURINARY: No dysuria, frequency or hematuria  NEUROLOGICAL: No numbness or weakness  SKIN: No itching, burning, rashes, or lesions   All other review of systems is negative unless indicated above      Vital Signs Last 24 Hrs  T(C): 36 (04 Feb 2022 08:24), Max: 36.9 (03 Feb 2022 22:20)  T(F): 96.8 (04 Feb 2022 08:24), Max: 98.5 (03 Feb 2022 22:20)  HR: 89 (04 Feb 2022 08:24) (74 - 91)  BP: 115/62 (04 Feb 2022 08:24) (115/62 - 143/74)  BP(mean): 87 (03 Feb 2022 20:23) (77 - 888)  RR: 20 (04 Feb 2022 08:24) (14 - 22)  SpO2: 97% (04 Feb 2022 08:24) (97% - 100%)    HEENT:   pupils equal and reactive, EOMI, no oropharyngeal lesions, erythema, exudates, oral thrush    NECK:   supple, no carotid bruits, no palpable lymph nodes, no thyromegaly    CV:  +S1, +S2, regular, no murmurs or rubs    RESP:   lungs clear to auscultation bilaterally, no wheezing, rales, rhonchi, good air entry bilaterally    BREAST:  not examined    GI:  abdomen soft, non-tender, non-distended, normal BS, no bruits, no abdominal masses, no palpable masses    RECTAL:  not examined    :  not examined    MSK:   normal muscle tone, no atrophy, no rigidity, no contractions    EXT:   no clubbing, no cyanosis, no edema, no calf pain, swelling or erythema    VASCULAR:  pulses equal and symmetric in the upper and lower extremities    NEURO:  AAOX3, no focal neurological deficits, follows all commands, able to move extremities spontaneously    SKIN:  no ulcers, lesions or rashes    MEDICATIONS  (STANDING):  folic acid 1 milliGRAM(s) Oral daily  levothyroxine 100 MICROGram(s) Oral daily  liothyronine 5 MICROGram(s) Oral daily  mirtazapine 7.5 milliGRAM(s) Oral at bedtime  multivitamin 1 Tablet(s) Oral daily  nystatin    Suspension 116373 Unit(s) Oral four times a day  pantoprazole    Tablet 40 milliGRAM(s) Oral before breakfast  tiotropium 18 MICROgram(s) Capsule 1 Capsule(s) Inhalation daily    MEDICATIONS  (PRN):  acetaminophen     Tablet .. 650 milliGRAM(s) Oral once PRN Temp greater or equal to 38C (100.4F), Mild Pain (1 - 3)  acetaminophen     Tablet .. 650 milliGRAM(s) Oral every 6 hours PRN Temp greater or equal to 38C (100.4F), Mild Pain (1 - 3)  ALBUTerol    90 MICROgram(s) HFA Inhaler 2 Puff(s) Inhalation every 6 hours PRN Bronchospasm  aluminum hydroxide/magnesium hydroxide/simethicone Suspension 30 milliLiter(s) Oral every 4 hours PRN Dyspepsia  bisacodyl 5 milliGRAM(s) Oral every 12 hours PRN Constipation  melatonin 3 milliGRAM(s) Oral at bedtime PRN Insomnia  ondansetron Injectable 4 milliGRAM(s) IV Push every 8 hours PRN Nausea and/or Vomiting  oxyCODONE    IR 5 milliGRAM(s) Oral every 4 hours PRN Moderate Pain (4 - 6)  polyethylene glycol 3350 17 Gram(s) Oral daily PRN Constipation      03 Feb 2022 16:40    137    |  103    |  23     ----------------------------<  167    4.5     |  27     |  0.81     Ca    7.5        03 Feb 2022 16:40    TPro  6.2    /  Alb  2.1    /  TBili  0.2    /  DBili  x      /  AST  35     /  ALT  40     /  AlkPhos  106    03 Feb 2022 16:40  LIVER FUNCTIONS - ( 03 Feb 2022 16:40 )  Alb: 2.1 g/dL / Pro: 6.2 gm/dL / ALK PHOS: 106 U/L / ALT: 40 U/L / AST: 35 U/L / GGT: x         CBC Full  -  ( 03 Feb 2022 16:40 )  WBC Count : 9.51 K/uL  Hemoglobin : 13.1 g/dL  Hematocrit : 39.5 %  Platelet Count - Automated : 318 K/uL  Mean Cell Volume : 92.7 fl  Mean Cell Hemoglobin : 30.8 pg  Mean Cell Hemoglobin Concentration : 33.2 gm/dL  Auto Neutrophil # : 9.03 K/uL  Auto Lymphocyte # : 0.38 K/uL  Auto Monocyte # : 0.00 K/uL  Auto Eosinophil # : 0.10 K/uL  Auto Basophil # : 0.00 K/uL  Auto Neutrophil % : 94.0 %  Auto Lymphocyte % : 4.0 %  Auto Monocyte % : 0.0 %  Auto Eosinophil % : 1.0 %  Auto Basophil % : 0.0 %                Assessment and Plan:     75 y/o F PMHx significant for Metastatic adenocarcinoma of the lung with metastasis to the spine, liver, and brain, Hypothyroidism s/p thyroid resection, h/o L2 pathologic compression fracture, GERD, chronic leg edema on Lasix, pulmonary fibrosis who was hospitalized at  1/24-1/28 for worsening SOB; (+) worsening R pleural effusion treated with VATS with Pleurx placement (pleural fluid (+) exudate cytology negative for malignant cells). The patient was discharged on a 3 day course of PO Keflex and instructed to hold her Lasix. Of note the patient was planned to start chemotherapy/RT tomorrow (2/4). The patient denies any associated cough, chest congestion, subjective fevers. The patient notes that her Pleurx catheter has not drained properly over the past 2 days and has only had a total of 100cc output drained today. The patient was referred to the ED by her Pulmonologist for further management. In the ED the patient was in moderate respiratory distress found to be hypoxic on room air to 90-91%.    #Acute Hypoxic Respiratory Failure due to Right Pleural Effusion as a consequence of a Pleurx Catheter Malfunction  ~admit to Medicine  ~cont. supplemental O2  ~f/u w/ Pulmonology in the am  ~f/u w/ CT Surgery in the am  ~f/u w/ Oncology consult with  ~cont. Folic acid 1mg po daily    #GERD  ~cont. Omeprazole 40mg po daily    #Hypothyroidism  ~cont. Liothyronine 5mcg po daily  ~cont. Levothyroxine 100mcg po qam    #Protein Calorie Malnutrition  ~f/u w/ Nutrition evaluation in the am    #Constipation  ~cont. Bisacodyl prn  ~cont. Miralax prn    #Advance Directives   ~per prior MOLST completed patient is DNR/DNI, no feeding tube, ok with IV fluids, IV abx, send to the hospital, no limitation in interventions    #Vte ppx  ~IMPROVE Vte Risk Score is 3  ~cont. Heparin sq       Cheif complaints and Diagnosis: resp failure/ pleural effusion / pleurex catheter malfunction    Subjective: no complaints      REVIEW OF SYSTEMS:    CONSTITUTIONAL: No weakness, fevers or chills  EYES/ENT: No visual changes;  No vertigo or throat pain   NECK: No pain or stiffness  RESPIRATORY: No cough, wheezing, hemoptysis; No shortness of breath  CARDIOVASCULAR: No chest pain or palpitations  GASTROINTESTINAL: No abdominal or epigastric pain. No nausea, vomiting, or hematemesis; No diarrhea or constipation. No melena or hematochezia.  GENITOURINARY: No dysuria, frequency or hematuria  NEUROLOGICAL: No numbness or weakness  SKIN: No itching, burning, rashes, or lesions   All other review of systems is negative unless indicated above      Vital Signs Last 24 Hrs  T(C): 36 (04 Feb 2022 08:24), Max: 36.9 (03 Feb 2022 22:20)  T(F): 96.8 (04 Feb 2022 08:24), Max: 98.5 (03 Feb 2022 22:20)  HR: 89 (04 Feb 2022 08:24) (74 - 91)  BP: 115/62 (04 Feb 2022 08:24) (115/62 - 143/74)  BP(mean): 87 (03 Feb 2022 20:23) (77 - 888)  RR: 20 (04 Feb 2022 08:24) (14 - 22)  SpO2: 97% (04 Feb 2022 08:24) (97% - 100%)    HEENT:   pupils equal and reactive, EOMI, no oropharyngeal lesions, erythema, exudates, oral thrush    NECK:   supple, no carotid bruits, no palpable lymph nodes, no thyromegaly    CV:  +S1, +S2, regular, no murmurs or rubs    RESP:   lungs clear to auscultation bilaterally, no wheezing, rales, rhonchi, good air entry bilaterally    BREAST:  not examined    GI:  abdomen soft, non-tender, non-distended, normal BS, no bruits, no abdominal masses, no palpable masses    RECTAL:  not examined    :  not examined    MSK:   normal muscle tone, no atrophy, no rigidity, no contractions    EXT:   no clubbing, no cyanosis, no edema, no calf pain, swelling or erythema    VASCULAR:  pulses equal and symmetric in the upper and lower extremities    NEURO:  AAOX3, no focal neurological deficits, follows all commands, able to move extremities spontaneously    SKIN:  no ulcers, lesions or rashes    MEDICATIONS  (STANDING):  folic acid 1 milliGRAM(s) Oral daily  levothyroxine 100 MICROGram(s) Oral daily  liothyronine 5 MICROGram(s) Oral daily  mirtazapine 7.5 milliGRAM(s) Oral at bedtime  multivitamin 1 Tablet(s) Oral daily  nystatin    Suspension 407534 Unit(s) Oral four times a day  pantoprazole    Tablet 40 milliGRAM(s) Oral before breakfast  tiotropium 18 MICROgram(s) Capsule 1 Capsule(s) Inhalation daily    MEDICATIONS  (PRN):  acetaminophen     Tablet .. 650 milliGRAM(s) Oral once PRN Temp greater or equal to 38C (100.4F), Mild Pain (1 - 3)  acetaminophen     Tablet .. 650 milliGRAM(s) Oral every 6 hours PRN Temp greater or equal to 38C (100.4F), Mild Pain (1 - 3)  ALBUTerol    90 MICROgram(s) HFA Inhaler 2 Puff(s) Inhalation every 6 hours PRN Bronchospasm  aluminum hydroxide/magnesium hydroxide/simethicone Suspension 30 milliLiter(s) Oral every 4 hours PRN Dyspepsia  bisacodyl 5 milliGRAM(s) Oral every 12 hours PRN Constipation  melatonin 3 milliGRAM(s) Oral at bedtime PRN Insomnia  ondansetron Injectable 4 milliGRAM(s) IV Push every 8 hours PRN Nausea and/or Vomiting  oxyCODONE    IR 5 milliGRAM(s) Oral every 4 hours PRN Moderate Pain (4 - 6)  polyethylene glycol 3350 17 Gram(s) Oral daily PRN Constipation      03 Feb 2022 16:40    137    |  103    |  23     ----------------------------<  167    4.5     |  27     |  0.81     Ca    7.5        03 Feb 2022 16:40    TPro  6.2    /  Alb  2.1    /  TBili  0.2    /  DBili  x      /  AST  35     /  ALT  40     /  AlkPhos  106    03 Feb 2022 16:40  LIVER FUNCTIONS - ( 03 Feb 2022 16:40 )  Alb: 2.1 g/dL / Pro: 6.2 gm/dL / ALK PHOS: 106 U/L / ALT: 40 U/L / AST: 35 U/L / GGT: x         CBC Full  -  ( 03 Feb 2022 16:40 )  WBC Count : 9.51 K/uL  Hemoglobin : 13.1 g/dL  Hematocrit : 39.5 %  Platelet Count - Automated : 318 K/uL  Mean Cell Volume : 92.7 fl  Mean Cell Hemoglobin : 30.8 pg  Mean Cell Hemoglobin Concentration : 33.2 gm/dL  Auto Neutrophil # : 9.03 K/uL  Auto Lymphocyte # : 0.38 K/uL  Auto Monocyte # : 0.00 K/uL  Auto Eosinophil # : 0.10 K/uL  Auto Basophil # : 0.00 K/uL  Auto Neutrophil % : 94.0 %  Auto Lymphocyte % : 4.0 %  Auto Monocyte % : 0.0 %  Auto Eosinophil % : 1.0 %  Auto Basophil % : 0.0 %                Assessment and Plan:     75 y/o F PMHx significant for Metastatic adenocarcinoma of the lung with metastasis to the spine, liver, and brain, Hypothyroidism s/p thyroid resection, h/o L2 pathologic compression fracture, GERD, chronic leg edema on Lasix, pulmonary fibrosis who was hospitalized at  1/24-1/28 for worsening SOB; (+) worsening R pleural effusion treated with VATS with Pleurx placement (pleural fluid (+) exudate cytology negative for malignant cells). The patient was discharged on a 3 day course of PO Keflex and instructed to hold her Lasix. Of note the patient was planned to start chemotherapy/RT tomorrow (2/4). The patient denies any associated cough, chest congestion, subjective fevers. The patient notes that her Pleurx catheter has not drained properly over the past 2 days and has only had a total of 100cc output drained today. The patient was referred to the ED by her Pulmonologist for further management. In the ED the patient was in moderate respiratory distress found to be hypoxic on room air to 90-91%.    #Acute Hypoxic Respiratory Failure due to Right Pleural Effusion as a consequence of a Pleurx Catheter Malfunction  ~admit to Medicine  ~cont. supplemental O2  ~f/u w/ Pulmonology in the am  ~f/u w/ CT Surgery in the am  ~f/u w/ Oncology consult with  ~cont. Folic acid 1mg po daily    #GERD  ~cont. Omeprazole 40mg po daily    #Hypothyroidism  ~cont. Liothyronine 5mcg po daily  ~cont. Levothyroxine 100mcg po qam    #Protein Calorie Malnutrition  ~f/u w/ Nutrition evaluation in the am    #Constipation  ~cont. Bisacodyl prn  ~cont. Miralax prn    #Advance Directives   ~per prior MOLST completed patient is DNR/DNI, no feeding tube, ok with IV fluids, IV abx, send to the hospital, no limitation in interventions    #Vte ppx  ~IMPROVE Vte Risk Score is 3  ~cont. Heparin sq

## 2022-02-05 NOTE — PROGRESS NOTE ADULT - ASSESSMENT
1) Dyspnea  2) Metastatic Lung Cancer (Adenocarcinoma)  3) Right Thoracostomy In Place (IPC)  4) Pleural Effusion  5)Cough with bronchospasm    77 y/o F PMHx significant for Metastatic adenocarcinoma of the lung with metastasis to the spine, liver, and brain, Hypothyroidism s/p thyroid resection, h/o L2 pathologic compression fracture, GERD, chronic leg edema on Lasix, pulmonary fibrosis who was hospitalized at  1/24-1/28 for worsening SOB; (+) worsening R pleural effusion treated with VATS with Pleurx placement (pleural fluid (+) exudate cytology negative for malignant cells). The patient was discharged on a 3 day course of PO Keflex and instructed to hold her Lasix. Of note the patient was planned to start chemotherapy/RT tomorrow (2/4). The patient denies any associated cough, chest congestion, subjective fevers. The patient notes that her Pleurx catheter has not drained properly over the past 2 days and has only had a total of 100cc output drained today. The patient was referred to the ED by her Pulmonologist for further management. In the ED the patient was in moderate respiratory distress found to be hypoxic on room air to 90-91%.    CXR revealed right sided pleural effusion  Bilateral infiltrates noted as well  She was supposed to receive chemotherapy today and radiation next week due to spinal metatasis  Dyspnea has been worse along with fatigue  ABG - ( 04 Feb 2022 09:26 )  pH, Arterial: 7.46  pH, Blood: x     /  pCO2: 39    /  pO2: 79    / HCO3: 28    / Base Excess: 3.7   /  SaO2: 98        Echocardiogram performed last admission did not show pulmonary hypertension  She has been given inhalers in the past (nebulized pulmicort/duoneb provided to patient in the office on 2/3) and she did not have much relief  Will start Symbicort/Spiriva   Overall prognosis is guarded      added solumedrol 40 mg BID  repeat cxr in am

## 2022-02-05 NOTE — PROGRESS NOTE ADULT - ASSESSMENT
76y Female PMHx significant for Metastatic adenocarcinoma of the lung with metastasis to the spine, liver, and brain, Hypothyroidism s/p thyroid resection, h/o L2 pathologic compression fracture, GERD, chronic leg edema on Lasix, pulmonary fibrosis who was hospitalized at  1/24-1/28 for worsening SOB; (+) worsening R pleural effusion treated with VATS with Pleurx placement admitted 2/4 w hypoxia and pleurx not draining well. Last drained on Tuesday, 2/1/22, minimal drainage. Drainage also associated w pain. CXR reveals effusion on right w pleurx up to apex of lung.    Problem List:  1) Right pleural effusion s/p Pleurx catheter   2) SOB  3) Lung ca with mets     500cc drained for right catheter on 2/4  Will unclamp right Pleurx catheter and attempt to drain another 500cc, will have patient lie flat due to positioning of the catheter  Patient's SOB has improved since drainage yesterday  Plan is to cap it on Monday  Pain medication PRN   76y Female PMHx significant for Metastatic adenocarcinoma of the lung with metastasis to the spine, liver, and brain, Hypothyroidism s/p thyroid resection, h/o L2 pathologic compression fracture, GERD, chronic leg edema on Lasix, pulmonary fibrosis who was hospitalized at  1/24-1/28 for worsening SOB; (+) worsening R pleural effusion treated with VATS with Pleurx placement admitted 2/4 w hypoxia and pleurx not draining well. Last drained on Tuesday, 2/1/22, minimal drainage. Drainage also associated w pain. CXR reveals effusion on right w pleurx up to apex of lung.    Problem List:  1) Right pleural effusion s/p Pleurx catheter   2) SOB  3) Lung ca with mets     500cc drained for right catheter on 2/4  Will unclamp right Pleurx catheter and attempt to drain another 500cc, will have patient lie flat due to positioning of the catheter  Patient's SOB has improved since drainage yesterday  Plan is to cap it on Monday  Pain medication PRN  Discussed with Dr. Stein leave pleurex open to waterseal

## 2022-02-05 NOTE — PROGRESS NOTE ADULT - SUBJECTIVE AND OBJECTIVE BOX
Patient is a 76y old  Female who presents with a chief complaint of Fever, Shortness of Breath (05 Feb 2022 09:15)      BRIEF HOSPITAL COURSE: 76y Female PMHx significant for Metastatic adenocarcinoma of the lung with metastasis to the spine, liver, and brain, Hypothyroidism s/p thyroid resection, h/o L2 pathologic compression fracture, GERD, chronic leg edema on Lasix, pulmonary fibrosis who was hospitalized at  1/24-1/28 for worsening SOB; (+) worsening R pleural effusion treated with VATS with Pleurx placement admitted 2/4 w hypoxia and pleurx not draining well. Last drained on Tuesday, 2/1/22, minimal drainage. Drainage also associated w pain. CXR reveals effusion on right w pleurx up to apex of lung.    Events last 24 hours: 500cc drained from right Pleurx catheter yesterday. Patient seen and examined at the bedside. She is sitting upright in a chair, complains of mild wheezing and sore throat. Breathing has greatly improved since admission. Catheter has been clamped since yesterday.     PAST MEDICAL & SURGICAL HISTORY:  Hypothyroidism    Fracture  L2 pathologic fracture    Osteoarthritis    Cholecystitis    Lung cancer  adenocarcinoma    History of other surgery  percutaneous endoscopic left pleural cavity drain    History of other surgery  gallbladder drain    H/O thyroidectomy    History of lumbar spinal fusion    S/P left oophorectomy    History of tonsillectomy        Review of Systems:  CONSTITUTIONAL: No fever, chills, or fatigue  EYES: No eye pain, visual disturbances, or discharge  ENMT:  No difficulty hearing, tinnitus, vertigo; No sinus or throat pain  NECK: No pain or stiffness  RESPIRATORY: No cough, + wheezing, No chills or hemoptysis; No shortness of breath  CARDIOVASCULAR: No chest pain, palpitations, dizziness, or leg swelling  GASTROINTESTINAL: No abdominal or epigastric pain. No nausea, vomiting, or hematemesis; No diarrhea or constipation. No melena or hematochezia.  GENITOURINARY: No dysuria, frequency, hematuria, or incontinence  NEUROLOGICAL: No headaches, memory loss, loss of strength, numbness, or tremors  SKIN: No itching, burning, rashes, or lesions   MUSCULOSKELETAL: No joint pain or swelling; No muscle, back, or extremity pain  PSYCHIATRIC: No depression, anxiety, mood swings, or difficulty sleeping      Medications:  nystatin    Suspension 319799 Unit(s) Oral four times a day      ALBUTerol    90 MICROgram(s) HFA Inhaler 2 Puff(s) Inhalation every 6 hours PRN  tiotropium 18 MICROgram(s) Capsule 1 Capsule(s) Inhalation daily    acetaminophen     Tablet .. 650 milliGRAM(s) Oral once PRN  acetaminophen     Tablet .. 650 milliGRAM(s) Oral every 6 hours PRN  ALPRAZolam 0.25 milliGRAM(s) Oral every 8 hours PRN  melatonin 3 milliGRAM(s) Oral at bedtime PRN  mirtazapine 7.5 milliGRAM(s) Oral at bedtime  morphine  - Injectable 2 milliGRAM(s) IV Push every 4 hours PRN  ondansetron Injectable 4 milliGRAM(s) IV Push every 8 hours PRN  oxyCODONE    IR 5 milliGRAM(s) Oral every 4 hours PRN      heparin   Injectable 5000 Unit(s) SubCutaneous every 8 hours    aluminum hydroxide/magnesium hydroxide/simethicone Suspension 30 milliLiter(s) Oral every 4 hours PRN  bisacodyl 5 milliGRAM(s) Oral every 12 hours PRN  pantoprazole    Tablet 40 milliGRAM(s) Oral before breakfast  polyethylene glycol 3350 17 Gram(s) Oral daily PRN      levothyroxine 100 MICROGram(s) Oral daily  liothyronine 5 MICROGram(s) Oral daily  methylPREDNISolone sodium succinate Injectable 40 milliGRAM(s) IV Push every 12 hours    calcium carbonate   1250 mG (OsCal) 1 Tablet(s) Oral two times a day  folic acid 1 milliGRAM(s) Oral daily  multivitamin 1 Tablet(s) Oral daily                ICU Vital Signs Last 24 Hrs  T(C): 36.7 (05 Feb 2022 08:25), Max: 36.8 (04 Feb 2022 23:15)  T(F): 98 (05 Feb 2022 08:25), Max: 98.2 (04 Feb 2022 23:15)  HR: 90 (05 Feb 2022 09:51) (80 - 90)  BP: 125/58 (05 Feb 2022 08:25) (94/50 - 125/58)  BP(mean): --  ABP: --  ABP(mean): --  RR: 18 (05 Feb 2022 08:25) (18 - 20)  SpO2: 97% (05 Feb 2022 09:51) (95% - 100%)      ABG - ( 04 Feb 2022 09:26 )  pH, Arterial: 7.46  pH, Blood: x     /  pCO2: 39    /  pO2: 79    / HCO3: 28    / Base Excess: 3.7   /  SaO2: 98                  I&O's Detail    04 Feb 2022 07:01  -  05 Feb 2022 07:00  --------------------------------------------------------  IN:  Total IN: 0 mL    OUT:    Chest Tube (mL): 540 mL  Total OUT: 540 mL    Total NET: -540 mL            LABS:                        13.1   9.51  )-----------( 318      ( 03 Feb 2022 16:40 )             39.5     02-03    137  |  103  |  23  ----------------------------<  167<H>  4.5   |  27  |  0.81    Ca    7.5<L>      03 Feb 2022 16:40    TPro  6.2  /  Alb  2.1<L>  /  TBili  0.2  /  DBili  x   /  AST  35  /  ALT  40  /  AlkPhos  106  02-03          CAPILLARY BLOOD GLUCOSE            CULTURES:  Culture Results:   No growth to date. (02-03-22 @ 17:19)  Culture Results:   No growth to date. (02-03-22 @ 16:40)      Physical Examination:    General: No acute distress.  Alert, oriented, interactive, nonfocal    HEENT: Pupils equal, reactive to light.  Symmetric.    PULM: rales and diminished breath sounds on right,    CVS: Regular rate and rhythm, no murmurs, rubs, or gallops    ABD: Soft, nondistended, nontender, normoactive bowel sounds, no masses    EXT: No edema, nontender    SKIN: Warm and well perfused, no rashes noted.    NEURO: A&Ox3, strength 5/5 all extremities, cranial nerves grossly intact, no focal deficits      RADIOLOGY:   ACC: [id number] EXAM:  XR CHEST PORTABLE URGENT 1V                          PROCEDURE DATE:  02/03/2022          INTERPRETATION:  AP chest on February 3, 2022 at 4:22 PM. Patient is   short of breath. There is history of metastatic rest cancer to brain.    Heart magnified by technique. Right MediPort and fairly extensive lumbar   hardware again noted.    Right Pleurx catheter remains. A Pleurx catheter has been advanced into   the right chest compared to January 27.    Significant diffuse left lung infiltrate is unchanged.    Present film shows increasing right base effusion which is new.    IMPRESSION: Advanced lung disease on the left unchanged. Increasing right   base effusion. Pleurx catheter has been advanced.    --- End of Report ---        TERA IVORY MD; Attending Radiologist  This document has been electronically signed. Feb  3 2022  4:48PM

## 2022-02-05 NOTE — PROGRESS NOTE ADULT - SUBJECTIVE AND OBJECTIVE BOX
Cheif complaints and Diagnosis: respiratory distress/ pleurex catheter malfunction    Subjective: c/o anxiety; no other complaints, no difficulty breathing.       REVIEW OF SYSTEMS:    CONSTITUTIONAL: No weakness, fevers or chills  EYES/ENT: No visual changes;  No vertigo or throat pain   NECK: No pain or stiffness  RESPIRATORY: No cough, wheezing, hemoptysis; No shortness of breath  CARDIOVASCULAR: No chest pain or palpitations  GASTROINTESTINAL: No abdominal or epigastric pain. No nausea, vomiting, or hematemesis; No diarrhea or constipation. No melena or hematochezia.  GENITOURINARY: No dysuria, frequency or hematuria  NEUROLOGICAL: No numbness or weakness  SKIN: No itching, burning, rashes, or lesions   All other review of systems is negative unless indicated above      Vital Signs Last 24 Hrs  T(C): 36.7 (05 Feb 2022 08:25), Max: 36.8 (04 Feb 2022 23:15)  T(F): 98 (05 Feb 2022 08:25), Max: 98.2 (04 Feb 2022 23:15)  HR: 90 (05 Feb 2022 09:51) (80 - 90)  BP: 125/58 (05 Feb 2022 08:25) (94/50 - 125/58)  BP(mean): --  RR: 18 (05 Feb 2022 08:25) (18 - 20)  SpO2: 97% (05 Feb 2022 09:51) (95% - 100%)    HEENT:   pupils equal and reactive, EOMI, no oropharyngeal lesions, erythema, exudates, oral thrush    NECK:   supple, no carotid bruits, no palpable lymph nodes, no thyromegaly    CV:  +S1, +S2, regular, no murmurs or rubs    RESP:   lungs clear to auscultation bilaterally, no wheezing, rales, rhonchi, good air entry bilaterally    BREAST:  not examined    GI:  abdomen soft, non-tender, non-distended, normal BS, no bruits, no abdominal masses, no palpable masses    RECTAL:  not examined    :  not examined    MSK:   normal muscle tone, no atrophy, no rigidity, no contractions    EXT:   no clubbing, no cyanosis, no edema, no calf pain, swelling or erythema    VASCULAR:  pulses equal and symmetric in the upper and lower extremities    NEURO:  AAOX3, no focal neurological deficits, follows all commands, able to move extremities spontaneously    SKIN:  no ulcers, lesions or rashes    MEDICATIONS  (STANDING):  calcium carbonate   1250 mG (OsCal) 1 Tablet(s) Oral two times a day  folic acid 1 milliGRAM(s) Oral daily  heparin   Injectable 5000 Unit(s) SubCutaneous every 8 hours  levothyroxine 100 MICROGram(s) Oral daily  liothyronine 5 MICROGram(s) Oral daily  methylPREDNISolone sodium succinate Injectable 40 milliGRAM(s) IV Push every 12 hours  mirtazapine 7.5 milliGRAM(s) Oral at bedtime  multivitamin 1 Tablet(s) Oral daily  nystatin    Suspension 687273 Unit(s) Oral four times a day  pantoprazole    Tablet 40 milliGRAM(s) Oral before breakfast  tiotropium 18 MICROgram(s) Capsule 1 Capsule(s) Inhalation daily    MEDICATIONS  (PRN):  acetaminophen     Tablet .. 650 milliGRAM(s) Oral once PRN Temp greater or equal to 38C (100.4F), Mild Pain (1 - 3)  acetaminophen     Tablet .. 650 milliGRAM(s) Oral every 6 hours PRN Temp greater or equal to 38C (100.4F), Mild Pain (1 - 3)  ALBUTerol    90 MICROgram(s) HFA Inhaler 2 Puff(s) Inhalation every 6 hours PRN Bronchospasm  ALPRAZolam 0.5 milliGRAM(s) Oral every 6 hours PRN anxiety  aluminum hydroxide/magnesium hydroxide/simethicone Suspension 30 milliLiter(s) Oral every 4 hours PRN Dyspepsia  bisacodyl 5 milliGRAM(s) Oral every 12 hours PRN Constipation  melatonin 3 milliGRAM(s) Oral at bedtime PRN Insomnia  morphine  - Injectable 2 milliGRAM(s) IV Push every 4 hours PRN Severe Pain (7 - 10)  ondansetron Injectable 4 milliGRAM(s) IV Push every 8 hours PRN Nausea and/or Vomiting  oxyCODONE    IR 5 milliGRAM(s) Oral every 4 hours PRN Moderate Pain (4 - 6)  polyethylene glycol 3350 17 Gram(s) Oral daily PRN Constipation      03 Feb 2022 16:40    137    |  103    |  23     ----------------------------<  167    4.5     |  27     |  0.81     Ca    7.5        03 Feb 2022 16:40    TPro  6.2    /  Alb  2.1    /  TBili  0.2    /  DBili  x      /  AST  35     /  ALT  40     /  AlkPhos  106    03 Feb 2022 16:40  LIVER FUNCTIONS - ( 03 Feb 2022 16:40 )  Alb: 2.1 g/dL / Pro: 6.2 gm/dL / ALK PHOS: 106 U/L / ALT: 40 U/L / AST: 35 U/L / GGT: x         CBC Full  -  ( 03 Feb 2022 16:40 )  WBC Count : 9.51 K/uL  Hemoglobin : 13.1 g/dL  Hematocrit : 39.5 %  Platelet Count - Automated : 318 K/uL  Mean Cell Volume : 92.7 fl  Mean Cell Hemoglobin : 30.8 pg  Mean Cell Hemoglobin Concentration : 33.2 gm/dL  Auto Neutrophil # : 9.03 K/uL  Auto Lymphocyte # : 0.38 K/uL  Auto Monocyte # : 0.00 K/uL  Auto Eosinophil # : 0.10 K/uL  Auto Basophil # : 0.00 K/uL  Auto Neutrophil % : 94.0 %  Auto Lymphocyte % : 4.0 %  Auto Monocyte % : 0.0 %  Auto Eosinophil % : 1.0 %  Auto Basophil % : 0.0 %          Assessment and Plan:     77 y/o F PMHx significant for Metastatic adenocarcinoma of the lung with metastasis to the spine, liver, and brain, Hypothyroidism s/p thyroid resection, h/o L2 pathologic compression fracture, GERD, chronic leg edema on Lasix, pulmonary fibrosis who was hospitalized at  1/24-1/28 for worsening SOB; (+) worsening R pleural effusion treated with VATS with Pleurx placement (pleural fluid (+) exudate cytology negative for malignant cells). The patient was discharged on a 3 day course of PO Keflex and instructed to hold her Lasix. Of note the patient was planned to start chemotherapy/RT tomorrow (2/4). The patient denies any associated cough, chest congestion, subjective fevers. The patient notes that her Pleurx catheter has not drained properly over the past 2 days and has only had a total of 100cc output drained today. The patient was referred to the ED by her Pulmonologist for further management. In the ED the patient was in moderate respiratory distress found to be hypoxic on room air to 90-91%.    #Acute Hypoxic Respiratory Failure due to Right Pleural Effusion as a consequence of a Pleurx Catheter Malfunction  ~admit to Medicine  ~cont. supplemental O2  ~f/u w/ Pulmonology in the am  ~f/u w/ CT Surgery consult appreciated >> patient to continue draining. cap on monday.   Hopeful DC on monday  ~f/u w/ Oncology consult with  ~cont. Folic acid 1mg po daily    #GERD  ~cont. Omeprazole 40mg po daily    #Hypothyroidism  ~cont. Liothyronine 5mcg po daily  ~cont. Levothyroxine 100mcg po qam    #Protein Calorie Malnutrition  ~f/u w/ Nutrition evaluation in the am    #Constipation  ~cont. Bisacodyl prn  ~cont. Miralax prn    #Advance Directives   ~per prior MOLST completed patient is DNR/DNI, no feeding tube, ok with IV fluids, IV abx, send to the hospital, no limitation in interventions    #Vte ppx  ~IMPROVE Vte Risk Score is 3  ~cont. Heparin sq

## 2022-02-05 NOTE — PROGRESS NOTE ADULT - SUBJECTIVE AND OBJECTIVE BOX
Patient is a 76y old  Female who presents with a chief complaint of Fever, Shortness of Breath (03 Feb 2022 23:49)      HPI:  75 y/o F PMHx significant for Metastatic adenocarcinoma of the lung with metastasis to the spine, liver, and brain, Hypothyroidism s/p thyroid resection, h/o L2 pathologic compression fracture, GERD, chronic leg edema on Lasix, pulmonary fibrosis who was hospitalized at  1/24-1/28 for worsening SOB; (+) worsening R pleural effusion treated with VATS with Pleurx placement (pleural fluid (+) exudate cytology negative for malignant cells). The patient was discharged on a 3 day course of PO Keflex and instructed to hold her Lasix. Of note the patient was planned to start chemotherapy/RT tomorrow (2/4). The patient denies any associated cough, chest congestion, subjective fevers. The patient notes that her Pleurx catheter has not drained properly over the past 2 days and has only had a total of 100cc output drained today. The patient was referred to the ED by her Pulmonologist for further management. In the ED the patient was in moderate respiratory distress found to be hypoxic on room air to 90-91%.  ABG is still pending  CXR revealed right sided pleural effusion  Bilateral infiltrates   2/5  covering for Dr Sanchez  c/o wheezing  dyspnea  data discussed with RN upon exam at bedside  no cp    PAST MEDICAL & SURGICAL HISTORY:  Hypothyroidism    Fracture  L2 pathologic fracture    Osteoarthritis    Cholecystitis    Lung cancer  adenocarcinoma    History of other surgery  percutaneous endoscopic left pleural cavity drain    History of other surgery  gallbladder drain    H/O thyroidectomy    History of lumbar spinal fusion    S/P left oophorectomy    History of tonsillectomy      MEDICATIONS  (STANDING):  calcium carbonate   1250 mG (OsCal) 1 Tablet(s) Oral two times a day  folic acid 1 milliGRAM(s) Oral daily  heparin   Injectable 5000 Unit(s) SubCutaneous every 8 hours  levothyroxine 100 MICROGram(s) Oral daily  liothyronine 5 MICROGram(s) Oral daily  mirtazapine 7.5 milliGRAM(s) Oral at bedtime  multivitamin 1 Tablet(s) Oral daily  nystatin    Suspension 630335 Unit(s) Oral four times a day  pantoprazole    Tablet 40 milliGRAM(s) Oral before breakfast  tiotropium 18 MICROgram(s) Capsule 1 Capsule(s) Inhalation daily      MEDICATIONS  (PRN):  acetaminophen     Tablet .. 650 milliGRAM(s) Oral once PRN Temp greater or equal to 38C (100.4F), Mild Pain (1 - 3)  acetaminophen     Tablet .. 650 milliGRAM(s) Oral every 6 hours PRN Temp greater or equal to 38C (100.4F), Mild Pain (1 - 3)  ALBUTerol    90 MICROgram(s) HFA Inhaler 2 Puff(s) Inhalation every 6 hours PRN Bronchospasm  aluminum hydroxide/magnesium hydroxide/simethicone Suspension 30 milliLiter(s) Oral every 4 hours PRN Dyspepsia  bisacodyl 5 milliGRAM(s) Oral every 12 hours PRN Constipation  melatonin 3 milliGRAM(s) Oral at bedtime PRN Insomnia  ondansetron Injectable 4 milliGRAM(s) IV Push every 8 hours PRN Nausea and/or Vomiting  oxyCODONE    IR 5 milliGRAM(s) Oral every 4 hours PRN Moderate Pain (4 - 6)  polyethylene glycol 3350 17 Gram(s) Oral daily PRN Constipation      FAMILY HISTORY:  FH: thyroid disease (Mother)        Vital Signs Last 24 Hrs  T(C): 36.7 (05 Feb 2022 08:25), Max: 36.8 (04 Feb 2022 23:15)  T(F): 98 (05 Feb 2022 08:25), Max: 98.2 (04 Feb 2022 23:15)  HR: 90 (05 Feb 2022 08:25) (80 - 90)  BP: 125/58 (05 Feb 2022 08:25) (94/50 - 125/58)  BP(mean): --  RR: 18 (05 Feb 2022 08:25) (18 - 20)  SpO2: 95% (05 Feb 2022 08:25) (95% - 100%)  PHYSICAL EXAM  General Appearance: cooperative, no acute distress,   HEENT: PERRL, conjunctiva clear, EOM's intact, non injected pharynx, no exudate, TM   normal  Neck: Supple, , no adenopathy, thyroid: not enlarged, no carotid bruit or JVD  Back: Symmetric, no  tenderness,no soft tissue tenderness  Lungs: right thoracostomy in place  / mild diffuse bronchospasm  Heart: Regular rate and rhythm, S1, S2 normal, no murmur, rub or gallop  Abdomen: Soft, non-tender, bowel sounds active , no hepatosplenomegaly  Extremities: no cyanosis or edema, no joint swelling  Skin: Skin color, texture normal, no rashes   Neurologic: Alert and oriented X3 , cranial nerves intact, sensory and motor normal,    ECG:    LABS:                          13.1   9.51  )-----------( 318      ( 03 Feb 2022 16:40 )             39.5     02-03    137  |  103  |  23  ----------------------------<  167<H>  4.5   |  27  |  0.81    Ca    7.5<L>      03 Feb 2022 16:40    TPro  6.2  /  Alb  2.1<L>  /  TBili  0.2  /  DBili  x   /  AST  35  /  ALT  40  /  AlkPhos  106  02-03      < from: Xray Chest 1 View- PORTABLE-Urgent (02.03.22 @ 16:27) >  INTERPRETATION:  AP chest on February 3, 2022 at 4:22 PM. Patient is   short of breath. There is history of metastatic rest cancer to brain.    Heart magnified by technique. Right MediPort and fairly extensive lumbar   hardware again noted.    Right Pleurx catheter remains. A Pleurx catheter has been advanced into   the right chest compared to January 27.    Significant diffuse left lung infiltrate is unchanged.    Present film shows increasing right base effusion which is new.    IMPRESSION: Advanced lung disease on the left unchanged. Increasing right   base effusion. Pleurx catheter has been advanced.    < end of copied text >                  RADIOLOGY & ADDITIONAL STUDIES:

## 2022-02-06 NOTE — PROGRESS NOTE ADULT - ASSESSMENT
1) Dyspnea  2) Metastatic Lung Cancer (Adenocarcinoma)  3) Right Thoracostomy In Place (IPC)  4) Pleural Effusion  5)Cough with bronchospasm    75 y/o F PMHx significant for Metastatic adenocarcinoma of the lung with metastasis to the spine, liver, and brain, Hypothyroidism s/p thyroid resection, h/o L2 pathologic compression fracture, GERD, chronic leg edema on Lasix, pulmonary fibrosis who was hospitalized at  1/24-1/28 for worsening SOB; (+) worsening R pleural effusion treated with VATS with Pleurx placement (pleural fluid (+) exudate cytology negative for malignant cells). The patient was discharged on a 3 day course of PO Keflex and instructed to hold her Lasix. Of note the patient was planned to start chemotherapy/RT tomorrow (2/4). The patient denies any associated cough, chest congestion, subjective fevers. The patient notes that her Pleurx catheter has not drained properly over the past 2 days and has only had a total of 100cc output drained today. The patient was referred to the ED by her Pulmonologist for further management. In the ED the patient was in moderate respiratory distress found to be hypoxic on room air to 90-91%.    CXR revealed right sided pleural effusion  Bilateral infiltrates noted as well  She was supposed to receive chemotherapy today and radiation next week due to spinal metatasis  Dyspnea has been worse along with fatigue  ABG - ( 04 Feb 2022 09:26 )  pH, Arterial: 7.46  pH, Blood: x     /  pCO2: 39    /  pO2: 79    / HCO3: 28    / Base Excess: 3.7   /  SaO2: 98        Echocardiogram performed last admission did not show pulmonary hypertension  She has been given inhalers in the past (nebulized pulmicort/duoneb provided to patient in the office on 2/3) and she did not have much relief  Will start Symbicort/Spiriva   Overall prognosis is guarded    added solumedrol 40 mg BID  repeat cxr in am  will need assess for possible home o2 threrapy  pleurex drainage TIW as outpt  DR Sanchez will resume in am

## 2022-02-06 NOTE — PHYSICAL THERAPY INITIAL EVALUATION ADULT - PERTINENT HX OF CURRENT PROBLEM, REHAB EVAL
75 y/o F PMHx significant for Metastatic adenocarcinoma of the lung with metastasis to the spine, liver, and brain, Hypothyroidism s/p thyroid resection, h/o L2 pathologic compression fracture, GERD, chronic leg edema on Lasix, pulmonary fibrosis who was hospitalized at  1/24-1/28 for worsening SOB; (+) worsening R pleural effusion treated with VATS with Pleurx placement (pleural fluid (+) exudate cytology negative for malignant cells).

## 2022-02-06 NOTE — DIETITIAN INITIAL EVALUATION ADULT. - ORAL INTAKE PTA/DIET HISTORY
Poor intake as per patient <50% of meals consumed PTA- at times consumed ensure shakes for additional nutrition.

## 2022-02-06 NOTE — DIETITIAN INITIAL EVALUATION ADULT. - PERTINENT LABORATORY DATA
BMI: BMI (kg/m2): 24.9 (02-03-22 @ 16:00)  HbA1c:   Glucose:   BP: 125/64 (02-06-22 @ 08:24) (94/50 - 143/74)  Lipid Panel:

## 2022-02-06 NOTE — PROGRESS NOTE ADULT - SUBJECTIVE AND OBJECTIVE BOX
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 The patient was discharged on a 3 day course of PO Keflex and instructed to hold her Lasix. Of note the patient was planned to start chemotherapy/RT  (2/4). The patient denies any associated cough, chest congestion, subjective fevers. The patient notes that her Pleurx catheter has not drained properly over the past 2 days and has only had a total of 100cc output drained today. The patient was referred to the ED by her Pulmonologist for further management. In the ED the patient was in moderate respiratory distress found to be hypoxic on room air to 90-91%. (03 Feb 2022 23:49)    CXR showed increasing right pleural effusion and malposition of the pleurex catheter to the left upper lobe    repositioned and drained 500 cc     breathing has improved               PAST MEDICAL & SURGICAL HISTORY:  Hypothyroidism    Fracture  L2 pathologic fracture    Osteoarthritis    Cholecystitis    Lung cancer  adenocarcinoma    History of other surgery  percutaneous endoscopic left pleural cavity drain    History of other surgery  gallbladder drain    H/O thyroidectomy    History of lumbar spinal fusion    S/P left oophorectomy    History of tonsillectomy        REVIEW OF SYSTEMS    General:	  short of breath  weakness  no fever  cough  anxious      Allergies    adhesives (Rash)  amoxicillin (Other; Diarrhea)  statins (Muscle Pain)    Intolerances    morphine (Nausea)          FAMILY HISTORY:  FH: thyroid disease (Mother)        Home Medications:  Albuterol (Eqv-Proventil HFA) 90 mcg/inh inhalation aerosol: 2 puff(s) inhaled every 6 hours, As Needed (03 Feb 2022 19:45)  biotin 1000 mcg oral tablet: 1 tab(s) orally once a day (03 Feb 2022 19:45)  calcium (as carbonate) 600 mg oral tablet: 1 tab(s) orally 2 times a day (03 Feb 2022 19:45)  ciclopirox 0.77% topical gel: Apply topically to affected area 2 times a day (03 Feb 2022 19:45)  esomeprazole 40 mg oral delayed release capsule: 1 cap(s) orally once a day, As Needed (03 Feb 2022 19:45)  liothyronine 5 mcg oral tablet: 1 tab(s) orally once a day (03 Feb 2022 19:45)  Multiple Vitamins oral tablet: 1 tab(s) orally once a day (03 Feb 2022 19:45)  ondansetron 8 mg oral tablet: 1 tab(s) orally 2 times a day, As Needed (03 Feb 2022 19:45)  Pfizer-BioNTech COVID-19 Vaccine PF 30 mcg/0.3 mL intramuscular suspension: 0.3 milliliter(s) intramuscular once  ****Booster as of 08/21*** (03 Feb 2022 19:45)  Synthroid 100 mcg (0.1 mg) oral tablet: 1 tab(s) orally once a day (03 Feb 2022 19:45)      MEDICATIONS  (STANDING):  ALBUTerol    90 MICROgram(s) HFA Inhaler 2 Puff(s) Inhalation every 6 hours  calcium carbonate   1250 mG (OsCal) 1 Tablet(s) Oral two times a day  folic acid 1 milliGRAM(s) Oral daily  heparin   Injectable 5000 Unit(s) SubCutaneous every 8 hours  levothyroxine 100 MICROGram(s) Oral daily  liothyronine 5 MICROGram(s) Oral daily  methylPREDNISolone sodium succinate Injectable 40 milliGRAM(s) IV Push every 12 hours  mirtazapine 7.5 milliGRAM(s) Oral at bedtime  multivitamin 1 Tablet(s) Oral daily  nystatin    Suspension 076490 Unit(s) Oral four times a day  pantoprazole    Tablet 40 milliGRAM(s) Oral before breakfast  tiotropium 18 MICROgram(s) Capsule 1 Capsule(s) Inhalation daily    MEDICATIONS  (PRN):  acetaminophen     Tablet .. 650 milliGRAM(s) Oral once PRN Temp greater or equal to 38C (100.4F), Mild Pain (1 - 3)  acetaminophen     Tablet .. 650 milliGRAM(s) Oral every 6 hours PRN Temp greater or equal to 38C (100.4F), Mild Pain (1 - 3)  ALPRAZolam 0.5 milliGRAM(s) Oral every 6 hours PRN anxiety  aluminum hydroxide/magnesium hydroxide/simethicone Suspension 30 milliLiter(s) Oral every 4 hours PRN Dyspepsia  bisacodyl 5 milliGRAM(s) Oral every 12 hours PRN Constipation  melatonin 3 milliGRAM(s) Oral at bedtime PRN Insomnia  morphine  - Injectable 2 milliGRAM(s) IV Push every 4 hours PRN Severe Pain (7 - 10)  ondansetron Injectable 4 milliGRAM(s) IV Push every 8 hours PRN Nausea and/or Vomiting  oxyCODONE    IR 5 milliGRAM(s) Oral every 4 hours PRN Moderate Pain (4 - 6)  polyethylene glycol 3350 17 Gram(s) Oral daily PRN Constipation   - 6)  polyethylene glycol 3350 17 Gram(s) Oral daily PRN Constipation      Vital Signs Last 24 Hrs  T(C): 36.4 (06 Feb 2022 08:24), Max: 36.4 (06 Feb 2022 08:24)  T(F): 97.5 (06 Feb 2022 08:24), Max: 97.5 (06 Feb 2022 08:24)  HR: 84 (06 Feb 2022 08:30) (75 - 86)  BP: 125/64 (06 Feb 2022 08:24) (125/64 - 127/74)  BP(mean): --  RR: 18 (06 Feb 2022 08:24) (18 - 18)  SpO2: 99% (06 Feb 2022 08:30) (97% - 99%)      Gen:     anxious  chest decreased air entry right'  edema  appears better                    RADIOLOGY & ADDITIONAL STUDIES:    increased right pleural effusion  pleurex tip in right upper lobe    repeat CX  pleural effusion resolved  pleurex in place

## 2022-02-06 NOTE — PHYSICAL THERAPY INITIAL EVALUATION ADULT - GENERAL OBSERVATIONS, REHAB EVAL
Pt rec'd sitting up in bedside chair, mildly anxious. no c/o acute pain, on 2L O2 via nc, chest tube to water seal.

## 2022-02-06 NOTE — DIETITIAN NUTRITION RISK NOTIFICATION - TREATMENT: THE FOLLOWING DIET HAS BEEN RECOMMENDED
Diet, Regular:   Prosource Gelatein Plus     Qty per Day:  3  Supplement Feeding Modality:  Oral (02-05-22 @ 10:14) [Active]

## 2022-02-06 NOTE — DIETITIAN INITIAL EVALUATION ADULT. - MALNUTRITION
Severe protein/calorie malnutrition in context of chronic illness r/t inability to meet ENN 2/2 mets CA AEB severe muscle/fat wasting, significant weight loss x 11 days (5.6%) and, po intake <75% x > 1 month,

## 2022-02-06 NOTE — DIETITIAN INITIAL EVALUATION ADULT. - PERTINENT MEDS FT
MEDICATIONS  (STANDING):  ALBUTerol    90 MICROgram(s) HFA Inhaler 2 Puff(s) Inhalation every 6 hours  calcium carbonate   1250 mG (OsCal) 1 Tablet(s) Oral two times a day  folic acid 1 milliGRAM(s) Oral daily  heparin   Injectable 5000 Unit(s) SubCutaneous every 8 hours  levothyroxine 100 MICROGram(s) Oral daily  liothyronine 5 MICROGram(s) Oral daily  methylPREDNISolone sodium succinate Injectable 40 milliGRAM(s) IV Push every 12 hours  mirtazapine 7.5 milliGRAM(s) Oral at bedtime  multivitamin 1 Tablet(s) Oral daily  nystatin    Suspension 345265 Unit(s) Oral four times a day  pantoprazole    Tablet 40 milliGRAM(s) Oral before breakfast  tiotropium 18 MICROgram(s) Capsule 1 Capsule(s) Inhalation daily    MEDICATIONS  (PRN):  acetaminophen     Tablet .. 650 milliGRAM(s) Oral once PRN Temp greater or equal to 38C (100.4F), Mild Pain (1 - 3)  acetaminophen     Tablet .. 650 milliGRAM(s) Oral every 6 hours PRN Temp greater or equal to 38C (100.4F), Mild Pain (1 - 3)  ALPRAZolam 0.5 milliGRAM(s) Oral every 6 hours PRN anxiety  aluminum hydroxide/magnesium hydroxide/simethicone Suspension 30 milliLiter(s) Oral every 4 hours PRN Dyspepsia  bisacodyl 5 milliGRAM(s) Oral every 12 hours PRN Constipation  melatonin 3 milliGRAM(s) Oral at bedtime PRN Insomnia  morphine  - Injectable 2 milliGRAM(s) IV Push every 4 hours PRN Severe Pain (7 - 10)  ondansetron Injectable 4 milliGRAM(s) IV Push every 8 hours PRN Nausea and/or Vomiting  oxyCODONE    IR 5 milliGRAM(s) Oral every 4 hours PRN Moderate Pain (4 - 6)  polyethylene glycol 3350 17 Gram(s) Oral daily PRN Constipation

## 2022-02-06 NOTE — PROGRESS NOTE ADULT - ASSESSMENT
76y Female PMHx significant for Metastatic adenocarcinoma of the lung with metastasis to the spine, liver, and brain, Hypothyroidism s/p thyroid resection, h/o L2 pathologic compression fracture, GERD, chronic leg edema on Lasix, pulmonary fibrosis who was hospitalized at  1/24-1/28 for worsening SOB; (+) worsening R pleural effusion treated with VATS with Pleurx placement admitted 2/4 w hypoxia and pleurx not draining well. Last drained on Tuesday, 2/1/22, minimal drainage. Drainage also associated w pain. CXR reveals effusion on right w pleurx up to apex of lung.    Problem List:  1) Right pleural effusion s/p Pleurx catheter   2) Lung ca with mets     500cc drained for right catheter on 2/4 2/5 right pleurex  to water seal with about 700cc drainage  O2 requirements down and SOB improved  Would keep right plurex catheter to water seal and allow for drainage  Plan is to cap it on Monday, drain three times a week as an outpatient   Pain medication PRN  Discussed with attending Dr. Stein

## 2022-02-06 NOTE — PROGRESS NOTE ADULT - SUBJECTIVE AND OBJECTIVE BOX
Cheif complaints and Diagnosis: pleurex catheter  malfunction    Subjective: no complaints      REVIEW OF SYSTEMS:    CONSTITUTIONAL: No weakness, fevers or chills  EYES/ENT: No visual changes;  No vertigo or throat pain   NECK: No pain or stiffness  RESPIRATORY: No cough, wheezing, hemoptysis; No shortness of breath  CARDIOVASCULAR: No chest pain or palpitations  GASTROINTESTINAL: No abdominal or epigastric pain. No nausea, vomiting, or hematemesis; No diarrhea or constipation. No melena or hematochezia.  GENITOURINARY: No dysuria, frequency or hematuria  NEUROLOGICAL: No numbness or weakness  SKIN: No itching, burning, rashes, or lesions   All other review of systems is negative unless indicated above      Vital Signs Last 24 Hrs  T(C): 36.4 (06 Feb 2022 08:24), Max: 36.4 (06 Feb 2022 08:24)  T(F): 97.5 (06 Feb 2022 08:24), Max: 97.5 (06 Feb 2022 08:24)  HR: 80 (06 Feb 2022 08:24) (75 - 86)  BP: 125/64 (06 Feb 2022 08:24) (125/64 - 127/74)  BP(mean): --  RR: 18 (06 Feb 2022 08:24) (18 - 18)  SpO2: 99% (06 Feb 2022 08:24) (97% - 99%)    HEENT:   pupils equal and reactive, EOMI, no oropharyngeal lesions, erythema, exudates, oral thrush    NECK:   supple, no carotid bruits, no palpable lymph nodes, no thyromegaly    CV:  +S1, +S2, regular, no murmurs or rubs    RESP:   lungs clear to auscultation bilaterally, no wheezing, rales, rhonchi, good air entry bilaterally    BREAST:  not examined    GI:  abdomen soft, non-tender, non-distended, normal BS, no bruits, no abdominal masses, no palpable masses    RECTAL:  not examined    :  not examined    MSK:   normal muscle tone, no atrophy, no rigidity, no contractions    EXT:   no clubbing, no cyanosis, no edema, no calf pain, swelling or erythema    VASCULAR:  pulses equal and symmetric in the upper and lower extremities    NEURO:  AAOX3, no focal neurological deficits, follows all commands, able to move extremities spontaneously    SKIN:  no ulcers, lesions or rashes    MEDICATIONS  (STANDING):  ALBUTerol    90 MICROgram(s) HFA Inhaler 2 Puff(s) Inhalation every 6 hours  calcium carbonate   1250 mG (OsCal) 1 Tablet(s) Oral two times a day  folic acid 1 milliGRAM(s) Oral daily  heparin   Injectable 5000 Unit(s) SubCutaneous every 8 hours  levothyroxine 100 MICROGram(s) Oral daily  liothyronine 5 MICROGram(s) Oral daily  methylPREDNISolone sodium succinate Injectable 40 milliGRAM(s) IV Push every 12 hours  mirtazapine 7.5 milliGRAM(s) Oral at bedtime  multivitamin 1 Tablet(s) Oral daily  nystatin    Suspension 503312 Unit(s) Oral four times a day  pantoprazole    Tablet 40 milliGRAM(s) Oral before breakfast  tiotropium 18 MICROgram(s) Capsule 1 Capsule(s) Inhalation daily    MEDICATIONS  (PRN):  acetaminophen     Tablet .. 650 milliGRAM(s) Oral once PRN Temp greater or equal to 38C (100.4F), Mild Pain (1 - 3)  acetaminophen     Tablet .. 650 milliGRAM(s) Oral every 6 hours PRN Temp greater or equal to 38C (100.4F), Mild Pain (1 - 3)  ALPRAZolam 0.5 milliGRAM(s) Oral every 6 hours PRN anxiety  aluminum hydroxide/magnesium hydroxide/simethicone Suspension 30 milliLiter(s) Oral every 4 hours PRN Dyspepsia  bisacodyl 5 milliGRAM(s) Oral every 12 hours PRN Constipation  melatonin 3 milliGRAM(s) Oral at bedtime PRN Insomnia  morphine  - Injectable 2 milliGRAM(s) IV Push every 4 hours PRN Severe Pain (7 - 10)  ondansetron Injectable 4 milliGRAM(s) IV Push every 8 hours PRN Nausea and/or Vomiting  oxyCODONE    IR 5 milliGRAM(s) Oral every 4 hours PRN Moderate Pain (4 - 6)  polyethylene glycol 3350 17 Gram(s) Oral daily PRN Constipation            Assessment and Plan:     75 y/o F PMHx significant for Metastatic adenocarcinoma of the lung with metastasis to the spine, liver, and brain, Hypothyroidism s/p thyroid resection, h/o L2 pathologic compression fracture, GERD, chronic leg edema on Lasix, pulmonary fibrosis who was hospitalized at  1/24-1/28 for worsening SOB; (+) worsening R pleural effusion treated with VATS with Pleurx placement (pleural fluid (+) exudate cytology negative for malignant cells). The patient was discharged on a 3 day course of PO Keflex and instructed to hold her Lasix. Of note the patient was planned to start chemotherapy/RT tomorrow (2/4). The patient denies any associated cough, chest congestion, subjective fevers. The patient notes that her Pleurx catheter has not drained properly over the past 2 days and has only had a total of 100cc output drained today. The patient was referred to the ED by her Pulmonologist for further management. In the ED the patient was in moderate respiratory distress found to be hypoxic on room air to 90-91%.    #Acute Hypoxic Respiratory Failure due to Right Pleural Effusion as a consequence of a Pleurx Catheter Malfunction  ~admit to Medicine  ~cont. supplemental O2  ~f/u w/ Pulmonology appreciated  ~f/u w/ CT Surgery consult appreciated >> patient to continue draining. cap on monday and DC Monday  -o2 eval for home ordered       #anxiety; developing anxiety from being in hospital and needing to undergo chemo  -started xanax.    #acute exacerbation of pulmonary fibrosis  -pulmonary apprecaited  -currently on solumederol; will taper to PO for hopeful DC [place]      #GERD  ~cont. Omeprazole 40mg po daily    #Hypothyroidism  ~cont. Liothyronine 5mcg po daily  ~cont. Levothyroxine 100mcg po qam    #Protein Calorie Malnutrition  ~f/u w/ Nutrition evaluation in the am    #Constipation  ~cont. Bisacodyl prn  ~cont. Miralax prn    #Advance Directives   ~per prior MOLST completed patient is DNR/DNI, no feeding tube, ok with IV fluids, IV abx, send to the hospital, no limitation in interventions    #Vte ppx  ~IMPROVE Vte Risk Score is 3  ~cont. Heparin sq      off service note: patient here for pleurex malfunction; is planned for outpatient chemo/ radiation Dr. Westfall.   pleurex to be capped Monday and potential DC. may need home o2, ordered eval.  Tapered solumederol to PO.   DC completed.

## 2022-02-06 NOTE — PHYSICAL THERAPY INITIAL EVALUATION ADULT - DIAGNOSIS, PT EVAL
Problem: Safety  Goal: Will remain free from injury  Outcome: PROGRESSING AS EXPECTED  Bed in low and locked position. Call light and belongings within reach. Hourly rounding in place.     Problem: Knowledge Deficit  Goal: Knowledge of disease process/condition, treatment plan, diagnostic tests, and medications will improve  Outcome: PROGRESSING AS EXPECTED  Plan of care discussed with the patient.     Problem: Pain Management  Goal: Pain level will decrease to patient’s comfort goal  Outcome: PROGRESSING AS EXPECTED  Pain medication as needed per MD after surgery.          Acute Hypoxic Respiratory Failure due to Right Pleural Effusion as a consequence of a Pleurx Catheter Malfunction. The patient notes that her Pleurx catheter has not drained properly over the past 2 days and has only had a total of 100cc output drained today. The patient was referred to the ED by her Pulmonologist for further management.

## 2022-02-06 NOTE — PROGRESS NOTE ADULT - SUBJECTIVE AND OBJECTIVE BOX
Patient is a 76y old  Female who presents with a chief complaint of Fever, Shortness of Breath (06 Feb 2022 10:09)      BRIEF HOSPITAL COURSE: 76y Female PMHx significant for Metastatic adenocarcinoma of the lung with metastasis to the spine, liver, and brain, Hypothyroidism s/p thyroid resection, h/o L2 pathologic compression fracture, GERD, chronic leg edema on Lasix, pulmonary fibrosis who was hospitalized at  1/24-1/28 for worsening SOB; (+) worsening R pleural effusion treated with VATS with Pleurx placement admitted 2/4 w hypoxia and pleurx not draining well. Last drained on Tuesday, 2/1/22, minimal drainage. Drainage also associated w pain. CXR reveals effusion on right w pleurx up to apex of lung.    Events last 24 hours: Right pleurex catheter left to water seal yesterday about 700cc total including overnight. CXR this AM with significant improvement in right pleural effusion. Patient now tolerating 2L NC, and can most likely be weaned down further. She may require O2 upon ambulation.     PAST MEDICAL & SURGICAL HISTORY:  Hypothyroidism    Fracture  L2 pathologic fracture    Osteoarthritis    Cholecystitis    Lung cancer  adenocarcinoma    History of other surgery  percutaneous endoscopic left pleural cavity drain    History of other surgery  gallbladder drain    H/O thyroidectomy    History of lumbar spinal fusion    S/P left oophorectomy    History of tonsillectomy        Review of Systems:  CONSTITUTIONAL: No fever, chills, or fatigue  EYES: No eye pain, visual disturbances, or discharge  ENMT:  No difficulty hearing, tinnitus, vertigo; No sinus pain + throat pain  NECK: No pain or stiffness  RESPIRATORY: + cough, No wheezing, chills or hemoptysis; No shortness of breath  CARDIOVASCULAR: No chest pain, palpitations, dizziness, or leg swelling  GASTROINTESTINAL: No abdominal or epigastric pain. No nausea, vomiting, or hematemesis; No diarrhea or constipation. No melena or hematochezia.  GENITOURINARY: No dysuria, frequency, hematuria, or incontinence  NEUROLOGICAL: No headaches, memory loss, loss of strength, numbness, or tremors  SKIN: No itching, burning, rashes, or lesions   MUSCULOSKELETAL: No joint pain or swelling; No muscle, back, or extremity pain  PSYCHIATRIC: No depression, anxiety, mood swings, or difficulty sleeping      Medications:  nystatin    Suspension 833185 Unit(s) Oral four times a day      ALBUTerol    90 MICROgram(s) HFA Inhaler 2 Puff(s) Inhalation every 6 hours  tiotropium 18 MICROgram(s) Capsule 1 Capsule(s) Inhalation daily    acetaminophen     Tablet .. 650 milliGRAM(s) Oral once PRN  acetaminophen     Tablet .. 650 milliGRAM(s) Oral every 6 hours PRN  ALPRAZolam 0.5 milliGRAM(s) Oral every 6 hours PRN  melatonin 3 milliGRAM(s) Oral at bedtime PRN  mirtazapine 7.5 milliGRAM(s) Oral at bedtime  morphine  - Injectable 2 milliGRAM(s) IV Push every 4 hours PRN  ondansetron Injectable 4 milliGRAM(s) IV Push every 8 hours PRN  oxyCODONE    IR 5 milliGRAM(s) Oral every 4 hours PRN      heparin   Injectable 5000 Unit(s) SubCutaneous every 8 hours    aluminum hydroxide/magnesium hydroxide/simethicone Suspension 30 milliLiter(s) Oral every 4 hours PRN  bisacodyl 5 milliGRAM(s) Oral every 12 hours PRN  pantoprazole    Tablet 40 milliGRAM(s) Oral before breakfast  polyethylene glycol 3350 17 Gram(s) Oral daily PRN      levothyroxine 100 MICROGram(s) Oral daily  liothyronine 5 MICROGram(s) Oral daily  methylPREDNISolone sodium succinate Injectable 40 milliGRAM(s) IV Push every 12 hours    calcium carbonate   1250 mG (OsCal) 1 Tablet(s) Oral two times a day  folic acid 1 milliGRAM(s) Oral daily  multivitamin 1 Tablet(s) Oral daily                ICU Vital Signs Last 24 Hrs  T(C): 36.4 (06 Feb 2022 08:24), Max: 36.4 (06 Feb 2022 08:24)  T(F): 97.5 (06 Feb 2022 08:24), Max: 97.5 (06 Feb 2022 08:24)  HR: 84 (06 Feb 2022 08:30) (75 - 86)  BP: 125/64 (06 Feb 2022 08:24) (125/64 - 127/74)  BP(mean): --  ABP: --  ABP(mean): --  RR: 18 (06 Feb 2022 08:24) (18 - 18)  SpO2: 99% (06 Feb 2022 08:30) (97% - 99%)          I&O's Detail    05 Feb 2022 07:01  -  06 Feb 2022 07:00  --------------------------------------------------------  IN:  Total IN: 0 mL    OUT:    Chest Tube (mL): 515 mL    Voided (mL): 1 mL  Total OUT: 516 mL    Total NET: -516 mL            LABS:                CAPILLARY BLOOD GLUCOSE            CULTURES:  Culture Results:   No growth to date. (02-03-22 @ 17:19)  Culture Results:   No growth to date. (02-03-22 @ 16:40)      Physical Examination:    General: No acute distress.  Alert, oriented, interactive, nonfocal    PULM: clear, good air movement on right, diminished BS on left     CVS: Regular rate and rhythm, no murmurs, rubs, or gallops    EXT: No edema, nontender      RADIOLOGY: 2/6 CXR per my read improvement in right pleural effusion, possible left pleural effusion or atelectasis

## 2022-02-06 NOTE — DIETITIAN INITIAL EVALUATION ADULT. - OTHER INFO
77 y/o F PMHx significant for Metastatic adenocarcinoma of the lung with metastasis to the spine, liver, and brain, Hypothyroidism s/p thyroid resection, h/o L2 pathologic compression fracture, GERD, chronic leg edema on Lasix, pulmonary fibrosis who was hospitalized at  1/24-1/28 for worsening SOB; (+) worsening R pleural effusion treated with VATS with Pleurx placement (pleural fluid (+) exudate cytology negative for malignant cells). The patient was discharged on a 3 day course of PO Keflex and instructed to hold her Lasix. Of note the patient was planned to start chemotherapy/RT tomorrow (2/4). The patient denies any associated cough, chest congestion, subjective fevers. The patient notes that her Pleurx catheter has not drained properly over the past 2 days and has only had a total of 100cc output drained today. The patient was referred to the ED by her Pulmonologist for further management. In the ED the patient was in moderate respiratory distress found to be hypoxic on room air to 90-91%. Last hospital admission weight (1/26/22) indicating significant weight loss x 11 days (8#/5.6%). Pt consuming gelatein however willing to drink ensure enlive in between meals. Weight loss expected with medical diagnosis (mets CA). NFPE indicates total body severe muscle/fat wasting. Pt appears extremely thin, frail, bony, and malnourished. Pt ordering meals with poor consumption (<50%). Criteria met for severe malnutrition. Will continue to monitor and follow up prn.

## 2022-02-06 NOTE — PROGRESS NOTE ADULT - SUBJECTIVE AND OBJECTIVE BOX
Patient is a 76y old  Female who presents with a chief complaint of Fever, Shortness of Breath (03 Feb 2022 23:49)      HPI:  75 y/o F PMHx significant for Metastatic adenocarcinoma of the lung with metastasis to the spine, liver, and brain, Hypothyroidism s/p thyroid resection, h/o L2 pathologic compression fracture, GERD, chronic leg edema on Lasix, pulmonary fibrosis who was hospitalized at  1/24-1/28 for worsening SOB; (+) worsening R pleural effusion treated with VATS with Pleurx placement (pleural fluid (+) exudate cytology negative for malignant cells). The patient was discharged on a 3 day course of PO Keflex and instructed to hold her Lasix. Of note the patient was planned to start chemotherapy/RT tomorrow (2/4). The patient denies any associated cough, chest congestion, subjective fevers. The patient notes that her Pleurx catheter has not drained properly over the past 2 days and has only had a total of 100cc output drained today. The patient was referred to the ED by her Pulmonologist for further management. In the ED the patient was in moderate respiratory distress found to be hypoxic on room air to 90-91%.  ABG is still pending  CXR revealed right sided pleural effusion  Bilateral infiltrates   2/5  covering for Dr Sanchez  c/o wheezing  dyspnea  data discussed with RN upon exam at bedside  no cp  2/6  sitting OOB today  she looks and feels more comfortable  cxr findings discussed  PAST MEDICAL & SURGICAL HISTORY:  Hypothyroidism    Fracture  L2 pathologic fracture    Osteoarthritis    Cholecystitis    Lung cancer  adenocarcinoma    History of other surgery  percutaneous endoscopic left pleural cavity drain    History of other surgery  gallbladder drain    H/O thyroidectomy    History of lumbar spinal fusion    S/P left oophorectomy    History of tonsillectomy      MEDICATIONS  (STANDING):  calcium carbonate   1250 mG (OsCal) 1 Tablet(s) Oral two times a day  folic acid 1 milliGRAM(s) Oral daily  heparin   Injectable 5000 Unit(s) SubCutaneous every 8 hours  levothyroxine 100 MICROGram(s) Oral daily  liothyronine 5 MICROGram(s) Oral daily  mirtazapine 7.5 milliGRAM(s) Oral at bedtime  multivitamin 1 Tablet(s) Oral daily  nystatin    Suspension 760669 Unit(s) Oral four times a day  pantoprazole    Tablet 40 milliGRAM(s) Oral before breakfast  tiotropium 18 MICROgram(s) Capsule 1 Capsule(s) Inhalation daily      MEDICATIONS  (PRN):  acetaminophen     Tablet .. 650 milliGRAM(s) Oral once PRN Temp greater or equal to 38C (100.4F), Mild Pain (1 - 3)  acetaminophen     Tablet .. 650 milliGRAM(s) Oral every 6 hours PRN Temp greater or equal to 38C (100.4F), Mild Pain (1 - 3)  ALBUTerol    90 MICROgram(s) HFA Inhaler 2 Puff(s) Inhalation every 6 hours PRN Bronchospasm  aluminum hydroxide/magnesium hydroxide/simethicone Suspension 30 milliLiter(s) Oral every 4 hours PRN Dyspepsia  bisacodyl 5 milliGRAM(s) Oral every 12 hours PRN Constipation  melatonin 3 milliGRAM(s) Oral at bedtime PRN Insomnia  ondansetron Injectable 4 milliGRAM(s) IV Push every 8 hours PRN Nausea and/or Vomiting  oxyCODONE    IR 5 milliGRAM(s) Oral every 4 hours PRN Moderate Pain (4 - 6)  polyethylene glycol 3350 17 Gram(s) Oral daily PRN Constipation      FAMILY HISTORY:  FH: thyroid disease (Mother)        Vital Signs Last 24 Hrs  T(C): 36.4 (06 Feb 2022 08:24), Max: 36.4 (06 Feb 2022 08:24)  T(F): 97.5 (06 Feb 2022 08:24), Max: 97.5 (06 Feb 2022 08:24)  HR: 80 (06 Feb 2022 08:24) (75 - 90)  BP: 125/64 (06 Feb 2022 08:24) (125/64 - 127/74)  BP(mean): --  RR: 18 (06 Feb 2022 08:24) (18 - 18)  SpO2: 99% (06 Feb 2022 08:24) (97% - 99%)  PHYSICAL EXAM  General Appearance: cooperative, no acute distress,   HEENT: PERRL, conjunctiva clear, EOM's intact, non injected pharynx, no exudate, TM   normal  Neck: Supple, , no adenopathy, thyroid: not enlarged, no carotid bruit or JVD  Back: Symmetric, no  tenderness,no soft tissue tenderness  Lungs: right thoracostomy in place  / mild diffuse bronchospasm  Heart: Regular rate and rhythm, S1, S2 normal, no murmur, rub or gallop  Abdomen: Soft, non-tender, bowel sounds active , no hepatosplenomegaly  Extremities: no cyanosis or edema, no joint swelling  Skin: Skin color, texture normal, no rashes   Neurologic: Alert and oriented X3 , cranial nerves intact, sensory and motor normal,    ECG:    LABS:                          13.1   9.51  )-----------( 318      ( 03 Feb 2022 16:40 )             39.5     02-03    137  |  103  |  23  ----------------------------<  167<H>  4.5   |  27  |  0.81    Ca    7.5<L>      03 Feb 2022 16:40    TPro  6.2  /  Alb  2.1<L>  /  TBili  0.2  /  DBili  x   /  AST  35  /  ALT  40  /  AlkPhos  106  02-03      < from: Xray Chest 1 View- PORTABLE-Urgent (02.03.22 @ 16:27) >  INTERPRETATION:  AP chest on February 3, 2022 at 4:22 PM. Patient is   short of breath. There is history of metastatic rest cancer to brain.    Heart magnified by technique. Right MediPort and fairly extensive lumbar   hardware again noted.    Right Pleurx catheter remains. A Pleurx catheter has been advanced into   the right chest compared to January 27.    Significant diffuse left lung infiltrate is unchanged.    Present film shows increasing right base effusion which is new.    IMPRESSION: Advanced lung disease on the left unchanged. Increasing right   base effusion. Pleurx catheter has been advanced.    < end of copied text >                  RADIOLOGY & ADDITIONAL STUDIES:

## 2022-02-06 NOTE — DIETITIAN INITIAL EVALUATION ADULT. - PHYSCIAL ASSESSMENT
Nick scale- 19  Skin intact   Last BM documented- 2/4/22 x 1 day ago (no bowel meds ordered) underweight/emaciated

## 2022-02-06 NOTE — DIETITIAN INITIAL EVALUATION ADULT. - ADD RECOMMEND
1) continue with regular diet and po supplements encourage protein enriched foods with all meals. 2) 3 small meals/3 small snacks for optimal intake. 3) monitor weights track trends 4) Palliative care team to establish GOC (address nutrition support) Nutrition support is not recommended due to overall declining medical status which evidenced based studies indicate EN is not effective in prolonging survival and improving quality of life. It can also increase risk of aspiration pneumonia as well as other related issues.

## 2022-02-06 NOTE — DIETITIAN NUTRITION RISK NOTIFICATION - ADDITIONAL COMMENTS/DIETITIAN RECOMMENDATIONS
· Additional Recommendations  1) continue with regular diet and po supplements encourage protein enriched foods with all meals. 2) 3 small meals/3 small snacks for optimal intake. 3) monitor weights track trends 4) Palliative care team to establish GOC (address nutrition support) Nutrition support is not recommended due to overall declining medical status which evidenced based studies indicate EN is not effective in prolonging survival and improving quality of life. It can also increase risk of aspiration pneumonia as well as other related issues.

## 2022-02-07 NOTE — PROGRESS NOTE ADULT - SUBJECTIVE AND OBJECTIVE BOX
Subjective:  Pt in chair anxious.  Notable wheeze from upper airway     T(C): 36 (02-07-22 @ 08:08), Max: 36.7 (02-06-22 @ 15:00)  HR: 88 (02-07-22 @ 08:35) (81 - 88)  BP: 109/55 (02-07-22 @ 08:08) (109/55 - 120/60)  ABP: --  ABP(mean): --  RR: 19 (02-07-22 @ 08:08) (19 - 20)  SpO2: 100% (02-07-22 @ 08:08) (96% - 100%) 4 L NC   Wt(kg): --  CVP(mm Hg): --  CO: --  CI: --  PA: --                                              Tele: SR     CHEST TUBE:  600cc                              OUTPUT:     per 24 hours    AIR LEAKS:  [ ] YES [ x] NO                                   CAPILLARY BLOOD GLUCOSE               < from: CT Chest w/ IV Cont (02.07.22 @ 11:12) >  FINDINGS:    AIRWAYS, LUNGS, PLEURA: Treated left upper lobe malignancy measuring 2.5   x 2 cm (image 44, series 2) is unchanged compared to 1/24/2022.    Additional left upper lobe 0.8 cm nodular opacity (image 51, series 2)   unchanged.    Consolidation at the left base unchanged.    Unchanged left lung volume loss and no significant change in nodular   thickening along the left mediastinal pleura; reference 1.5 cm nodular   lesion along the left mediastinal pleura (image 55, series 2).    Interval placement of right-sided chest tube. Interval resolution of   right pleural effusion.    MEDIASTINUM: Normal heart size. No pericardial effusion. Thoracic aorta   normal caliber.  No large mediastinal lymph nodes. Fluid-filled   esophagus. Port-A-Cath tip terminates in the lower SVC.    IMAGED ABDOMEN: Limited assessment of the imaged abdomen secondary to   beam hardening artifact from spinal hardware. 5.6 x 2.5 cm hypodense   lesion involving the caudate lobe (image 116, series 2). Multiple   additional smaller hypodense lesions throughout the liver.    SOFT TISSUES: Unremarkable.    BONES: Orthopedic fixation hardware of the thoracic and imaged lumbar   spine. T10 vertebral body sclerosis similar to prior exam. Sclerosis and   compression of L2 vertebral body.      IMPRESSION:.    2.5 cm treated left upper lobe malignancy, additional left upper lobe   indeterminate 0.8 cm nodular opacity, and left pleural metastatic disease   without significant interval change compared to 1/24/2022.    Interval resolution of right pleural effusion compared to 1/24/2022.    Persistent left basilar consolidation.    5.6 cm mass within the caudate lobe and multiple additional hepatic   hypodense lesions likely represents metastatic disease.    < end of copied text >          Exam  Neuro: Alert Awake   Pulm:  decreased at bases b/l + exp wheeze - upper airway- + Rt Pleurx   CV: RRR S1 S2   Abd:  Soft NT ND   Extremities: trace edema         Assessment:  76yFemale    with PAST MEDICAL & SURGICAL HISTORY:  Hypothyroidism    Fracture  L2 pathologic fracture    Osteoarthritis    Cholecystitis    Lung cancer  adenocarcinoma    History of other surgery  percutaneous endoscopic left pleural cavity drain    History of other surgery  gallbladder drain    H/O thyroidectomy    History of lumbar spinal fusion    S/P left oophorectomy    History of tonsillectomy          Plan:

## 2022-02-07 NOTE — PROGRESS NOTE ADULT - ASSESSMENT
77 y/o F PMHx significant for Metastatic adenocarcinoma of the lung with metastasis to the spine, liver, and brain, Hypothyroidism s/p thyroid resection, h/o L2 pathologic compression fracture, GERD, chronic leg edema on Lasix, pulmonary fibrosis who was hospitalized at  1/24-1/28 for worsening SOB; (+) worsening R pleural effusion treated with VATS with Pleurx placement (pleural fluid (+) exudate cytology negative for malignant cells). The patient was discharged on a 3 day course of PO Keflex and instructed to hold her Lasix. Of note the patient was planned to start chemotherapy/RT tomorrow (2/4). The patient denies any associated cough, chest congestion, subjective fevers. The patient notes that her Pleurx catheter has not drained properly over the past 2 days and has only had a total of 100cc output drained today. The patient was referred to the ED by her Pulmonologist for further management. In the ED the patient was in moderate respiratory distress found to be hypoxic on room air to 90-91%.    #Acute Hypoxic Respiratory Failure due to Right Pleural Effusion as a consequence of a Pleurx Catheter Malfunction  ~admit to Medicine  ~cont. supplemental O2  ~f/u w/ Pulmonology appreciated  ~f/u w/ CT Surgery consult appreciated >> patient to continue draining. cap on monday and DC Monday  -o2 eval for home ordered     # Stage 4 Adenocarcinoma Lung  - Heme onc following  - Palliative for GOC consult  - Family want everything done    #anxiety; developing anxiety from being in hospital and needing to undergo chemo  -started xanax.    #acute exacerbation of pulmonary fibrosis  - Solumedrol now  -Prednisone 40 x 5 days for DC    #GERD  -cont. Omeprazole 40mg po daily    #Hypothyroidism  -cont. Liothyronine 5mcg po daily  -cont. Levothyroxine 100mcg po qam    #Protein Calorie Malnutrition  -f/u w/ Nutrition evaluation in the am    #Constipation  -cont. Bisacodyl prn  -cont. Miralax prn    #Advance Directives   -DNR/DNI    #Vte ppx  -IMPROVE Vte Risk Score is 3  -cont. Heparin sq    Disposition: Likely DC today with prednisone 40 x 7 days 75 y/o F PMHx significant for Metastatic adenocarcinoma of the lung with metastasis to the spine, liver, and brain, Hypothyroidism s/p thyroid resection, h/o L2 pathologic compression fracture, GERD, chronic leg edema on Lasix, pulmonary fibrosis who was hospitalized at  1/24-1/28 for worsening SOB; (+) worsening R pleural effusion treated with VATS with Pleurx placement (pleural fluid (+) exudate cytology negative for malignant cells). The patient was discharged on a 3 day course of PO Keflex and instructed to hold her Lasix. Of note the patient was planned to start chemotherapy/RT tomorrow (2/4). The patient denies any associated cough, chest congestion, subjective fevers. The patient notes that her Pleurx catheter has not drained properly over the past 2 days and has only had a total of 100cc output drained today. The patient was referred to the ED by her Pulmonologist for further management. In the ED the patient was in moderate respiratory distress found to be hypoxic on room air to 90-91%.    #Acute Hypoxic Respiratory Failure due to Right Pleural Effusion as a consequence of a Pleurx Catheter Malfunction  - cont. supplemental O2  - home 02 evaluation  - f/u w/ Pulmonology appreciated  - f/u w/ CT Surgery consult appreciated >> patient to continue draining. cap on monday and DC Monday  -o2 eval for home ordered     # Stage 4 Adenocarcinoma Lung  - Heme onc following  - See Repeat CT chest today above  - Palliative for GOC consult  - Family want everything done    # Vocal Cord paralysis  - FU with ENT as outpatient     #anxiety  -started xanax.    #acute exacerbation of pulmonary fibrosis  - Solumedrol now  -Prednisone 40 x 7 days for DC    #GERD  -cont. Omeprazole 40mg po daily    #Hypothyroidism  -cont. Liothyronine 5mcg po daily  -cont. Levothyroxine 100mcg po qam    #Protein Calorie Malnutrition  -f/u w/ Nutrition evaluation in the am    #Constipation  -cont. Bisacodyl prn  -cont. Miralax prn    #Advance Directives   -DNR/DNI    #Vte ppx  -IMPROVE Vte Risk Score is 3  -cont. Heparin sq    Disposition: Likely DC today with prednisone 40 x 7 days 75 y/o F PMHx significant for Metastatic adenocarcinoma of the lung with metastasis to the spine, liver, and brain, Hypothyroidism s/p thyroid resection, h/o L2 pathologic compression fracture, GERD, chronic leg edema on Lasix, pulmonary fibrosis who was hospitalized at  1/24-1/28 for worsening SOB; (+) worsening R pleural effusion treated with VATS with Pleurx placement (pleural fluid (+) exudate cytology negative for malignant cells). The patient was discharged on a 3 day course of PO Keflex and instructed to hold her Lasix. Of note the patient was planned to start chemotherapy/RT tomorrow (2/4). The patient denies any associated cough, chest congestion, subjective fevers. The patient notes that her Pleurx catheter has not drained properly over the past 2 days and has only had a total of 100cc output drained today. The patient was referred to the ED by her Pulmonologist for further management. In the ED the patient was in moderate respiratory distress found to be hypoxic on room air to 90-91%.    #Acute Hypoxic Respiratory Failure due to Right Pleural Effusion as a consequence of a Pleurx Catheter Malfunction  - cont. supplemental O2  - home 02 evaluation  - f/u w/ Pulmonology appreciated  - f/u w/ CT Surgery consult appreciated >> patient to continue draining. cap on monday and DC Monday  -o2 eval Pulse ox 92% on 3 L at rest, to 88% while ambulating. Is a candidate for Home o2 to help with     activities of daily living, especially  with her diagnosis of Lung Cancer with mets.     # Stage 4 Adenocarcinoma Lung  - Heme onc following  - See Repeat CT chest today above  - Palliative for GOC consult  - Family want everything done    # Vocal Cord paralysis  - FU with ENT as outpatient     #anxiety  -started xanax.    #acute exacerbation of pulmonary fibrosis  - Solumedrol now  -Prednisone 40 x 7 days for DC    #GERD  -cont. Omeprazole 40mg po daily    #Hypothyroidism  -cont. Liothyronine 5mcg po daily  -cont. Levothyroxine 100mcg po qam    #Protein Calorie Malnutrition  -f/u w/ Nutrition evaluation in the am    #Constipation  -cont. Bisacodyl prn  -cont. Miralax prn    #Advance Directives   -DNR/DNI    #Vte ppx  -IMPROVE Vte Risk Score is 3  -cont. Heparin sq    Disposition: Likely DC today with prednisone 40 x 7 days 77 y/o F PMHx significant for Metastatic adenocarcinoma of the lung with metastasis to the spine, liver, and brain, Hypothyroidism s/p thyroid resection, h/o L2 pathologic compression fracture, GERD, chronic leg edema on Lasix, pulmonary fibrosis who was hospitalized at  1/24-1/28 for worsening SOB; (+) worsening R pleural effusion treated with VATS with Pleurx placement (pleural fluid (+) exudate cytology negative for malignant cells). The patient was discharged on a 3 day course of PO Keflex and instructed to hold her Lasix. Of note the patient was planned to start chemotherapy/RT tomorrow (2/4). The patient denies any associated cough, chest congestion, subjective fevers. The patient notes that her Pleurx catheter has not drained properly over the past 2 days and has only had a total of 100cc output drained today. The patient was referred to the ED by her Pulmonologist for further management. In the ED the patient was in moderate respiratory distress found to be hypoxic on room air to 90-91%.    #Acute Hypoxic Respiratory Failure due to Right Pleural Effusion as a consequence of a Pleurx Catheter Malfunction  - cont. supplemental O2  - home 02 evaluation  - f/u w/ Pulmonology appreciated  - f/u w/ CT Surgery consult appreciated >> patient to continue draining. cap on monday and DC Monday  -o2 eval Pulse ox 92% on 3 L at rest, to 88% while ambulating. Is a candidate for Home o2 to help with     activities of daily living, especially  with her diagnosis of Lung Cancer with mets.     # Stage 4 Adenocarcinoma Lung  - Heme onc following  - See Repeat CT chest today above  - Palliative for GOC consult  - Family want everything done  - Starting Chemotherapy tomorrow (Carboplatin & Alimta)     # Vocal Cord paralysis  - FU with ENT as outpatient     #anxiety  -started xanax.    #acute exacerbation of pulmonary fibrosis  - Solumedrol now  -Prednisone 40 x 7 days for DC    #GERD  -cont. Omeprazole 40mg po daily    #Hypothyroidism  -cont. Liothyronine 5mcg po daily  -cont. Levothyroxine 100mcg po qam    #Protein Calorie Malnutrition  -f/u w/ Nutrition evaluation in the am    #Constipation  -cont. Bisacodyl prn  -cont. Miralax prn    #Advance Directives   -DNR/DNI    #Vte ppx  -IMPROVE Vte Risk Score is 3  -cont. Heparin sq     77 y/o F PMHx significant for Metastatic adenocarcinoma of the lung with metastasis to the spine, liver, and brain, Hypothyroidism s/p thyroid resection, h/o L2 pathologic compression fracture, GERD, chronic leg edema on Lasix, pulmonary fibrosis who was hospitalized at  1/24-1/28 for worsening SOB; (+) worsening R pleural effusion treated with VATS with Pleurx placement (pleural fluid (+) exudate cytology negative for malignant cells). The patient was discharged on a 3 day course of PO Keflex and instructed to hold her Lasix. Of note the patient was planned to start chemotherapy/RT tomorrow (2/4). The patient denies any associated cough, chest congestion, subjective fevers. The patient notes that her Pleurx catheter has not drained properly over the past 2 days and has only had a total of 100cc output drained today. The patient was referred to the ED by her Pulmonologist for further management. In the ED the patient was in moderate respiratory distress found to be hypoxic on room air to 90-91%.    #Acute Hypoxic Respiratory Failure due to Right Pleural Effusion as a consequence of a Pleurx Catheter Malfunction, possible component of pulm fibrosis overlap  - cont. supplemental O2  - f/u w/ Pulmonology appreciated  - f/u w/ CT Surgery consult appreciated >> patient to continue draining. cap on monday and DC Monday  -o2 eval Pulse ox 92% on 3 L at rest, to 88% while ambulating. Is a candidate for Home o2 to help with     activities of daily living, especially  with her diagnosis of Lung Cancer with mets.       #acute exacerbation of pulmonary fibrosis  - Solumedrol now  -Prednisone 40 x 7 days for DC    # Stage 4 Adenocarcinoma Lung  - Heme onc following  - See Repeat CT chest today above  - Palliative for GOC consult  - Family want everything done  - Starting Chemotherapy tomorrow (Carboplatin & Alimta)     # Vocal Cord paralysis  - ? Mediastinal involvement  - FU with ENT as outpatient     #anxiety  -started xanax.    #GERD  -cont. Omeprazole 40mg po daily    #Hypothyroidism  -cont. Liothyronine 5mcg po daily  -cont. Levothyroxine 100mcg po qam    #Protein Calorie Malnutrition  -f/u w/ Nutrition evaluation in the am    #Constipation  -cont. Bisacodyl prn  -cont. Miralax prn    #Advance Directives   -DNR/DNI    #Vte ppx  -IMPROVE Vte Risk Score is 3  -cont. Heparin sq

## 2022-02-07 NOTE — PROGRESS NOTE ADULT - ASSESSMENT
1) Dyspnea  2) Metastatic Lung Cancer (Adenocarcinoma)  3) Right Thoracostomy In Place (IPC)  4) Pleural Effusion  5)Cough with bronchospasm    77 y/o F PMHx significant for Metastatic adenocarcinoma of the lung with metastasis to the spine, liver, and brain, Hypothyroidism s/p thyroid resection, h/o L2 pathologic compression fracture, GERD, chronic leg edema on Lasix, pulmonary fibrosis who was hospitalized at  1/24-1/28 for worsening SOB; (+) worsening R pleural effusion treated with VATS with Pleurx placement (pleural fluid (+) exudate cytology negative for malignant cells). The patient was discharged on a 3 day course of PO Keflex and instructed to hold her Lasix. Of note the patient was planned to start chemotherapy/RT tomorrow (2/4). The patient denies any associated cough, chest congestion, subjective fevers. The patient notes that her Pleurx catheter has not drained properly over the past 2 days and has only had a total of 100cc output drained today. The patient was referred to the ED by her Pulmonologist for further management. In the ED the patient was in moderate respiratory distress found to be hypoxic on room air to 90-91%.  CXR revealed right sided pleural effusion  Bilateral infiltrates noted as well  Plan for chemo/XRT per oncology.   Dyspnea has been worse along with fatigue  ABG 7.46/39/79   Echocardiogram performed last admission did not show pulmonary hypertension  She has been given inhalers in the past (nebulized pulmicort/duoneb provided to patient in the office on 2/3) and she did not have much relief  Continue Symbicort/Spiriva   Overall prognosis is guarded  CXR reviewed, shows improvement       1) Dyspnea  2) Metastatic Lung Cancer (Adenocarcinoma)  3) Right Thoracostomy In Place (IPC)  4) Pleural Effusion  5)Cough with bronchospasm    77 y/o F PMHx significant for Metastatic adenocarcinoma of the lung with metastasis to the spine, liver, and brain, Hypothyroidism s/p thyroid resection, h/o L2 pathologic compression fracture, GERD, chronic leg edema on Lasix, pulmonary fibrosis who was hospitalized at  1/24-1/28 for worsening SOB; (+) worsening R pleural effusion treated with VATS with Pleurx placement (pleural fluid (+) exudate cytology negative for malignant cells). The patient was discharged on a 3 day course of PO Keflex and instructed to hold her Lasix. Of note the patient was planned to start chemotherapy/RT tomorrow (2/4). The patient denies any associated cough, chest congestion, subjective fevers. The patient notes that her Pleurx catheter has not drained properly over the past 2 days and has only had a total of 100cc output drained today. The patient was referred to the ED by her Pulmonologist for further management. In the ED the patient was in moderate respiratory distress found to be hypoxic on room air to 90-91%.  CXR revealed right sided pleural effusion  Bilateral infiltrates noted as well  Plan for chemo/XRT per oncology.   Dyspnea has been worse along with fatigue  ABG 7.46/39/79   Echocardiogram performed last admission did not show pulmonary hypertension  She has been given inhalers in the past (nebulized pulmicort/duoneb provided to patient in the office on 2/3) and she did not have much relief  Continue Symbicort/Spiriva   Ordered CT neck with contrast/ CT Chest due to the  stridor  Overall prognosis is guarded  CXR reviewed, shows improvement

## 2022-02-07 NOTE — PROGRESS NOTE ADULT - ASSESSMENT
76y Female PMHx significant for Metastatic adenocarcinoma of the lung with metastasis to the spine, liver, and brain, Hypothyroidism s/p thyroid resection, h/o L2 pathologic compression fracture, GERD, chronic leg edema on Lasix, pulmonary fibrosis who was hospitalized at  1/24-1/28 for worsening SOB; (+) worsening R pleural effusion treated with VATS with Pleurx placement admitted 2/4 w hypoxia and pleurx not draining well. Last drained on Tuesday, 2/1/22, minimal drainage. Drainage also associated w pain. CXR reveals effusion on right w pleurx up to apex of lung.    Plan   Pleurx capped   homecare to drain every Monday/ Wednesday/Friday up to 1 liter   Due to positioning of Pleurx, best drainage results while pt lying down-  is aware   Patient can be discharged from a thoracic standpoint   pain and anxiety meds as needed   [facility] Thoracic team in am rounds

## 2022-02-07 NOTE — DISCHARGE NOTE PROVIDER - CARE PROVIDER_API CALL
Abdullahi Sanchez  INTERNAL MEDICINE  [address] [place] [address]  Phone: [phone]  Fax: [phone]  Follow Up Time:     Richie Westfall  [address] [place] [address]  Phone: [phone]  Fax: [phone]  Follow Up Time:

## 2022-02-07 NOTE — PROGRESS NOTE BEHAVIORAL HEALTH - EYE CONTACT
61 y/o male with h/o multiple myeloma not on immunotherapy, MR and TR repairs in Nov 2021, HL, COVID quadruple vaccinated (last vaccinated  a week PTA) was admitted on 2/6 for fever, SOB, cough and CP. He developed low grade temp x 4 days. Associated SOB, cough , left sided chest discomfort. Reports he feels like pain is worsened with deep breaths. Describes pain as sharp. Tm was 101. Pt also describes back pain with right leg irradiation for 5 weeks. In ER he received ceftriaxone and azithromycin.     1. Febrile syndrome. Multifocal pneumonia. MM. Immunocompromised host.  -obtain BC x 2, sputum c/s    
59 y/o male with h/o multiple myeloma on immunotherapy (until Nov 2021), MR and TR repairs in Nov 2021, HL, COVID quadruple vaccinated (last vaccinated  a week PTA) was admitted on 2/6 for fever, SOB, cough and CP. He developed low grade temp x 4 days. Associated SOB, cough , left sided chest discomfort. Reports he feels like pain is worsened with deep breaths. Describes pain as sharp. Tm was 101. Pt also describes back pain with right leg irradiation for 5 weeks. In ER he received ceftriaxone and azithromycin.     1. Febrile syndrome. Multifocal pneumonia. MM. Immunocompromised host.  -obtain BC x 2-no sputum available  -agree with ceftriaxone 1 gm IV qd and azithromycin 500 mg IV qd  -reason for abx use and side effects reviewed with patient; monitor BMP   -respiratory car  -old chart reviewed to assess prior cultures  -monitor temps  -f/u CBC  -supportive care  2. Other issues:   -care per medicine    d/w Dr. Hernández      
Good

## 2022-02-07 NOTE — DISCHARGE NOTE PROVIDER - NSDCMRMEDTOKEN_GEN_ALL_CORE_FT
Albuterol (Eqv-Proventil HFA) 90 mcg/inh inhalation aerosol: 2 puff(s) inhaled every 6 hours, As Needed  ALPRAZolam 0.5 mg oral tablet: 1 tab(s) orally every 6 hours, As needed, anxiety MDD:2 mg  biotin 1000 mcg oral tablet: 1 tab(s) orally once a day  bisacodyl 5 mg oral delayed release tablet: 1 tab(s) orally every 12 hours, As Needed -for constipation    budesonide-formoterol 160 mcg-4.5 mcg/inh inhalation aerosol: 2 puff(s) inhaled 2 times a day   calcium (as carbonate) 600 mg oral tablet: 1 tab(s) orally 2 times a day  ciclopirox 0.77% topical gel: Apply topically to affected area 2 times a day  esomeprazole 40 mg oral delayed release capsule: 1 cap(s) orally once a day, As Needed  folic acid 1 mg oral tablet: 1 tab(s) orally once a day  liothyronine 5 mcg oral tablet: 1 tab(s) orally once a day  mirtazapine 7.5 mg oral tablet: 1 tab(s) orally once a day (at bedtime)  ondansetron 8 mg oral tablet: 1 tab(s) orally 2 times a day, As Needed  oxyCODONE 5 mg oral tablet: 1 tab(s) orally every 4 hours, As needed, Moderate Pain (4 - 6) MDD:30 mg  polyethylene glycol 3350 oral powder for reconstitution: 17 gram(s) orally once a day, As Needed -for constipation   predniSONE 20 mg oral tablet: 2 tab(s) orally once a day  Synthroid 100 mcg (0.1 mg) oral tablet: 1 tab(s) orally once a day  tiotropium 18 mcg inhalation capsule: 1 cap(s) inhaled once a day  Tylenol 8 Hour 650 mg oral tablet, extended release: 1 tab(s) orally every 8 hours, As Needed -for mild pain

## 2022-02-07 NOTE — PROGRESS NOTE ADULT - SUBJECTIVE AND OBJECTIVE BOX
Patient is a 76y old  Female who presents with a chief complaint of Fever, Shortness of Breath (03 Feb 2022 23:49)      HPI:  75 y/o F PMHx significant for Metastatic adenocarcinoma of the lung with metastasis to the spine, liver, and brain, Hypothyroidism s/p thyroid resection, h/o L2 pathologic compression fracture, GERD, chronic leg edema on Lasix, pulmonary fibrosis who was hospitalized at  1/24-1/28 for worsening SOB; (+) worsening R pleural effusion treated with VATS with Pleurx placement (pleural fluid (+) exudate cytology negative for malignant cells). The patient was discharged on a 3 day course of PO Keflex and instructed to hold her Lasix. Of note the patient was planned to start chemotherapy/RT tomorrow (2/4). The patient denies any associated cough, chest congestion, subjective fevers. The patient notes that her Pleurx catheter has not drained properly over the past 2 days and has only had a total of 100cc output drained today. The patient was referred to the ED by her Pulmonologist for further management. In the ED the patient was in moderate respiratory distress found to be hypoxic on room air to 90-91%.  ABG is still pending  CXR revealed right sided pleural effusion  Bilateral infiltrates     2/7  No acute pulmonary events occurred overnight    Osteoarthritis    Cholecystitis    Lung cancer  adenocarcinoma    History of other surgery  percutaneous endoscopic left pleural cavity drain    History of other surgery  gallbladder drain    H/O thyroidectomy    History of lumbar spinal fusion    S/P left oophorectomy    History of tonsillectomy      MEDICATIONS  (STANDING):  ALBUTerol    90 MICROgram(s) HFA Inhaler 2 Puff(s) Inhalation every 6 hours  budesonide 160 MICROgram(s)/formoterol 4.5 MICROgram(s) Inhaler 2 Puff(s) Inhalation two times a day  calcium carbonate   1250 mG (OsCal) 1 Tablet(s) Oral two times a day  folic acid 1 milliGRAM(s) Oral daily  guaiFENesin Oral Liquid (Sugar-Free) 200 milliGRAM(s) Oral four times a day  heparin   Injectable 5000 Unit(s) SubCutaneous every 8 hours  levothyroxine 100 MICROGram(s) Oral daily  liothyronine 5 MICROGram(s) Oral daily  methylPREDNISolone sodium succinate Injectable 40 milliGRAM(s) IV Push every 12 hours  mirtazapine 7.5 milliGRAM(s) Oral at bedtime  multivitamin 1 Tablet(s) Oral daily  nystatin    Suspension 640873 Unit(s) Oral four times a day  pantoprazole    Tablet 40 milliGRAM(s) Oral before breakfast  tiotropium 18 MICROgram(s) Capsule 1 Capsule(s) Inhalation daily    MEDICATIONS  (PRN):  acetaminophen     Tablet .. 650 milliGRAM(s) Oral once PRN Temp greater or equal to 38C (100.4F), Mild Pain (1 - 3)  acetaminophen     Tablet .. 650 milliGRAM(s) Oral every 6 hours PRN Temp greater or equal to 38C (100.4F), Mild Pain (1 - 3)  ALPRAZolam 0.5 milliGRAM(s) Oral every 6 hours PRN anxiety  aluminum hydroxide/magnesium hydroxide/simethicone Suspension 30 milliLiter(s) Oral every 4 hours PRN Dyspepsia  bisacodyl 5 milliGRAM(s) Oral every 12 hours PRN Constipation  melatonin 3 milliGRAM(s) Oral at bedtime PRN Insomnia  morphine  - Injectable 2 milliGRAM(s) IV Push every 4 hours PRN Severe Pain (7 - 10)  ondansetron Injectable 4 milliGRAM(s) IV Push every 8 hours PRN Nausea and/or Vomiting  oxyCODONE    IR 5 milliGRAM(s) Oral every 4 hours PRN Moderate Pain (4 - 6)  polyethylene glycol 3350 17 Gram(s) Oral daily PRN Constipation        FAMILY HISTORY:  FH: thyroid disease (Mother)      Vital Signs Last 24 Hrs  T(C): 36 (07 Feb 2022 08:08), Max: 36.7 (06 Feb 2022 15:00)  T(F): 96.8 (07 Feb 2022 08:08), Max: 98 (06 Feb 2022 15:00)  HR: 83 (07 Feb 2022 08:08) (81 - 86)  BP: 109/55 (07 Feb 2022 08:08) (109/55 - 120/60)  BP(mean): --  RR: 19 (07 Feb 2022 08:08) (19 - 20)  SpO2: 100% (07 Feb 2022 08:08) (96% - 100%)    PHYSICAL EXAM  General Appearance: cooperative, no acute distress,   HEENT: PERRL, conjunctiva clear, EOM's intact, non injected pharynx, no exudate, TM   normal  Neck: Supple, , no adenopathy, thyroid: not enlarged, no carotid bruit or JVD  Back: Symmetric, no  tenderness,no soft tissue tenderness  Lungs: right thoracostomy in place  / mild diffuse bronchospasm  Heart: Regular rate and rhythm, S1, S2 normal, no murmur, rub or gallop  Abdomen: Soft, non-tender, bowel sounds active , no hepatosplenomegaly  Extremities: no cyanosis or edema, no joint swelling  Skin: Skin color, texture normal, no rashes   Neurologic: Alert and oriented X3 , cranial nerves intact, sensory and motor normal,    ECG:    LABS:                          13.1   9.51  )-----------( 318      ( 03 Feb 2022 16:40 )             39.5     02-03    137  |  103  |  23  ----------------------------<  167<H>  4.5   |  27  |  0.81    Ca    7.5<L>      03 Feb 2022 16:40    TPro  6.2  /  Alb  2.1<L>  /  TBili  0.2  /  DBili  x   /  AST  35  /  ALT  40  /  AlkPhos  106  02-03      < from: Xray Chest 1 View- PORTABLE-Urgent (02.03.22 @ 16:27) >  INTERPRETATION:  AP chest on February 3, 2022 at 4:22 PM. Patient is   short of breath. There is history of metastatic rest cancer to brain.    Heart magnified by technique. Right MediPort and fairly extensive lumbar   hardware again noted.    Right Pleurx catheter remains. A Pleurx catheter has been advanced into   the right chest compared to January 27.    Significant diffuse left lung infiltrate is unchanged.    Present film shows increasing right base effusion which is new.    IMPRESSION: Advanced lung disease on the left unchanged. Increasing right   base effusion. Pleurx catheter has been advanced.    < end of copied text >                  RADIOLOGY & ADDITIONAL STUDIES:

## 2022-02-07 NOTE — PROGRESS NOTE ADULT - SUBJECTIVE AND OBJECTIVE BOX
Patient seen and examined:  Still with upper airway discomfort  no throat closing. Just a raspy voice  and cough. No fever. Tolerating po  and Voiding.    ROS: Negative except for above    All other systems reviewed and found to be negative with the exception of what has been described above.    MEDICATIONS  (STANDING):  ALBUTerol    90 MICROgram(s) HFA Inhaler 2 Puff(s) Inhalation every 6 hours  budesonide 160 MICROgram(s)/formoterol 4.5 MICROgram(s) Inhaler 2 Puff(s) Inhalation two times a day  calcium carbonate   1250 mG (OsCal) 1 Tablet(s) Oral two times a day  folic acid 1 milliGRAM(s) Oral daily  guaiFENesin Oral Liquid (Sugar-Free) 200 milliGRAM(s) Oral four times a day  heparin   Injectable 5000 Unit(s) SubCutaneous every 8 hours  levothyroxine 100 MICROGram(s) Oral daily  liothyronine 5 MICROGram(s) Oral daily  methylPREDNISolone sodium succinate Injectable 40 milliGRAM(s) IV Push every 12 hours  mirtazapine 7.5 milliGRAM(s) Oral at bedtime  multivitamin 1 Tablet(s) Oral daily  nystatin    Suspension 229915 Unit(s) Oral four times a day  pantoprazole    Tablet 40 milliGRAM(s) Oral before breakfast  tiotropium 18 MICROgram(s) Capsule 1 Capsule(s) Inhalation daily    MEDICATIONS  (PRN):  acetaminophen     Tablet .. 650 milliGRAM(s) Oral once PRN Temp greater or equal to 38C (100.4F), Mild Pain (1 - 3)  acetaminophen     Tablet .. 650 milliGRAM(s) Oral every 6 hours PRN Temp greater or equal to 38C (100.4F), Mild Pain (1 - 3)  ALPRAZolam 0.5 milliGRAM(s) Oral every 6 hours PRN anxiety  aluminum hydroxide/magnesium hydroxide/simethicone Suspension 30 milliLiter(s) Oral every 4 hours PRN Dyspepsia  bisacodyl 5 milliGRAM(s) Oral every 12 hours PRN Constipation  melatonin 3 milliGRAM(s) Oral at bedtime PRN Insomnia  morphine  - Injectable 2 milliGRAM(s) IV Push every 4 hours PRN Severe Pain (7 - 10)  ondansetron Injectable 4 milliGRAM(s) IV Push every 8 hours PRN Nausea and/or Vomiting  oxyCODONE    IR 5 milliGRAM(s) Oral every 4 hours PRN Moderate Pain (4 - 6)  polyethylene glycol 3350 17 Gram(s) Oral daily PRN Constipation      VITALS:  T(F): 96.8 (02-07-22 @ 08:08), Max: 98 (02-06-22 @ 15:00)  HR: 88 (02-07-22 @ 08:35) (81 - 88)  BP: 109/55 (02-07-22 @ 08:08) (109/55 - 120/60)  RR: 19 (02-07-22 @ 08:08) (19 - 20)  SpO2: 100% (02-07-22 @ 08:08) (96% - 100%)  Wt(kg): --    I&O's Summary    06 Feb 2022 07:01  -  07 Feb 2022 07:00  --------------------------------------------------------  IN: 0 mL / OUT: 600 mL / NET: -600 mL          PHYSICAL EXAM:  GEN: NAD, Raspy voice and cough  HEENT:  pupils equal and reactive, EOMI, no oropharyngeal lesions, erythema, exudates, oral thrush  NECK:   supple, no carotid bruits, no palpable lymph nodes, no thyromegaly  CV:  +S1, +S2, regular, no murmurs or rubs  RESP:   Right Chest tube, Scattered wheeze  BREAST:  not examined  GI:  abdomen soft, non-tender, non-distended, normal BS, no bruits, no abdominal masses, no palpable masses  RECTAL:  not examined  :  not examined  MSK:   normal muscle tone, no atrophy, no rigidity, no contractions  EXT:  no clubbing, no cyanosis, no edema, no calf pain, swelling or erythema  VASCULAR:  pulses equal and symmetric in the upper and lower extremities  NEURO:  AAOX3, no focal neurological deficits, follows all commands, able to move extremities spontaneously  SKIN:  no ulcers, lesions or rashes    < from: CT Chest w/ IV Cont (02.07.22 @ 11:12) >  IMPRESSION:.    2.5 cm treated left upper lobe malignancy, additional left upper lobe   indeterminate 0.8 cm nodular opacity, and left pleural metastatic disease   without significant interval change compared to 1/24/2022.    Interval resolution of right pleural effusion compared to 1/24/2022.    Persistent left basilar consolidation.    5.6 cm mass within the caudate lobe and multiple additional hepatic   hypodense lesions likely represents metastatic disease.    --- End of Report ---    < end of copied text >  < from: CT Neck Soft Tissue w/ IV Cont (02.07.22 @ 10:58) >  IMPRESSION: RIGHT-sided vocal cord paralysis with a paramedian position   and enlarged RIGHT lateral ventricle. The RIGHT arytenoid cartilage is   sclerotic, possibly due to prior treatment.    The visualized proximal   esophagus is minimally dilated and contains fluid. Tiny RIGHT pleural  effusion and RIGHT-sided chest tube. Moderate LEFT pleural effusion is   noted with underlying atelectasis      < end of copied text >

## 2022-02-07 NOTE — DISCHARGE NOTE PROVIDER - HOSPITAL COURSE
77 y/o F PMHx significant for Metastatic adenocarcinoma of the lung with metastasis to the spine, liver, and brain, Hypothyroidism s/p thyroid resection, h/o L2 pathologic compression fracture, GERD, chronic leg edema on Lasix, pulmonary fibrosis who was hospitalized at  1/24-1/28 for worsening SOB; (+) worsening R pleural effusion treated with VATS with Pleurx placement (pleural fluid (+) exudate cytology negative for malignant cells). The patient was discharged on a 3 day course of PO Keflex and instructed to hold her Lasix. Of note the patient was planned to start chemotherapy/RT tomorrow (2/4). The patient denies any associated cough, chest congestion, subjective fevers. The patient notes that her Pleurx catheter has not drained properly over the past 2 days and has only had a total of 100cc output drained today. The patient was referred to the ED by her Pulmonologist for further management. In the ED the patient was in moderate respiratory distress found to be hypoxic on room air to 90-91%.    #Acute Hypoxic Respiratory Failure due to Right Pleural Effusion as a consequence of a Pleurx Catheter Malfunction  - cont. supplemental O2  - home 02 evaluation  - f/u w/ Pulmonology appreciated  - f/u w/ CT Surgery consult appreciated >> patient to continue draining. cap on monday and DC Monday  -o2 eval for home ordered     # Stage 4 Adenocarcinoma Lung  - Heme onc following  - See Repeat CT chest today above  - Palliative for GOC consult  - Family want everything done    # Vocal Cord paralysis  - FU with ENT as outpatient     #anxiety  -started xanax.    #acute exacerbation of pulmonary fibrosis  - Solumedrol now  -Prednisone 40 x 7 days for DC    #GERD  -cont. Omeprazole 40mg po daily    #Hypothyroidism  -cont. Liothyronine 5mcg po daily  -cont. Levothyroxine 100mcg po qam    #Protein Calorie Malnutrition  -f/u w/ Nutrition evaluation in the am    #Constipation  -cont. Bisacodyl prn  -cont. Miralax prn    #Advance Directives   -DNR/DNI

## 2022-02-08 NOTE — PROGRESS NOTE ADULT - ASSESSMENT
Recurrent non-small cell lung cancer   multiple sites of met  Pleural effusion  Bone metastases    Assessment/plan    Carboplatin, pemetrexed tomorrow  Folate 1 mg p.o. daily  Patient has received B12  Follow-up as outpatient for immunotherapy  Bisphosphonates for bone metastases

## 2022-02-08 NOTE — PROGRESS NOTE ADULT - ASSESSMENT
1) Dyspnea  2) Metastatic Lung Cancer (Adenocarcinoma)  3) Right Thoracostomy In Place (IPC)  4) Pleural Effusion  5) Right VC Paralysis      77 y/o F PMHx significant for Metastatic adenocarcinoma of the lung with metastasis to the spine, liver, and brain, Hypothyroidism s/p thyroid resection, h/o L2 pathologic compression fracture, GERD, chronic leg edema on Lasix, pulmonary fibrosis who was hospitalized at  1/24-1/28 for worsening SOB; (+) worsening R pleural effusion treated with VATS with Pleurx placement (pleural fluid (+) exudate cytology negative for malignant cells). The patient was discharged on a 3 day course of PO Keflex and instructed to hold her Lasix. Of note the patient was planned to start chemotherapy/RT tomorrow (2/4). The patient denies any associated cough, chest congestion, subjective fevers. The patient notes that her Pleurx catheter has not drained properly over the past 2 days and has only had a total of 100cc output drained today. The patient was referred to the ED by her Pulmonologist for further management. In the ED the patient was in moderate respiratory distress found to be hypoxic on room air to 90-91%.  CXR revealed right sided pleural effusion  Bilateral infiltrates noted as well  Plan for chemo/XRT per oncology.   Dyspnea has been worse along with fatigue  ABG 7.46/39/79   Echocardiogram performed last admission did not show pulmonary hypertension  She has been given inhalers in the past (nebulized pulmicort/duoneb provided to patient in the office on 2/3) and she did not have much relief  Continue Symbicort/Spiriva   CT Neck/chest reviewed, stridor issues are multifactorial and include VC paralysis with esophageal dilatation likely causing transient reflux and bronchospasm   Overall prognosis is guarded  Discussed with hospitalist/oncology   Follow up GI recommendations

## 2022-02-08 NOTE — PROGRESS NOTE ADULT - ASSESSMENT
Pt is a 76y old Female with hx of Metastatic adenocarcinoma of the lung with metastasis to the spine, liver, and brain, Hypothyroidism s/p thyroid resection, h/o L2 pathologic compression fracture, GERD, chronic leg edema on Lasix, pulmonary fibrosis who was hospitalized at  1/24-1/28 for worsening SOB; (+) worsening R pleural effusion treated with VATS with Pleurx placement (pleural fluid (+) exudate cytology negative for malignant cells). The patient was discharged on a three-day course of PO Keflex and instructed to hold her Lasix. Of note, the patient was planned to start chemotherapy/RT tomorrow (2/4). The patient denies any associated cough, chest congestion, subjective fevers. The patient notes that her Pleurx catheter has not drained properly over the past two days and has only had a total of 100cc output drained today. The patient was referred to the ED by her Pulmonologist for further management. In the ED, the patient was in moderate respiratory distress and found hypoxic on room air to 90-91%. Palliative medicine consulted to help establish GOC and advance care planning     1) moderate right pleural effusion likely malignant   - dyspnea resolving as per patient - Pleurx was not working properly   - history underlying Metastatic adenocarcinoma of Lung ( INDU)  - CT surgery consult for pleural effusion management  - c/w morphine for severe Pain   - c/w oxycodone 5mg q4hrs for moderate pain     2) Metastatic adenocarcinoma of Lung   - New 0.8 cm INDU nodule   - oncology consult appreciated - Dr. Westfall is patient primary oncologist   - pulmonology consult appreciated - Dr. Sanchez   - new T4-T6 new lesion on recent PET  - radiation oncology - patient was to start treatment today      3) debility   - PT consult appreciated   - encourage OOB   - re-evaluate for home O2     4) anxiety   - patient takes xanax at bedtime usually   - supportive care     5) severe protein calorie malnutrition   - recommend dietician consult   - Albumin, Serum: 2.1 g/dL    6) Advanced directives  - patient has capacity to make decisions but very anxious at this time   - MOLST : DNR/I  - GOC meeting held today, family is clear that any treatment being offered they would like to pursue it at this time

## 2022-02-08 NOTE — PROGRESS NOTE ADULT - SUBJECTIVE AND OBJECTIVE BOX
Subjective:  Pt seen, c/o SOB, appears uncomfortable. Also c/o diarrhea. Wants to go home. CT neck yesterday revealed RIGHT-sided vocal cord paralysis with a paramedian position.          Vital Signs:  Vital Signs Last 24 Hrs  T(C): 36.3 (02-08-22 @ 05:38), Max: 36.8 (02-07-22 @ 21:21)  T(F): 97.4 (02-08-22 @ 05:38), Max: 98.3 (02-07-22 @ 21:21)  HR: 85 (02-08-22 @ 08:17) (85 - 88)  BP: 121/55 (02-08-22 @ 05:38) (121/55 - 130/65)  RR: 19 (02-08-22 @ 05:38) (18 - 19)  SpO2: 100% (02-08-22 @ 08:17) (97% - 100%) on (O2)    Telemetry/Alarms:    Relevant labs, radiology and Medications reviewed                        12.0   15.06 )-----------( 460      ( 08 Feb 2022 07:00 )             38.2     02-08    141  |  105  |  33<H>  ----------------------------<  138<H>  5.2   |  32<H>  |  0.78    Ca    7.5<L>      08 Feb 2022 07:00    TPro  5.7<L>  /  Alb  2.0<L>  /  TBili  0.2  /  DBili  x   /  AST  26  /  ALT  44  /  AlkPhos  107  02-08      MEDICATIONS  (STANDING):  ALBUTerol    90 MICROgram(s) HFA Inhaler 2 Puff(s) Inhalation every 6 hours  budesonide 160 MICROgram(s)/formoterol 4.5 MICROgram(s) Inhaler 2 Puff(s) Inhalation two times a day  calcium carbonate   1250 mG (OsCal) 1 Tablet(s) Oral two times a day  folic acid 1 milliGRAM(s) Oral daily  guaiFENesin Oral Liquid (Sugar-Free) 200 milliGRAM(s) Oral four times a day  heparin   Injectable 5000 Unit(s) SubCutaneous every 8 hours  levothyroxine 100 MICROGram(s) Oral daily  liothyronine 5 MICROGram(s) Oral daily  methylPREDNISolone sodium succinate Injectable 40 milliGRAM(s) IV Push every 12 hours  mirtazapine 7.5 milliGRAM(s) Oral at bedtime  multivitamin 1 Tablet(s) Oral daily  nystatin    Suspension 727957 Unit(s) Oral four times a day  pantoprazole    Tablet 40 milliGRAM(s) Oral before breakfast  tiotropium 18 MICROgram(s) Capsule 1 Capsule(s) Inhalation daily    MEDICATIONS  (PRN):  acetaminophen     Tablet .. 650 milliGRAM(s) Oral once PRN Temp greater or equal to 38C (100.4F), Mild Pain (1 - 3)  acetaminophen     Tablet .. 650 milliGRAM(s) Oral every 6 hours PRN Temp greater or equal to 38C (100.4F), Mild Pain (1 - 3)  ALPRAZolam 0.5 milliGRAM(s) Oral every 6 hours PRN anxiety  aluminum hydroxide/magnesium hydroxide/simethicone Suspension 30 milliLiter(s) Oral every 4 hours PRN Dyspepsia  bisacodyl 5 milliGRAM(s) Oral every 12 hours PRN Constipation  loperamide 2 milliGRAM(s) Oral two times a day PRN Diarrhea  melatonin 3 milliGRAM(s) Oral at bedtime PRN Insomnia  morphine  - Injectable 2 milliGRAM(s) IV Push every 4 hours PRN Severe Pain (7 - 10)  ondansetron Injectable 4 milliGRAM(s) IV Push every 8 hours PRN Nausea and/or Vomiting  oxyCODONE    IR 5 milliGRAM(s) Oral every 4 hours PRN Moderate Pain (4 - 6)  polyethylene glycol 3350 17 Gram(s) Oral daily PRN Constipation      Physical exam  General:  NAD,                                                         Neuro: A+O x 3,                      Eyes: PERRL, EOMI   Neck: supple, no JVD, trachea midline   Chest: decreased left base, audible wheezing  CV: RRR,   GI: soft, NT, ND,  Extremities: warm, edema  SKIN: warm, dry, intac    I&O's Summary      Assessment  76y Female  w/ PAST MEDICAL & SURGICAL HISTORY:  Hypothyroidism    Fracture  L2 pathologic fracture    Osteoarthritis    Cholecystitis    Lung cancer  adenocarcinoma    History of other surgery  percutaneous endoscopic left pleural cavity drain    History of other surgery  gallbladder drain    H/O thyroidectomy    History of lumbar spinal fusion    S/P left oophorectomy    History of tonsillectomy    admitted with complaints of Patient is a 76y old  Female who presents with a chief complaint of Fever, Shortness of Breath (08 Feb 2022 09:29)  .  76y Female PMHx significant for Metastatic adenocarcinoma of the lung with metastasis to the spine, liver, and brain, Hypothyroidism s/p thyroid resection, h/o L2 pathologic compression fracture, GERD, chronic leg edema on Lasix, pulmonary fibrosis who was hospitalized at  1/24-1/28 for worsening SOB; (+) worsening R pleural effusion treated with VATS with Pleurx placement admitted 2/4 w hypoxia and pleurx not draining well. Last drained on Tuesday, 2/1/22, minimal drainage. Drainage also associated w pain. CXR reveals effusion on right w pleurx up to apex of lung. Drains when pt lying supine.     homecare to drain every Monday/ Wednesday/Friday up to 1 liter   Due to positioning of Pleurx, best drainage results while pt lying down-  Patient can be discharged from a thoracic standpoint   no further thoracic intervention  care as per medical/pulm team    Discussed with Cardiothoracic Team at AM rounds.

## 2022-02-08 NOTE — PROGRESS NOTE ADULT - SUBJECTIVE AND OBJECTIVE BOX
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  There is a prior history of spine, liver, lung metastases.  Now with pleural effusion.  Increasing a dyspnea.  She is status post VATS procedure placement of a Pleurx catheter.  Patient was readmitted for dyspnea.  Has some expiratory wheezing.  Plan was to start on systemic chemotherapy today, however for technical reasons therapy will be delayed till tomorrow morning.    Patient is anxious about getting the chemotherapy however looks clinically stable.        REVIEW OF SYSTEMS:      Patient with a dyspnea at rest.  Has wheezing.  Anxious  No significant pains at this time    Intolerances    morphine (Nausea)      MEDICATIONS  (STANDING):  ALBUTerol    90 MICROgram(s) HFA Inhaler 2 Puff(s) Inhalation every 6 hours  budesonide 160 MICROgram(s)/formoterol 4.5 MICROgram(s) Inhaler 2 Puff(s) Inhalation two times a day  calcium carbonate   1250 mG (OsCal) 1 Tablet(s) Oral two times a day  folic acid 1 milliGRAM(s) Oral daily  guaiFENesin Oral Liquid (Sugar-Free) 200 milliGRAM(s) Oral four times a day  heparin   Injectable 5000 Unit(s) SubCutaneous every 8 hours  levothyroxine 100 MICROGram(s) Oral daily  liothyronine 5 MICROGram(s) Oral daily  methylPREDNISolone sodium succinate Injectable 40 milliGRAM(s) IV Push every 12 hours  mirtazapine 7.5 milliGRAM(s) Oral at bedtime  multivitamin 1 Tablet(s) Oral daily  nystatin    Suspension 166099 Unit(s) Oral four times a day  pantoprazole    Tablet 40 milliGRAM(s) Oral before breakfast  tiotropium 18 MICROgram(s) Capsule 1 Capsule(s) Inhalation daily    MEDICATIONS  (PRN):  acetaminophen     Tablet .. 650 milliGRAM(s) Oral once PRN Temp greater or equal to 38C (100.4F), Mild Pain (1 - 3)  acetaminophen     Tablet .. 650 milliGRAM(s) Oral every 6 hours PRN Temp greater or equal to 38C (100.4F), Mild Pain (1 - 3)  ALPRAZolam 0.5 milliGRAM(s) Oral every 6 hours PRN anxiety  aluminum hydroxide/magnesium hydroxide/simethicone Suspension 30 milliLiter(s) Oral every 4 hours PRN Dyspepsia  bisacodyl 5 milliGRAM(s) Oral every 12 hours PRN Constipation  loperamide 2 milliGRAM(s) Oral two times a day PRN Diarrhea  melatonin 3 milliGRAM(s) Oral at bedtime PRN Insomnia  morphine  - Injectable 2 milliGRAM(s) IV Push every 4 hours PRN Severe Pain (7 - 10)  ondansetron Injectable 4 milliGRAM(s) IV Push every 8 hours PRN Nausea and/or Vomiting  oxyCODONE    IR 5 milliGRAM(s) Oral every 4 hours PRN Moderate Pain (4 - 6)  polyethylene glycol 3350 17 Gram(s) Oral daily PRN Constipation      Vital Signs Last 24 Hrs  T(C): 36.8 (08 Feb 2022 15:10), Max: 36.8 (07 Feb 2022 21:21)  T(F): 98.3 (08 Feb 2022 15:10), Max: 98.3 (07 Feb 2022 21:21)  HR: 81 (08 Feb 2022 15:10) (81 - 91)  BP: 121/65 (08 Feb 2022 15:10) (119/59 - 130/65)  BP(mean): --  RR: 16 (08 Feb 2022 15:10) (16 - 19)  SpO2: 100% (08 Feb 2022 15:10) (97% - 100%)    PHYSICAL EXAM:    GENERAL: NAD,  Dyspnea at rest/ wheeze/ audible   HEAD:  Atraumatic, Normocephalic  EYES: EOMI, PERRLA, conjunctiva and sclera clear  NECK: Supple, No JVD, Normal thyroid  NERVOUS SYSTEM:    CHEST/LUNG: + wheeze / exp   HEART: Regular rate and rhythm;   ABDOMEN: Soft, Nontender.  EXTREMITIES:   edema:-  LYMPH: No lymphadenopathy noted        LABS:                        12.0   15.06 )-----------( 460      ( 08 Feb 2022 07:00 )             38.2     02-08    141  |  105  |  33<H>  ----------------------------<  138<H>  5.2   |  32<H>  |  0.78    Ca    7.5<L>      08 Feb 2022 07:00    TPro  5.7<L>  /  Alb  2.0<L>  /  TBili  0.2  /  DBili  x   /  AST  26  /  ALT  44  /  AlkPhos  107  02-08

## 2022-02-08 NOTE — PROGRESS NOTE ADULT - ASSESSMENT
77 y/o F PMHx significant for Metastatic adenocarcinoma of the lung with metastasis to the spine, liver, and brain, Hypothyroidism s/p thyroid resection, h/o L2 pathologic compression fracture, GERD, chronic leg edema on Lasix, pulmonary fibrosis who was hospitalized at  1/24-1/28 for worsening SOB; (+) worsening R pleural effusion treated with VATS with Pleurx placement (pleural fluid (+) exudate cytology negative for malignant cells). The patient was discharged on a 3 day course of PO Keflex and instructed to hold her Lasix. Of note the patient was planned to start chemotherapy/RT tomorrow (2/4). The patient denies any associated cough, chest congestion, subjective fevers. The patient notes that her Pleurx catheter has not drained properly over the past 2 days and has only had a total of 100cc output drained today. The patient was referred to the ED by her Pulmonologist for further management. In the ED the patient was in moderate respiratory distress found to be hypoxic on room air to 90-91%.    #Acute Hypoxic Respiratory Failure due to Right Pleural Effusion as a consequence of a Pleurx Catheter Malfunction, possible component of pulm fibrosis overlap  - cont. supplemental O2  - f/u w/ Pulmonology appreciated  - f/u w/ CT Surgery consult appreciated: Drain Pleurx M/W/F up to 1 Liter  -o2 eval Pulse ox 92% on 3 L at rest, to 88% while ambulating. Is a candidate for Home o2 to help with     activities of daily living, especially  with her diagnosis of Lung Cancer with mets.     #acute exacerbation of pulmonary fibrosis  - Solumedrol now  -Prednisone 40 x 7 days for DC  - Pulmonary Following     # Stage 4 Adenocarcinoma Lung  - Heme onc following  - Palliative for GOC consult appreciated  - Family want everything done  - Starting Chemotherapy tomorrow (Carboplatin & Alimta)     # Vocal Cord paralysis  - ? Mediastinal involvement  - FU with ENT as outpatient     #anxiety  -started xanax.    #GERD  -cont. Omeprazole 40mg po daily    #Hypothyroidism  -cont. Liothyronine 5mcg po daily  -cont. Levothyroxine 100mcg po qam    #Protein Calorie Malnutrition  -f/u w/ Nutrition evaluation in the am    #Constipation  -cont. Bisacodyl prn  -cont. Miralax prn    #Advance Directives   -DNR/DNI    #Vte ppx  -IMPROVE VTE  Risk Score is 3  -cont. Heparin sq

## 2022-02-08 NOTE — PROGRESS NOTE ADULT - SUBJECTIVE AND OBJECTIVE BOX
Patient is a 76y old  Female who presents with a chief complaint of Fever, Shortness of Breath (03 Feb 2022 23:49)      HPI:  77 y/o F PMHx significant for Metastatic adenocarcinoma of the lung with metastasis to the spine, liver, and brain, Hypothyroidism s/p thyroid resection, h/o L2 pathologic compression fracture, GERD, chronic leg edema on Lasix, pulmonary fibrosis who was hospitalized at  1/24-1/28 for worsening SOB; (+) worsening R pleural effusion treated with VATS with Pleurx placement (pleural fluid (+) exudate cytology negative for malignant cells). The patient was discharged on a 3 day course of PO Keflex and instructed to hold her Lasix. Of note the patient was planned to start chemotherapy/RT tomorrow (2/4). The patient denies any associated cough, chest congestion, subjective fevers. The patient notes that her Pleurx catheter has not drained properly over the past 2 days and has only had a total of 100cc output drained today. The patient was referred to the ED by her Pulmonologist for further management. In the ED the patient was in moderate respiratory distress found to be hypoxic on room air to 90-91%.  ABG is still pending  CXR revealed right sided pleural effusion  Bilateral infiltrates     2/8  CT Chest/neck with contrast was performed     MEDICATIONS  (STANDING):  ALBUTerol    90 MICROgram(s) HFA Inhaler 2 Puff(s) Inhalation every 6 hours  budesonide 160 MICROgram(s)/formoterol 4.5 MICROgram(s) Inhaler 2 Puff(s) Inhalation two times a day  calcium carbonate   1250 mG (OsCal) 1 Tablet(s) Oral two times a day  folic acid 1 milliGRAM(s) Oral daily  guaiFENesin Oral Liquid (Sugar-Free) 200 milliGRAM(s) Oral four times a day  heparin   Injectable 5000 Unit(s) SubCutaneous every 8 hours  levothyroxine 100 MICROGram(s) Oral daily  liothyronine 5 MICROGram(s) Oral daily  methylPREDNISolone sodium succinate Injectable 40 milliGRAM(s) IV Push every 12 hours  mirtazapine 7.5 milliGRAM(s) Oral at bedtime  multivitamin 1 Tablet(s) Oral daily  nystatin    Suspension 789553 Unit(s) Oral four times a day  pantoprazole    Tablet 40 milliGRAM(s) Oral before breakfast  tiotropium 18 MICROgram(s) Capsule 1 Capsule(s) Inhalation daily    MEDICATIONS  (PRN):  acetaminophen     Tablet .. 650 milliGRAM(s) Oral once PRN Temp greater or equal to 38C (100.4F), Mild Pain (1 - 3)  acetaminophen     Tablet .. 650 milliGRAM(s) Oral every 6 hours PRN Temp greater or equal to 38C (100.4F), Mild Pain (1 - 3)  ALPRAZolam 0.5 milliGRAM(s) Oral every 6 hours PRN anxiety  aluminum hydroxide/magnesium hydroxide/simethicone Suspension 30 milliLiter(s) Oral every 4 hours PRN Dyspepsia  bisacodyl 5 milliGRAM(s) Oral every 12 hours PRN Constipation  loperamide 2 milliGRAM(s) Oral two times a day PRN Diarrhea  melatonin 3 milliGRAM(s) Oral at bedtime PRN Insomnia  morphine  - Injectable 2 milliGRAM(s) IV Push every 4 hours PRN Severe Pain (7 - 10)  ondansetron Injectable 4 milliGRAM(s) IV Push every 8 hours PRN Nausea and/or Vomiting  oxyCODONE    IR 5 milliGRAM(s) Oral every 4 hours PRN Moderate Pain (4 - 6)  polyethylene glycol 3350 17 Gram(s) Oral daily PRN Constipation    Vital Signs Last 24 Hrs  T(C): 36.3 (08 Feb 2022 05:38), Max: 36.8 (07 Feb 2022 21:21)  T(F): 97.4 (08 Feb 2022 05:38), Max: 98.3 (07 Feb 2022 21:21)  HR: 85 (08 Feb 2022 08:17) (85 - 88)  BP: 121/55 (08 Feb 2022 05:38) (121/55 - 130/65)  BP(mean): --  RR: 19 (08 Feb 2022 05:38) (18 - 19)  SpO2: 100% (08 Feb 2022 08:17) (97% - 100%)    PHYSICAL EXAM  General Appearance: cooperative, no acute distress,   HEENT: PERRL, conjunctiva clear, EOM's intact, non injected pharynx, no exudate, TM   normal  Neck: Supple, , no adenopathy, thyroid: not enlarged, no carotid bruit or JVD  Back: Symmetric, no  tenderness,no soft tissue tenderness  Lungs: right thoracostomy in place  / mild diffuse bronchospasm  Heart: Regular rate and rhythm, S1, S2 normal, no murmur, rub or gallop  Abdomen: Soft, non-tender, bowel sounds active , no hepatosplenomegaly  Extremities: no cyanosis or edema, no joint swelling  Skin: Skin color, texture normal, no rashes   Neurologic: Alert and oriented X3 , cranial nerves intact, sensory and motor normal,    ECG:    LABS:                          13.1   9.51  )-----------( 318      ( 03 Feb 2022 16:40 )             39.5     02-03    137  |  103  |  23  ----------------------------<  167<H>  4.5   |  27  |  0.81    Ca    7.5<L>      03 Feb 2022 16:40    TPro  6.2  /  Alb  2.1<L>  /  TBili  0.2  /  DBili  x   /  AST  35  /  ALT  40  /  AlkPhos  106  02-03      < from: Xray Chest 1 View- PORTABLE-Urgent (02.03.22 @ 16:27) >  INTERPRETATION:  AP chest on February 3, 2022 at 4:22 PM. Patient is   short of breath. There is history of metastatic rest cancer to brain.    Heart magnified by technique. Right MediPort and fairly extensive lumbar   hardware again noted.    Right Pleurx catheter remains. A Pleurx catheter has been advanced into   the right chest compared to January 27.    Significant diffuse left lung infiltrate is unchanged.    Present film shows increasing right base effusion which is new.    IMPRESSION: Advanced lung disease on the left unchanged. Increasing right   base effusion. Pleurx catheter has been advanced.    < end of copied text >                  RADIOLOGY & ADDITIONAL STUDIES:     Patient is a 76y old  Female who presents with a chief complaint of Fever, Shortness of Breath (03 Feb 2022 23:49)      HPI:  75 y/o F PMHx significant for Metastatic adenocarcinoma of the lung with metastasis to the spine, liver, and brain, Hypothyroidism s/p thyroid resection, h/o L2 pathologic compression fracture, GERD, chronic leg edema on Lasix, pulmonary fibrosis who was hospitalized at  1/24-1/28 for worsening SOB; (+) worsening R pleural effusion treated with VATS with Pleurx placement (pleural fluid (+) exudate cytology negative for malignant cells). The patient was discharged on a 3 day course of PO Keflex and instructed to hold her Lasix. Of note the patient was planned to start chemotherapy/RT tomorrow (2/4). The patient denies any associated cough, chest congestion, subjective fevers. The patient notes that her Pleurx catheter has not drained properly over the past 2 days and has only had a total of 100cc output drained today. The patient was referred to the ED by her Pulmonologist for further management. In the ED the patient was in moderate respiratory distress found to be hypoxic on room air to 90-91%.  ABG is still pending  CXR revealed right sided pleural effusion  Bilateral infiltrates     2/8  CT Chest/neck with contrast was performed   Right VC paralysis noted  Proximal esophageal dilation with fluid     MEDICATIONS  (STANDING):  ALBUTerol    90 MICROgram(s) HFA Inhaler 2 Puff(s) Inhalation every 6 hours  budesonide 160 MICROgram(s)/formoterol 4.5 MICROgram(s) Inhaler 2 Puff(s) Inhalation two times a day  calcium carbonate   1250 mG (OsCal) 1 Tablet(s) Oral two times a day  folic acid 1 milliGRAM(s) Oral daily  guaiFENesin Oral Liquid (Sugar-Free) 200 milliGRAM(s) Oral four times a day  heparin   Injectable 5000 Unit(s) SubCutaneous every 8 hours  levothyroxine 100 MICROGram(s) Oral daily  liothyronine 5 MICROGram(s) Oral daily  methylPREDNISolone sodium succinate Injectable 40 milliGRAM(s) IV Push every 12 hours  mirtazapine 7.5 milliGRAM(s) Oral at bedtime  multivitamin 1 Tablet(s) Oral daily  nystatin    Suspension 749647 Unit(s) Oral four times a day  pantoprazole    Tablet 40 milliGRAM(s) Oral before breakfast  tiotropium 18 MICROgram(s) Capsule 1 Capsule(s) Inhalation daily    MEDICATIONS  (PRN):  acetaminophen     Tablet .. 650 milliGRAM(s) Oral once PRN Temp greater or equal to 38C (100.4F), Mild Pain (1 - 3)  acetaminophen     Tablet .. 650 milliGRAM(s) Oral every 6 hours PRN Temp greater or equal to 38C (100.4F), Mild Pain (1 - 3)  ALPRAZolam 0.5 milliGRAM(s) Oral every 6 hours PRN anxiety  aluminum hydroxide/magnesium hydroxide/simethicone Suspension 30 milliLiter(s) Oral every 4 hours PRN Dyspepsia  bisacodyl 5 milliGRAM(s) Oral every 12 hours PRN Constipation  loperamide 2 milliGRAM(s) Oral two times a day PRN Diarrhea  melatonin 3 milliGRAM(s) Oral at bedtime PRN Insomnia  morphine  - Injectable 2 milliGRAM(s) IV Push every 4 hours PRN Severe Pain (7 - 10)  ondansetron Injectable 4 milliGRAM(s) IV Push every 8 hours PRN Nausea and/or Vomiting  oxyCODONE    IR 5 milliGRAM(s) Oral every 4 hours PRN Moderate Pain (4 - 6)  polyethylene glycol 3350 17 Gram(s) Oral daily PRN Constipation    Vital Signs Last 24 Hrs  T(C): 36.3 (08 Feb 2022 05:38), Max: 36.8 (07 Feb 2022 21:21)  T(F): 97.4 (08 Feb 2022 05:38), Max: 98.3 (07 Feb 2022 21:21)  HR: 85 (08 Feb 2022 08:17) (85 - 88)  BP: 121/55 (08 Feb 2022 05:38) (121/55 - 130/65)  BP(mean): --  RR: 19 (08 Feb 2022 05:38) (18 - 19)  SpO2: 100% (08 Feb 2022 08:17) (97% - 100%)    PHYSICAL EXAM  General Appearance: cooperative, no acute distress,   HEENT: PERRL, conjunctiva clear, EOM's intact, non injected pharynx, no exudate, TM   normal  Neck: Supple, , no adenopathy, thyroid: not enlarged, no carotid bruit or JVD  Back: Symmetric, no  tenderness,no soft tissue tenderness  Lungs: right thoracostomy in place  / mild diffuse bronchospasm  Heart: Regular rate and rhythm, S1, S2 normal, no murmur, rub or gallop  Abdomen: Soft, non-tender, bowel sounds active , no hepatosplenomegaly  Extremities: no cyanosis or edema, no joint swelling  Skin: Skin color, texture normal, no rashes   Neurologic: Alert and oriented X3 , cranial nerves intact, sensory and motor normal,    ECG:    LABS:                          13.1   9.51  )-----------( 318      ( 03 Feb 2022 16:40 )             39.5     02-03    137  |  103  |  23  ----------------------------<  167<H>  4.5   |  27  |  0.81    Ca    7.5<L>      03 Feb 2022 16:40    TPro  6.2  /  Alb  2.1<L>  /  TBili  0.2  /  DBili  x   /  AST  35  /  ALT  40  /  AlkPhos  106  02-03      < from: Xray Chest 1 View- PORTABLE-Urgent (02.03.22 @ 16:27) >  INTERPRETATION:  AP chest on February 3, 2022 at 4:22 PM. Patient is   short of breath. There is history of metastatic rest cancer to brain.    Heart magnified by technique. Right MediPort and fairly extensive lumbar   hardware again noted.    Right Pleurx catheter remains. A Pleurx catheter has been advanced into   the right chest compared to January 27.    Significant diffuse left lung infiltrate is unchanged.    Present film shows increasing right base effusion which is new.    IMPRESSION: Advanced lung disease on the left unchanged. Increasing right   base effusion. Pleurx catheter has been advanced.    < end of copied text >                  RADIOLOGY & ADDITIONAL STUDIES:

## 2022-02-08 NOTE — PROGRESS NOTE ADULT - SUBJECTIVE AND OBJECTIVE BOX
HPI: patient seen and examined this AM with VINNY Maher at bedside, patient endorses feeling very anxious, patient requesting dose of xanax which was already in the process of being given.  Patient was tearful bc she was wondering when she was going to get next dose of treatment, awaiting for oncology to come.       PAIN: ( )Yes   (x )No  DYSPNEA: (x ) Yes  ( ) No  Level: with supplemental O2 and worse on exertion    Review of Systems:    Anxiety- severe  Depression- feels sad  Physical Discomfort- yes   Dyspnea- not at this time, breathing better since chest tube placed   Constipation- no   Diarrhea- no   Nausea- no   Vomiting- no   Anorexia- yes   Weight Loss-  yes   Cough- at times   Secretions- no   Fatigue- yes   Weakness- yes   Delirium- no     All other systems reviewed and negative     PHYSICAL EXAM:    Vital Signs Last 24 Hrs  T(C): 36.4 (08 Feb 2022 09:42), Max: 36.8 (07 Feb 2022 21:21)  T(F): 97.6 (08 Feb 2022 09:42), Max: 98.3 (07 Feb 2022 21:21)  HR: 91 (08 Feb 2022 13:08) (81 - 91)  BP: 119/59 (08 Feb 2022 09:42) (119/59 - 130/65)  RR: 18 (08 Feb 2022 09:42) (18 - 19)  SpO2: 97% (08 Feb 2022 13:08) (97% - 100%)    PPSV2:  40-50 %    General: tearful pleasant female in bed, NAD  Mental Status: alert and oriented, very anxious   HEENT: mmm, nasal cannula inplace   Lungs: diminished breath sounds   GI: non tender, nondistended , +BS   : + voding  Ext: mild edema in b/l hands   Neuro: intact       LABS:                        12.0   15.06 )-----------( 460      ( 08 Feb 2022 07:00 )             38.2     02-08    141  |  105  |  33<H>  ----------------------------<  138<H>  5.2   |  32<H>  |  0.78    Ca    7.5<L>      08 Feb 2022 07:00    TPro  5.7<L>  /  Alb  2.0<L>  /  TBili  0.2  /  DBili  x   /  AST  26  /  ALT  44  /  AlkPhos  107  02-08    Albumin: Albumin, Serum: 2.0 g/dL (02-08 @ 07:00)    Allergies    adhesives (Rash)  amoxicillin (Other; Diarrhea)  statins (Muscle Pain)    Intolerances    morphine (Nausea)    MEDICATIONS  (STANDING):  ALBUTerol    90 MICROgram(s) HFA Inhaler 2 Puff(s) Inhalation every 6 hours  budesonide 160 MICROgram(s)/formoterol 4.5 MICROgram(s) Inhaler 2 Puff(s) Inhalation two times a day  calcium carbonate   1250 mG (OsCal) 1 Tablet(s) Oral two times a day  folic acid 1 milliGRAM(s) Oral daily  guaiFENesin Oral Liquid (Sugar-Free) 200 milliGRAM(s) Oral four times a day  heparin   Injectable 5000 Unit(s) SubCutaneous every 8 hours  levothyroxine 100 MICROGram(s) Oral daily  liothyronine 5 MICROGram(s) Oral daily  methylPREDNISolone sodium succinate Injectable 40 milliGRAM(s) IV Push every 12 hours  mirtazapine 7.5 milliGRAM(s) Oral at bedtime  multivitamin 1 Tablet(s) Oral daily  nystatin    Suspension 329009 Unit(s) Oral four times a day  pantoprazole    Tablet 40 milliGRAM(s) Oral before breakfast  tiotropium 18 MICROgram(s) Capsule 1 Capsule(s) Inhalation daily    MEDICATIONS  (PRN):  acetaminophen     Tablet .. 650 milliGRAM(s) Oral once PRN Temp greater or equal to 38C (100.4F), Mild Pain (1 - 3)  acetaminophen     Tablet .. 650 milliGRAM(s) Oral every 6 hours PRN Temp greater or equal to 38C (100.4F), Mild Pain (1 - 3)  ALPRAZolam 0.5 milliGRAM(s) Oral every 6 hours PRN anxiety  aluminum hydroxide/magnesium hydroxide/simethicone Suspension 30 milliLiter(s) Oral every 4 hours PRN Dyspepsia  bisacodyl 5 milliGRAM(s) Oral every 12 hours PRN Constipation  loperamide 2 milliGRAM(s) Oral two times a day PRN Diarrhea  melatonin 3 milliGRAM(s) Oral at bedtime PRN Insomnia  morphine  - Injectable 2 milliGRAM(s) IV Push every 4 hours PRN Severe Pain (7 - 10)  ondansetron Injectable 4 milliGRAM(s) IV Push every 8 hours PRN Nausea and/or Vomiting  oxyCODONE    IR 5 milliGRAM(s) Oral every 4 hours PRN Moderate Pain (4 - 6)  polyethylene glycol 3350 17 Gram(s) Oral daily PRN Constipation      RADIOLOGY:      ACC: [id number] EXAM:  CT CHEST IC                        PROCEDURE DATE:  02/07/2022    INTERPRETATION:  HISTORY: Stage IV lung cancer.  EXAMINATION: CT CHEST was performed with IV contrast.  CONTRAST/COMPLICATIONS:  IV Contrast: [id number]  75 cc administered   0 cc discarded  Oral Contrast: NONE  Complications: None reported at time of study completion  COMPARISON: 1/24/2022 CT chest.  FINDINGS:  AIRWAYS, LUNGS, PLEURA: Treated left upper lobe malignancy measuring 2.5   x 2 cm (image 44, series 2) is unchanged compared to 1/24/2022.  Additional left upper lobe 0.8 cm nodular opacity (image 51, series 2)   unchanged.  Consolidation at the left base unchanged.  Unchanged left lung volume loss and no significant change in nodular   thickening along the left mediastinal pleura; reference 1.5 cm nodular   lesion along the left mediastinal pleura (image 55, series 2).  Interval placement of right-sided chest tube. Interval resolution of   right pleural effusion.  MEDIASTINUM: Normal heart size. No pericardial effusion. Thoracic aorta   normal caliber.  No large mediastinal lymph nodes. Fluid-filled   esophagus. Port-A-Cath tip terminates in the lower SVC.  IMAGED ABDOMEN: Limited assessment of the imaged abdomen secondary to   beam hardening artifact from spinal hardware. 5.6 x 2.5 cm hypodense   lesion involving the caudate lobe (image 116, series 2). Multiple   additional smaller hypodense lesions throughout the liver.  SOFT TISSUES: Unremarkable.  BONES: Orthopedic fixation hardware of the thoracic and imaged lumbar   spine. T10 vertebral body sclerosis similar to prior exam. Sclerosis and   compression of L2 vertebral body.    IMPRESSION:.  2.5 cm treated left upper lobe malignancy, additional left upper lobe   indeterminate 0.8 cm nodular opacity, and left pleural metastatic disease   without significant interval change compared to 1/24/2022.  Interval resolution of right pleural effusion compared to 1/24/2022.  Persistent left basilar consolidation.  5.6 cm mass within the caudate lobe and multiple additional hepatic   hypodense lesions likely represents metastatic disease.

## 2022-02-08 NOTE — PROGRESS NOTE ADULT - SUBJECTIVE AND OBJECTIVE BOX
Patient seen and examined:  No acute events overnight.  Still with some sob and cough.  Tolerating po and voiding well.  Scheduled for Chemo tomorrow    ROS: Negative except for above    All other systems reviewed and found to be negative with the exception of what has been described above.    MEDICATIONS  (STANDING):  ALBUTerol    90 MICROgram(s) HFA Inhaler 2 Puff(s) Inhalation every 6 hours  budesonide 160 MICROgram(s)/formoterol 4.5 MICROgram(s) Inhaler 2 Puff(s) Inhalation two times a day  calcium carbonate   1250 mG (OsCal) 1 Tablet(s) Oral two times a day  folic acid 1 milliGRAM(s) Oral daily  guaiFENesin Oral Liquid (Sugar-Free) 200 milliGRAM(s) Oral four times a day  heparin   Injectable 5000 Unit(s) SubCutaneous every 8 hours  levothyroxine 100 MICROGram(s) Oral daily  liothyronine 5 MICROGram(s) Oral daily  methylPREDNISolone sodium succinate Injectable 40 milliGRAM(s) IV Push every 12 hours  mirtazapine 7.5 milliGRAM(s) Oral at bedtime  multivitamin 1 Tablet(s) Oral daily  nystatin    Suspension 043379 Unit(s) Oral four times a day  pantoprazole    Tablet 40 milliGRAM(s) Oral before breakfast  tiotropium 18 MICROgram(s) Capsule 1 Capsule(s) Inhalation daily    MEDICATIONS  (PRN):  acetaminophen     Tablet .. 650 milliGRAM(s) Oral once PRN Temp greater or equal to 38C (100.4F), Mild Pain (1 - 3)  acetaminophen     Tablet .. 650 milliGRAM(s) Oral every 6 hours PRN Temp greater or equal to 38C (100.4F), Mild Pain (1 - 3)  ALPRAZolam 0.5 milliGRAM(s) Oral every 6 hours PRN anxiety  aluminum hydroxide/magnesium hydroxide/simethicone Suspension 30 milliLiter(s) Oral every 4 hours PRN Dyspepsia  bisacodyl 5 milliGRAM(s) Oral every 12 hours PRN Constipation  loperamide 2 milliGRAM(s) Oral two times a day PRN Diarrhea  melatonin 3 milliGRAM(s) Oral at bedtime PRN Insomnia  morphine  - Injectable 2 milliGRAM(s) IV Push every 4 hours PRN Severe Pain (7 - 10)  ondansetron Injectable 4 milliGRAM(s) IV Push every 8 hours PRN Nausea and/or Vomiting  oxyCODONE    IR 5 milliGRAM(s) Oral every 4 hours PRN Moderate Pain (4 - 6)  polyethylene glycol 3350 17 Gram(s) Oral daily PRN Constipation      VITALS:  T(F): 97.6 (02-08-22 @ 09:42), Max: 98.3 (02-07-22 @ 21:21)  HR: 81 (02-08-22 @ 09:42) (81 - 88)  BP: 119/59 (02-08-22 @ 09:42) (119/59 - 130/65)  RR: 18 (02-08-22 @ 09:42) (18 - 19)  SpO2: 99% (02-08-22 @ 12:00) (97% - 100%)  Wt(kg): --    I&O's Summary      CAPILLARY BLOOD GLUCOSE          PHYSICAL EXAM:  GEN: NAD  HEENT:  pupils equal and reactive, EOMI, no oropharyngeal lesions, erythema, exudates, oral thrush  NECK:   supple, no carotid bruits, no palpable lymph nodes, no thyromegaly  CV:  +S1, +S2, regular, no murmurs or rubs  RESP:   Scattered wheeze, Upper Airway transmitted sounds  BREAST:  not examined  GI:  abdomen soft, non-tender, non-distended, normal BS, no bruits, no abdominal masses, no palpable masses  RECTAL:  not examined  :  not examined  MSK:   normal muscle tone, no atrophy, no rigidity, no contractions  EXT:  no clubbing, no cyanosis, no edema, no calf pain, swelling or erythema  VASCULAR:  pulses equal and symmetric in the upper and lower extremities  NEURO:  AAOX3, no focal neurological deficits, follows all commands, able to move extremities spontaneously  SKIN:  no ulcers, lesions or rashes    LABS:                            12.0   15.06 )-----------( 460      ( 08 Feb 2022 07:00 )             38.2     02-08    141  |  105  |  33<H>  ----------------------------<  138<H>  5.2   |  32<H>  |  0.78    Ca    7.5<L>      08 Feb 2022 07:00    TPro  5.7<L>  /  Alb  2.0<L>  /  TBili  0.2  /  DBili  x   /  AST  26  /  ALT  44  /  AlkPhos  107  02-08        LIVER FUNCTIONS - ( 08 Feb 2022 07:00 )  Alb: 2.0 g/dL / Pro: 5.7 gm/dL / ALK PHOS: 107 U/L / ALT: 44 U/L / AST: 26 U/L / GGT: x           < from: CT Neck Soft Tissue w/ IV Cont (02.07.22 @ 10:58) >  IMPRESSION: RIGHT-sided vocal cord paralysis with a paramedian position   and enlarged RIGHT lateral ventricle. The RIGHT arytenoid cartilage is   sclerotic, possibly due to prior treatment.    The visualized proximal   esophagus is minimally dilated and contains fluid. Tiny RIGHT pleural  effusion and RIGHT-sided chest tube. Moderate LEFT pleural effusion is   noted with underlying atelectasis    --- End of Report ---    < end of copied text >  < from: CT Chest w/ IV Cont (02.07.22 @ 11:12) >  IMPRESSION:.    2.5 cm treated left upper lobe malignancy, additional left upper lobe   indeterminate 0.8 cm nodular opacity, and left pleural metastatic disease   without significant interval change compared to 1/24/2022.    Interval resolution of right pleural effusion compared to 1/24/2022.    Persistent left basilar consolidation.    5.6 cm mass within the caudate lobe and multiple additional hepatic   hypodense lesions likely represents metastatic disease.    --- End of Report ---    < end of copied text >

## 2022-02-09 NOTE — PROGRESS NOTE ADULT - ASSESSMENT
Stage IV recurrent lung cancer  R effusion s/p VATS  Dyspnea  / multifactorial  VC paresis  Anxiety  Malnutrition      Plan    Carboplatin  + Pemetrexed today  If medically stable and Discharged, advise FU in Office Friday   GI evaluation/ esophogeal dilation  Optimize nutrition

## 2022-02-09 NOTE — DISCHARGE NOTE NURSING/CASE MANAGEMENT/SOCIAL WORK - PATIENT PORTAL LINK FT
You can access the FollowMyHealth Patient Portal offered by [facility] by registering at the following website: [email or web address]. By joining [facility]’s FollowMyHealth portal, you will also be able to view your health information using other applications (apps) compatible with our system.

## 2022-02-09 NOTE — PROGRESS NOTE ADULT - ASSESSMENT
1) Dyspnea  2) Metastatic Lung Cancer (Adenocarcinoma)  3) Right Thoracostomy In Place (IPC)  4) Pleural Effusion  5) Right VC Paralysis  6) Dysphagia     77 y/o F PMHx significant for Metastatic adenocarcinoma of the lung with metastasis to the spine, liver, and brain, Hypothyroidism s/p thyroid resection, h/o L2 pathologic compression fracture, GERD, chronic leg edema on Lasix, pulmonary fibrosis who was hospitalized at  1/24-1/28 for worsening SOB; (+) worsening R pleural effusion treated with VATS with Pleurx placement (pleural fluid (+) exudate cytology negative for malignant cells). The patient was discharged on a 3 day course of PO Keflex and instructed to hold her Lasix. Of note the patient was planned to start chemotherapy/RT tomorrow (2/4). The patient denies any associated cough, chest congestion, subjective fevers. The patient notes that her Pleurx catheter has not drained properly over the past 2 days and has only had a total of 100cc output drained today. The patient was referred to the ED by her Pulmonologist for further management. In the ED the patient was in moderate respiratory distress found to be hypoxic on room air to 90-91%.  CXR revealed right sided pleural effusion  Bilateral infiltrates noted as well  Plan for chemo/XRT per oncology.   Dyspnea has been worse along with fatigue  ABG 7.46/39/79   Echocardiogram performed last admission did not show pulmonary hypertension  She has been given inhalers in the past (nebulized pulmicort/duoneb provided to patient in the office on 2/3) and she did not have much relief  Continue Symbicort/Spiriva   CT Neck/chest reviewed, stridor issues are multifactorial and include VC paralysis with esophageal dilatation likely causing transient reflux and bronchospasm   Overall prognosis is guarded  Discussed with hospitalist/oncology   Follow up GI recommendations due to worsening dysphagia today

## 2022-02-09 NOTE — PROGRESS NOTE ADULT - ATTENDING COMMENTS
Patient seen and examined. She is having excruciating pain with drainage of the Pleurx catheter. About 200 cc of drainage for today. I gave her a few options, 1) is to continue like she is 2) remove catheter and do intermittent IR thoracentesis 3) Go to OR to adjust the position of the catheter. Patient understands that even if I adjust the position of the catheter, she may still have pain. There is no guarantee that her pain will improve if I put the catheter in a different position.     Will plan the procedure for Friday, plan conveyed to

## 2022-02-09 NOTE — PROGRESS NOTE ADULT - SUBJECTIVE AND OBJECTIVE BOX
Patient seen  Starting chemotherapy today  No acute events overnight.  Some difficulty swallowing  No nausea or vomiting.    ROS: Negative except for above    MEDICATIONS  (STANDING):  ALBUTerol    90 MICROgram(s) HFA Inhaler 2 Puff(s) Inhalation every 6 hours  budesonide 160 MICROgram(s)/formoterol 4.5 MICROgram(s) Inhaler 2 Puff(s) Inhalation two times a day  calcium carbonate   1250 mG (OsCal) 1 Tablet(s) Oral two times a day  folic acid 1 milliGRAM(s) Oral daily  guaiFENesin Oral Liquid (Sugar-Free) 200 milliGRAM(s) Oral four times a day  heparin   Injectable 5000 Unit(s) SubCutaneous every 8 hours  levothyroxine 100 MICROGram(s) Oral daily  liothyronine 5 MICROGram(s) Oral daily  mirtazapine 7.5 milliGRAM(s) Oral at bedtime  multivitamin 1 Tablet(s) Oral daily  nystatin    Suspension 334682 Unit(s) Oral four times a day  pantoprazole    Tablet 40 milliGRAM(s) Oral before breakfast  PEMEtrexed IVPB (eMAR) 860 milliGRAM(s) IV Intermittent once  tiotropium 18 MICROgram(s) Capsule 1 Capsule(s) Inhalation daily    MEDICATIONS  (PRN):  acetaminophen     Tablet .. 650 milliGRAM(s) Oral once PRN Temp greater or equal to 38C (100.4F), Mild Pain (1 - 3)  acetaminophen     Tablet .. 650 milliGRAM(s) Oral every 6 hours PRN Temp greater or equal to 38C (100.4F), Mild Pain (1 - 3)  ALPRAZolam 0.5 milliGRAM(s) Oral every 6 hours PRN anxiety  aluminum hydroxide/magnesium hydroxide/simethicone Suspension 30 milliLiter(s) Oral every 4 hours PRN Dyspepsia  bisacodyl 5 milliGRAM(s) Oral every 12 hours PRN Constipation  loperamide 2 milliGRAM(s) Oral two times a day PRN Diarrhea  melatonin 3 milliGRAM(s) Oral at bedtime PRN Insomnia  morphine  - Injectable 2 milliGRAM(s) IV Push every 4 hours PRN Severe Pain (7 - 10)  ondansetron   Disintegrating Tablet 8 milliGRAM(s) Oral every 8 hours PRN Nausea and/or Vomiting  ondansetron Injectable 4 milliGRAM(s) IV Push every 8 hours PRN Nausea and/or Vomiting  oxyCODONE    IR 5 milliGRAM(s) Oral every 4 hours PRN Moderate Pain (4 - 6)  polyethylene glycol 3350 17 Gram(s) Oral daily PRN Constipation      VITALS:  T(F): 98.2 (02-09-22 @ 14:35), Max: 98.3 (02-08-22 @ 15:10)  HR: 75 (02-09-22 @ 14:35) (74 - 81)  BP: 116/51 (02-09-22 @ 14:35) (103/49 - 121/65)  RR: 16 (02-09-22 @ 14:35) (16 - 18)  SpO2: 100% (02-09-22 @ 14:35) (98% - 100%)        PHYSICAL EXAM:  GEN: Anxious, Raspy voice  HEENT:  pupils equal and reactive, EOMI, no oropharyngeal lesions, erythema, exudates, oral thrush  NECK:   supple, no carotid bruits, no palpable lymph nodes, no thyromegaly  CV:  +S1, +S2, regular, no murmurs or rubs  RESP:   lungs decreased entry, Pleurx   BREAST:  not examined  GI:  abdomen soft, non-tender, non-distended, normal BS, no bruits, no abdominal masses, no palpable masses  RECTAL:  not examined  :  not examined  MSK:   normal muscle tone, no atrophy, no rigidity, no contractions  EXT:  no clubbing, no cyanosis, no edema, no calf pain, swelling or erythema  VASCULAR:  pulses equal and symmetric in the upper and lower extremities  NEURO:  AAOX3, no focal neurological deficits, follows all commands, able to move extremities spontaneously  SKIN:  no ulcers, lesions or rashes    LABS:                            12.2   15.28 )-----------( 418      ( 09 Feb 2022 07:56 )             38.8     02-09    139  |  104  |  38<H>  ----------------------------<  130<H>  5.6<H>   |  33<H>  |  0.84    Ca    7.7<L>      09 Feb 2022 07:56    TPro  5.6<L>  /  Alb  2.0<L>  /  TBili  0.3  /  DBili  x   /  AST  46<H>  /  ALT  92<H>  /  AlkPhos  105  02-09        LIVER FUNCTIONS - ( 09 Feb 2022 07:56 )  Alb: 2.0 g/dL / Pro: 5.6 gm/dL / ALK PHOS: 105 U/L / ALT: 92 U/L / AST: 46 U/L / GGT: x             < from: CT Chest w/ IV Cont (02.07.22 @ 11:12) >  IMPRESSION:.    2.5 cm treated left upper lobe malignancy, additional left upper lobe   indeterminate 0.8 cm nodular opacity, and left pleural metastatic disease   without significant interval change compared to 1/24/2022.    Interval resolution of right pleural effusion compared to 1/24/2022.    Persistent left basilar consolidation.    5.6 cm mass within the caudate lobe and multiple additional hepatic   hypodense lesions likely represents metastatic disease.    --- End of Report ---        < end of copied text >  < from: CT Neck Soft Tissue w/ IV Cont (02.07.22 @ 10:58) >    IMPRESSION: RIGHT-sided vocal cord paralysis with a paramedian position   and enlarged RIGHT lateral ventricle. The RIGHT arytenoid cartilage is   sclerotic, possibly due to prior treatment.    The visualized proximal   esophagus is minimally dilated and contains fluid. Tiny RIGHT pleural  effusion and RIGHT-sided chest tube. Moderate LEFT pleural effusion is   noted with underlying atelectasis    --- End of Report ---    < end of copied text >

## 2022-02-09 NOTE — PROGRESS NOTE ADULT - SUBJECTIVE AND OBJECTIVE BOX
INTERVAL HISTORY:    77 YO female  recurrent Lung cancer/ R effusion S/P VATS, due for chemotherapy today : Carb+ pemetrexed  Has been seen by Pulm; Dyspnea multifactorial: VC paresis, Pleural effusion / ? 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 / had 18 month response   Lung liver bone CNS metastases    REVIEW OF SYSTEMS:  Anxious/ Dysphagia       Allergies    adhesives (Rash)  amoxicillin (Other; Diarrhea)  statins (Muscle Pain)    Intolerances    morphine (Nausea)      MEDICATIONS  (STANDING):  ALBUTerol    90 MICROgram(s) HFA Inhaler 2 Puff(s) Inhalation every 6 hours  budesonide 160 MICROgram(s)/formoterol 4.5 MICROgram(s) Inhaler 2 Puff(s) Inhalation two times a day  calcium carbonate   1250 mG (OsCal) 1 Tablet(s) Oral two times a day  folic acid 1 milliGRAM(s) Oral daily  guaiFENesin Oral Liquid (Sugar-Free) 200 milliGRAM(s) Oral four times a day  heparin   Injectable 5000 Unit(s) SubCutaneous every 8 hours  levothyroxine 100 MICROGram(s) Oral daily  liothyronine 5 MICROGram(s) Oral daily  methylPREDNISolone sodium succinate Injectable 40 milliGRAM(s) IV Push every 12 hours  mirtazapine 7.5 milliGRAM(s) Oral at bedtime  multivitamin 1 Tablet(s) Oral daily  nystatin    Suspension 069968 Unit(s) Oral four times a day  pantoprazole    Tablet 40 milliGRAM(s) Oral before breakfast  tiotropium 18 MICROgram(s) Capsule 1 Capsule(s) Inhalation daily    MEDICATIONS  (PRN):  acetaminophen     Tablet .. 650 milliGRAM(s) Oral once PRN Temp greater or equal to 38C (100.4F), Mild Pain (1 - 3)  acetaminophen     Tablet .. 650 milliGRAM(s) Oral every 6 hours PRN Temp greater or equal to 38C (100.4F), Mild Pain (1 - 3)  ALPRAZolam 0.5 milliGRAM(s) Oral every 6 hours PRN anxiety  aluminum hydroxide/magnesium hydroxide/simethicone Suspension 30 milliLiter(s) Oral every 4 hours PRN Dyspepsia  bisacodyl 5 milliGRAM(s) Oral every 12 hours PRN Constipation  loperamide 2 milliGRAM(s) Oral two times a day PRN Diarrhea  melatonin 3 milliGRAM(s) Oral at bedtime PRN Insomnia  morphine  - Injectable 2 milliGRAM(s) IV Push every 4 hours PRN Severe Pain (7 - 10)  ondansetron Injectable 4 milliGRAM(s) IV Push every 8 hours PRN Nausea and/or Vomiting  oxyCODONE    IR 5 milliGRAM(s) Oral every 4 hours PRN Moderate Pain (4 - 6)  polyethylene glycol 3350 17 Gram(s) Oral daily PRN Constipation      Vital Signs Last 24 Hrs  T(C): 36.5 (08 Feb 2022 21:41), Max: 36.8 (08 Feb 2022 15:10)  T(F): 97.7 (08 Feb 2022 21:41), Max: 98.3 (08 Feb 2022 15:10)  HR: 80 (09 Feb 2022 08:13) (74 - 91)  BP: 121/53 (08 Feb 2022 21:41) (119/59 - 121/65)  BP(mean): --  RR: 18 (09 Feb 2022 06:03) (16 - 18)  SpO2: 98% (09 Feb 2022 06:03) (97% - 100%)    PHYSICAL EXAM:    GENERAL: NAD,   HEAD:  Atraumatic, Normocephalic  EYES: EOMI, PERRLA, conjunctiva and sclera clear    NECK: Supple, No JVD, Normal thyroid  NERVOUS SYSTEM:    CHEST/LUNG: Clear  HEART: Regular rate and rhythm;   ABDOMEN: Soft, Nontender.  EXTREMITIES:   edema:-  LYMPH: No lymphadenopathy noted        LABS:                        12.2   15.28 )-----------( 418      ( 09 Feb 2022 07:56 )             38.8     02-09    139  |  104  |  38<H>  ----------------------------<  130<H>  5.6<H>   |  33<H>  |  0.84    Ca    7.7<L>      09 Feb 2022 07:56    TPro  5.6<L>  /  Alb  2.0<L>  /  TBili  0.3  /  DBili  x   /  AST  46<H>  /  ALT  92<H>  /  AlkPhos  105  02-09            RADIOLOGY & ADDITIONAL STUDIES:    PATHOLOGY:

## 2022-02-09 NOTE — PROGRESS NOTE ADULT - SUBJECTIVE AND OBJECTIVE BOX
Patient is a 76y old  Female who presents with a chief complaint of Fever, Shortness of Breath (03 Feb 2022 23:49)      HPI:  77 y/o F PMHx significant for Metastatic adenocarcinoma of the lung with metastasis to the spine, liver, and brain, Hypothyroidism s/p thyroid resection, h/o L2 pathologic compression fracture, GERD, chronic leg edema on Lasix, pulmonary fibrosis who was hospitalized at  1/24-1/28 for worsening SOB; (+) worsening R pleural effusion treated with VATS with Pleurx placement (pleural fluid (+) exudate cytology negative for malignant cells). The patient was discharged on a 3 day course of PO Keflex and instructed to hold her Lasix. Of note the patient was planned to start chemotherapy/RT tomorrow (2/4). The patient denies any associated cough, chest congestion, subjective fevers. The patient notes that her Pleurx catheter has not drained properly over the past 2 days and has only had a total of 100cc output drained today. The patient was referred to the ED by her Pulmonologist for further management. In the ED the patient was in moderate respiratory distress found to be hypoxic on room air to 90-91%.  ABG is still pending  CXR revealed right sided pleural effusion  Bilateral infiltrates     2/9  CT Chest/neck with contrast was performed on 2/7  Right VC paralysis noted  Proximal esophageal dilation with fluid   Awaiting GI evaluation  Patient endorses dysphagia today     MEDICATIONS  (STANDING):  ALBUTerol    90 MICROgram(s) HFA Inhaler 2 Puff(s) Inhalation every 6 hours  budesonide 160 MICROgram(s)/formoterol 4.5 MICROgram(s) Inhaler 2 Puff(s) Inhalation two times a day  calcium carbonate   1250 mG (OsCal) 1 Tablet(s) Oral two times a day  folic acid 1 milliGRAM(s) Oral daily  guaiFENesin Oral Liquid (Sugar-Free) 200 milliGRAM(s) Oral four times a day  heparin   Injectable 5000 Unit(s) SubCutaneous every 8 hours  levothyroxine 100 MICROGram(s) Oral daily  liothyronine 5 MICROGram(s) Oral daily  methylPREDNISolone sodium succinate Injectable 40 milliGRAM(s) IV Push every 12 hours  mirtazapine 7.5 milliGRAM(s) Oral at bedtime  multivitamin 1 Tablet(s) Oral daily  nystatin    Suspension 204702 Unit(s) Oral four times a day  pantoprazole    Tablet 40 milliGRAM(s) Oral before breakfast  tiotropium 18 MICROgram(s) Capsule 1 Capsule(s) Inhalation daily    MEDICATIONS  (PRN):  acetaminophen     Tablet .. 650 milliGRAM(s) Oral once PRN Temp greater or equal to 38C (100.4F), Mild Pain (1 - 3)  acetaminophen     Tablet .. 650 milliGRAM(s) Oral every 6 hours PRN Temp greater or equal to 38C (100.4F), Mild Pain (1 - 3)  ALPRAZolam 0.5 milliGRAM(s) Oral every 6 hours PRN anxiety  aluminum hydroxide/magnesium hydroxide/simethicone Suspension 30 milliLiter(s) Oral every 4 hours PRN Dyspepsia  bisacodyl 5 milliGRAM(s) Oral every 12 hours PRN Constipation  loperamide 2 milliGRAM(s) Oral two times a day PRN Diarrhea  melatonin 3 milliGRAM(s) Oral at bedtime PRN Insomnia  morphine  - Injectable 2 milliGRAM(s) IV Push every 4 hours PRN Severe Pain (7 - 10)  ondansetron Injectable 4 milliGRAM(s) IV Push every 8 hours PRN Nausea and/or Vomiting  oxyCODONE    IR 5 milliGRAM(s) Oral every 4 hours PRN Moderate Pain (4 - 6)  polyethylene glycol 3350 17 Gram(s) Oral daily PRN Constipation    Vital Signs Last 24 Hrs  T(C): 36.5 (08 Feb 2022 21:41), Max: 36.8 (08 Feb 2022 15:10)  T(F): 97.7 (08 Feb 2022 21:41), Max: 98.3 (08 Feb 2022 15:10)  HR: 80 (09 Feb 2022 08:13) (74 - 91)  BP: 121/53 (08 Feb 2022 21:41) (119/59 - 121/65)  BP(mean): --  RR: 18 (09 Feb 2022 06:03) (16 - 18)  SpO2: 98% (09 Feb 2022 06:03) (97% - 100%)    PHYSICAL EXAM  General Appearance: cooperative, no acute distress,   HEENT: PERRL, conjunctiva clear, EOM's intact, non injected pharynx, no exudate, TM   normal  Neck: Supple, , no adenopathy, thyroid: not enlarged, no carotid bruit or JVD  Back: Symmetric, no  tenderness,no soft tissue tenderness  Lungs: right thoracostomy in place  / mild diffuse bronchospasm  Heart: Regular rate and rhythm, S1, S2 normal, no murmur, rub or gallop  Abdomen: Soft, non-tender, bowel sounds active , no hepatosplenomegaly  Extremities: no cyanosis or edema, no joint swelling  Skin: Skin color, texture normal, no rashes   Neurologic: Alert and oriented X3 , cranial nerves intact, sensory and motor normal,    ECG:    LABS:                          13.1   9.51  )-----------( 318      ( 03 Feb 2022 16:40 )             39.5     02-03    137  |  103  |  23  ----------------------------<  167<H>  4.5   |  27  |  0.81    Ca    7.5<L>      03 Feb 2022 16:40    TPro  6.2  /  Alb  2.1<L>  /  TBili  0.2  /  DBili  x   /  AST  35  /  ALT  40  /  AlkPhos  106  02-03      < from: Xray Chest 1 View- PORTABLE-Urgent (02.03.22 @ 16:27) >  INTERPRETATION:  AP chest on February 3, 2022 at 4:22 PM. Patient is   short of breath. There is history of metastatic rest cancer to brain.    Heart magnified by technique. Right MediPort and fairly extensive lumbar   hardware again noted.    Right Pleurx catheter remains. A Pleurx catheter has been advanced into   the right chest compared to January 27.    Significant diffuse left lung infiltrate is unchanged.    Present film shows increasing right base effusion which is new.    IMPRESSION: Advanced lung disease on the left unchanged. Increasing right   base effusion. Pleurx catheter has been advanced.    < end of copied text >                  RADIOLOGY & ADDITIONAL STUDIES:

## 2022-02-09 NOTE — DISCHARGE NOTE NURSING/CASE MANAGEMENT/SOCIAL WORK - NSDCPEFALRISK_GEN_ALL_CORE
For information on Fall & Injury Prevention, visit: [email or web address] OR  [email or web address] OR  https://www.cdc.gov/steadi/patient.html

## 2022-02-09 NOTE — PROGRESS NOTE ADULT - ASSESSMENT
77 y/o F PMHx significant for Metastatic adenocarcinoma of the lung with metastasis to the spine, liver, and brain, Hypothyroidism s/p thyroid resection, h/o L2 pathologic compression fracture, GERD, chronic leg edema on Lasix, pulmonary fibrosis who was hospitalized at  1/24-1/28 for worsening SOB; (+) worsening R pleural effusion treated with VATS with Pleurx placement (pleural fluid (+) exudate cytology negative for malignant cells). The patient was discharged on a 3 day course of PO Keflex and instructed to hold her Lasix. Of note the patient was planned to start chemotherapy/RT tomorrow (2/4). The patient denies any associated cough, chest congestion, subjective fevers. The patient notes that her Pleurx catheter has not drained properly over the past 2 days and has only had a total of 100cc output drained today. The patient was referred to the ED by her Pulmonologist for further management. In the ED the patient was in moderate respiratory distress found to be hypoxic on room air to 90-91%.    #Acute Hypoxic Respiratory Failure due to Right Pleural Effusion as a consequence of a Pleurx Catheter Malfunction, possible component of pulm fibrosis overlap  - cont. supplemental O2  - f/u w/ Pulmonology appreciated  - f/u w/ CT Surgery consult appreciated: Drain Pleurx M/W/F up to 1 Liter  -o2 eval Pulse ox 92% on 3 L at rest, to 88% while ambulating. Is a candidate for Home o2 to help with     activities of daily living, especially  with her diagnosis of Lung Cancer with mets.   - Pleurx now not draining, likely replace/reposition friday as per CTS    #acute exacerbation of pulmonary fibrosis  - Hold Solumedrol today  - Received Decadron for Cehmo  - Pulmonary Following     # Dysphagia  - Discussed with GI  - Reviewed CT Chest with Radiology  - Fluid filled esophagus  - Will check with Esophagram in am, NPO at midnight.    # Stage 4 Adenocarcinoma Lung  - Heme onc following  - Palliative for GOC consult appreciated  - Family want everything done  - Starting Chemotherapy tomorrow (Carboplatin & Alimta)     # Vocal Cord paralysis  - ? Mediastinal involvement  - FU with ENT as outpatient     #anxiety  -started xanax.    #GERD  -cont. Omeprazole 40mg po daily    #Hypothyroidism  -cont. Liothyronine 5mcg po daily  -cont. Levothyroxine 100mcg po qam    #Protein Calorie Malnutrition  -f/u w/ Nutrition evaluation in the am    #Constipation  -cont. Bisacodyl prn  -cont. Miralax prn    #Advance Directives   -DNR/DNI    #Vte ppx  -IMPROVE VTE  Risk Score is 3  -cont. Heparin sq   75 y/o F PMHx significant for Metastatic adenocarcinoma of the lung with metastasis to the spine, liver, and brain, Hypothyroidism s/p thyroid resection, h/o L2 pathologic compression fracture, GERD, chronic leg edema on Lasix, pulmonary fibrosis who was hospitalized at  1/24-1/28 for worsening SOB; (+) worsening R pleural effusion treated with VATS with Pleurx placement (pleural fluid (+) exudate cytology negative for malignant cells). The patient was discharged on a 3 day course of PO Keflex and instructed to hold her Lasix. Of note the patient was planned to start chemotherapy/RT tomorrow (2/4). The patient denies any associated cough, chest congestion, subjective fevers. The patient notes that her Pleurx catheter has not drained properly over the past 2 days and has only had a total of 100cc output drained today. The patient was referred to the ED by her Pulmonologist for further management. In the ED the patient was in moderate respiratory distress found to be hypoxic on room air to 90-91%.    #Acute Hypoxic Respiratory Failure due to Right Pleural Effusion as a consequence of a Pleurx Catheter Malfunction, possible component of pulm fibrosis overlap  - cont. supplemental O2  - f/u w/ Pulmonology appreciated  - f/u w/ CT Surgery consult appreciated: Drain Pleurx M/W/F up to 1 Liter  -o2 eval Pulse ox 92% on 3 L at rest, to 88% while ambulating. Is a candidate for Home o2 to help with     activities of daily living, especially  with her diagnosis of Lung Cancer with mets.   - Pleurx now not draining, likely replace/reposition friday as per CTS    #acute exacerbation of pulmonary fibrosis  - Hold Solumedrol today  - Received Decadron for Cehmo  - Pulmonary Following     # Dysphagia  - Discussed with GI  - Reviewed CT Chest with Radiology  - Fluid filled esophagus  - Will check with Esophagram in am, NPO at midnight.    # Stage 4 Adenocarcinoma Lung  - Heme onc following  - Palliative for GOC consult appreciated  - Family want everything done  - Starting Chemotherapy tomorrow (Carboplatin & Alimta)     # Vocal Cord paralysis  - ? Mediastinal involvement  - FU with ENT as outpatient     # Leukocytosis  - Likely due to steroids  - Monitor    # Hyperkalemia  - Chemo related  - Repeat, if still elevated, Will give lokelma    #anxiety  -started xanax.    #GERD  -cont. Omeprazole 40mg po daily    #Hypothyroidism  -cont. Liothyronine 5mcg po daily  -cont. Levothyroxine 100mcg po qam    #Protein Calorie Malnutrition  -f/u w/ Nutrition evaluation in the am    #Constipation  -cont. Bisacodyl prn  -cont. Miralax prn    #Advance Directives   -DNR/DNI    #Vte ppx  -IMPROVE VTE  Risk Score is 3  -cont. Heparin sq

## 2022-02-09 NOTE — PROGRESS NOTE ADULT - SUBJECTIVE AND OBJECTIVE BOX
Subjective:  Pt seen, eating breakfast. On 2.5L. To have chemo today.    Vital Signs:  Vital Signs Last 24 Hrs  T(C): 36.3 (02-09-22 @ 09:38), Max: 36.8 (02-08-22 @ 15:10)  T(F): 97.4 (02-09-22 @ 09:38), Max: 98.3 (02-08-22 @ 15:10)  HR: 80 (02-09-22 @ 09:38) (74 - 91)  BP: 108/48 (02-09-22 @ 09:38) (108/48 - 121/65)  RR: 17 (02-09-22 @ 09:38) (16 - 18)  SpO2: 100% (02-09-22 @ 09:38) (97% - 100%) on (O2)    Telemetry/Alarms:    Relevant labs, radiology and Medications reviewed                        12.2   15.28 )-----------( 418      ( 09 Feb 2022 07:56 )             38.8     02-09    139  |  104  |  38<H>  ----------------------------<  130<H>  5.6<H>   |  33<H>  |  0.84    Ca    7.7<L>      09 Feb 2022 07:56    TPro  5.6<L>  /  Alb  2.0<L>  /  TBili  0.3  /  DBili  x   /  AST  46<H>  /  ALT  92<H>  /  AlkPhos  105  02-09      MEDICATIONS  (STANDING):  ALBUTerol    90 MICROgram(s) HFA Inhaler 2 Puff(s) Inhalation every 6 hours  budesonide 160 MICROgram(s)/formoterol 4.5 MICROgram(s) Inhaler 2 Puff(s) Inhalation two times a day  calcium carbonate   1250 mG (OsCal) 1 Tablet(s) Oral two times a day  folic acid 1 milliGRAM(s) Oral daily  guaiFENesin Oral Liquid (Sugar-Free) 200 milliGRAM(s) Oral four times a day  heparin   Injectable 5000 Unit(s) SubCutaneous every 8 hours  levothyroxine 100 MICROGram(s) Oral daily  liothyronine 5 MICROGram(s) Oral daily  methylPREDNISolone sodium succinate Injectable 40 milliGRAM(s) IV Push every 12 hours  mirtazapine 7.5 milliGRAM(s) Oral at bedtime  multivitamin 1 Tablet(s) Oral daily  nystatin    Suspension 167919 Unit(s) Oral four times a day  pantoprazole    Tablet 40 milliGRAM(s) Oral before breakfast  tiotropium 18 MICROgram(s) Capsule 1 Capsule(s) Inhalation daily    MEDICATIONS  (PRN):  acetaminophen     Tablet .. 650 milliGRAM(s) Oral once PRN Temp greater or equal to 38C (100.4F), Mild Pain (1 - 3)  acetaminophen     Tablet .. 650 milliGRAM(s) Oral every 6 hours PRN Temp greater or equal to 38C (100.4F), Mild Pain (1 - 3)  ALPRAZolam 0.5 milliGRAM(s) Oral every 6 hours PRN anxiety  aluminum hydroxide/magnesium hydroxide/simethicone Suspension 30 milliLiter(s) Oral every 4 hours PRN Dyspepsia  bisacodyl 5 milliGRAM(s) Oral every 12 hours PRN Constipation  loperamide 2 milliGRAM(s) Oral two times a day PRN Diarrhea  melatonin 3 milliGRAM(s) Oral at bedtime PRN Insomnia  morphine  - Injectable 2 milliGRAM(s) IV Push every 4 hours PRN Severe Pain (7 - 10)  ondansetron Injectable 4 milliGRAM(s) IV Push every 8 hours PRN Nausea and/or Vomiting  oxyCODONE    IR 5 milliGRAM(s) Oral every 4 hours PRN Moderate Pain (4 - 6)  polyethylene glycol 3350 17 Gram(s) Oral daily PRN Constipation      Physical exam  General:  NAD,                                                         Neuro: A+O x 3,                      Eyes: PERRL, EOMI   Neck: supple, no JVD, trachea midline   Chest: decreased left base, audible wheezing, left pleurx under dressing, sterile  CV: RRR,   GI: soft, NT, ND,  Extremities: warm, edema  SKIN: warm, dry, intac  I&O's Summary      Assessment  76y Female  w/ PAST MEDICAL & SURGICAL HISTORY:  Hypothyroidism    Fracture  L2 pathologic fracture    Osteoarthritis    Cholecystitis    Lung cancer  adenocarcinoma    History of other surgery  percutaneous endoscopic left pleural cavity drain    History of other surgery  gallbladder drain    H/O thyroidectomy    History of lumbar spinal fusion    S/P left oophorectomy    History of tonsillectomy    admitted with complaints of Patient is a 76y old  Female who presents with a chief complaint of Fever, Shortness of Breath (09 Feb 2022 09:26)  76y Female PMHx significant for Metastatic adenocarcinoma of the lung with metastasis to the spine, liver, and brain, Hypothyroidism s/p thyroid resection, h/o L2 pathologic compression fracture, GERD, chronic leg edema on Lasix, pulmonary fibrosis who was hospitalized at  1/24-1/28 for worsening SOB; (+) worsening R pleural effusion treated with VATS with Pleurx placement admitted 2/4 w hypoxia and pleurx not draining well. Last drained on Tuesday, 2/1/22, minimal drainage. Drainage also associated w pain. CXR reveals effusion on right w pleurx up to apex of lung. Drains when pt lying supine. Right vocal cord paralysis.     I drained pleurx today at bedside, pt w much discomfort w drainage, went very slow at drip rate of drainage. 225cc collected before pt could no longer tolerate and drainage was aborted.   Pt upset and frustrated.   Will d/w Dr. Stein to see if any intervention to aid in less painful pleurx drainage.   d/w pt  pain control  pt to receive first chemo dose today as per oncology note  IS  maintain sterile pleurx dressing, until drainage     Discussed with Cardiothoracic Team at AM rounds.

## 2022-02-10 NOTE — PROGRESS NOTE ADULT - SUBJECTIVE AND OBJECTIVE BOX
Subjective:  Pt in bed NAD     T(C): 36.3 (02-10-22 @ 04:15), Max: 36.8 (02-09-22 @ 14:35)  HR: 82 (02-10-22 @ 04:15) (73 - 87)  BP: 126/55 (02-10-22 @ 04:15) (103/49 - 150/61)  ABP: --  ABP(mean): --  RR: 18 (02-10-22 @ 00:47) (16 - 19)  SpO2: 98% (02-10-22 @ 04:15) (98% - 100%) 3 L NC   Wt(kg): --  CVP(mm Hg): --  CO: --  CI: --  PA: --                                              Tele: SR     Rt Pleurx capped         02-10    140  |  103  |  34<H>  ----------------------------<  130<H>  5.5<H>   |  36<H>  |  0.68    Ca    8.1<L>      10 Feb 2022 07:12    TPro  5.6<L>  /  Alb  2.0<L>  /  TBili  0.3  /  DBili  x   /  AST  46<H>  /  ALT  92<H>  /  AlkPhos  105  02-09                               13.0   19.07 )-----------( 485      ( 10 Feb 2022 07:12 )             42.7        PT/INR - ( 10 Feb 2022 07:12 )   PT: 11.2 sec;   INR: 0.96 ratio         PTT - ( 10 Feb 2022 07:12 )  PTT:33.2 sec         CAPILLARY BLOOD GLUCOSE          < from: CT Chest w/ IV Cont (02.07.22 @ 11:12) >  2.5 cm treated left upper lobe malignancy, additional left upper lobe   indeterminate 0.8 cm nodular opacity, and left pleural metastatic disease   without significant interval change compared to 1/24/2022.    Interval resolution of right pleural effusion compared to 1/24/2022.    Persistent left basilar consolidation.    5.6 cm mass within the caudate lobe and multiple additional hepatic   hypodense lesions likely represents metastatic disease.    < end of copied text >               exam  Neuro:  Alert awake NAD  Pulm: decreased at bases + Rt Pleurx   CV: RRR S1 S2   Abd: soft   Extremities:  warm        Assessment:  76yFemale    with PAST MEDICAL & SURGICAL HISTORY:  Hypothyroidism    Fracture  L2 pathologic fracture    Osteoarthritis    Cholecystitis    Lung cancer  adenocarcinoma    History of other surgery  percutaneous endoscopic left pleural cavity drain    History of other surgery  gallbladder drain    H/O thyroidectomy    History of lumbar spinal fusion    S/P left oophorectomy    History of tonsillectomy          Plan:

## 2022-02-10 NOTE — PROGRESS NOTE ADULT - ASSESSMENT
77 y/o F PMHx significant for Metastatic adenocarcinoma of the lung with metastasis to the spine, liver, and brain, Hypothyroidism s/p thyroid resection, h/o L2 pathologic compression fracture, GERD, chronic leg edema on Lasix, pulmonary fibrosis who was hospitalized at  1/24-1/28 for worsening SOB; (+) worsening R pleural effusion treated with VATS with Pleurx placement (pleural fluid (+) exudate cytology negative for malignant cells). The patient was discharged on a 3 day course of PO Keflex and instructed to hold her Lasix. Of note the patient was planned to start chemotherapy/RT tomorrow (2/4). The patient denies any associated cough, chest congestion, subjective fevers. The patient notes that her Pleurx catheter has not drained properly over the past 2 days and has only had a total of 100cc output drained today. The patient was referred to the ED by her Pulmonologist for further management. In the ED the patient was in moderate respiratory distress found to be hypoxic on room air to 90-91%.    #Acute Hypoxic Respiratory Failure due to Right Pleural Effusion as a consequence of a Pleurx Catheter Malfunction, possible component of pulm fibrosis overlap  - cont. supplemental O2  - f/u w/ Pulmonology appreciated  - f/u w/ CT Surgery consult appreciated: Drain Pleurx M/W/F up to 1 Liter  -o2 eval Pulse ox 92% on 3 L at rest, to 88% while ambulating. Is a candidate for Home o2 to help with     activities of daily living, especially  with her diagnosis of Lung Cancer with mets.   - Pleurx now not draining, likely replace/reposition tomorrow as per CTS    #acute exacerbation of pulmonary fibrosis  - Hold Solumedrol today  - On decadron for chemo  - Pulmonary Following     # Dysphagia  - Discussed with GI  - Reviewed CT Chest with Radiology  - Fluid filled esophagus  - GI consult appreciated, Start fluconazole    # Stage 4 Adenocarcinoma Lung  - Heme onc following  - Palliative for GOC consult appreciated  - Family want everything done  - SP Chemotherapy (Carboplatin & Alimta)     # Vocal Cord paralysis  - ? Mediastinal involvement  - FU with ENT as outpatient     # Leukocytosis  - Likely due to steroids  - Monitor    # Hyperkalemia  - Chemo related  - Lokelma x 1 now    #anxiety  -started xanax.    #GERD  -cont. Omeprazole 40mg po daily    #Hypothyroidism  -cont. Liothyronine 5mcg po daily  -cont. Levothyroxine 100mcg po qam    #Protein Calorie Malnutrition  -f/u w/ Nutrition evaluation in the am    #Constipation  -cont. Bisacodyl prn  -cont. Miralax prn    #Advance Directives   -DNR/DNI    #Vte ppx  -IMPROVE VTE  Risk Score is 3  -cont. Heparin sq

## 2022-02-10 NOTE — CONSULT NOTE ADULT - ASSESSMENT
76y Female PMHx significant for Metastatic adenocarcinoma of the lung with metastasis to the spine, liver, and brain, Hypothyroidism s/p thyroid resection, h/o L2 pathologic compression fracture, GERD, chronic leg edema on Lasix, pulmonary fibrosis who was hospitalized at  1/24-1/28 for worsening SOB; (+) worsening R pleural effusion treated with VATS with Pleurx placement admitted 2/4 w hypoxia and pleurx not draining well. Last drained on Tuesday, 2/1/22, minimal drainage. Drainage also associated w pain. CXR reveals effusion on right w pleurx up to apex of lung.    Pleurx placed to waterseal, 500cc drained.   Pleurx clamped due to pt discomfort w drainage, will reopen to waterseal 2/5  Due to positioning of Pleurx, best drainage results while pt lying down, d/w   will cap pleurx on Monday, and resume normal drainage schedule  pain meds as needed
Pt is a 76y old Female with hx of Metastatic adenocarcinoma of the lung with metastasis to the spine, liver, and brain, Hypothyroidism s/p thyroid resection, h/o L2 pathologic compression fracture, GERD, chronic leg edema on Lasix, pulmonary fibrosis who was hospitalized at  1/24-1/28 for worsening SOB; (+) worsening R pleural effusion treated with VATS with Pleurx placement (pleural fluid (+) exudate cytology negative for malignant cells). The patient was discharged on a three-day course of PO Keflex and instructed to hold her Lasix. Of note, the patient was planned to start chemotherapy/RT tomorrow (2/4). The patient denies any associated cough, chest congestion, subjective fevers. The patient notes that her Pleurx catheter has not drained properly over the past two days and has only had a total of 100cc output drained today. The patient was referred to the ED by her Pulmonologist for further management. In the ED, the patient was in moderate respiratory distress and found hypoxic on room air to 90-91%. Palliative medicine consulted to help establish GOC and advance care planning     1) moderate right pleural effusion likely malignant   - dyspnea resolving as per patient - Pleurx was not working properly   - history underlying Metastatic adenocarcinoma of Lung ( INDU)  - CT surgery consult for pleural effusion management  - c/w morphine for severe Pain   - c/w oxycodone 5mg q4hrs for moderate pain     2) Metastatic adenocarcinoma of Lung   - New 0.8 cm INDU nodule   - oncology consult appreciated - Dr. Westfall is patient primary oncologist   - pulmonology consult appreciated - Dr. Sanchez   - new T4-T6 new lesion on recent PET  - radiation oncology - patient was to start treatment today      3) debility   - PT consult appreciated   - encourage OOB   - re-evaluate for home O2     4) anxiety   - patient takes xanax at bedtime usually   - supportive care     5) severe protein calorie malnutrition   - recommend dietician consult   - Albumin, Serum: 2.1 g/dL    6) Advanced directives  - patient has capacity to make decisions but very anxious at this time   - MOLST : DNR/I  - GOC meeting held today, family is clear that any treatment being offered they would like to pursue it at this time   
Imp:  Preliminarily the esophagram shows some delayed esophageal emptying but no obstruction.  I favor some dysmotility related to age and overall illness  Can't rule out candida    Rec:  Given overall poor health, I don't think there's a role for EGD at this time  A trial of fluconazole for ?candida esophagitis is reasonable  Favor mechanical soft diet for now
1) Dyspnea  2) Metastatic Lung Cancer (Adenocarcinoma)  3) Right Thoracostomy In Place (IPC)  4) Pleural Effusion      75 y/o F PMHx significant for Metastatic adenocarcinoma of the lung with metastasis to the spine, liver, and brain, Hypothyroidism s/p thyroid resection, h/o L2 pathologic compression fracture, GERD, chronic leg edema on Lasix, pulmonary fibrosis who was hospitalized at  1/24-1/28 for worsening SOB; (+) worsening R pleural effusion treated with VATS with Pleurx placement (pleural fluid (+) exudate cytology negative for malignant cells). The patient was discharged on a 3 day course of PO Keflex and instructed to hold her Lasix. Of note the patient was planned to start chemotherapy/RT tomorrow (2/4). The patient denies any associated cough, chest congestion, subjective fevers. The patient notes that her Pleurx catheter has not drained properly over the past 2 days and has only had a total of 100cc output drained today. The patient was referred to the ED by her Pulmonologist for further management. In the ED the patient was in moderate respiratory distress found to be hypoxic on room air to 90-91%.  ABG is still pending  CXR revealed right sided pleural effusion  Bilateral infiltrates noted as well  She was supposed to receive chemotherapy today and radiation next week due to spinal metatasis  Dyspnea has been worse along with fatigue  Ordered ABG.   Echocardiogram performed last admission did not show pulmonary hypertension  She has been given inhalers in the past (nebulized pulmicort/duoneb provided to patient in the office on 2/3) and she did not have much relief  Will start Symbicort/Spiriva   Overall prognosis is guarded  Will discuss further with hospitalist service

## 2022-02-10 NOTE — PROGRESS NOTE ADULT - SUBJECTIVE AND OBJECTIVE BOX
Patient seen and examined:  No acute events overnight.  Tolerating po. Voiding.  Some insomnia, increased   fatigue. No chest pain.    ROS: Negative except for above      MEDICATIONS  (STANDING):  ALBUTerol    90 MICROgram(s) HFA Inhaler 2 Puff(s) Inhalation every 6 hours  aprepitant 80 milliGRAM(s) Oral daily  budesonide 160 MICROgram(s)/formoterol 4.5 MICROgram(s) Inhaler 2 Puff(s) Inhalation two times a day  calcium carbonate   1250 mG (OsCal) 1 Tablet(s) Oral two times a day  dexAMETHasone     Tablet 4 milliGRAM(s) Oral every 12 hours  fluconAZOLE   Tablet 200 milliGRAM(s) Oral once  folic acid 1 milliGRAM(s) Oral daily  guaiFENesin Oral Liquid (Sugar-Free) 200 milliGRAM(s) Oral four times a day  heparin   Injectable 5000 Unit(s) SubCutaneous every 8 hours  levothyroxine 100 MICROGram(s) Oral daily  liothyronine 5 MICROGram(s) Oral daily  melatonin 5 milliGRAM(s) Oral at bedtime  mirtazapine 7.5 milliGRAM(s) Oral at bedtime  multivitamin 1 Tablet(s) Oral daily  nystatin    Suspension 911596 Unit(s) Oral four times a day  pantoprazole    Tablet 40 milliGRAM(s) Oral before breakfast  tiotropium 18 MICROgram(s) Capsule 1 Capsule(s) Inhalation daily    MEDICATIONS  (PRN):  acetaminophen     Tablet .. 650 milliGRAM(s) Oral once PRN Temp greater or equal to 38C (100.4F), Mild Pain (1 - 3)  acetaminophen     Tablet .. 650 milliGRAM(s) Oral every 6 hours PRN Temp greater or equal to 38C (100.4F), Mild Pain (1 - 3)  ALPRAZolam 0.5 milliGRAM(s) Oral every 6 hours PRN anxiety  aluminum hydroxide/magnesium hydroxide/simethicone Suspension 30 milliLiter(s) Oral every 4 hours PRN Dyspepsia  bisacodyl 5 milliGRAM(s) Oral every 12 hours PRN Constipation  loperamide 2 milliGRAM(s) Oral two times a day PRN Diarrhea  melatonin 3 milliGRAM(s) Oral at bedtime PRN Insomnia  morphine  - Injectable 2 milliGRAM(s) IV Push every 4 hours PRN Severe Pain (7 - 10)  ondansetron   Disintegrating Tablet 8 milliGRAM(s) Oral every 8 hours PRN Nausea and/or Vomiting  ondansetron Injectable 4 milliGRAM(s) IV Push every 8 hours PRN Nausea and/or Vomiting  oxyCODONE    IR 5 milliGRAM(s) Oral every 4 hours PRN Moderate Pain (4 - 6)  polyethylene glycol 3350 17 Gram(s) Oral daily PRN Constipation      VITALS:  T(F): 97.4 (02-10-22 @ 04:15), Max: 98.2 (02-09-22 @ 14:35)  HR: 82 (02-10-22 @ 04:15) (73 - 87)  BP: 126/55 (02-10-22 @ 04:15) (103/49 - 150/61)  RR: 18 (02-10-22 @ 00:47) (16 - 19)  SpO2: 98% (02-10-22 @ 04:15) (98% - 100%)            PHYSICAL EXAM:  GEN: NAD, Raspy voice  HEENT:  pupils equal and reactive, EOMI, no oropharyngeal lesions, erythema, exudates, oral thrush  NECK:   supple, no carotid bruits, no palpable lymph nodes, no thyromegaly  CV:  +S1, +S2, regular, no murmurs or rubs  RESP:  Trace wheeze, Transmitted upper airway sounds, Pleurx   BREAST:  not examined  GI:  abdomen soft, non-tender, non-distended, normal BS, no bruits, no abdominal masses, no palpable masses  RECTAL:  not examined  :  not examined  MSK:   normal muscle tone, no atrophy, no rigidity, no contractions  EXT:  no clubbing, no cyanosis, no edema, no calf pain, swelling or erythema  VASCULAR:  pulses equal and symmetric in the upper and lower extremities  NEURO:  AAOX3, no focal neurological deficits, follows all commands, able to move extremities spontaneously  SKIN:  no ulcers, lesions or rashes    LABS:                            13.0   19.07 )-----------( 485      ( 10 Feb 2022 07:12 )             42.7     02-10    140  |  103  |  34<H>  ----------------------------<  130<H>  5.5<H>   |  36<H>  |  0.68    Ca    8.1<L>      10 Feb 2022 07:12    TPro  5.6<L>  /  Alb  2.0<L>  /  TBili  0.3  /  DBili  x   /  AST  46<H>  /  ALT  92<H>  /  AlkPhos  105  02-09        LIVER FUNCTIONS - ( 09 Feb 2022 07:56 )  Alb: 2.0 g/dL / Pro: 5.6 gm/dL / ALK PHOS: 105 U/L / ALT: 92 U/L / AST: 46 U/L / GGT: x             Esophagram: Pending Results    PT/INR - ( 10 Feb 2022 07:12 )   PT: 11.2 sec;   INR: 0.96 ratio         PTT - ( 10 Feb 2022 07:12 )  PTT:33.2 sec

## 2022-02-10 NOTE — PROVIDER CONTACT NOTE (OTHER) - REASON
npo order
low bp, pt requesting xanax, expiratory wheeze
adventitious long sounds
pt still with expiratory wheeze after respiratory treatment

## 2022-02-10 NOTE — PROVIDER CONTACT NOTE (OTHER) - RECOMMENDATIONS
will have respiratory come to give treatement for wheeze,
Dr. Moon assessed pt. Continue to monitor pt.  pt oxygen 100% on 3L.
md to see patient

## 2022-02-10 NOTE — PROVIDER CONTACT NOTE (OTHER) - BACKGROUND
pt NPO after midnight for possible reposition of plerux or new pleruex drainage tube
pt  has lung cancer

## 2022-02-10 NOTE — CONSULT NOTE ADULT - REASON FOR ADMISSION
Shortness of Breath
Fever, Shortness of Breath

## 2022-02-10 NOTE — PROVIDER CONTACT NOTE (OTHER) - ASSESSMENT
possible stridor on auscultation. Pt making audible breathing sounds making it difficult determine if stridor is present.

## 2022-02-10 NOTE — PROGRESS NOTE ADULT - ASSESSMENT
76y Female PMHx significant for Metastatic adenocarcinoma of the lung with metastasis to the spine, liver, and brain, Hypothyroidism s/p thyroid resection, h/o L2 pathologic compression fracture, GERD, chronic leg edema on Lasix, pulmonary fibrosis who was hospitalized at  1/24-1/28 for worsening SOB; (+) worsening R pleural effusion treated with VATS with Pleurx placement admitted 2/4 w hypoxia and pleurx not draining well. Last drained on Tuesday, 2/1/22, minimal drainage. Drainage also associated w pain. CXR reveals effusion on right w pleurx up to apex of lung. Drains when pt lying supine. Right vocal cord paralysis. 2/9 pleurx drainage 225 cc .  patient did not tolerate very well + discomfort.  patient declined palliative     plan    Will d/w Dr. Stein to see if any intervention to aid in less painful pleurx drainage.   plan for pleurx adjustment tomorrow in the OR   NPO p midnight   COVID P  pain control  s/p  chemo yesterday  as per oncology note  IS  maintain sterile pleurx dressing    Discussed with Cardiothoracic Team at AM rounds.

## 2022-02-10 NOTE — CONSULT NOTE ADULT - SUBJECTIVE AND OBJECTIVE BOX
HPI:  77 y/o F PMHx significant for Metastatic adenocarcinoma of the lung with metastasis to the spine, liver, and brain, Hypothyroidism s/p thyroid resection, h/o L2 pathologic compression fracture, GERD, chronic leg edema on Lasix, pulmonary fibrosis who was hospitalized at  1/24-1/28 for worsening SOB; (+) worsening R pleural effusion treated with VATS with Pleurx placement (pleural fluid (+) exudate cytology negative for malignant cells). The patient was discharged on a 3 day course of PO Keflex and instructed to hold her Lasix. Of note the patient was planned to start chemotherapy/RT tomorrow (2/4). The patient denies any associated cough, chest congestion, subjective fevers. The patient notes that her Pleurx catheter has not drained properly over the past 2 days and has only had a total of 100cc output drained today. The patient was referred to the ED by her Pulmonologist for further management. In the ED the patient was in moderate respiratory distress found to be hypoxic on room air to 90-91%. (03 Feb 2022 23:49)  -------------------------------  Patient notes dysphagia for a few weeks, mostly to solids. No chest pain or vomiting. Feels like solids stick in the chest.    PAST MEDICAL & SURGICAL HISTORY:  Hypothyroidism    Fracture  L2 pathologic fracture    Osteoarthritis    Cholecystitis    Lung cancer  adenocarcinoma    History of other surgery  percutaneous endoscopic left pleural cavity drain    History of other surgery  gallbladder drain    H/O thyroidectomy    History of lumbar spinal fusion    S/P left oophorectomy    History of tonsillectomy        Home Medications:  Albuterol (Eqv-Proventil HFA) 90 mcg/inh inhalation aerosol: 2 puff(s) inhaled every 6 hours, As Needed (03 Feb 2022 19:45)  biotin 1000 mcg oral tablet: 1 tab(s) orally once a day (03 Feb 2022 19:45)  calcium (as carbonate) 600 mg oral tablet: 1 tab(s) orally 2 times a day (03 Feb 2022 19:45)  ciclopirox 0.77% topical gel: Apply topically to affected area 2 times a day (03 Feb 2022 19:45)  esomeprazole 40 mg oral delayed release capsule: 1 cap(s) orally once a day, As Needed (03 Feb 2022 19:45)  liothyronine 5 mcg oral tablet: 1 tab(s) orally once a day (03 Feb 2022 19:45)  ondansetron 8 mg oral tablet: 1 tab(s) orally 2 times a day, As Needed (03 Feb 2022 19:45)  Synthroid 100 mcg (0.1 mg) oral tablet: 1 tab(s) orally once a day (03 Feb 2022 19:45)      MEDICATIONS  (STANDING):  ALBUTerol    90 MICROgram(s) HFA Inhaler 2 Puff(s) Inhalation every 6 hours  aprepitant 80 milliGRAM(s) Oral daily  budesonide 160 MICROgram(s)/formoterol 4.5 MICROgram(s) Inhaler 2 Puff(s) Inhalation two times a day  calcium carbonate   1250 mG (OsCal) 1 Tablet(s) Oral two times a day  dexAMETHasone     Tablet 4 milliGRAM(s) Oral every 12 hours  folic acid 1 milliGRAM(s) Oral daily  guaiFENesin Oral Liquid (Sugar-Free) 200 milliGRAM(s) Oral four times a day  heparin   Injectable 5000 Unit(s) SubCutaneous every 8 hours  levothyroxine 100 MICROGram(s) Oral daily  liothyronine 5 MICROGram(s) Oral daily  melatonin 5 milliGRAM(s) Oral at bedtime  mirtazapine 7.5 milliGRAM(s) Oral at bedtime  multivitamin 1 Tablet(s) Oral daily  nystatin    Suspension 275176 Unit(s) Oral four times a day  pantoprazole    Tablet 40 milliGRAM(s) Oral before breakfast  sodium zirconium cyclosilicate 5 Gram(s) Oral once  tiotropium 18 MICROgram(s) Capsule 1 Capsule(s) Inhalation daily    MEDICATIONS  (PRN):  acetaminophen     Tablet .. 650 milliGRAM(s) Oral once PRN Temp greater or equal to 38C (100.4F), Mild Pain (1 - 3)  acetaminophen     Tablet .. 650 milliGRAM(s) Oral every 6 hours PRN Temp greater or equal to 38C (100.4F), Mild Pain (1 - 3)  ALPRAZolam 0.5 milliGRAM(s) Oral every 6 hours PRN anxiety  aluminum hydroxide/magnesium hydroxide/simethicone Suspension 30 milliLiter(s) Oral every 4 hours PRN Dyspepsia  bisacodyl 5 milliGRAM(s) Oral every 12 hours PRN Constipation  loperamide 2 milliGRAM(s) Oral two times a day PRN Diarrhea  melatonin 3 milliGRAM(s) Oral at bedtime PRN Insomnia  morphine  - Injectable 2 milliGRAM(s) IV Push every 4 hours PRN Severe Pain (7 - 10)  ondansetron   Disintegrating Tablet 8 milliGRAM(s) Oral every 8 hours PRN Nausea and/or Vomiting  ondansetron Injectable 4 milliGRAM(s) IV Push every 8 hours PRN Nausea and/or Vomiting  oxyCODONE    IR 5 milliGRAM(s) Oral every 4 hours PRN Moderate Pain (4 - 6)  polyethylene glycol 3350 17 Gram(s) Oral daily PRN Constipation      Allergies    adhesives (Rash)  amoxicillin (Other; Diarrhea)  statins (Muscle Pain)    Intolerances    morphine (Nausea)      SOCIAL HISTORY:    FAMILY HISTORY:  FH: thyroid disease (Mother)        ROS  As above  Otherwise unremarkable    Vital Signs Last 24 Hrs  T(C): 36.3 (10 Feb 2022 04:15), Max: 36.8 (09 Feb 2022 14:35)  T(F): 97.4 (10 Feb 2022 04:15), Max: 98.2 (09 Feb 2022 14:35)  HR: 82 (10 Feb 2022 04:15) (73 - 87)  BP: 126/55 (10 Feb 2022 04:15) (103/49 - 150/61)  BP(mean): --  RR: 18 (10 Feb 2022 00:47) (16 - 19)  SpO2: 98% (10 Feb 2022 04:15) (98% - 100%)    Constitutional: NAD, weak and frail appearing  Respiratory: CTAB  Cardiovascular: S1 and S2, RRR  Gastrointestinal: BS+, soft, NT/ND  Extremities: No peripheral edema  Psychiatric: Normal mood, normal affect  Skin: No rashes    LABS:                        13.0   19.07 )-----------( 485      ( 10 Feb 2022 07:12 )             42.7     02-10    140  |  103  |  34<H>  ----------------------------<  130<H>  5.5<H>   |  36<H>  |  0.68    Ca    8.1<L>      10 Feb 2022 07:12    TPro  5.6<L>  /  Alb  2.0<L>  /  TBili  0.3  /  DBili  x   /  AST  46<H>  /  ALT  92<H>  /  AlkPhos  105  02-09    PT/INR - ( 10 Feb 2022 07:12 )   PT: 11.2 sec;   INR: 0.96 ratio         PTT - ( 10 Feb 2022 07:12 )  PTT:33.2 sec  LIVER FUNCTIONS - ( 09 Feb 2022 07:56 )  Alb: 2.0 g/dL / Pro: 5.6 gm/dL / ALK PHOS: 105 U/L / ALT: 92 U/L / AST: 46 U/L / GGT: x             RADIOLOGY & ADDITIONAL STUDIES:
Patient is a 76y old  Female who presents with a chief complaint of Fever, Shortness of Breath (03 Feb 2022 23:49)      HPI:  77 y/o F PMHx significant for Metastatic adenocarcinoma of the lung with metastasis to the spine, liver, and brain, Hypothyroidism s/p thyroid resection, h/o L2 pathologic compression fracture, GERD, chronic leg edema on Lasix, pulmonary fibrosis who was hospitalized at  1/24-1/28 for worsening SOB; (+) worsening R pleural effusion treated with VATS with Pleurx placement (pleural fluid (+) exudate cytology negative for malignant cells). The patient was discharged on a 3 day course of PO Keflex and instructed to hold her Lasix. Of note the patient was planned to start chemotherapy/RT tomorrow (2/4). The patient denies any associated cough, chest congestion, subjective fevers. The patient notes that her Pleurx catheter has not drained properly over the past 2 days and has only had a total of 100cc output drained today. The patient was referred to the ED by her Pulmonologist for further management. In the ED the patient was in moderate respiratory distress found to be hypoxic on room air to 90-91%.  ABG is still pending  CXR revealed right sided pleural effusion  Bilateral infiltrates     PAST MEDICAL & SURGICAL HISTORY:  Hypothyroidism    Fracture  L2 pathologic fracture    Osteoarthritis    Cholecystitis    Lung cancer  adenocarcinoma    History of other surgery  percutaneous endoscopic left pleural cavity drain    History of other surgery  gallbladder drain    H/O thyroidectomy    History of lumbar spinal fusion    S/P left oophorectomy    History of tonsillectomy        PREVIOUS DIAGNOSTIC TESTING:      MEDICATIONS  (STANDING):  folic acid 1 milliGRAM(s) Oral daily  levothyroxine 100 MICROGram(s) Oral daily  liothyronine 5 MICROGram(s) Oral daily  mirtazapine 7.5 milliGRAM(s) Oral at bedtime  multivitamin 1 Tablet(s) Oral daily  pantoprazole    Tablet 40 milliGRAM(s) Oral before breakfast    MEDICATIONS  (PRN):  acetaminophen     Tablet .. 650 milliGRAM(s) Oral once PRN Temp greater or equal to 38C (100.4F), Mild Pain (1 - 3)  acetaminophen     Tablet .. 650 milliGRAM(s) Oral every 6 hours PRN Temp greater or equal to 38C (100.4F), Mild Pain (1 - 3)  ALBUTerol    90 MICROgram(s) HFA Inhaler 2 Puff(s) Inhalation every 6 hours PRN Bronchospasm  aluminum hydroxide/magnesium hydroxide/simethicone Suspension 30 milliLiter(s) Oral every 4 hours PRN Dyspepsia  bisacodyl 5 milliGRAM(s) Oral every 12 hours PRN Constipation  melatonin 3 milliGRAM(s) Oral at bedtime PRN Insomnia  ondansetron Injectable 4 milliGRAM(s) IV Push every 8 hours PRN Nausea and/or Vomiting  oxyCODONE    IR 5 milliGRAM(s) Oral every 4 hours PRN Moderate Pain (4 - 6)  polyethylene glycol 3350 17 Gram(s) Oral daily PRN Constipation      FAMILY HISTORY:  FH: thyroid disease (Mother)        SOCIAL HISTORY:  ***    REVIEW OF SYSTEM:  dyspnea, fatigue     Vital Signs Last 24 Hrs  T(C): 36 (04 Feb 2022 08:24), Max: 36.9 (03 Feb 2022 22:20)  T(F): 96.8 (04 Feb 2022 08:24), Max: 98.5 (03 Feb 2022 22:20)  HR: 89 (04 Feb 2022 08:24) (74 - 91)  BP: 115/62 (04 Feb 2022 08:24) (115/62 - 143/74)  BP(mean): 87 (03 Feb 2022 20:23) (77 - 888)  RR: 20 (04 Feb 2022 08:24) (14 - 22)  SpO2: 97% (04 Feb 2022 08:24) (97% - 100%)    I&O's Summary    PHYSICAL EXAM  General Appearance: cooperative, no acute distress,   HEENT: PERRL, conjunctiva clear, EOM's intact, non injected pharynx, no exudate, TM   normal  Neck: Supple, , no adenopathy, thyroid: not enlarged, no carotid bruit or JVD  Back: Symmetric, no  tenderness,no soft tissue tenderness  Lungs: right thoracostomy in place   Heart: Regular rate and rhythm, S1, S2 normal, no murmur, rub or gallop  Abdomen: Soft, non-tender, bowel sounds active , no hepatosplenomegaly  Extremities: no cyanosis or edema, no joint swelling  Skin: Skin color, texture normal, no rashes   Neurologic: Alert and oriented X3 , cranial nerves intact, sensory and motor normal,    ECG:    LABS:                          13.1   9.51  )-----------( 318      ( 03 Feb 2022 16:40 )             39.5     02-03    137  |  103  |  23  ----------------------------<  167<H>  4.5   |  27  |  0.81    Ca    7.5<L>      03 Feb 2022 16:40    TPro  6.2  /  Alb  2.1<L>  /  TBili  0.2  /  DBili  x   /  AST  35  /  ALT  40  /  AlkPhos  106  02-03                      RADIOLOGY & ADDITIONAL STUDIES:    
HPI: Pt is a 76y old Female with hx of Metastatic adenocarcinoma of the lung with metastasis to the spine, liver, and brain, Hypothyroidism s/p thyroid resection, h/o L2 pathologic compression fracture, GERD, chronic leg edema on Lasix, pulmonary fibrosis who was hospitalized at  1/24-1/28 for worsening SOB; (+) worsening R pleural effusion treated with VATS with Pleurx placement (pleural fluid (+) exudate cytology negative for malignant cells). The patient was discharged on a three-day course of PO Keflex and instructed to hold her Lasix. Of note, the patient was planned to start chemotherapy/RT tomorrow (2/4). The patient denies any associated cough, chest congestion, subjective fevers. The patient notes that her Pleurx catheter has not drained properly over the past two days and has only had a total of 100cc output drained today. The patient was referred to the ED by her Pulmonologist for further management. In the ED, the patient was in moderate respiratory distress and found hypoxic on room air to 90-91%. Palliative medicine consulted to help establish GOC and advance care planning     2/7/2022 Patient seen and examined with  at the bedside; patient states that SOB is improving but still requiring supplemental O2.  The patient denies any pain at this time history of anxiety Xanax helping patient.  The patient and  are clear that they would like to be d/fei to start Radiation therapy and further chemo options.     PAIN: ( )Yes   (x )No  DYSPNEA: (x ) Yes  ( ) No  Level: with supplemental O2 and worse on exertion     PAST MEDICAL & SURGICAL HISTORY:  Hypothyroidism  Fracture L2 pathologic fracture  Osteoarthritis  Cholecystitis  Lung cancer adenocarcinoma  History of other surgery percutaneous endoscopic left pleural cavity drain  History of other surgery gallbladder drain  H/O thyroidectomy  History of lumbar spinal fusion  S/P left oophorectomy  History of tonsillectomy    SOCIAL HX:     Hx opiate tolerance ( )YES  (x )NO    Baseline ADLs  (Prior to Admission)  (x ) Independent   ( )Dependent    FAMILY HISTORY:  FH: thyroid disease (Mother)    Review of Systems:    Anxiety- severe  Depression- feels sad  Physical Discomfort- yes   Dyspnea- not at this time, breathing better since chest tube placed   Constipation- no   Diarrhea- no   Nausea- no   Vomiting- no   Anorexia- yes   Weight Loss-  yes   Cough- at times   Secretions- no   Fatigue- yes   Weakness- yes   Delirium- no     All other systems reviewed and negative    PHYSICAL EXAM:    Vital Signs Last 24 Hrs  T(C): 36 (07 Feb 2022 08:08), Max: 36.7 (06 Feb 2022 15:00)  T(F): 96.8 (07 Feb 2022 08:08), Max: 98 (06 Feb 2022 15:00)  HR: 88 (07 Feb 2022 08:35) (81 - 88)  BP: 109/55 (07 Feb 2022 08:08) (109/55 - 120/60)  RR: 19 (07 Feb 2022 08:08) (19 - 20)  SpO2: 100% (07 Feb 2022 08:08) (96% - 100%)    PPSV2:  40-50 %    General: tearful pleasant female in bed, NAD  Mental Status: alert and oriented, very anxious   HEENT: mmm, nasal cannula inplace   Lungs: diminished breath sounds   GI: non tender, nondistended , +BS   : + voding  Ext: mild edema in b/l hands   Neuro: intact     LABS:    Albumin: Albumin, Serum: 2.1 g/dL (02-03 @ 16:40)      Allergies    adhesives (Rash)  amoxicillin (Other; Diarrhea)  statins (Muscle Pain)    Intolerances  < from: CT Chest w/ IV Cont (02.07.22 @ 11:12) >    morphine (Nausea)    MEDICATIONS  (STANDING):  ALBUTerol    90 MICROgram(s) HFA Inhaler 2 Puff(s) Inhalation every 6 hours  budesonide 160 MICROgram(s)/formoterol 4.5 MICROgram(s) Inhaler 2 Puff(s) Inhalation two times a day  calcium carbonate   1250 mG (OsCal) 1 Tablet(s) Oral two times a day  folic acid 1 milliGRAM(s) Oral daily  guaiFENesin Oral Liquid (Sugar-Free) 200 milliGRAM(s) Oral four times a day  heparin   Injectable 5000 Unit(s) SubCutaneous every 8 hours  levothyroxine 100 MICROGram(s) Oral daily  liothyronine 5 MICROGram(s) Oral daily  methylPREDNISolone sodium succinate Injectable 40 milliGRAM(s) IV Push every 12 hours  mirtazapine 7.5 milliGRAM(s) Oral at bedtime  multivitamin 1 Tablet(s) Oral daily  nystatin    Suspension 195917 Unit(s) Oral four times a day  pantoprazole    Tablet 40 milliGRAM(s) Oral before breakfast  tiotropium 18 MICROgram(s) Capsule 1 Capsule(s) Inhalation daily    MEDICATIONS  (PRN):  acetaminophen     Tablet .. 650 milliGRAM(s) Oral once PRN Temp greater or equal to 38C (100.4F), Mild Pain (1 - 3)  acetaminophen     Tablet .. 650 milliGRAM(s) Oral every 6 hours PRN Temp greater or equal to 38C (100.4F), Mild Pain (1 - 3)  ALPRAZolam 0.5 milliGRAM(s) Oral every 6 hours PRN anxiety  aluminum hydroxide/magnesium hydroxide/simethicone Suspension 30 milliLiter(s) Oral every 4 hours PRN Dyspepsia  bisacodyl 5 milliGRAM(s) Oral every 12 hours PRN Constipation  melatonin 3 milliGRAM(s) Oral at bedtime PRN Insomnia  morphine  - Injectable 2 milliGRAM(s) IV Push every 4 hours PRN Severe Pain (7 - 10)  ondansetron Injectable 4 milliGRAM(s) IV Push every 8 hours PRN Nausea and/or Vomiting  oxyCODONE    IR 5 milliGRAM(s) Oral every 4 hours PRN Moderate Pain (4 - 6)  polyethylene glycol 3350 17 Gram(s) Oral daily PRN Constipation      RADIOLOGY/ADDITIONAL STUDIES:    ACC: [id number] EXAM:  CT CHEST IC                        PROCEDURE DATE:  02/07/2022    INTERPRETATION:  HISTORY: Stage IV lung cancer.  EXAMINATION: CT CHEST was performed with IV contrast.  CONTRAST/COMPLICATIONS:  IV Contrast: [id number]  75 cc administered   0 cc discarded  Oral Contrast: NONE  Complications: None reported at time of study completion    COMPARISON: 1/24/2022 CT chest.  FINDINGS:  AIRWAYS, LUNGS, PLEURA: Treated left upper lobe malignancy measuring 2.5   x 2 cm (image 44, series 2) is unchanged compared to 1/24/2022.  Additional left upper lobe 0.8 cm nodular opacity (image 51, series 2)   unchanged.  Consolidation at the left base unchanged.  Unchanged left lung volume loss and no significant change in nodular   thickening along the left mediastinal pleura; reference 1.5 cm nodular   lesion along the left mediastinal pleura (image 55, series 2).  Interval placement of right-sided chest tube. Interval resolution of   right pleural effusion.  MEDIASTINUM: Normal heart size. No pericardial effusion. Thoracic aorta   normal caliber.  No large mediastinal lymph nodes. Fluid-filled   esophagus. Port-A-Cath tip terminates in the lower SVC.  IMAGED ABDOMEN: Limited assessment of the imaged abdomen secondary to   beam hardening artifact from spinal hardware. 5.6 x 2.5 cm hypodense   lesion involving the caudate lobe (image 116, series 2). Multiple   additional smaller hypodense lesions throughout the liver.  SOFT TISSUES: Unremarkable.  BONES: Orthopedic fixation hardware of the thoracic and imaged lumbar   spine. T10 vertebral body sclerosis similar to prior exam. Sclerosis and   compression of L2 vertebral body.    IMPRESSION:.    2.5 cm treated left upper lobe malignancy, additional left upper lobe   indeterminate 0.8 cm nodular opacity, and left pleural metastatic disease   without significant interval change compared to 1/24/2022.  Interval resolution of right pleural effusion compared to 1/24/2022.  Persistent left basilar consolidation.  5.6 cm mass within the caudate lobe and multiple additional hepatic   hypodense lesions likely represents metastatic disease.    ACC: [id number] EXAM:  CT NECK SOFT TISSUE IC                        PROCEDURE DATE:  02/07/2022    INTERPRETATION:  CT neck with IV contrast  CLINICAL INFORMATION: Stage IV lung cancer  TECHNIQUE:  Contiguous axial 3 mm thick sections were obtained through   the neck using single helical acquisition.  Images were acquired during   the intravenous administration of 95 cc of Omnipaque 350/ 5 cc discarded.    Image data was reconstructed in the 3 mm sagittal and coronal planes.    This scan was performed using automatic exposure control (radiation dose   reduction software) to obtain a diagnostic image quality scan with   patient dose as low as reasonably achievable.  FINDINGS:   No prior similar studies are available for review.  No neck mass is found.  No pathologically enlarged lymph nodes are found.    The visualized lymph nodes demonstrate no central necrosis or extranodal   extension.  The mucosal surfaces of the upper aerodigestive tract demonstrate   physiologic mucosal enhancement and appear symmetric and unremarkable.    The larynx demonstrates RIGHT-sided vocal cord paralysis with a   paramedian position and enlarged RIGHT lateral ventricle. The RIGHT   arytenoid cartilage is sclerotic, possibly due to prior treatment.  The   preepiglottic and paralaryngeal spaces are intact. The visualized   proximal esophagus is minimally dilated and contains fluid.  The nasopharynx is symmetric.  No lateral retropharyngeal mass is found.    The underlying central skull base is intact.  The petrous temporal bones   and mastoids remain clear.  The visualized base of brain appears   unremarkable.  The parotid and submandibular glands are intact.  The thyroid gland is   intact.  The visualized paranasal sinuses are clear.  The nasal cavity is   unremarkable.  The cervical spine shows degenerative disc disease and spondylosis at   C3-4 through C6-7 with loss of disc height and associated degenerative   endplate changes.  The carotid and vertebral arteries demonstrate enhancement indicating   their patency.  The lung apices show a tiny RIGHT pleural effusion and RIGHT-sided chest   tube. Moderate LEFT pleural effusion is noted with underlying atelectasis.    IMPRESSION: RIGHT-sided vocal cord paralysis with a paramedian position   and enlarged RIGHT lateral ventricle. The RIGHT arytenoid cartilage is   sclerotic, possibly due to prior treatment.    The visualized proximal   esophagus is minimally dilated and contains fluid. Tiny RIGHT pleural  effusion and RIGHT-sided chest tube. Moderate LEFT pleural effusion is   noted with underlying atelectasis      ACC: [id number] EXAM:  XR CHEST PORTABLE ROUTINE 1V                        PROCEDURE DATE:  02/06/2022    INTERPRETATION:  AP erect chest on February 6, 2022 at 8:36 AM. Patient   is short of breath.  Heart magnified by technique. Rather extensive lower thoracic hardware   going into the lumbar area again noted. Right Pleurx catheter right-sided   MediPort again noted.  Moderate right effusion on February 3 diminished. No pneumothorax.  There is diffuse infiltration in the left lungwith associated mild to   moderate effusion which is unchanged.    IMPRESSION: Improved right base findings. Other findings stable.          
History of Present Illness:  76y Female PMHx significant for Metastatic adenocarcinoma of the lung with metastasis to the spine, liver, and brain, Hypothyroidism s/p thyroid resection, h/o L2 pathologic compression fracture, GERD, chronic leg edema on Lasix, pulmonary fibrosis who was hospitalized at  1/24-1/28 for worsening SOB; (+) worsening R pleural effusion treated with VATS with Pleurx placement admitted 2/4 w hypoxia and pleurx not draining well. Last drained on Tuesday, 2/1/22, minimal drainage. Drainage also associated w pain. CXR reveals effusion on right w pleurx up to apex of lung.  Pt seen, anxious to have pleurx hooked up to drain.     PMH/PSH:  No pertinent past medical history    Hypothyroidism    Fracture  L2 pathologic fracture    Osteoarthritis    Cholecystitis    Lung cancer  adenocarcinoma    OA (osteoarthritis)      No significant past surgical history    History of other surgery  percutaneous endoscopic left pleural cavity drain    History of other surgery  gallbladder drain    H/O thyroidectomy    History of lumbar spinal fusion    S/P left oophorectomy    History of tonsillectomy        Relevant Family History  FAMILY HISTORY:  FH: thyroid disease (Mother)        SOCIAL HISTORY:  Smoker: [ ] Yes  [x ] No        PACK YEARS:                         WHEN QUIT?  ETOH use: [ ] Yes  [x ] No              FREQUENCY / QUANTITY:  Ilicit Drug use:  [ ] Yes  [x ] No      MEDICATIONS  (STANDING):  folic acid 1 milliGRAM(s) Oral daily  heparin   Injectable 5000 Unit(s) SubCutaneous every 8 hours  levothyroxine 100 MICROGram(s) Oral daily  liothyronine 5 MICROGram(s) Oral daily  mirtazapine 7.5 milliGRAM(s) Oral at bedtime  multivitamin 1 Tablet(s) Oral daily  nystatin    Suspension 768905 Unit(s) Oral four times a day  pantoprazole    Tablet 40 milliGRAM(s) Oral before breakfast  tiotropium 18 MICROgram(s) Capsule 1 Capsule(s) Inhalation daily    MEDICATIONS  (PRN):  acetaminophen     Tablet .. 650 milliGRAM(s) Oral once PRN Temp greater or equal to 38C (100.4F), Mild Pain (1 - 3)  acetaminophen     Tablet .. 650 milliGRAM(s) Oral every 6 hours PRN Temp greater or equal to 38C (100.4F), Mild Pain (1 - 3)  ALBUTerol    90 MICROgram(s) HFA Inhaler 2 Puff(s) Inhalation every 6 hours PRN Bronchospasm  ALPRAZolam 0.25 milliGRAM(s) Oral every 8 hours PRN anxiety  aluminum hydroxide/magnesium hydroxide/simethicone Suspension 30 milliLiter(s) Oral every 4 hours PRN Dyspepsia  bisacodyl 5 milliGRAM(s) Oral every 12 hours PRN Constipation  melatonin 3 milliGRAM(s) Oral at bedtime PRN Insomnia  morphine  - Injectable 2 milliGRAM(s) IV Push every 4 hours PRN Severe Pain (7 - 10)  ondansetron Injectable 4 milliGRAM(s) IV Push every 8 hours PRN Nausea and/or Vomiting  oxyCODONE    IR 5 milliGRAM(s) Oral every 4 hours PRN Moderate Pain (4 - 6)  polyethylene glycol 3350 17 Gram(s) Oral daily PRN Constipation      Allergies: adhesives (Rash)  amoxicillin (Other; Diarrhea)  morphine (Nausea)  statins (Muscle Pain)                                                            LABS:                        13.1   9.51  )-----------( 318      ( 03 Feb 2022 16:40 )             39.5     02-03    137  |  103  |  23  ----------------------------<  167<H>  4.5   |  27  |  0.81    Ca    7.5<L>      03 Feb 2022 16:40    TPro  6.2  /  Alb  2.1<L>  /  TBili  0.2  /  DBili  x   /  AST  35  /  ALT  40  /  AlkPhos  106  02-03      < from: Xray Chest 1 View- PORTABLE-Urgent (02.03.22 @ 16:27) >  Heart magnified by technique. Right MediPort and fairly extensive lumbar   hardware again noted.    Right Pleurx catheter remains. A Pleurx catheter has been advanced into   the right chest compared to January 27.    Significant diffuse left lung infiltrate is unchanged.    Present film shows increasing right base effusion which is new.    IMPRESSION: Advanced lung disease on the left unchanged. Increasing right   base effusion. Pleurx catheter has been advanced.    < end of copied text >              Review of Systems             Constitutional:  general malaise, weight loss,  HEENT:  dry mouth,     Respiratory:  SOB, CEDILLO, cough,   Cardiovascular: right CP w drainage   Gastrointestinal: denies nausea, vomiting, diarrhea, constipation, abdominal pain, melena   Genitourinary: denies dysuria,   Skin/Breast: denies    Musculoskeletal:  myalgias, arthritis, muscle weakness  Neurologic: brain mets, spine mets  Psychiatric:  anxious, depressed,   Endocrine: denies increased fingerstick glucoses, cold or heat intolerance, polydipsia, polyuria, polyphagia   Hematology/Oncology: metastatic CA   ROS negative x 10 systems except as noted above    T(C): 36 (02-04-22 @ 08:24), Max: 36.9 (02-03-22 @ 22:20)  HR: 89 (02-04-22 @ 08:24) (74 - 91)  BP: 115/62 (02-04-22 @ 08:24) (115/62 - 133/79)  RR: 20 (02-04-22 @ 08:24) (18 - 22)  SpO2: 97% (02-04-22 @ 08:24) (97% - 98%)      Physical Exam  General:  NAD, anxious                                                         Neuro: A+O x 3,                      Eyes: PERRL, EOMI   ENT: Normal exam of nasal/oral mucosa with absence of cyanosis.   Neck: supple, no JVD, trachea midline   Chest: decreased right base, accessory muscle use noted  CV: RRR,   GI: soft, NT, ND,  Extremities: warm  SKIN: warm, dry, intact   
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 The patient was discharged on a 3 day course of PO Keflex and instructed to hold her Lasix. Of note the patient was planned to start chemotherapy/RT  (2/4). The patient denies any associated cough, chest congestion, subjective fevers. The patient notes that her Pleurx catheter has not drained properly over the past 2 days and has only had a total of 100cc output drained today. The patient was referred to the ED by her Pulmonologist for further management. In the ED the patient was in moderate respiratory distress found to be hypoxic on room air to 90-91%. (03 Feb 2022 23:49)    CXR showed increasing right pleural effusion and malposition of the pleurex catheter to the left upper lobe    repositioned and drained 500 cc           PAST MEDICAL & SURGICAL HISTORY:  Hypothyroidism    Fracture  L2 pathologic fracture    Osteoarthritis    Cholecystitis    Lung cancer  adenocarcinoma    History of other surgery  percutaneous endoscopic left pleural cavity drain    History of other surgery  gallbladder drain    H/O thyroidectomy    History of lumbar spinal fusion    S/P left oophorectomy    History of tonsillectomy        REVIEW OF SYSTEMS    General:	  short of breath  weakness  no fever  cough  anxious      Allergies    adhesives (Rash)  amoxicillin (Other; Diarrhea)  statins (Muscle Pain)    Intolerances    morphine (Nausea)      Occupation:  [employer]: Denied Smoking: Denied Drug Use: Denied  Marital Status:         FAMILY HISTORY:  FH: thyroid disease (Mother)        Home Medications:  Albuterol (Eqv-Proventil HFA) 90 mcg/inh inhalation aerosol: 2 puff(s) inhaled every 6 hours, As Needed (03 Feb 2022 19:45)  biotin 1000 mcg oral tablet: 1 tab(s) orally once a day (03 Feb 2022 19:45)  calcium (as carbonate) 600 mg oral tablet: 1 tab(s) orally 2 times a day (03 Feb 2022 19:45)  ciclopirox 0.77% topical gel: Apply topically to affected area 2 times a day (03 Feb 2022 19:45)  esomeprazole 40 mg oral delayed release capsule: 1 cap(s) orally once a day, As Needed (03 Feb 2022 19:45)  liothyronine 5 mcg oral tablet: 1 tab(s) orally once a day (03 Feb 2022 19:45)  Multiple Vitamins oral tablet: 1 tab(s) orally once a day (03 Feb 2022 19:45)  ondansetron 8 mg oral tablet: 1 tab(s) orally 2 times a day, As Needed (03 Feb 2022 19:45)  Pfizer-BioNTech COVID-19 Vaccine PF 30 mcg/0.3 mL intramuscular suspension: 0.3 milliliter(s) intramuscular once  ****Booster as of 08/21*** (03 Feb 2022 19:45)  Synthroid 100 mcg (0.1 mg) oral tablet: 1 tab(s) orally once a day (03 Feb 2022 19:45)      MEDICATIONS  (STANDING):  calcium carbonate   1250 mG (OsCal) 1 Tablet(s) Oral two times a day  folic acid 1 milliGRAM(s) Oral daily  heparin   Injectable 5000 Unit(s) SubCutaneous every 8 hours  levothyroxine 100 MICROGram(s) Oral daily  liothyronine 5 MICROGram(s) Oral daily  mirtazapine 7.5 milliGRAM(s) Oral at bedtime  multivitamin 1 Tablet(s) Oral daily  nystatin    Suspension 121137 Unit(s) Oral four times a day  pantoprazole    Tablet 40 milliGRAM(s) Oral before breakfast  tiotropium 18 MICROgram(s) Capsule 1 Capsule(s) Inhalation daily    MEDICATIONS  (PRN):  acetaminophen     Tablet .. 650 milliGRAM(s) Oral once PRN Temp greater or equal to 38C (100.4F), Mild Pain (1 - 3)  acetaminophen     Tablet .. 650 milliGRAM(s) Oral every 6 hours PRN Temp greater or equal to 38C (100.4F), Mild Pain (1 - 3)  ALBUTerol    90 MICROgram(s) HFA Inhaler 2 Puff(s) Inhalation every 6 hours PRN Bronchospasm  ALPRAZolam 0.25 milliGRAM(s) Oral every 8 hours PRN anxiety  aluminum hydroxide/magnesium hydroxide/simethicone Suspension 30 milliLiter(s) Oral every 4 hours PRN Dyspepsia  bisacodyl 5 milliGRAM(s) Oral every 12 hours PRN Constipation  melatonin 3 milliGRAM(s) Oral at bedtime PRN Insomnia  morphine  - Injectable 2 milliGRAM(s) IV Push every 4 hours PRN Severe Pain (7 - 10)  ondansetron Injectable 4 milliGRAM(s) IV Push every 8 hours PRN Nausea and/or Vomiting  oxyCODONE    IR 5 milliGRAM(s) Oral every 4 hours PRN Moderate Pain (4 - 6)  polyethylene glycol 3350 17 Gram(s) Oral daily PRN Constipation      PHYSICAL EXAM:  Vital Signs Last 24 Hrs  T(C): 36 (04 Feb 2022 08:24), Max: 36 (04 Feb 2022 08:24)  T(F): 96.8 (04 Feb 2022 08:24), Max: 96.8 (04 Feb 2022 08:24)  HR: 82 (04 Feb 2022 15:56) (82 - 89)  BP: 94/50 (04 Feb 2022 15:56) (94/50 - 115/62)  BP(mean): --  RR: 20 (04 Feb 2022 15:56) (20 - 20)  SpO2: 100% (04 Feb 2022 15:56) (97% - 100%)      Gen:     anxious  chest decreased air entry right'  edema            LABS:                        13.1   9.51  )-----------( 318      ( 03 Feb 2022 16:40 )             39.5     03 Feb 2022 16:40    137    |  103    |  23     ----------------------------<  167    4.5     |  27     |  0.81     Ca    7.5        03 Feb 2022 16:40    TPro  6.2    /  Alb  2.1    /  TBili  0.2    /  DBili  x      /  AST  35     /  ALT  40     /  AlkPhos  106    03 Feb 2022 16:40    LIVER FUNCTIONS - ( 03 Feb 2022 16:40 )  Alb: 2.1 g/dL / Pro: 6.2 gm/dL / ALK PHOS: 106 U/L / ALT: 40 U/L / AST: 35 U/L / GGT: x                 RADIOLOGY & ADDITIONAL STUDIES:    increased right pleural effusion  pleurex tip in right upper lobe

## 2022-02-11 NOTE — PROGRESS NOTE ADULT - ASSESSMENT
77 y/o F PMHx significant for Metastatic adenocarcinoma of the lung with metastasis to the spine, liver, and brain, Hypothyroidism s/p thyroid resection, h/o L2 pathologic compression fracture, GERD, chronic leg edema on Lasix, pulmonary fibrosis who was hospitalized at  1/24-1/28 for worsening SOB; (+) worsening R pleural effusion treated with VATS with Pleurx placement (pleural fluid (+) exudate cytology negative for malignant cells). The patient was discharged on a 3 day course of PO Keflex and instructed to hold her Lasix. Of note the patient was planned to start chemotherapy/RT tomorrow (2/4). The patient denies any associated cough, chest congestion, subjective fevers. The patient notes that her Pleurx catheter has not drained properly over the past 2 days and has only had a total of 100cc output drained today. The patient was referred to the ED by her Pulmonologist for further management. In the ED the patient was in moderate respiratory distress found to be hypoxic on room air to 90-91%.    #Acute Hypoxic Respiratory Failure due to Right Pleural Effusion as a consequence of a Pleurx Catheter Malfunction, possible component of pulm fibrosis overlap  - cont. supplemental O2  - f/u w/ Pulmonology appreciated  - f/u w/ CT Surgery consult appreciated: Drain Pleurx M/W/F up to 1 Liter  -o2 eval Pulse ox 92% on 3 L at rest, to 88% while ambulating. Is a candidate for Home o2 to help with     activities of daily living, especially  with her diagnosis of Lung Cancer with mets.   - Pleurx Repositioning by  CTS today    #acute exacerbation of pulmonary fibrosis  - Restart Solumedrol  - Pulmonary Following     # Dysphagia  - Discussed with GI  - Reviewed CT Chest with Radiology  - Fluid filled esophagus  - GI consult appreciated, Start fluconazole  - Esophageal dysmotility as per esophagram    # Stage 4 Adenocarcinoma Lung  - Heme onc following  - Palliative for GOC consult appreciated  - Family wants everything done  - SP Chemotherapy (Carboplatin & Alimta)   - Increased weakness and decreased appetite today    # Vocal Cord paralysis  - ? Mediastinal involvement  - Will discuss with ENT at [facility] today    # Leukocytosis  - Likely due to steroids  - Monitor    # Hyperkalemia  - Chemo related  - Lokelma x 1 now  - Improved     #anxiety  - xanax.    #GERD  -cont. Omeprazole 40mg po daily    #Hypothyroidism  -cont. Liothyronine 5mcg po daily  -cont. Levothyroxine 100mcg po qam      #Constipation  -cont. Bisacodyl prn  -cont. Miralax prn    #Advance Directives   -DNR/DNI    #Vte ppx  -IMPROVE VTE  Risk Score is 3  -cont. Heparin sq    Disposition: Prognosis guarded 75 y/o F PMHx significant for Metastatic adenocarcinoma of the lung with metastasis to the spine, liver, and brain, Hypothyroidism s/p thyroid resection, h/o L2 pathologic compression fracture, GERD, chronic leg edema on Lasix, pulmonary fibrosis who was hospitalized at  1/24-1/28 for worsening SOB; (+) worsening R pleural effusion treated with VATS with Pleurx placement (pleural fluid (+) exudate cytology negative for malignant cells). The patient was discharged on a 3 day course of PO Keflex and instructed to hold her Lasix. Of note the patient was planned to start chemotherapy/RT tomorrow (2/4). The patient denies any associated cough, chest congestion, subjective fevers. The patient notes that her Pleurx catheter has not drained properly over the past 2 days and has only had a total of 100cc output drained today. The patient was referred to the ED by her Pulmonologist for further management. In the ED the patient was in moderate respiratory distress found to be hypoxic on room air to 90-91%.    #Acute Hypoxic Respiratory Failure due to Right Pleural Effusion as a consequence of a Pleurx Catheter Malfunction, possible component of pulm fibrosis overlap  - cont. supplemental O2  - f/u w/ Pulmonology appreciated  - f/u w/ CT Surgery consult appreciated: Drain Pleurx M/W/F up to 1 Liter  -o2 eval Pulse ox 92% on 3 L at rest, to 88% while ambulating. Is a candidate for Home o2 to help with     activities of daily living, especially  with her diagnosis of Lung Cancer with mets.   - Pleurx Repositioning by  CTS today    #acute exacerbation of pulmonary fibrosis  - Restart Solumedrol  - Pulmonary Following     # Dysphagia  - Discussed with GI  - Reviewed CT Chest with Radiology  - Fluid filled esophagus  - GI consult appreciated, Start fluconazole  - Esophageal dysmotility as per esophagram  - Speech and swallow eval     # Stage 4 Adenocarcinoma Lung  - Heme onc following  - Palliative for GOC consult appreciated  - Family wants everything done  - SP Chemotherapy (Carboplatin & Alimta)   - Increased weakness and decreased appetite today    # Vocal Cord paralysis  - ? Mediastinal involvement  - Discussed with Dr. Jacek MCADAMS ENT today, Recommend CT Skull base    and Mediastinum for vagal nerve involvement and fu outpatient      # Leukocytosis  - Likely due to steroids  - Monitor    # Hyperkalemia  - Chemo related  - Lokelma x 1 now  - Improved     #anxiety  - xanax.    #GERD  -cont. Omeprazole 40mg po daily    #Hypothyroidism  -cont. Liothyronine 5mcg po daily  -cont. Levothyroxine 100mcg po qam      #Constipation  -cont. Bisacodyl prn  -cont. Miralax prn    #Advance Directives   -DNR/DNI    #Vte ppx  -IMPROVE VTE  Risk Score is 3  -cont. Heparin sq    Disposition: Prognosis guarded 75 y/o F PMHx significant for Metastatic adenocarcinoma of the lung with metastasis to the spine, liver, and brain, Hypothyroidism s/p thyroid resection, h/o L2 pathologic compression fracture, GERD, chronic leg edema on Lasix, pulmonary fibrosis who was hospitalized at  1/24-1/28 for worsening SOB; (+) worsening R pleural effusion treated with VATS with Pleurx placement (pleural fluid (+) exudate cytology negative for malignant cells). The patient was discharged on a 3 day course of PO Keflex and instructed to hold her Lasix. Of note the patient was planned to start chemotherapy/RT tomorrow (2/4). The patient denies any associated cough, chest congestion, subjective fevers. The patient notes that her Pleurx catheter has not drained properly over the past 2 days and has only had a total of 100cc output drained today. The patient was referred to the ED by her Pulmonologist for further management. In the ED the patient was in moderate respiratory distress found to be hypoxic on room air to 90-91%.    #Acute Hypoxic Respiratory Failure due to Right Pleural Effusion as a consequence of a Pleurx Catheter Malfunction, possible component of pulm fibrosis overlap  - cont. supplemental O2  - f/u w/ Pulmonology appreciated  - f/u w/ CT Surgery consult appreciated: Drain Pleurx M/W/F up to 1 Liter  -o2 eval Pulse ox 92% on 3 L at rest, to 88% while ambulating. Is a candidate for Home o2 to help with     activities of daily living, especially  with her diagnosis of Lung Cancer with mets.   - Pleurx Repositioning by  CTS today    #acute exacerbation of pulmonary fibrosis  - Restart Solumedrol  - Pulmonary Following     # Dysphagia  - Discussed with GI  - Reviewed CT Chest with Radiology  - Fluid filled esophagus  - GI consult appreciated, Start fluconazole  - Esophageal dysmotility as per esophagram  - Speech and swallow eval     # Stage 4 Adenocarcinoma Lung  - Heme onc following  - Palliative for GOC consult appreciated  - Family wants everything done  - SP Chemotherapy (Carboplatin & Alimta)   - Increased weakness and decreased appetite today    # Vocal Cord paralysis  - ? Mediastinal involvement  - Discussed with Dr. Jacek MCADAMS ENT today, Recommend CT Skull base    and Mediastinum for vagal nerve involvement and fu outpatient  - Will discuss with radiology       # Leukocytosis  - Likely due to steroids  - Monitor    # Hyperkalemia  - Chemo related  - Lokelma x 1 now  - Improved     #anxiety  - xanax.    #GERD  -cont. Omeprazole 40mg po daily    #Hypothyroidism  -cont. Liothyronine 5mcg po daily  -cont. Levothyroxine 100mcg po qam      #Constipation  -cont. Bisacodyl prn  -cont. Miralax prn    #Advance Directives   -DNR/DNI    #Vte ppx  -IMPROVE VTE  Risk Score is 3  -cont. Heparin sq    Disposition: Prognosis guarded 75 y/o F PMHx significant for Metastatic adenocarcinoma of the lung with metastasis to the spine, liver, and brain, Hypothyroidism s/p thyroid resection, h/o L2 pathologic compression fracture, GERD, chronic leg edema on Lasix, pulmonary fibrosis who was hospitalized at  1/24-1/28 for worsening SOB; (+) worsening R pleural effusion treated with VATS with Pleurx placement (pleural fluid (+) exudate cytology negative for malignant cells). The patient was discharged on a 3 day course of PO Keflex and instructed to hold her Lasix. Of note the patient was planned to start chemotherapy/RT tomorrow (2/4). The patient denies any associated cough, chest congestion, subjective fevers. The patient notes that her Pleurx catheter has not drained properly over the past 2 days and has only had a total of 100cc output drained today. The patient was referred to the ED by her Pulmonologist for further management. In the ED the patient was in moderate respiratory distress found to be hypoxic on room air to 90-91%.    #Acute Hypoxic Respiratory Failure due to Right Pleural Effusion as a consequence of a Pleurx Catheter Malfunction, possible component of pulm fibrosis overlap  - cont. supplemental O2  - f/u w/ Pulmonology appreciated  - f/u w/ CT Surgery consult appreciated: Drain Pleurx M/W/F up to 1 Liter  -o2 eval Pulse ox 92% on 3 L at rest, to 88% while ambulating. Is a candidate for Home o2 to help with     activities of daily living, especially  with her diagnosis of Lung Cancer with mets.   - Pleurx Repositioning by  CTS today    #acute exacerbation of pulmonary fibrosis  - Restart Solumedrol  - Pulmonary Following     # Dysphagia  - Discussed with GI  - Reviewed CT Chest with Radiology  - Fluid filled esophagus  - GI consult appreciated, Start fluconazole  - Esophageal dysmotility as per esophagram  - Speech and swallow eval     # Stage 4 Adenocarcinoma Lung  - Heme onc following  - Palliative for GOC consult appreciated  - Family wants everything done  - SP Chemotherapy (Carboplatin & Alimta)   - Increased weakness and decreased appetite today    # Vocal Cord paralysis  - ? Mediastinal involvement  - Discussed with Dr. Jacek MCADAMS ENT today, Recommend CT Skull base    and Mediastinum for vagal nerve involvement however patient just    had Neck and chest CT. They recommend fu as outpatient for scope    and possible intervention        # Leukocytosis  - Likely due to steroids  - Monitor    # Hyperkalemia  - Chemo related  - Lokelma x 1 now  - Improved     #anxiety  - xanax.    #GERD  -cont. Omeprazole 40mg po daily    #Hypothyroidism  -cont. Liothyronine 5mcg po daily  -cont. Levothyroxine 100mcg po qam      #Constipation  -cont. Bisacodyl prn  -cont. Miralax prn    #Advance Directives   -DNR/DNI    #Vte ppx  -IMPROVE VTE  Risk Score is 3  -cont. Heparin sq    Disposition: Prognosis guarded

## 2022-02-11 NOTE — PROGRESS NOTE ADULT - REASON FOR ADMISSION
Fever, Shortness of Breath

## 2022-02-11 NOTE — SBIRT NOTE ADULT - NSSBIRTUNABLESCROTHER_GEN_A_CORE
Due to pt's medical state, significant for Metastatic adenocarcinoma of the lung with metastasis to the spine, liver, and brain, SBIRT is not appropriate to complete. SW team remains available to assist pt with resources if need arises

## 2022-02-11 NOTE — PROGRESS NOTE ADULT - ASSESSMENT
76y Female PMHx significant for Metastatic adenocarcinoma of the lung with metastasis to the spine, liver, and brain, Hypothyroidism s/p thyroid resection, h/o L2 pathologic compression fracture, GERD, chronic leg edema on Lasix, pulmonary fibrosis who was hospitalized at  1/24-1/28 for worsening SOB; (+) worsening R pleural effusion treated with VATS with Pleurx placement admitted 2/4 w hypoxia and pleurx not draining well. Last drained on Tuesday, 2/1/22, minimal drainage. Drainage also associated w pain. CXR reveals effusion on right w pleurx up to apex of lung. Drains when pt lying supine. Right vocal cord paralysis. 2/9 pleurx drainage 225 cc .  patient did not tolerate very well + discomfort.  patient declined palliative     plan    plan for pleurx adjustment tomorrow in the OR today   NPO  COVID neg  pain control  heme/onc appreciated   maintain sterile pleurx dressing  would recommend to keep patient until tomorrow   homecare plan for pleurx drainage Monday/Wednesday/Fridays up to 1 l per session   Will plan to cap Pleurx tomorrow   Discussed with Cardiothoracic Team at AM rounds.

## 2022-02-11 NOTE — PROGRESS NOTE ADULT - SUBJECTIVE AND OBJECTIVE BOX
Patient seen and examined:  Difficulty eating now, decreased  appetite. Still with UA stridor  No sob or chest pain. Going  for pleurx change today.   No fever    ROS: Negative except for above      MEDICATIONS  (STANDING):  ALBUTerol    90 MICROgram(s) HFA Inhaler 2 Puff(s) Inhalation every 6 hours  budesonide 160 MICROgram(s)/formoterol 4.5 MICROgram(s) Inhaler 2 Puff(s) Inhalation two times a day  calcium carbonate   1250 mG (OsCal) 1 Tablet(s) Oral two times a day  fluconAZOLE   Tablet 100 milliGRAM(s) Oral daily  folic acid 1 milliGRAM(s) Oral daily  guaiFENesin Oral Liquid (Sugar-Free) 200 milliGRAM(s) Oral four times a day  heparin   Injectable 5000 Unit(s) SubCutaneous every 8 hours  levothyroxine 100 MICROGram(s) Oral daily  liothyronine 5 MICROGram(s) Oral daily  melatonin 5 milliGRAM(s) Oral at bedtime  methylPREDNISolone sodium succinate Injectable 40 milliGRAM(s) IV Push two times a day  mirtazapine 7.5 milliGRAM(s) Oral at bedtime  multivitamin 1 Tablet(s) Oral daily  nystatin    Suspension [id number] Unit(s) Oral four times a day  pantoprazole    Tablet 40 milliGRAM(s) Oral before breakfast  tiotropium 18 MICROgram(s) Capsule 1 Capsule(s) Inhalation daily    MEDICATIONS  (PRN):  acetaminophen     Tablet .. 650 milliGRAM(s) Oral once PRN Temp greater or equal to 38C (100.4F), Mild Pain (1 - 3)  acetaminophen     Tablet .. 650 milliGRAM(s) Oral every 6 hours PRN Temp greater or equal to 38C (100.4F), Mild Pain (1 - 3)  ALPRAZolam 0.5 milliGRAM(s) Oral every 6 hours PRN anxiety  aluminum hydroxide/magnesium hydroxide/simethicone Suspension 30 milliLiter(s) Oral every 4 hours PRN Dyspepsia  bisacodyl 5 milliGRAM(s) Oral every 12 hours PRN Constipation  loperamide 2 milliGRAM(s) Oral two times a day PRN Diarrhea  melatonin 3 milliGRAM(s) Oral at bedtime PRN Insomnia  morphine  - Injectable 2 milliGRAM(s) IV Push every 4 hours PRN Severe Pain (7 - 10)  ondansetron   Disintegrating Tablet 8 milliGRAM(s) Oral every 8 hours PRN Nausea and/or Vomiting  ondansetron Injectable 4 milliGRAM(s) IV Push every 8 hours PRN Nausea and/or Vomiting  oxyCODONE    IR 5 milliGRAM(s) Oral every 4 hours PRN Moderate Pain (4 - 6)  polyethylene glycol 3350 17 Gram(s) Oral daily PRN Constipation  zolpidem 5 milliGRAM(s) Oral at bedtime PRN Insomnia      VITALS:  T(F): 97.9 (02-11-22 @ 08:37), Max: 98.1 (02-10-22 @ 19:56)  HR: 80 (02-11-22 @ 08:37) (77 - 80)  BP: 112/56 (02-11-22 @ 08:37) (94/50 - 113/52)  RR: 19 (02-11-22 @ 08:37) (18 - 20)  SpO2: 100% (02-11-22 @ 08:37) (95% - 100%)  Wt(kg): --    I&O's Summary    10 Feb 2022 07:01  -  11 Feb 2022 07:00  --------------------------------------------------------  IN: 120 mL / OUT: 0 mL / NET: 120 mL        CAPILLARY BLOOD GLUCOSE          PHYSICAL EXAM:  GEN: Frail, Stridor present  HEENT:  pupils equal and reactive, EOMI, no oropharyngeal lesions, erythema, exudates, oral thrush  NECK:   supple, no carotid bruits, no palpable lymph nodes, no thyromegaly  CV:  +S1, +S2, regular, no murmurs or rubs  RESP:   lungs clear to auscultation bilaterally, no wheezing, rales, rhonchi, good air entry bilaterally. Transmitted UA sounds  BREAST:  not examined  GI:  abdomen soft, non-tender, non-distended, normal BS, no bruits, no abdominal masses, no palpable masses  RECTAL:  not examined  :  not examined  MSK:   normal muscle tone, no atrophy, no rigidity, no contractions  EXT:  no clubbing, no cyanosis, no edema, no calf pain, swelling or erythema  VASCULAR:  pulses equal and symmetric in the upper and lower extremities  NEURO:  AAOX3, no focal neurological deficits, follows all commands, able to move extremities spontaneously  SKIN:  no ulcers, lesions or rashes    LABS:                            12.4   15.04 )-----------( 387      ( 11 Feb 2022 12:05 )             39.1     02-11    139  |  101  |  31<H>  ----------------------------<  108<H>  5.1   |  36<H>  |  0.71    Ca    8.1<L>      11 Feb 2022 12:05    TPro  6.1  /  Alb  2.2<L>  /  TBili  0.3  /  DBili  x   /  AST  36  /  ALT  108<H>  /  AlkPhos  130<H>  02-10        LIVER FUNCTIONS - ( 10 Feb 2022 12:10 )  Alb: 2.2 g/dL / Pro: 6.1 gm/dL / ALK PHOS: 130 U/L / ALT: 108 U/L / AST: 36 U/L / GGT: x           PT/INR - ( 10 Feb 2022 07:12 )   PT: 11.2 sec;   INR: 0.96 ratio         PTT - ( 10 Feb 2022 07:12 )  PTT:33.2 sec

## 2022-02-11 NOTE — PROGRESS NOTE ADULT - NUTRITIONAL ASSESSMENT
This patient has been assessed with a concern for Malnutrition and has been determined to have a diagnosis/diagnoses of Severe protein-calorie malnutrition.    This patient is being managed with:   Diet NPO after Midnight-     NPO Start Date: 10-Feb-2022   NPO Start Time: 23:59  Entered: Feb 10 2022 10:16AM    Diet NPO after Midnight-     NPO Start Date: 09-Feb-2022   NPO Start Time: 23:59  Entered: Feb 9 2022  3:17PM    Diet Regular-  Prosource Gelatein Plus     Qty per Day:  3  Supplement Feeding Modality:  Oral  Entered: Feb 5 2022 10:13AM    
This patient has been assessed with a concern for Malnutrition and has been determined to have a diagnosis/diagnoses of Severe protein-calorie malnutrition.    This patient is being managed with:   Diet NPO after Midnight-     NPO Start Date: 10-Feb-2022   NPO Start Time: 23:59  Entered: Feb 10 2022 10:16AM    Diet Regular-  Prosource Gelatein Plus     Qty per Day:  3  Supplement Feeding Modality:  Oral  Entered: Feb 5 2022 10:13AM    
This patient has been assessed with a concern for Malnutrition and has been determined to have a diagnosis/diagnoses of Severe protein-calorie malnutrition.    This patient is being managed with:   Diet Regular-  Prosource Gelatein Plus     Qty per Day:  3  Supplement Feeding Modality:  Oral  Entered: Feb 5 2022 10:13AM    
This patient has been assessed with a concern for Malnutrition and has been determined to have a diagnosis/diagnoses of Severe protein-calorie malnutrition.    This patient is being managed with:   Diet NPO after Midnight-     NPO Start Date: 10-Feb-2022   NPO Start Time: 23:59  Entered: Feb 10 2022 10:16AM    Diet NPO after Midnight-     NPO Start Date: 09-Feb-2022   NPO Start Time: 23:59  Entered: Feb 9 2022  3:17PM    Diet Regular-  Prosource Gelatein Plus     Qty per Day:  3  Supplement Feeding Modality:  Oral  Entered: Feb 5 2022 10:13AM    
This patient has been assessed with a concern for Malnutrition and has been determined to have a diagnosis/diagnoses of Severe protein-calorie malnutrition.    This patient is being managed with:   Diet NPO after Midnight-     NPO Start Date: 10-Feb-2022   NPO Start Time: 23:59  Entered: Feb 10 2022 10:16AM    Diet Regular-  Prosource Gelatein Plus     Qty per Day:  3  Supplement Feeding Modality:  Oral  Entered: Feb 5 2022 10:13AM    
This patient has been assessed with a concern for Malnutrition and has been determined to have a diagnosis/diagnoses of Severe protein-calorie malnutrition.    This patient is being managed with:   Diet Regular-  Prosource Gelatein Plus     Qty per Day:  3  Supplement Feeding Modality:  Oral  Entered: Feb 5 2022 10:13AM    
This patient has been assessed with a concern for Malnutrition and has been determined to have a diagnosis/diagnoses of Severe protein-calorie malnutrition.    This patient is being managed with:   Diet Regular-  Prosource Gelatein Plus     Qty per Day:  3  Supplement Feeding Modality:  Oral  Entered: Feb 5 2022 10:13AM    
This patient has been assessed with a concern for Malnutrition and has been determined to have a diagnosis/diagnoses of Severe protein-calorie malnutrition.    This patient is being managed with:   Diet Regular-  Prosource Gelatein Plus     Qty per Day:  3  Supplement Feeding Modality:  Oral  Entered: Feb 5 2022 10:13AM      This patient has been assessed with a concern for Malnutrition and has been determined to have a diagnosis/diagnoses of Severe protein-calorie malnutrition.    This patient is being managed with:   Diet Regular-  Prosource Gelatein Plus     Qty per Day:  3  Supplement Feeding Modality:  Oral  Entered: Feb 5 2022 10:13AM

## 2022-02-11 NOTE — PROGRESS NOTE ADULT - PROVIDER SPECIALTY LIST ADULT
Hospitalist
Thoracic Surgery
Thoracic Surgery
Hospitalist
Hospitalist
Pulmonology
Thoracic Surgery
Heme/Onc
Heme/Onc
Hospitalist
Pulmonology
Pulmonology
Thoracic Surgery
Pulmonology
Pulmonology
Thoracic Surgery
Palliative Care
Heme/Onc

## 2022-02-11 NOTE — PROGRESS NOTE ADULT - SUBJECTIVE AND OBJECTIVE BOX
Subjective:  Pt in bed NAD Plan for OR this afternoon for pleurx adjustment     T(C): 36.6 (02-11-22 @ 08:37), Max: 36.7 (02-10-22 @ 19:56)  HR: 80 (02-11-22 @ 08:37) (77 - 80)  BP: 112/56 (02-11-22 @ 08:37) (94/50 - 113/52)  ABP: --  ABP(mean): --  RR: 19 (02-11-22 @ 08:37) (18 - 20)  SpO2: 100% (02-11-22 @ 08:37) (95% - 100%) 3 L NC   Wt(kg): --  CVP(mm Hg): --  CO: --  CI: --  PA: --                                              Tele: SR    Rt Pleurx Capped         02-11    139  |  101  |  31<H>  ----------------------------<  108<H>  5.1   |  36<H>  |  0.71    Ca    8.1<L>      11 Feb 2022 12:05    TPro  6.1  /  Alb  2.2<L>  /  TBili  0.3  /  DBili  x   /  AST  36  /  ALT  108<H>  /  AlkPhos  130<H>  02-10                               12.4   15.04 )-----------( 387      ( 11 Feb 2022 12:05 )             39.1        PT/INR - ( 10 Feb 2022 07:12 )   PT: 11.2 sec;   INR: 0.96 ratio         PTT - ( 10 Feb 2022 07:12 )  PTT:33.2 sec         CAPILLARY BLOOD GLUCOSE                 exam  Neuro:  Alert awake NAD  Pulm: decreased at bases + Rt Pleurx   CV: RRR S1 S2   Abd: soft   Extremities:  warm              Assessment:  76yFemale    with PAST MEDICAL & SURGICAL HISTORY:  Hypothyroidism    Fracture  L2 pathologic fracture    Osteoarthritis    Cholecystitis    Lung cancer  adenocarcinoma    History of other surgery  percutaneous endoscopic left pleural cavity drain    History of other surgery  gallbladder drain    H/O thyroidectomy    History of lumbar spinal fusion    S/P left oophorectomy    History of tonsillectomy          Plan:

## 2022-02-12 NOTE — CHART NOTE - NSCHARTNOTEFT_GEN_A_CORE
At this time, the patient's Goals are clear patient is receiving Chemotherapy, today inpatient, at this time family would like to continue to pursue all treatment options. Patient symptoms controlled at this time with Xanax for anxiety. I spoke with Dr. Foote will sign off at this time. Please re-consult if needed.
76y Female PMHx significant for Metastatic adenocarcinoma of the lung with metastasis to the spine, liver, and brain, Hypothyroidism s/p thyroid resection, h/o L2 pathologic compression fracture, GERD, chronic leg edema on Lasix, pulmonary fibrosis who was hospitalized at  1/24-1/28 for worsening SOB; (+) worsening R pleural effusion treated with VATS with Pleurx placement admitted 2/4 w hypoxia and pleurx not draining well. Last drained on Tuesday, 2/1/22, minimal drainage. Drainage also associated w pain. CXR reveals effusion on right w pleurx up to apex of lung. Drains when pt lying supine. Right vocal cord paralysis. 2/9 pleurx drainage 225 cc .  patient did not tolerate very well + discomfort.  patient declined palliative     Patient now s/p Rt Pleurx adjustment under local sedation in the OR.  post procedure patient was noted to be  unresponsive and agonal breathing.  Patient did sustain O2 Sat of 98% on 50% AFM.  The patient was given reversal agents by the anesthesiologist  and the patient had minimal response.  The patient was transferred to PACU where she continued to have agonal breathing.  The patients hemodynamics remained stable despite her breathing efforts.  Dr Stein called the patients  who instructed us to not intubate the patient but to give her Oxygen to keep her comfortable.  It was explained to the patients  that the prognosis is poor and that  he and the patients children should come to see their family member now.      Pt plan is for the patient to be transferred back to  with the current status of DNR/DNI
Called for death pronouncement of Ms. Yenni Torres . Pt was alone in room when I went to examine her. She was unresponsive to verbal or physical stimuli.    Examination  HEENT-Absent pupillary response. Pupils are fixed and dilated.  CVS-Absent heart sounds, no spontaneous respiration  Ext-Absent peripheral pulses    Plan  Pt pronounced dead at 10:40pm  on  2/11/2022  Family was notified.
Patient SP OR found to be unresponsive.   Seen at Bedside with . Agonal   Breathing. DNR/DNI, family wants comfort  only.     Start Morphine 2 mg x 1 dose now  Ativan 0.5 IVP q 6  Morphine Drip as directed  Morphine 2mg IVP PRN for Breakthrough  SOB  Robinul 0.2 IVP q 6     Discussed with  Albert who agrees with plan.

## 2022-02-23 DIAGNOSIS — C79.31 SECONDARY MALIGNANT NEOPLASM OF BRAIN: ICD-10-CM

## 2022-02-23 DIAGNOSIS — E87.5 HYPERKALEMIA: ICD-10-CM

## 2022-02-23 DIAGNOSIS — E43 UNSPECIFIED SEVERE PROTEIN-CALORIE MALNUTRITION: ICD-10-CM

## 2022-02-23 DIAGNOSIS — C78.7 SECONDARY MALIGNANT NEOPLASM OF LIVER AND INTRAHEPATIC BILE DUCT: ICD-10-CM

## 2022-02-23 DIAGNOSIS — F41.9 ANXIETY DISORDER, UNSPECIFIED: ICD-10-CM

## 2022-02-23 DIAGNOSIS — J96.01 ACUTE RESPIRATORY FAILURE WITH HYPOXIA: ICD-10-CM

## 2022-02-23 DIAGNOSIS — J90 PLEURAL EFFUSION, NOT ELSEWHERE CLASSIFIED: ICD-10-CM

## 2022-02-23 DIAGNOSIS — J98.01 ACUTE BRONCHOSPASM: ICD-10-CM

## 2022-02-23 DIAGNOSIS — E89.0 POSTPROCEDURAL HYPOTHYROIDISM: ICD-10-CM

## 2022-02-23 DIAGNOSIS — J84.10 PULMONARY FIBROSIS, UNSPECIFIED: ICD-10-CM

## 2022-02-23 DIAGNOSIS — R53.81 OTHER MALAISE: ICD-10-CM

## 2022-02-23 DIAGNOSIS — K59.00 CONSTIPATION, UNSPECIFIED: ICD-10-CM

## 2022-02-23 DIAGNOSIS — E03.9 HYPOTHYROIDISM, UNSPECIFIED: ICD-10-CM

## 2022-02-23 DIAGNOSIS — C79.51 SECONDARY MALIGNANT NEOPLASM OF BONE: ICD-10-CM

## 2022-02-23 DIAGNOSIS — Z66 DO NOT RESUSCITATE: ICD-10-CM

## 2022-02-23 DIAGNOSIS — C34.90 MALIGNANT NEOPLASM OF UNSPECIFIED PART OF UNSPECIFIED BRONCHUS OR LUNG: ICD-10-CM

## 2022-02-23 DIAGNOSIS — T85.618A BREAKDOWN (MECHANICAL) OF OTHER SPECIFIED INTERNAL PROSTHETIC DEVICES, IMPLANTS AND GRAFTS, INITIAL ENCOUNTER: ICD-10-CM

## 2022-02-23 DIAGNOSIS — J38.00 PARALYSIS OF VOCAL CORDS AND LARYNX, UNSPECIFIED: ICD-10-CM
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
## 2022-02-23 DIAGNOSIS — Z88.0 ALLERGY STATUS TO PENICILLIN: ICD-10-CM

## 2022-02-23 DIAGNOSIS — M19.90 UNSPECIFIED OSTEOARTHRITIS, UNSPECIFIED SITE: ICD-10-CM

## 2022-02-23 DIAGNOSIS — Z51.5 ENCOUNTER FOR PALLIATIVE CARE: ICD-10-CM
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
## 2022-02-23 DIAGNOSIS — R13.10 DYSPHAGIA, UNSPECIFIED: ICD-10-CM

## 2022-02-23 DIAGNOSIS — K21.9 GASTRO-ESOPHAGEAL REFLUX DISEASE WITHOUT ESOPHAGITIS: ICD-10-CM

## 2022-02-23 DIAGNOSIS — Z98.1 ARTHRODESIS STATUS: ICD-10-CM

## 2022-02-23 DIAGNOSIS — Z91.09 OTHER ALLERGY STATUS, OTHER THAN TO DRUGS AND BIOLOGICAL SUBSTANCES: ICD-10-CM

## 2022-02-23 PROBLEM — Z00.00 ENCOUNTER FOR PREVENTIVE HEALTH EXAMINATION: Noted: 2022-02-23

## 2022-02-23 LAB
CULTURE RESULTS: SIGNIFICANT CHANGE UP
SPECIMEN SOURCE: SIGNIFICANT CHANGE UP

## 2022-07-07 NOTE — DISCHARGE NOTE PROVIDER - NSDCCONDITION_GEN_ALL_CORE
[TextBox_4] : has a mild chronic cough\par uses nebulizer prn and Mucinex\par on Symbicort and spiriva\par Feeling relatively well.  Denies chest pain or significant shortness of breath.\par Feels respiratory status is stable.\par will get the COVID booster Stable

## 2022-07-18 NOTE — ED ADULT TRIAGE NOTE - CHIEF COMPLAINT QUOTE
Pt has massive left pleural effusion , pt had ct scan abd/pelvis with IV yesterday What Type Of Note Output Would You Prefer (Optional)?: Standard Output What Is The Reason For Today's Visit?: Full Body Skin Examination What Is The Reason For Today's Visit? (Being Monitored For X): concerning skin lesions on an annual basis

## 2022-07-18 NOTE — PHYSICAL THERAPY INITIAL EVALUATION ADULT - ASSISTIVE DEVICE:SUPINE/SIT, REHAB EVAL
[facility] ([facility])  Rehab Progress Note    Patient Name: Ayaz Huggins  MRN: [id number]  Age: 77 y.o. Sex: male  : 1945  Hospital Length of Stay: 6 days  Date of Service: 2022   Chief Complaint: Acute left hemispheric CVA s/p CPA and s/p left CEA on 2022    Subjective:     Basic Information  Admit Information: 77-year-old white male presented to [facility] ED on 2022 complaining of right-sided weakness.  PMH significant for DM type 2, CAD s/p CABG x2 in , and HTN.  CT head negative for hemorrhagic bleeding.  Received tPA and transferred to ICU for post tPA monitoring and treatment.  TTE with negative bubble study, but significant for grade 1 diastolic dysfunction.  Carotid ultrasound significant for left ICA stenosis.  MRI significant for last left hemispheric CVA and 3 other small punctate CVAs on the right hemisphere.  Tolerated left CEA on  without periprocedural complications.  Tolerated transfer to [facility] inpatient rehab unit on  without incident.  Today's Information: No acute events overnight.  Sitting in chair.  Reports good sleep and appetite.  Last BM .  Vital signs at goal with no recent recorded fevers.  Labs reviewed, CMP unremarkable.  Albumin and pre-albumin trending up.  H&H stable.  Magnesium level 1.4.  Otherwise unremarkable.  No imaging today.    Review of patient's allergies indicates:   Allergen Reactions    Penicillins         Current Facility-Administered Medications:     acetaminophen tablet 650 mg, 650 mg, Oral, Q4H PRN, Alexey A Kathryn, FNP    ALPRAZolam tablet 0.25 mg, 0.25 mg, Oral, TID PRN, Alexey A Kathryn, FNP    amLODIPine tablet 10 mg, 10 mg, Oral, Daily, Alexey A Kathryn, FNP, 10 mg at 22    aspirin tablet 325 mg, 325 mg, Oral, Daily, Alexey A Kathryn, FNP, 325 mg at 22    atorvastatin tablet 80 mg, 80 mg, Oral, QHS, Alexey A Kathryn, FNP, 80 mg at 22     benzonatate capsule 100 mg, 100 mg, Oral, TID PRN, Alexey A Kathryn, FNP    bisacodyL suppository 10 mg, 10 mg, Rectal, Daily PRN, Alexey A Kathryn, FNP    carvediloL tablet 25 mg, 25 mg, Oral, BID WM, Alexey A Kathryn, FNP, 25 mg at 07/18/22 0907    dextrose 50 % in water (D50W) injection 12.5 g, 12.5 g, Intravenous, PRN, Alexey A Kathryn, FNP    dextrose 50 % in water (D50W) injection 25 g, 25 g, Intravenous, PRN, Alexey A Kathryn, FNP    docusate sodium capsule 100 mg, 100 mg, Oral, BID PRN, Alexey A Kathryn, FNP    glucagon (human recombinant) injection 1 mg, 1 mg, Intramuscular, PRN, Alexey A Kathryn, FNP    glucose chewable tablet 16 g, 16 g, Oral, PRN, Alexey A Kathryn, FNP    glucose chewable tablet 24 g, 24 g, Oral, PRN, Alexey A Kathryn, FNP    hydrALAZINE injection 10 mg, 10 mg, Intravenous, Q4H PRN, Alexey A Kathryn, FNP    HYDROcodone-acetaminophen 5-325 mg per tablet 1 tablet, 1 tablet, Oral, Q6H PRN, Alexey A Kathryn, FNP    hydrOXYzine pamoate capsule 50 mg, 50 mg, Oral, QHS, Alexey A Kathryn, FNP, 50 mg at 07/17/22 2039    insulin aspart U-100 injection 1-10 Units, 1-10 Units, Subcutaneous, QID (AC + HS) PRN, Alexey A Kathryn, FNP, 2 Units at 07/13/22 1213    labetalol 20 mg/4 mL (5 mg/mL) IV syring, 10 mg, Intravenous, Q4H PRN, Alexey A Kathryn, FNP, 10 mg at 07/13/22 0904    lisinopriL tablet 5 mg, 5 mg, Oral, Daily, Alexey A Kathryn, FNP, 5 mg at 07/18/22 0907    magnesium sulfate in dextrose IVPB (premix) 2 g, 2 g, Intravenous, Once, ARVIND Garza    metFORMIN tablet 1,000 mg, 1,000 mg, Oral, BID WM, ARVIND Garcia, 1,000 mg at 07/18/22 0907    metoprolol injection 10 mg, 10 mg, Intravenous, Q2H PRN, ARVIND Garcia    nitroGLYCERIN SL tablet 0.4 mg, 0.4 mg, Sublingual, Q5 Min PRN, ARVIND Garcia    ondansetron disintegrating tablet 8 mg, 8 mg, Oral, Q6H PRN, ARVIND Garcia     "polyethylene glycol packet 17 g, 17 g, Oral, BID PRN, Alexey A Kathryn, FNP    promethazine tablet 25 mg, 25 mg, Oral, Q6H PRN, Alexey PAWEL Kathryn, FNP    zinc oxide 16% (WIL'S BUTT PASTE) topical ointment, , Topical (Top), TID, Alexey PAWEL Kathryn, FNP, Given at 07/17/22 1400     Review of Systems   Complete 12-point review of symptoms negative except for what's mentioned in HPI     Objective:     BP (!) 156/64   Pulse 67   Temp 98.4 °F (36.9 °C) (Oral)   Resp 16   Ht 5' 10.98" (1.803 m)   Wt 87.9 kg (193 lb 12.6 oz)   SpO2 97%   BMI 27.04 kg/m²        Intake/Output Summary (Last 24 hours) at 7/18/2022 1121  Last data filed at 7/18/2022 0900  Gross per 24 hour   Intake 960 ml   Output 650 ml   Net 310 ml       Physical Exam  Constitutional:       Appearance: Normal appearance.   HENT:      Head: Normocephalic.      Mouth/Throat:      Mouth: Mucous membranes are moist.   Eyes:      Pupils: Pupils are equal, round, and reactive to light.   Neck:      Comments: Left CEA incision clean intact with mild postsurgical swelling  Cardiovascular:      Rate and Rhythm: Normal rate and regular rhythm.      Heart sounds: Normal heart sounds.   Pulmonary:      Effort: Pulmonary effort is normal.      Breath sounds: Normal breath sounds.   Abdominal:      General: Bowel sounds are normal.      Palpations: Abdomen is soft.   Musculoskeletal:      Cervical back: Neck supple.      Comments: Generalized weakness and muscle atrophy.  Right upper extremity dependent swelling   Skin:     General: Skin is warm and dry.   Neurological:      Mental Status: He is alert and oriented to person, place, and time.      Comments: Right-sided hemiplegia.  Can move RUE against gravity.  LLE flaccid.    Psychiatric:         Mood and Affect: Mood normal.         Behavior: Behavior normal.         Thought Content: Thought content normal.         Judgment: Judgment normal.     *MD performed and documented physical examination   "     Lines/Drains/Airways     None               Labs  Recent Results (from the past 24 hour(s))   POCT glucose    Collection Time: 07/17/22 12:03 PM   Result Value Ref Range    POCT Glucose 113 (H) 70 - 110 mg/dL   POCT glucose    Collection Time: 07/17/22  4:07 PM   Result Value Ref Range    POCT Glucose 121 (H) 70 - 110 mg/dL   POCT glucose    Collection Time: 07/17/22  8:42 PM   Result Value Ref Range    POCT Glucose 148 (H) 70 - 110 mg/dL   POCT glucose    Collection Time: 07/18/22  5:44 AM   Result Value Ref Range    POCT Glucose 119 (H) 70 - 110 mg/dL   Prealbumin    Collection Time: 07/18/22  6:02 AM   Result Value Ref Range    Prealbumin 16.6 16.0 - 42.0 mg/dL   Comprehensive Metabolic Panel    Collection Time: 07/18/22  6:02 AM   Result Value Ref Range    Sodium Level 139 136 - 145 mmol/L    Potassium Level 4.6 3.5 - 5.1 mmol/L    Chloride 104 98 - 107 mmol/L    Carbon Dioxide 26 23 - 31 mmol/L    Glucose Level 113 82 - 115 mg/dL    Blood Urea Nitrogen 16.3 8.4 - 25.7 mg/dL    Creatinine 0.72 (L) 0.73 - 1.18 mg/dL    Calcium Level Total 9.6 8.8 - 10.0 mg/dL    Protein Total 6.7 5.8 - 7.6 gm/dL    Albumin Level 3.3 (L) 3.4 - 4.8 gm/dL    Globulin 3.4 2.4 - 3.5 gm/dL    Albumin/Globulin Ratio 1.0 (L) 1.1 - 2.0 ratio    Bilirubin Total 0.5 <=1.5 mg/dL    Alkaline Phosphatase 79 40 - 150 unit/L    Alanine Aminotransferase 32 0 - 55 unit/L    Aspartate Aminotransferase 23 5 - 34 unit/L    Estimated GFR-Non  >60 mls/min/1.73/m2   Magnesium    Collection Time: 07/18/22  6:02 AM   Result Value Ref Range    Magnesium Level 1.40 (L) 1.60 - 2.60 mg/dL   Phosphorus    Collection Time: 07/18/22  6:02 AM   Result Value Ref Range    Phosphorus Level 3.2 2.3 - 4.7 mg/dL   CBC with Differential    Collection Time: 07/18/22  6:02 AM   Result Value Ref Range    WBC 7.4 4.5 - 11.5 x10(3)/mcL    RBC 3.78 (L) 4.70 - 6.10 x10(6)/mcL    Hgb 11.2 (L) 14.0 - 18.0 gm/dL    Hct 32.6 (L) 42.0 - 52.0 %    MCV 86.2 80.0 -  94.0 fL    MCH 29.6 27.0 - 31.0 pg    MCHC 34.4 33.0 - 36.0 mg/dL    RDW 12.9 11.5 - 17.0 %    Platelet 253 130 - 400 x10(3)/mcL    MPV 9.8 7.4 - 10.4 fL    Neut % 54.3 %    Lymph % 33.0 %    Mono % 7.8 %    Eos % 4.1 %    Basophil % 0.5 %    Lymph # 2.44 0.6 - 4.6 x10(3)/mcL    Neut # 4.0 2.1 - 9.2 x10(3)/mcL    Mono # 0.58 0.1 - 1.3 x10(3)/mcL    Eos # 0.30 0 - 0.9 x10(3)/mcL    Baso # 0.04 0 - 0.2 x10(3)/mcL    IG# 0.02 0 - 0.04 x10(3)/mcL    IG% 0.3 %    NRBC% 0.0 %     Radiology  MRI brain without contrast on 07/03/2022 at 2:09 p.m., IMPRESSION: Small acute infarcts supratentorially, the largest is a left posterior frontal 3 cm cortical based infarct with petechial hemorrhage.  No mass effect or shift.  Discussed with Dr. Duncan. Otherwise no acute intracranial abnormalities.  Radiology  Transthoracic echo on 07/02/2022 at 2:40 p.m., IMPRESSION: The left ventricle is  with normal systolic function. The estimated ejection fraction is 60%. There is grade I diastolic dysfunction consistent with impaired relaxation. Left atrial pressure is normal.    Assessment/Plan:     77 y.o. WM admitted on 7/12/2022    Acute left hemispheric CVA   - MRI with 3 other small punctate CVAs on the right hemisphere  - s/p tPA and s/p left CEA on 7/8/2022  - continue                Aspirin 325 mg daily                Lipitor 80 mg at bedtime                Xarelto 20 mg daily   - right knee brace obtained on 7/13  - waiting for right AFO from orthotics  - defer to physiatry for rehab and pain management  - PT/OT/RT following      Carotid artery stenosis  - s/p left CEA on 07/08/2022  - continue                Aspirin 325 mg daily                Lipitor 80 mg at bedtime                Xarelto 20 mg daily                Norco 5 mg/325 mg q.6 hours p.r.n.  - to follow up with vascular surgery outpatient     HTN  - BP at goal!!  - discontinued Metoprolol succinate 50 mg daily on 7/13  - continue     Coreg 25 mg BID (initiated 7/13)  (increased 7/14)                Norvasc 10 mg daily                Lisinopril 20 mg daily                 Hydralazine 10 mg every 2 hours as needed for BP > 160/90                Labetalol 10 mg every 2 hours as needed for BP > 160/90     DM type II  - HgA1c with next labs  - continue                Metformin 1000 mg twice daily                ISS   - CBGs AC/HS     Normocytic anemia  - asymptomatic  - H/H stable   - no evidence of active bleeds  - will closely monitor and transfuse if needed      Constipation  - stable, loose stools reported on 7/15  - continue                Colace 100 mg b.i.d. PRN                MiraLax 17 g daily PRN     VTE Prophylaxis:  Xarelto 20 mg daily  COVID-19 testing:  Negative on 07/12/2022  COVID-19 vaccination status:  Vaccinated (Moderna):  x 3     POA: No  Living will: No  Contacts:  Pooja Huggins (wife) [phone]                     Eleonora Manzanares (daughter) [phone]     CODE STATUS: Full Code  Internal Medicine (attending): Weston Rivera MD  Physiatry (consulting): Angel Em MD     OUTPATIENT PROVIDERS  Angel Parham MD  Neurology: Jacque Agee MD  Vascular surgery:  Roxi Lama MD  Cardiology: Dionisio Marin MD     DISPOSITION: Condition stable.  Vital signs at goal.  Labs reviewed.  CMP unremarkable.  Hypomagnesemia.  Replace magnesium with 2 g IVP once now.  Albumin and pre-albumin trending up.  H&H stable.  Good glycemic control.  Bowel management, sleep hygiene, and appetite at goal.  Continues to progress well with therapies.  Monitor closely.  Notify of any acute changes.     Dorothea Calvert NP conducted independent physical examination and assisted with medical documentation.    Total time spent on this encounter including chart review and direct MD + NP 1-on-1 patient interaction: 42 minutes   Over 50% of this time was spent in counseling and coordination of care              bed rails

## 2022-08-30 NOTE — PHYSICAL THERAPY INITIAL EVALUATION ADULT - DIAGNOSIS, PT EVAL
Impression: Dry eye syndrome of bilateral lacrimal glands: H04.123. Plan: artificial tears OU. RT pleural effusion s/p RT pigtail placement, s/p sx as above

## 2022-10-26 NOTE — PATIENT PROFILE ADULT - NSTRANSFERBELONGINGSRESP_GEN_A_NUR
How Severe Is The Dermatochalasis?: moderate
Is This A New Presentation, Or A Follow-Up?: Dermatochalasis
yes

## 2022-12-21 NOTE — DIETITIAN INITIAL EVALUATION ADULT. - OTHER INFO
24h/No...
77yo female with PMH significant for metastatic adenocarcinoma of the lung to spine, liver, and brain, hypothyroidism, L2 pathologic compression Fx, GERD p/w 1 week h/o dyspnea.  Pt admitted with progressive dyspnea due to new moderate Rt pleural effusion 2/2 malignant with underlying metastatic adenocarcinoma of lung.  OSMAN.  was seen by palliative care; full code.

## 2023-02-11 NOTE — DISCHARGE NOTE PROVIDER - NSDCHHHOMEBOUND_GEN_ALL_CORE
Fall risk PRINCIPAL DISCHARGE DIAGNOSIS  Diagnosis: Adult failure to thrive  Assessment and Plan of Treatment: Eat meals that are rich in protein and calories.  Nutritional supplementation is very important to help provide adequate protein and energy intake.  A prescription for Ensure 3 times a day has been provided for you.  Drinking dietary supplements between meals rather than with meals may be more effective in increasing energy consumption.  Drink plenty of fluids to avoid dehydration.      SECONDARY DISCHARGE DIAGNOSES  Diagnosis: Acute UTI  Assessment and Plan of Treatment: You had a bladder and/or kidney infection. You completed a course of antibiotics during admission.  Avoid medications that will cause urinary retention such as benadryl whenever possible.  Drink adequate fluids - there is no harm in drinking cranberry juice.  Females should urinate right after sex.  Contact your doctor if you experience new symptoms or continued symptoms after treatment, such as pain or burning with urination, frequent urination, urinary urgency, blood in the urine, fever, back pain, and/or nausea vomiting.    Diagnosis: Hypertension  Assessment and Plan of Treatment: Continue to follow a low salt/sodium diet.  Perform physical activities as tolerated in consultation with your Primary Care Provider and physical therapist.  Take all medications as prescribed.  Follow up with your medical doctor for routine blood pressure monitoring at your next visit.  Notify your doctor if you have any of the following symptoms:  Dizziness, lightheadedness, blurry vision, headache, chest pain, or shortness of breath.    Diagnosis: Dementia  Assessment and Plan of Treatment: You have a history of dementia  Continue memantine as prescribed

## 2023-03-13 NOTE — PROGRESS NOTE ADULT - SUBJECTIVE AND OBJECTIVE BOX
PACU Admission/Event Note POD#3. Pt seen resting on chair. Pt was seen ambulating with physical therapy earlier today. Pt has incisional site soreness and pain of the back. Pain is worse with movement. Pt denies lower extremities pain. Pt has intermittent numbness of b/l quads which has improved compared to yesterday. Pain is tolerable with current medication. Pt voided on own without complications. Tolerating current diet.     PE  Gen appearance: NAD today  Motor strength: 5-/5 of b/l HF and quads 2/2 back pain. Otherwise 5/5 of b/l EHL, gastrocs, and ant tibs.  Sensation: intact to light touch bilaterally. No numbness on exam  No calf tenderness bilaterally. Venodynes on bilaterally  Incisional site: clean and dry dressing noted  Bulb:40cc and right hemovac 160cc kept per Plastic surgery    Plan  Afebrile,   WBC:18.16H decreased from yesterday. H/H: 11.4/35.5  F/u OR path---(fresh frozen - adenocarcinoma). Continue management per Medicine and Oncology team.  Gen surgery (Dr. Linda) evaluated for distended Gallbladder--Gallbladder hydrops. Pt was recommended decompression of gallbladder with percutaneous drainage. Risks of delaying care such as perforation and sepsis was discussed. Pt is not interested at this time.  (Albert called service) regarding gallbladder procedure.  would like to discuss with Dr. Linda in regards to procedure that was recommended. RN notified.   Mobilize with physical therapy  Continue Venodynes  Discussed patient status with  (Albert)-per patient request.   Discussed case with Dr. Mcgee and Dr. Carey

## 2023-06-27 NOTE — PATIENT PROFILE ADULT - FLU SEASON?
The patient has been examined and the H&P has been reviewed:    I concur with the findings and no changes have occurred since H&P was written.    Anesthesia/Surgery risks, benefits and alternative options discussed and understood by patient/family.    Lab Results   Component Value Date    WBC 5.53 06/23/2023    HGB 15.4 06/23/2023    HCT 46.8 06/23/2023    MCV 84 06/23/2023     06/23/2023       Lab Results   Component Value Date    INR 1.1 06/23/2023     BMP  Lab Results   Component Value Date     06/23/2023    K 3.8 06/23/2023     06/23/2023    CO2 27 06/23/2023    BUN 11 06/23/2023    CREATININE 0.9 06/23/2023    CALCIUM 10.4 06/23/2023    ANIONGAP 11 06/23/2023    EGFRNORACEVR >60 06/23/2023           There are no hospital problems to display for this patient.    
Yes...

## 2024-03-14 NOTE — PROGRESS NOTE ADULT - PROBLEM SELECTOR PROBLEM 6
The form was sent to the Physician's Nurse MSG Pool via [facility] for review and signature. Completed form will be mailed to patient's address on file if no authorization is received. Depression

## 2024-08-03 NOTE — BEHAVIORAL HEALTH ASSESSMENT NOTE - DIFFERENTIAL
Reason for Call:  Appointment Request    Patient requesting this type of appt:  Hospital/ED Follow-Up     Requested provider: Sun Person    Reason patient unable to be scheduled: Not within requested timeframe    When does patient want to be seen/preferred time: End of August or 1st week of Sept.     Comments: Please follow-up with this patient about rescheduling appt. Scheduled for 08/29/2024 or canceling or clarifying the need for appt. (Per notes from Dr. Person on 07/29/2024)    Could we send this information to you in [facility] or would you prefer to receive a phone call?:   Patient would prefer a phone call   Okay to leave a detailed message?: Yes at Cell number on file:    Telephone Information:   Mobile [phone]       Call taken on 8/3/2024 at 11:33 AM by Marcela Schaefer    adjustment disorder vs depression vs. anxiety

## 2024-08-05 NOTE — DISCHARGE NOTE NURSING/CASE MANAGEMENT/SOCIAL WORK - NSSCCONTNUM_GEN_ALL_CORE
Rx Refill Note  Requested Prescriptions     Pending Prescriptions Disp Refills    docusate sodium (COLACE) 100 MG capsule [Pharmacy Med Name: DOCUSATE SODIUM 100 MG SOFTGEL] 60 capsule 2     Sig: TAKE 1 CAPSULE BY MOUTH TWICE A DAY      Last office visit with prescribing clinician: 3/11/2024   Last telemedicine visit with prescribing clinician: Visit date not found   Next office visit with prescribing clinician: Visit date not found                         Would you like a call back once the refill request has been completed: [] Yes [] No    If the office needs to give you a call back, can they leave a voicemail: [] Yes [] No    Morgan Venegas MA  08/05/24, 11:44 CDT   [phone]

## 2024-09-12 NOTE — DISCHARGE NOTE NURSING/CASE MANAGEMENT/SOCIAL WORK - PATIENT PORTAL LINK FT
What Type Of Note Output Would You Prefer (Optional)?: Standard Output Has Your Skin Lesion Been Treated?: not been treated Is This A New Presentation, Or A Follow-Up?: Skin Lesion You can access the FollowMyHealth Patient Portal offered by [facility] by registering at the following website: [email or web address]. By joining [employer]’s FollowMyHealth portal, you will also be able to view your health information using other applications (apps) compatible with our system.

## 2024-11-04 NOTE — PROCEDURAL SAFETY CHECKLIST WITH OR WITHOUT SEDATION - NSPROCEDPERFORMDFREE_GEN_ALL_CORE
----- Message from Nayda Ramirez sent at 11/4/2024  9:11 AM EST -----  Please call the patient regarding his abnormal result.  Your heart function is still the same it has not improved or decreased.  
Called patient and information and education given.  All questions answered, annual appt scheduled
Pts last depo was in April. And she has yet to have a period. She would like a call back because she has questions.  Please advise
ultrasound guided left pigtail catheter

## 2025-02-07 NOTE — PHYSICAL THERAPY INITIAL EVALUATION ADULT - PLANNED THERAPY INTERVENTIONS, PT EVAL
Eval, amb, transfers, bed mobility x 20'. Pt left sitting at EOB with RN present. Pt c/o pain CT site 5/10. Will cont to follow pt and increase as tolerated./bed mobility training/gait training/transfer training
The patient has been re-examined and I agree with the above assessment or I updated with my findings.
bed mobility training/gait training/transfer training